# Patient Record
Sex: MALE | Race: BLACK OR AFRICAN AMERICAN | Employment: OTHER | URBAN - METROPOLITAN AREA
[De-identification: names, ages, dates, MRNs, and addresses within clinical notes are randomized per-mention and may not be internally consistent; named-entity substitution may affect disease eponyms.]

---

## 2017-02-22 ENCOUNTER — HOSPITAL ENCOUNTER (EMERGENCY)
Age: 72
Discharge: HOME | End: 2017-02-23
Attending: EMERGENCY MEDICINE | Admitting: EMERGENCY MEDICINE
Payer: MEDICARE

## 2017-02-22 VITALS
RESPIRATION RATE: 12 BRPM | TEMPERATURE: 99.5 F | WEIGHT: 132 LBS | HEART RATE: 82 BPM | BODY MASS INDEX: 19.49 KG/M2 | DIASTOLIC BLOOD PRESSURE: 79 MMHG | SYSTOLIC BLOOD PRESSURE: 109 MMHG | OXYGEN SATURATION: 99 %

## 2017-02-22 DIAGNOSIS — N39.0 URINARY TRACT INFECTION WITH HEMATURIA, SITE UNSPECIFIED: Primary | ICD-10-CM

## 2017-02-22 DIAGNOSIS — R31.9 HEMATURIA: ICD-10-CM

## 2017-02-22 DIAGNOSIS — R33.9 URINARY RETENTION: ICD-10-CM

## 2017-02-22 DIAGNOSIS — R31.9 URINARY TRACT INFECTION WITH HEMATURIA, SITE UNSPECIFIED: Primary | ICD-10-CM

## 2017-02-22 LAB
ABO + RH BLD: NORMAL
ALBUMIN SERPL BCP-MCNC: 3.4 G/DL (ref 3.4–5)
ALBUMIN/GLOB SERPL: 0.8 {RATIO} (ref 0.8–1.7)
ALP SERPL-CCNC: 69 U/L (ref 45–117)
ALT SERPL-CCNC: 28 U/L (ref 16–61)
ANION GAP BLD CALC-SCNC: 5 MMOL/L (ref 3–18)
APPEARANCE UR: ABNORMAL
APTT PPP: 38.8 SEC (ref 23–36.4)
AST SERPL W P-5'-P-CCNC: 13 U/L (ref 15–37)
BACTERIA URNS QL MICRO: ABNORMAL /HPF
BASOPHILS # BLD AUTO: 0 K/UL (ref 0–0.06)
BASOPHILS # BLD: 0 % (ref 0–2)
BILIRUB DIRECT SERPL-MCNC: <0.1 MG/DL (ref 0–0.2)
BILIRUB SERPL-MCNC: 0.3 MG/DL (ref 0.2–1)
BILIRUB UR QL: ABNORMAL
BLOOD GROUP ANTIBODIES SERPL: NORMAL
BUN SERPL-MCNC: 27 MG/DL (ref 7–18)
BUN/CREAT SERPL: 35 (ref 12–20)
CALCIUM SERPL-MCNC: 9.3 MG/DL (ref 8.5–10.1)
CHLORIDE SERPL-SCNC: 107 MMOL/L (ref 100–108)
CO2 SERPL-SCNC: 29 MMOL/L (ref 21–32)
COLOR UR: ABNORMAL
CREAT SERPL-MCNC: 0.78 MG/DL (ref 0.6–1.3)
DIFFERENTIAL METHOD BLD: ABNORMAL
EOSINOPHIL # BLD: 0.1 K/UL (ref 0–0.4)
EOSINOPHIL NFR BLD: 1 % (ref 0–5)
EPITH CASTS URNS QL MICRO: NEGATIVE /LPF (ref 0–5)
ERYTHROCYTE [DISTWIDTH] IN BLOOD BY AUTOMATED COUNT: 17.7 % (ref 11.6–14.5)
GLOBULIN SER CALC-MCNC: 4.1 G/DL (ref 2–4)
GLUCOSE SERPL-MCNC: 101 MG/DL (ref 74–99)
GLUCOSE UR STRIP.AUTO-MCNC: NEGATIVE MG/DL
HCT VFR BLD AUTO: 34.7 % (ref 36–48)
HGB BLD-MCNC: 11 G/DL (ref 13–16)
HGB UR QL STRIP: ABNORMAL
INR PPP: 1.1 (ref 0.8–1.2)
KETONES UR QL STRIP.AUTO: ABNORMAL MG/DL
LEUKOCYTE ESTERASE UR QL STRIP.AUTO: ABNORMAL
LIPASE SERPL-CCNC: 189 U/L (ref 73–393)
LYMPHOCYTES # BLD AUTO: 10 % (ref 21–52)
LYMPHOCYTES # BLD: 1.2 K/UL (ref 0.9–3.6)
MCH RBC QN AUTO: 25.6 PG (ref 24–34)
MCHC RBC AUTO-ENTMCNC: 31.7 G/DL (ref 31–37)
MCV RBC AUTO: 80.9 FL (ref 74–97)
MONOCYTES # BLD: 0.6 K/UL (ref 0.05–1.2)
MONOCYTES NFR BLD AUTO: 5 % (ref 3–10)
MUCOUS THREADS URNS QL MICRO: ABNORMAL /LPF
NEUTS SEG # BLD: 10.3 K/UL (ref 1.8–8)
NEUTS SEG NFR BLD AUTO: 84 % (ref 40–73)
NITRITE UR QL STRIP.AUTO: POSITIVE
PH UR STRIP: 7.5 [PH] (ref 5–8)
PLATELET # BLD AUTO: 356 K/UL (ref 135–420)
PMV BLD AUTO: 10.3 FL (ref 9.2–11.8)
POTASSIUM SERPL-SCNC: 4 MMOL/L (ref 3.5–5.5)
PROT SERPL-MCNC: 7.5 G/DL (ref 6.4–8.2)
PROT UR STRIP-MCNC: >300 MG/DL
PROTHROMBIN TIME: 13.6 SEC (ref 11.5–15.2)
RBC # BLD AUTO: 4.29 M/UL (ref 4.7–5.5)
RBC #/AREA URNS HPF: ABNORMAL /HPF (ref 0–5)
SODIUM SERPL-SCNC: 141 MMOL/L (ref 136–145)
SP GR UR REFRACTOMETRY: 1.02 (ref 1–1.03)
SPECIMEN EXP DATE BLD: NORMAL
UROBILINOGEN UR QL STRIP.AUTO: 1 EU/DL (ref 0.2–1)
WBC # BLD AUTO: 12.3 K/UL (ref 4.6–13.2)
WBC URNS QL MICRO: ABNORMAL /HPF (ref 0–4)

## 2017-02-22 PROCEDURE — 96365 THER/PROPH/DIAG IV INF INIT: CPT

## 2017-02-22 PROCEDURE — 85025 COMPLETE CBC W/AUTO DIFF WBC: CPT | Performed by: PHYSICIAN ASSISTANT

## 2017-02-22 PROCEDURE — 74011250636 HC RX REV CODE- 250/636: Performed by: PHYSICIAN ASSISTANT

## 2017-02-22 PROCEDURE — 86900 BLOOD TYPING SEROLOGIC ABO: CPT | Performed by: PHYSICIAN ASSISTANT

## 2017-02-22 PROCEDURE — 80076 HEPATIC FUNCTION PANEL: CPT | Performed by: PHYSICIAN ASSISTANT

## 2017-02-22 PROCEDURE — 85730 THROMBOPLASTIN TIME PARTIAL: CPT | Performed by: PHYSICIAN ASSISTANT

## 2017-02-22 PROCEDURE — 81001 URINALYSIS AUTO W/SCOPE: CPT | Performed by: PHYSICIAN ASSISTANT

## 2017-02-22 PROCEDURE — 51702 INSERT TEMP BLADDER CATH: CPT

## 2017-02-22 PROCEDURE — 80048 BASIC METABOLIC PNL TOTAL CA: CPT | Performed by: PHYSICIAN ASSISTANT

## 2017-02-22 PROCEDURE — 77030005530 HC CATH URETH FOL40 BARD -B

## 2017-02-22 PROCEDURE — 99285 EMERGENCY DEPT VISIT HI MDM: CPT

## 2017-02-22 PROCEDURE — 85610 PROTHROMBIN TIME: CPT | Performed by: PHYSICIAN ASSISTANT

## 2017-02-22 PROCEDURE — 83690 ASSAY OF LIPASE: CPT | Performed by: PHYSICIAN ASSISTANT

## 2017-02-22 RX ORDER — LEVOFLOXACIN 750 MG/1
750 TABLET ORAL DAILY
Qty: 5 TAB | Refills: 0 | Status: SHIPPED | OUTPATIENT
Start: 2017-02-22 | End: 2017-02-27

## 2017-02-22 RX ORDER — LEVOFLOXACIN 5 MG/ML
750 INJECTION, SOLUTION INTRAVENOUS ONCE
Status: COMPLETED | OUTPATIENT
Start: 2017-02-22 | End: 2017-02-22

## 2017-02-22 RX ADMIN — LEVOFLOXACIN 750 MG: 5 INJECTION, SOLUTION INTRAVENOUS at 09:09

## 2017-02-22 NOTE — ED NOTES
Bedside shift change report given to Solomon Hampton RN (oncoming nurse) by Zhen Rdz RN (offgoing nurse). Report included the following information SBAR.

## 2017-02-22 NOTE — ED PROVIDER NOTES
HPI Comments: Pt is a 71yo male with hx of HTN, dysphagia, brain aneurysm, CVA, stroke, bladder CA presenting to ED from assisted living with rectal bleeding per NH staff. Pt reportedly had clot noted near perineum. Pt has left sided paralysis from past stroke. LBM yesterday. Pt states he was told his \"blood was low\" a few weeks ago. Denies hx of transfusion. Pt denies abdominal pain, back pain, headache, dizziness, fever, chills, SOB, CP, urinary symptoms including hematuria, dysuria. Pt states he has a hx of hemorrhoids. States he had indwelling catheter placed which was removed 8 months ago. Pratik recent diarrhea, melena, bleeding from penis or in stool/rectum. Brianna Prince MD PCP    Patient is a 73 y.o. male presenting with anal bleeding. The history is provided by the patient. Rectal Bleeding   Pertinent negatives include no abdominal pain, no headaches and no shortness of breath.         Past Medical History:   Diagnosis Date    Basal ganglia disease     Bladder cancer ([facility] 3/13/14    Bladder tumor     Brain aneurysm     CVA (cerebral vascular accident) ([facility] [address] [facility] [address]    Dysphagia     Webb catheter in place     HTN (hypertension)     Hx MRSA infection     Neurogenic bladder     Retention, urine     Stroke [facility])        Past Surgical History:   Procedure Laterality Date    HX GI      PEG TUBE--REMOVED    HX HEENT      H/O TRACH    HX UROLOGICAL      super pubic tube    HX UROLOGICAL  3/13/14    TURBT, Dr. Nabila Luke, [place]    HX UROLOGICAL  10/8/15    Cysto, Dr. Cynthia Jo, [facility]         Family History:   Problem Relation Age of Onset    Hypertension Mother     Hypertension Father        Social History     Social History    Marital status: UNKNOWN     Spouse name: N/A    Number of children: 2    Years of education: 15     Occupational History    other Retired     Social History Main Topics    Smoking status: Former Smoker    Smokeless tobacco: Never Used    Alcohol use No    Drug use: No    Sexual activity: Not Currently     Partners: Female     Other Topics Concern     Service No    Blood Transfusions No    Caffeine Concern No    Occupational Exposure No    Hobby Hazards No    Sleep Concern No    Stress Concern No    Weight Concern No    Special Diet No    Back Care No    Exercise No    Bike Helmet No    Seat Belt Yes    Self-Exams No     Social History Narrative         ALLERGIES: Pcn [penicillins]    Review of Systems   Constitutional: Negative for chills and fever. HENT: Negative for congestion. Respiratory: Negative for cough, chest tightness, shortness of breath and wheezing. Gastrointestinal: Positive for anal bleeding. Negative for abdominal distention, abdominal pain, blood in stool, constipation, diarrhea, nausea, rectal pain and vomiting. Genitourinary: Positive for difficulty urinating, hematuria and penile pain. Negative for discharge, dysuria, penile swelling, scrotal swelling, testicular pain and urgency. Musculoskeletal: Positive for gait problem. Negative for back pain. Neurological: Negative for dizziness, weakness and headaches. Psychiatric/Behavioral: Negative for behavioral problems and confusion. Vitals:    02/22/17 0644 02/22/17 0645   BP: (!) 136/8 123/81   Pulse: 62    Resp: 14    Temp: 98.8 °F (37.1 °C)    SpO2: 100% 100%   Weight: 59.9 kg (132 lb)             Physical Exam   Constitutional: He is oriented to person, place, and time. He appears well-developed and well-nourished. HENT:   Head: Normocephalic and atraumatic. Eyes: Conjunctivae are normal. Pupils are equal, round, and reactive to light. Neck: Normal range of motion. Cardiovascular: Normal rate, regular rhythm and normal heart sounds. Pulmonary/Chest: Effort normal. No respiratory distress. He has no wheezes. He exhibits no tenderness. Abdominal: Soft. Bowel sounds are normal. He exhibits no distension and no mass.  There is no tenderness. There is no rebound and no guarding. Genitourinary: Prostate normal and penis normal. Rectal exam shows guaiac positive stool. No penile tenderness. Genitourinary Comments: No external hemorrhoids or internal hemorrhoids appreciated. No blood in rectum or anus. No fissures Penis is normal in appearance, no drainage or bleeding from meatus. No swelling, no TTP    Guaiac pos however blood noted to diaper and to surrounding skin   Musculoskeletal:   Contractures noted to upper and lower extremity, muscle atrophy noted to UE and LE bilaterally   Neurological: He is alert and oriented to person, place, and time. No cranial nerve deficit. Skin: Skin is warm. Nursing note and vitals reviewed. MDM  Number of Diagnoses or Management Options  Hematuria:   Urinary retention:   Urinary tract infection with hematuria, site unspecified:   Diagnosis management comments: 71yo male with blood clot noted by NH staff PTA- unsure of origin of blood. PE unremarkable. Vitals normal, pt is alert, non-toxic, well hydrated and appropriate. Will obtain Labs, ua and reassess patient. Labs Reviewed  CBC WITH AUTOMATED DIFF - Abnormal; Notable for the following:      RBC                           4.29 (*)               HGB                           11.0 (*)               HCT                           34.7 (*)               RDW                           17.7 (*)               NEUTROPHILS                   84 (*)                 LYMPHOCYTES                   10 (*)                 ABS.  NEUTROPHILS              10.3 (*)            All other components within normal limits  METABOLIC PANEL, BASIC - Abnormal; Notable for the following:      Glucose                       101 (*)                BUN                           27 (*)                 BUN/Creatinine ratio          35 (*)              All other components within normal limits  PTT - Abnormal; Notable for the following:      aPTT                          38.8 (*)            All other components within normal limits  HEPATIC FUNCTION PANEL - Abnormal; Notable for the following:      Globulin                      4.1 (*)                AST (SGOT)                    13 (*)              All other components within normal limits  URINALYSIS W/ RFLX MICROSCOPIC - Abnormal; Notable for the following:      Protein                       >300 (*)               Ketone                        TRACE (*)               Bilirubin                     MODERATE (*)               Blood                         LARGE (*)               Nitrites                      POSITIVE (*)               Leukocyte Esterase            TRACE (*)            All other components within normal limits  URINE MICROSCOPIC ONLY - Abnormal; Notable for the following:      Bacteria                      4+ (*)                 Mucus                         1+ (*)              All other components within normal limits  PROTHROMBIN TIME + INR  LIPASE  TYPE & SCREEN    Pt catheterized by ED nurse for urine specimen as pt has been unable to void. Discussed case with Dr. Katie Langley who agrees with ED management. Does not recommend further ED work up at this time and agrees that patient can be discharged back to [facility] with catheter and antibiotic, urology f/u in 48-72 hours. Recommended consulted NH PCP to discuss plan. 12:11 PM   PCP paged 3 times over 2 hour window with no call back. Discussed this with Dr. Katie Langley, he agrees to discharge patient without consultation. Rechecked the patient and updated him on all current results. No new complaints, abdomen remains soft, non-tender. No rectal bleeding, no melena or diarrhea. Impression: UTI, abdominal distension, hematuria. UTI as cause of urinary hematuria/retention. Plan: cipro Rx, perales catheter for retention and urology f/u in 2-3 days    Discussed proper way to take medications.  Discussed treatment plan, return precautions, symptomatic relief, and expected time to improvement. All questions answered. Patient is stable for discharge and outpatient management. NH staff will receive f/u info from ED nurse. Christina Whittington PA-C 12:14 PM       ED Course       Procedures    Diagnosis:   1. Urinary tract infection with hematuria, site unspecified    2. Urinary retention    3. Hematuria          Disposition: discharge home    Follow-up Information     Follow up With Details Comments [address], [place] In 2 days  [address] [address]      [facility] EMERGENCY DEPT  As needed, If symptoms worsen [address] [address]  [phone]    Urology Clinic Schedule an appointment as soon as possible for a visit in 2 days  [address] [place] [address] Rd  [phone]          Patient's Medications   Start Taking    LEVOFLOXACIN (LEVAQUIN) 750 MG TABLET    Take 1 Tab by mouth daily for 5 days. Continue Taking    CALCIUM CITRATE-VITAMIN D3 (CITRACAL+D) 315-200 MG-UNIT TAB    Take 1 Tab by mouth daily (with breakfast). CHOLECALCIFEROL, VITAMIN D3, (VITAMIN D3) 1,000 UNIT CAP    Take  by mouth. FERROUS SULFATE (IRON, FERROUS SULFATE,) 325 MG (65 MG IRON) TABLET    Take 1 Tab by mouth Daily (before breakfast). LISINOPRIL (PRINIVIL, ZESTRIL) 5 MG TABLET    Take 1 Tab by mouth daily. MULTIVIT, IRON, MIN NO. 8, FA (THERAGRAN-M PO)    Take  by mouth. OXYBUTYNIN CHLORIDE XL (DITROPAN XL) 10 MG CR TABLET    Take 1 Tab by mouth two (2) times a day. OXYCODONE IR (ROXICODONE) 10 MG TAB IMMEDIATE RELEASE TABLET    Take 1 Tab by mouth every six (6) hours as needed. Max Daily Amount: 40 mg. VITAMIN C-VITAMIN E (CRANBERRY CONCENTRATE) CAP    Take  by mouth.    These Medications have changed    No medications on file   Stop Taking    No medications on file

## 2017-02-24 ENCOUNTER — HOSPITAL ENCOUNTER (EMERGENCY)
Age: 72
Discharge: ANOTHER HOSPITAL | End: 2017-02-25
Attending: EMERGENCY MEDICINE | Admitting: EMERGENCY MEDICINE
Payer: MEDICARE

## 2017-02-24 DIAGNOSIS — R31.9 HEMATURIA: Primary | ICD-10-CM

## 2017-02-24 DIAGNOSIS — N39.0 URINARY TRACT INFECTION, SITE UNSPECIFIED: ICD-10-CM

## 2017-02-24 LAB
ANION GAP BLD CALC-SCNC: 6 MMOL/L (ref 3–18)
APPEARANCE UR: ABNORMAL
BACTERIA URNS QL MICRO: ABNORMAL /HPF
BASOPHILS # BLD AUTO: 0 K/UL (ref 0–0.1)
BASOPHILS # BLD: 0 % (ref 0–2)
BILIRUB UR QL: ABNORMAL
BUN SERPL-MCNC: 23 MG/DL (ref 7–18)
BUN/CREAT SERPL: 27 (ref 12–20)
CALCIUM SERPL-MCNC: 9 MG/DL (ref 8.5–10.1)
CHLORIDE SERPL-SCNC: 107 MMOL/L (ref 100–108)
CO2 SERPL-SCNC: 28 MMOL/L (ref 21–32)
COLOR UR: ABNORMAL
CREAT SERPL-MCNC: 0.85 MG/DL (ref 0.6–1.3)
DIFFERENTIAL METHOD BLD: ABNORMAL
EOSINOPHIL # BLD: 0.2 K/UL (ref 0–0.4)
EOSINOPHIL NFR BLD: 2 % (ref 0–5)
EPITH CASTS URNS QL MICRO: POSITIVE /LPF (ref 0–5)
ERYTHROCYTE [DISTWIDTH] IN BLOOD BY AUTOMATED COUNT: 17.9 % (ref 11.6–14.5)
GLUCOSE SERPL-MCNC: 112 MG/DL (ref 74–99)
GLUCOSE UR STRIP.AUTO-MCNC: NEGATIVE MG/DL
HCT VFR BLD AUTO: 33.2 % (ref 36–48)
HGB BLD-MCNC: 10.6 G/DL (ref 13–16)
HGB UR QL STRIP: ABNORMAL
KETONES UR QL STRIP.AUTO: NEGATIVE MG/DL
LEUKOCYTE ESTERASE UR QL STRIP.AUTO: ABNORMAL
LYMPHOCYTES # BLD AUTO: 10 % (ref 21–52)
LYMPHOCYTES # BLD: 1.2 K/UL (ref 0.9–3.6)
MCH RBC QN AUTO: 25.8 PG (ref 24–34)
MCHC RBC AUTO-ENTMCNC: 31.9 G/DL (ref 31–37)
MCV RBC AUTO: 80.8 FL (ref 74–97)
MONOCYTES # BLD: 0.8 K/UL (ref 0.05–1.2)
MONOCYTES NFR BLD AUTO: 7 % (ref 3–10)
NEUTS SEG # BLD: 9.9 K/UL (ref 1.8–8)
NEUTS SEG NFR BLD AUTO: 81 % (ref 40–73)
NITRITE UR QL STRIP.AUTO: POSITIVE
PH UR STRIP: 5 [PH] (ref 5–8)
PLATELET # BLD AUTO: 339 K/UL (ref 135–420)
PLATELET COMMENTS,PCOM: ABNORMAL
PMV BLD AUTO: 10.6 FL (ref 9.2–11.8)
POTASSIUM SERPL-SCNC: 3.9 MMOL/L (ref 3.5–5.5)
PROT UR STRIP-MCNC: 30 MG/DL
RBC # BLD AUTO: 4.11 M/UL (ref 4.7–5.5)
RBC #/AREA URNS HPF: ABNORMAL /HPF (ref 0–5)
RBC MORPH BLD: ABNORMAL
RBC MORPH BLD: ABNORMAL
SODIUM SERPL-SCNC: 141 MMOL/L (ref 136–145)
SP GR UR REFRACTOMETRY: 1.03 (ref 1–1.03)
UROBILINOGEN UR QL STRIP.AUTO: 1 EU/DL (ref 0.2–1)
WBC # BLD AUTO: 12.1 K/UL (ref 4.6–13.2)
WBC URNS QL MICRO: POSITIVE /HPF (ref 0–4)

## 2017-02-24 PROCEDURE — 85025 COMPLETE CBC W/AUTO DIFF WBC: CPT | Performed by: EMERGENCY MEDICINE

## 2017-02-24 PROCEDURE — 80048 BASIC METABOLIC PNL TOTAL CA: CPT | Performed by: EMERGENCY MEDICINE

## 2017-02-24 PROCEDURE — 99285 EMERGENCY DEPT VISIT HI MDM: CPT

## 2017-02-24 PROCEDURE — 81001 URINALYSIS AUTO W/SCOPE: CPT | Performed by: EMERGENCY MEDICINE

## 2017-02-25 VITALS
OXYGEN SATURATION: 99 % | TEMPERATURE: 98.9 F | RESPIRATION RATE: 16 BRPM | DIASTOLIC BLOOD PRESSURE: 79 MMHG | HEART RATE: 84 BPM | SYSTOLIC BLOOD PRESSURE: 107 MMHG

## 2017-02-25 NOTE — DISCHARGE INSTRUCTIONS

## 2017-02-25 NOTE — ED TRIAGE NOTES
Patient brought in by EMS from [address]. Patient reports blood in his urine since perales was placed 2 days ago. Patients H&H noted to be slightly low in daily labs today so the patient was sent here for evaluation.

## 2017-02-25 NOTE — ED PROVIDER NOTES
HPI Comments: 10:10 PM Mara Lagos is a 73 y.o. male with h/o HTN and bladder cancer who presents to ED complaining of hematuria. The patient was seen in [address] [place] [address] ED on February 17th, and at that time he had heme-positive stool and hematuria. He was discharged on antibiotics, and instructed to follow-up with urology. The patient is confused about what happened during his last visit, and he denies experiencing any urinary retention. States \"I didn't have any problems until they put the catheter in\". The patient is also complaining of hoarseness. No other concerns or symptoms at this time. PCP: Maude Campbell MD      The history is provided by the patient.         Past Medical History:   Diagnosis Date    Basal ganglia disease     Bladder cancer ([facility] 3/13/14    Bladder tumor     Brain aneurysm     CVA (cerebral vascular accident) ([facility] [address] [facility] [address] [facility] [address] 2012    Dysphagia     Webb catheter in place     HTN (hypertension)     Hx MRSA infection     Neurogenic bladder     Retention, urine     Stroke [facility])        Past Surgical History:   Procedure Laterality Date    HX GI      PEG TUBE--REMOVED    HX HEENT      H/O TRACH    HX UROLOGICAL      super pubic tube    HX UROLOGICAL  3/13/14    TURBT, Dr. Jeri Live    HX UROLOGICAL  10/8/15    Cysto, Dr. Mariana Snowden, [facility]         Family History:   Problem Relation Age of Onset    Hypertension Mother     Hypertension Father        Social History     Social History    Marital status:      Spouse name: N/A    Number of children: 2    Years of education: 15     Occupational History    other Retired     Social History Main Topics    Smoking status: Former Smoker    Smokeless tobacco: Never Used    Alcohol use No    Drug use: No    Sexual activity: Not Currently     Partners: Female     Other Topics Concern     Service No    Blood Transfusions No    Caffeine Concern No    Occupational Exposure No    Hobby Hazards No    Sleep Concern No    Stress Concern No    Weight Concern No    Special Diet No    Back Care No    Exercise No    Bike Helmet No    Seat Belt Yes    Self-Exams No     Social History Narrative         ALLERGIES: Pcn [penicillins]    Review of Systems   Constitutional: Negative for activity change, appetite change, diaphoresis and fever. HENT: Negative for congestion, dental problem, ear pain, hearing loss, nosebleeds, postnasal drip, sinus pressure, sneezing and tinnitus. Eyes: Negative for photophobia, discharge, redness and visual disturbance. Respiratory: Negative for cough, choking, shortness of breath, wheezing and stridor. Cardiovascular: Negative for chest pain, palpitations and leg swelling. Gastrointestinal: Negative for abdominal distention, abdominal pain, anal bleeding and blood in stool. Genitourinary: Positive for hematuria. Negative for decreased urine volume, difficulty urinating, discharge, dysuria, frequency, penile swelling, scrotal swelling, testicular pain and urgency. Musculoskeletal: Negative for arthralgias, back pain, gait problem, joint swelling, myalgias and neck pain. Skin: Negative for color change and pallor. Neurological: Negative for dizziness, tremors, seizures, syncope and headaches. Hematological: Negative for adenopathy. Does not bruise/bleed easily. Psychiatric/Behavioral: Negative for agitation, behavioral problems, confusion and hallucinations. The patient is not nervous/anxious. Vitals:    02/24/17 2230 02/24/17 2245 02/24/17 2300 02/24/17 2315   BP: 100/75 116/76 113/80 108/79   Pulse:       Resp:       Temp:       SpO2: 99% 99% 99% 98%            Physical Exam   Constitutional: He is oriented to person, place, and time. He appears well-developed and well-nourished. Speech is normal, but slightly hoarse. HENT:   Head: Normocephalic and atraumatic.    Right Ear: External ear normal.   Left Ear: External ear normal.   Nose: Nose normal. Mouth/Throat: Posterior oropharyngeal erythema present. Eyes: Conjunctivae and EOM are normal. Pupils are equal, round, and reactive to light. Right eye exhibits no discharge. No scleral icterus. Neck: Normal range of motion. Neck supple. No JVD present. No thyromegaly present. Cardiovascular: Normal rate, regular rhythm and intact distal pulses. Exam reveals no gallop and no friction rub. No murmur heard. Pulmonary/Chest: No respiratory distress. He has no wheezes. He has no rales. He exhibits no tenderness. Abdominal: He exhibits no distension and no mass. There is no tenderness. There is no rebound and no guarding. Genitourinary:   Genitourinary Comments: Webb catheter in place; bloody urine. Musculoskeletal: He exhibits no edema. Contractions in left arm and leg. Lymphadenopathy:     He has no cervical adenopathy. Neurological: He is alert and oriented to person, place, and time. No cranial nerve deficit. Coordination normal.   Skin: Skin is warm and dry. No rash noted. No erythema. Psychiatric: He has a normal mood and affect. His behavior is normal. Judgment normal.   Nursing note and vitals reviewed. MDM  Number of Diagnoses or Management Options  Diagnosis management comments: Return visit with hematuria. Prior ED visit reviewed  Will recheck the UA, CBC.        Amount and/or Complexity of Data Reviewed  Clinical lab tests: ordered  Tests in the radiology section of CPT®: ordered    Risk of Complications, Morbidity, and/or Mortality  Presenting problems: moderate      ED Course       Procedures    Vitals:  Patient Vitals for the past 12 hrs:   Temp Pulse Resp BP SpO2   02/24/17 2315 - - - 108/79 98 %   02/24/17 2300 - - - 113/80 99 %   02/24/17 2245 - - - 116/76 99 %   02/24/17 2230 - - - 100/75 99 %   02/24/17 2215 - - - 98/70 99 %   02/24/17 2200 - - - 110/79 99 %   02/24/17 2145 - - - 111/82 99 %   02/24/17 2130 - - - 116/86 99 %   02/24/17 2119 98.9 °F (37.2 °C) 84 16 119/78 98 %   SpO2 reviewed and within normal limits. Medications ordered:   Medications - No data to display      Lab findings:  Recent Results (from the past 12 hour(s))   CBC WITH AUTOMATED DIFF    Collection Time: 02/24/17 10:46 PM   Result Value Ref Range    WBC 12.1 4.6 - 13.2 K/uL    RBC 4.11 (L) 4.70 - 5.50 M/uL    HGB 10.6 (L) 13.0 - 16.0 g/dL    HCT 33.2 (L) 36.0 - 48.0 %    MCV 80.8 74.0 - 97.0 FL    MCH 25.8 24.0 - 34.0 PG    MCHC 31.9 31.0 - 37.0 g/dL    RDW 17.9 (H) 11.6 - 14.5 %    PLATELET 284 746 - 210 K/uL    MPV 10.6 9.2 - 11.8 FL    NEUTROPHILS 81 (H) 40 - 73 %    LYMPHOCYTES 10 (L) 21 - 52 %    MONOCYTES 7 3 - 10 %    EOSINOPHILS 2 0 - 5 %    BASOPHILS 0 0 - 2 %    ABS. NEUTROPHILS 9.9 (H) 1.8 - 8.0 K/UL    ABS. LYMPHOCYTES 1.2 0.9 - 3.6 K/UL    ABS. MONOCYTES 0.8 0.05 - 1.2 K/UL    ABS. EOSINOPHILS 0.2 0.0 - 0.4 K/UL    ABS.  BASOPHILS 0.0 0.0 - 0.1 K/UL    DF AUTOMATED      PLATELET COMMENTS ADEQUATE PLATELETS      RBC COMMENTS MICROCYTOSIS  1+        RBC COMMENTS ANISOCYTOSIS  1+       METABOLIC PANEL, BASIC    Collection Time: 02/24/17 10:46 PM   Result Value Ref Range    Sodium 141 136 - 145 mmol/L    Potassium 3.9 3.5 - 5.5 mmol/L    Chloride 107 100 - 108 mmol/L    CO2 28 21 - 32 mmol/L    Anion gap 6 3.0 - 18 mmol/L    Glucose 112 (H) 74 - 99 mg/dL    BUN 23 (H) 7.0 - 18 MG/DL    Creatinine 0.85 0.6 - 1.3 MG/DL    BUN/Creatinine ratio 27 (H) 12 - 20      GFR est AA >60 >60 ml/min/1.73m2    GFR est non-AA >60 >60 ml/min/1.73m2    Calcium 9.0 8.5 - 10.1 MG/DL   URINALYSIS W/ RFLX MICROSCOPIC    Collection Time: 02/24/17 10:46 PM   Result Value Ref Range    Color RED      Appearance TURBID      Specific gravity 1.030 1.005 - 1.030      pH (UA) 5.0 5.0 - 8.0      Protein 30 (A) NEG mg/dL    Glucose NEGATIVE  NEG mg/dL    Ketone NEGATIVE  NEG mg/dL    Bilirubin SMALL (A) NEG      Blood SMALL (A) NEG      Urobilinogen 1.0 0.2 - 1.0 EU/dL    Nitrites POSITIVE (A) NEG      Leukocyte Esterase MODERATE (A) NEG     URINE MICROSCOPIC ONLY    Collection Time: 02/24/17 10:46 PM   Result Value Ref Range    WBC POSITIVE 0 - 4 /hpf    RBC TOO NUMEROUS TO COUNT 0 - 5 /hpf    Epithelial cells POSITIVE 0 - 5 /lpf    Bacteria (A) NEG /hpf     UNABLE TO QUANTITATE MICROSCOPIC PARAMETERS DUE TO EXCESSIVE RBCS       EKG interpretation by ED Physician:    X-Ray, CT or other radiology findings or impressions:  No orders to display       Progress notes, Consult notes or additional Procedure notes:     Reevaluation of patient:   12:31 AM I have reassessed the patient and discussed their results and diagnosis. Pt will be discharged in stable condition. Patient is to return to emergency department if any new or worsening condition. Patient understands and verbalizes agreement with plan. Disposition:  Diagnosis:   1. Hematuria    2. Urinary tract infection, site unspecified        Disposition:    Follow-up Information     Follow up With Details Comments [address], [place] Call in 2 days  [address] [address] [address]      [facility] EMERGENCY DEPT Go to As needed, If symptoms worsen [facility] [address]    Nata Ag MD Call in 2 days For Urology Follow-Up [facility]  [phone]             Patient's Medications   Start Taking    No medications on file   Continue Taking    CALCIUM CITRATE-VITAMIN D3 (CITRACAL+D) 315-200 MG-UNIT TAB    Take 1 Tab by mouth daily (with breakfast). CHOLECALCIFEROL, VITAMIN D3, (VITAMIN D3) 1,000 UNIT CAP    Take  by mouth. FERROUS SULFATE (IRON, FERROUS SULFATE,) 325 MG (65 MG IRON) TABLET    Take 1 Tab by mouth Daily (before breakfast). LEVOFLOXACIN (LEVAQUIN) 750 MG TABLET    Take 1 Tab by mouth daily for 5 days. LISINOPRIL (PRINIVIL, ZESTRIL) 5 MG TABLET    Take 1 Tab by mouth daily.     MULTIVIT, IRON, MIN NO. 8, FA (THERAGRAN-M PO)    Take  by mouth.    OXYBUTYNIN CHLORIDE XL (DITROPAN XL) 10 MG CR TABLET    Take 1 Tab by mouth two (2) times a day. OXYCODONE IR (ROXICODONE) 10 MG TAB IMMEDIATE RELEASE TABLET    Take 1 Tab by mouth every six (6) hours as needed. Max Daily Amount: 40 mg. VITAMIN C-VITAMIN E (CRANBERRY CONCENTRATE) CAP    Take  by mouth. These Medications have changed    No medications on file   Stop Taking    No medications on file       SCRIBE ATTESTATION STATEMENT  Documented by: Deandre Kapoor scribing for, and in the presence of, Priyanka Langford MD 10:33 PM     Signed by: Thiago Jernigan, 2/25/2017, 10:33 PM    Scribe [facility] for and in the presence of Priyanka Langford MD (02/25/17)  Signed by: Thiago Keith, February 25, 2017 at 12:31 AM     Physician [address] [place] [address] Rd  I personally performed the services described in this documentation, reviewed and edited the documentation which was dictated to the scribe in my presence, and it accurately records my words and actions.     Priyanka Langford MD (02/25/17)

## 2017-03-22 ENCOUNTER — HOSPITAL ENCOUNTER (EMERGENCY)
Age: 72
Discharge: OTHER INSTITUTION | End: 2017-03-22
Attending: EMERGENCY MEDICINE | Admitting: EMERGENCY MEDICINE
Payer: MEDICARE

## 2017-03-22 VITALS
BODY MASS INDEX: 21.19 KG/M2 | DIASTOLIC BLOOD PRESSURE: 76 MMHG | OXYGEN SATURATION: 98 % | SYSTOLIC BLOOD PRESSURE: 108 MMHG | TEMPERATURE: 98.8 F | HEIGHT: 69 IN | WEIGHT: 143.06 LBS

## 2017-03-22 DIAGNOSIS — T83.9XXA FOLEY CATHETER PROBLEM, INITIAL ENCOUNTER (HCC): Primary | ICD-10-CM

## 2017-03-22 PROCEDURE — 99283 EMERGENCY DEPT VISIT LOW MDM: CPT

## 2017-03-22 NOTE — ED TRIAGE NOTES
Patient brought in by [facility] from [facility] for leaking around perales catheter. Patient denies any complaints at this time.

## 2017-03-23 NOTE — ED NOTES
Report called to [address], [facility] Given [facility] status and treatment. Awaiting transport back to facility.

## 2017-03-23 NOTE — ED PROVIDER NOTES
HPI Comments: Patient transported to ED from skilled nursing facility due to leakage of urine from around Webb over the course of the day. He states he has baseline penile pain without acute change. He denies fever, abdominal pain, hematuria, nausea, vomiting, or change in bowel movements. A review of his medical record reveals he had cystoscopy for dilation of urethral stricture and biopsy of an obstructing bladder mass. Patient is followed by Dr. Ines Potter of Urology. Patient is a 73 y.o. male presenting with urinary catheter problem. The history is provided by the patient.    Urinary Catheter Problem          Past Medical History:   Diagnosis Date    Basal ganglia disease     Benign prostatic hyperplasia with lower urinary tract symptoms     Bladder calculus     Bladder cancer ([facility] [address] 3/13/14    Bladder mass     Bladder tumor     Brain aneurysm     [facility] [address]    Dysphagia     Webb catheter in place     Gross hematuria     HTN (hypertension)     Hx MRSA infection     Neurogenic bladder     Retention, urine     Stroke [facility])        Past Surgical History:   Procedure Laterality Date    HX GI      PEG TUBE--REMOVED    HX HEENT      H/O TRACH    HX UROLOGICAL      super pubic tube    HX UROLOGICAL  3/13/14    TURBT, Dr. Nan Recinos, [facility]    HX UROLOGICAL  10/8/15    Cysto, Dr. Arslan Kim, [facility]         Family History:   Problem Relation Age of Onset    Hypertension Mother     Hypertension Father        Social History     Social History    Marital status:      Spouse name: N/A    Number of children: 2    Years of education: 15     Occupational History    other Retired     Social History Main Topics    Smoking status: Former Smoker    Smokeless tobacco: Never Used    Alcohol use No    Drug use: No    Sexual activity: Not Currently     Partners: Female     Other Topics Concern     Service No    Blood Transfusions No    Caffeine Concern No    Occupational Exposure No    Hobby Hazards No    Sleep Concern No    Stress Concern No    Weight Concern No    Special Diet No    Back Care No    Exercise No    Bike Helmet No    Seat Belt Yes    Self-Exams No     Social History Narrative         ALLERGIES: Pcn [penicillins]    Review of Systems   Constitutional: Negative. HENT: Negative. Eyes: Negative. Respiratory: Negative. Cardiovascular: Negative. Gastrointestinal: Negative. Endocrine: Negative. Genitourinary: Positive for difficulty urinating and penile pain. Negative for decreased urine volume, discharge, dysuria, enuresis, flank pain, frequency, genital sores, hematuria, penile swelling, scrotal swelling, testicular pain and urgency. Musculoskeletal: Negative. Skin: Negative. Allergic/Immunologic: Negative. Neurological: Negative. Hematological: Negative. Psychiatric/Behavioral: Negative. Vitals:    03/22/17 1930 03/22/17 1932 03/22/17 1958   BP: 108/76     Temp: 98.8 °F (37.1 °C)     SpO2:  98%    Weight:   64.9 kg (143 lb 1 oz)   Height:   5' 9\" (1.753 m)            Physical Exam   Constitutional: He appears well-developed and well-nourished. Alert and appropriate in no apparent marked discomfort or acute respiratory distress   HENT:   Head: Normocephalic and atraumatic. Right Ear: External ear normal.   Left Ear: External ear normal.   Mouth/Throat: Oropharynx is clear and moist. No oropharyngeal exudate. Eyes: Conjunctivae and EOM are normal. Pupils are equal, round, and reactive to light. Right eye exhibits no discharge. Left eye exhibits no discharge. No scleral icterus. Neck: Normal range of motion. Neck supple. No tracheal deviation present. No thyromegaly present. Cardiovascular: Normal rate, regular rhythm, normal heart sounds and intact distal pulses. No murmur heard. Pulmonary/Chest: Effort normal and breath sounds normal. No respiratory distress. He has no wheezes.  He has no rales. Abdominal: Soft. Bowel sounds are normal. He exhibits no distension. There is no tenderness. There is no rebound and no guarding. Genitourinary: Penis normal. No penile tenderness. Genitourinary Comments: Webb catheter in place with yellow urine in tube with moderate sedimentation without purulence or hematuria; no swelling or skin changes   Musculoskeletal: Normal range of motion. He exhibits no edema or tenderness. Lymphadenopathy:     He has no cervical adenopathy. Neurological: He is alert. No cranial nerve deficit. Coordination normal.   Skin: Skin is warm. No rash noted. No erythema. Psychiatric: He has a normal mood and affect. His behavior is normal. Judgment and thought content normal.   Nursing note and vitals reviewed. MDM  Number of Diagnoses or Management Options  Webb catheter problem, initial encounter [facility]   Diagnosis management comments: Patient with likely Webb obstruction associated with sedimentation but no clinical signs of UTI or sepsis. Will replace and irrigate Webb but hold on any laboratory testing at this time. Patient agrees with this plan. Risk of Complications, Morbidity, and/or Mortality  Presenting problems: moderate  Diagnostic procedures: low  Management options: moderate    Patient Progress  Patient progress: improved    ED Course       Procedures        Progress notes, Consult notes or additional Procedure notes: Webb changed out without complication and irrigated with clearing of sediment. Appeared to be functioning well at time of discharge. Discussed with Dr. Lety Roberson of Urology - agrees with outpatient follow-up. Reevaluation of patient:   I have reevaluated patient. Patient is feeling comfortable and back to baseline at time of discharge. Dispo:  Patient was discharged in stable condition. Patient is to return to emergency department with any new or worsening condition.

## 2017-03-23 NOTE — DISCHARGE INSTRUCTIONS
Return immediately for increasing pain, fever, inability to pass urine through catheter, or any other concerns

## 2017-03-28 ENCOUNTER — HOSPITAL ENCOUNTER (EMERGENCY)
Age: 72
Discharge: ANOTHER HOSPITAL | End: 2017-03-29
Attending: EMERGENCY MEDICINE | Admitting: EMERGENCY MEDICINE
Payer: MEDICARE

## 2017-03-28 DIAGNOSIS — T83.9XXA FOLEY CATHETER PROBLEM, INITIAL ENCOUNTER (HCC): Primary | ICD-10-CM

## 2017-03-28 LAB
APPEARANCE UR: ABNORMAL
BACTERIA URNS QL MICRO: ABNORMAL /HPF
BILIRUB UR QL: NEGATIVE
COLOR UR: YELLOW
EPITH CASTS URNS QL MICRO: NEGATIVE /LPF (ref 0–5)
GLUCOSE UR STRIP.AUTO-MCNC: NEGATIVE MG/DL
HGB UR QL STRIP: ABNORMAL
KETONES UR QL STRIP.AUTO: NEGATIVE MG/DL
LEUKOCYTE ESTERASE UR QL STRIP.AUTO: ABNORMAL
NITRITE UR QL STRIP.AUTO: NEGATIVE
PH UR STRIP: 6.5 [PH] (ref 5–8)
PROT UR STRIP-MCNC: 100 MG/DL
RBC #/AREA URNS HPF: ABNORMAL /HPF (ref 0–5)
SP GR UR REFRACTOMETRY: 1.02 (ref 1–1.03)
UROBILINOGEN UR QL STRIP.AUTO: 1 EU/DL (ref 0.2–1)
WBC URNS QL MICRO: ABNORMAL /HPF (ref 0–4)

## 2017-03-28 PROCEDURE — 81001 URINALYSIS AUTO W/SCOPE: CPT | Performed by: EMERGENCY MEDICINE

## 2017-03-28 PROCEDURE — 99285 EMERGENCY DEPT VISIT HI MDM: CPT

## 2017-03-28 PROCEDURE — 77030005530 HC CATH URETH FOL40 BARD -B

## 2017-03-28 PROCEDURE — 51703 INSERT BLADDER CATH COMPLEX: CPT

## 2017-03-29 VITALS
RESPIRATION RATE: 17 BRPM | HEART RATE: 64 BPM | OXYGEN SATURATION: 98 % | HEIGHT: 69 IN | BODY MASS INDEX: 21.18 KG/M2 | TEMPERATURE: 98.9 F | WEIGHT: 143 LBS | SYSTOLIC BLOOD PRESSURE: 108 MMHG | DIASTOLIC BLOOD PRESSURE: 73 MMHG

## 2017-03-29 NOTE — ED NOTES
Patient changed and repositioned in bed. I have reviewed discharge instructions with the patient. The patient verbalized understanding. Patient awaiting medical transport at this time.

## 2017-03-29 NOTE — DISCHARGE INSTRUCTIONS
SPECIFIC PATIENT INSTRUCTIONS FROM THE PHYSICIAN WHO TREATED YOU IN THE ER TODAY:  1. Return if any concerns or worsening of condition(s)  2. If you are being discharged with a Webb catheter still in place, then keep it in until seen by your urologist or primary care doctor. 3. If you are being discharged with your Webb catheter removed, then return to ER if inability to urinate, abdominal pain, fever, or any symptoms which worry you. 3. FOLLOW UP APPOINTMENT:  Your urologist and/or primary doctor in the 2-3 days. MyChart Activation    Thank you for requesting access to [employer]. Please follow the instructions below to securely access and download your online medical record. [employer] allows you to send messages to your doctor, view your test results, renew your prescriptions, schedule appointments, and more. How Do I Sign Up? 1. In your internet browser, go to [email or web address]. 2. Click on the First Time User? Click Here link in the Sign In box. You will see the New Member Sign Up page. 3. Enter your [employer] Access Code exactly as it appears below. You will not need to use this code after youve completed the sign-up process. If you do not sign up before the expiration date, you must request a new code. [employer] Access Code: Blank Ruiz  Expires: 2017  9:31 AM (This is the date your [employer] access code will )    4. Enter the last four digits of your Social Security Number (xxxx) and Date of Birth (mm/dd/yyyy) as indicated and click Submit. You will be taken to the next sign-up page. 5. Create a [employer] ID. This will be your [employer] login ID and cannot be changed, so think of one that is secure and easy to remember. 6. Create a [employer] password. You can change your password at any time. 7. Enter your Password Reset Question and Answer. This can be used at a later time if you forget your password. 8. Enter your e-mail address.  You will receive e-mail notification when new information is available in [address] 9. Click Sign Up. You can now view and download portions of your medical record. 10. Click the Download Summary menu link to download a portable copy of your medical information. Additional Information    If you have questions, please visit the Frequently Asked Questions section of the [email or web address] website at [email or web address] Remember, [email or web address] is NOT to be used for urgent needs. For medical emergencies, dial 911.

## 2017-03-29 NOTE — ED PROVIDER NOTES
Patricia Cheung  [address] [facility] EMERGENCY DEPT      8:45 PM. 73 y.o. male with noted past medical history who presents to the emergency department via EMS for urinary catheter complications. The patient states that his catheter is \"backed up,\" and that his nurses could not flush it out. He notes that he has residual left sided weakness from a previous stroke. He denies any abdominal pain. There are no other concerns at this time. No other complaints. No current facility-administered medications for this encounter. Current Outpatient Prescriptions   Medication Sig    Menthol-Zinc Oxide (CALMOSEPTINE) 0.44-20.6 % oint Apply  to affected area.  docusate sodium (COLACE) 100 mg capsule Take 100 mg by mouth two (2) times a day.  oxyCODONE-acetaminophen (PERCOCET) 5-325 mg per tablet Take 1 Tab by mouth every four (4) hours as needed for Pain.  food supplemt, lactose-reduced (ENSURE ACTIVE HIGH PROTEIN) liqd Take 237 mL by mouth four (4) times daily.  promethazine (PHENERGAN) 25 mg tablet Take 25 mg by mouth every six (6) hours as needed for Nausea.  citalopram (CELEXA) 10 mg tablet Take  by mouth daily.  lisinopril (PRINIVIL, ZESTRIL) 5 mg tablet Take 1 Tab by mouth daily.  ferrous sulfate (IRON, FERROUS SULFATE,) 325 mg (65 mg iron) tablet Take 1 Tab by mouth Daily (before breakfast).  oxyCODONE IR (ROXICODONE) 10 mg tab immediate release tablet Take 1 Tab by mouth every six (6) hours as needed. Max Daily Amount: 40 mg.    oxybutynin chloride XL (DITROPAN XL) 10 mg CR tablet Take 1 Tab by mouth two (2) times a day.  calcium citrate-vitamin d3 (CITRACAL+D) 315-200 mg-unit Tab Take 1 Tab by mouth daily (with breakfast).  vitamin c-vitamin e (CRANBERRY CONCENTRATE) cap Take  by mouth.  MULTIVIT, IRON, MIN NO. 8, FA (THERAGRAN-M PO) Take  by mouth.  Cholecalciferol, Vitamin D3, (VITAMIN D3) 1,000 unit cap Take  by mouth.        Past Medical History:   Diagnosis Date    Basal ganglia disease     Benign prostatic hyperplasia with lower urinary tract symptoms     Bladder calculus     Bladder cancer ([facility] 3/13/14    Bladder mass     Bladder tumor     Brain aneurysm     CVA (cerebral vascular accident) ([facility] [address]    Dysphagia     Webb catheter in place     Gross hematuria     HTN (hypertension)     Hx MRSA infection     Neurogenic bladder     Retention, urine     [facility])        Past Surgical History:   Procedure Laterality Date    HX GI      PEG TUBE--REMOVED    HX HEENT      H/O TRACH    HX UROLOGICAL      super pubic tube    HX UROLOGICAL  3/13/14    TURBT, Dr. Indu Henriquez, [place]    HX UROLOGICAL  10/8/15    [facility] Hannah [facility]       Family History   Problem Relation Age of Onset    Hypertension Mother     Hypertension Father        Social History     Social History    Marital status:      Spouse name: N/A    Number of children: 2    Years of education: 15     Occupational History    other Retired     Social History Main Topics    Smoking status: Former Smoker    Smokeless tobacco: Never Used    Alcohol use No    Drug use: No    Sexual activity: Not Currently     Partners: Female     Other Topics Concern     Service No    Blood Transfusions No    Caffeine Concern No    Occupational Exposure No    Hobby Hazards No    Sleep Concern No    Stress Concern No    Weight Concern No    Special Diet No    Back Care No    Exercise No    Bike Helmet No    Seat Belt Yes    Self-Exams No     Social History Narrative       Allergies   Allergen Reactions    Pcn [Penicillins] Other (comments) and Itching     intolerance       Patient's primary care provider (as noted in EPIC): Susy Prader, MD    REVIEW OF SYSTEMS:    Constitutional:  Negative for diaphoresis. HENT:  Negative for congestion. Respiratory:  Negative for cough and shortness of breath. Cardiovascular:  Negative for chest pain and palpitations.    Gastrointestinal: Negative for diarrhea. Genitourinary:  Negative for flank pain. Musculoskeletal:  Negative for back pain. Skin:  Negative for pallor. Neurological:  Negative for weakness. Visit Vitals    /72    Pulse 78    Temp 98.9 °F (37.2 °C)    Resp 22    Ht 5' 9\" (1.753 m)    Wt 64.9 kg (143 lb)    SpO2 100%    BMI 21.12 kg/m2       PHYSICAL EXAM:    CONSTITUTIONAL:  Alert, in no apparent distress;  well developed;  well nourished. HEAD:  Normocephalic, atraumatic. EYES:  EOMI. Non-icteric sclera. Normal conjunctiva. ENTM:  Nose:  no rhinorrhea. Throat:  no erythema or exudate, mucous membranes moist.  NECK:  No JVD. Supple  RESPIRATORY:  Chest clear, equal breath sounds, good air movement. CARDIOVASCULAR:  Regular rate and rhythm. No murmurs, rubs, or gallops. GI:  Normal bowel sounds, abdomen soft and non-tender. No rebound or guarding. No palpable masses.  exam:  No penile or scrotal rash, lesions, bruising. No urethral discharge. No penile, testicular or scrotal tenderness to palpation. Noted Webb catheter in place. BACK:  Non-tender. UPPER EXT:  Normal inspection. LOWER EXT:  No edema, no calf tenderness. Distal pulses intact. NEURO:  Moves all four extremities, and grossly normal motor exam.  SKIN:  No rashes;  Normal for age. PSYCH:  Alert and normal affect.     Abnormal lab results from this emergency department encounter:  Labs Reviewed   URINALYSIS W/ RFLX MICROSCOPIC - Abnormal; Notable for the following:        Result Value    Protein 100 (*)     Blood LARGE (*)     Leukocyte Esterase LARGE (*)     All other components within normal limits   URINE MICROSCOPIC ONLY       Lab values for this patient within approximately the last 12 hours:  Recent Results (from the past 12 hour(s))   URINALYSIS W/ RFLX MICROSCOPIC    Collection Time: 03/28/17  9:57 PM   Result Value Ref Range    Color YELLOW      Appearance TURBID      Specific gravity 1.022 1.005 - 1.030 pH (UA) 6.5 5.0 - 8.0      Protein 100 (A) NEG mg/dL    Glucose NEGATIVE  NEG mg/dL    Ketone NEGATIVE  NEG mg/dL    Bilirubin NEGATIVE  NEG      Blood LARGE (A) NEG      Urobilinogen 1.0 0.2 - 1.0 EU/dL    Nitrites NEGATIVE  NEG      Leukocyte Esterase LARGE (A) NEG         Radiologist and cardiologist interpretations if available at time of this note:  No orders to display       Medication(s) ordered for patient during this emergency visit encounter:  Medications - No data to display    ED COURSE:  Noted UA findings, but no bacterial and no WBC's. Further, given no UTI sxs, will not treat for UTI>     IMPRESSION AND MEDICAL DECISION MAKING:  Based upon the patients presentation with noted HPI and PE, along with the work up done in the emergency department, I believe that the patient is having noted Perales problem, specifically blocked Perales catheter. Catheter was changed out in ED. Follow-up Activity limitations:  None  Condition on Discharge:  Stable     DIAGNOSIS:  1. Clogged perales catheter. SPECIFIC PATIENT INSTRUCTIONS FROM THE PHYSICIAN WHO TREATED YOU IN THE ER TODAY:  1. Return if any concerns or worsening of condition(s)  2. If you are being discharged with a Perales catheter still in place, then keep it in until seen by your urologist or primary care doctor. 3. If you are being discharged with your Perales catheter removed, then return to ER if inability to urinate, abdominal pain, fever, or any symptoms which worry you. 3. FOLLOW UP APPOINTMENT:  Your urologist and/or primary doctor in the 2-3 days. Gli Greene M.D.     Scribe Attestation:   Sameera Deluca am scribing for and in the presence of Adis Carrera MD March 28, 2017 at 9:00 PM     Signed by: Marit Collet, Scribe, 03/28/17, 9:00 PM     Provider Attestation:  If a scribe was utilized in generation of this patient record, I personally performed the services described in the documentation, reviewed the documentation, as recorded by the scribe in my presence, and it accurately records the patient's history of presenting illness, review of systems, patient physical examination, and procedures performed by me as the attending physician. Alan Junior M.D.   [facility] Board Certified Emergency Physician  3/28/2017.  10:08 PM

## 2017-03-29 NOTE — ED NOTES
Received nursing report from [address] [place] [address]. Patient is A/Ox4, resting comfortably on the stretcher. Vital signs are stable, no signs of acute distress noted. Will continue to monitor.

## 2017-04-02 ENCOUNTER — HOSPITAL ENCOUNTER (EMERGENCY)
Age: 72
Discharge: OTHER INSTITUTION | End: 2017-04-02
Attending: EMERGENCY MEDICINE
Payer: MEDICARE

## 2017-04-02 VITALS
SYSTOLIC BLOOD PRESSURE: 102 MMHG | TEMPERATURE: 98.6 F | HEART RATE: 63 BPM | RESPIRATION RATE: 14 BRPM | OXYGEN SATURATION: 96 % | DIASTOLIC BLOOD PRESSURE: 73 MMHG

## 2017-04-02 DIAGNOSIS — Z97.8 FOLEY CATHETER IN PLACE: ICD-10-CM

## 2017-04-02 DIAGNOSIS — R31.9 HEMATURIA: Primary | ICD-10-CM

## 2017-04-02 PROCEDURE — 74011250637 HC RX REV CODE- 250/637: Performed by: EMERGENCY MEDICINE

## 2017-04-02 PROCEDURE — 99285 EMERGENCY DEPT VISIT HI MDM: CPT

## 2017-04-02 RX ORDER — PHENAZOPYRIDINE HYDROCHLORIDE 200 MG/1
200 TABLET, FILM COATED ORAL 3 TIMES DAILY
Qty: 6 TAB | Refills: 0 | Status: SHIPPED | OUTPATIENT
Start: 2017-04-02 | End: 2017-04-04

## 2017-04-02 RX ORDER — PHENAZOPYRIDINE HYDROCHLORIDE 200 MG/1
200 TABLET, FILM COATED ORAL
Status: COMPLETED | OUTPATIENT
Start: 2017-04-02 | End: 2017-04-02

## 2017-04-02 RX ADMIN — PHENAZOPYRIDINE 200 MG: 200 TABLET ORAL at 12:33

## 2017-04-02 NOTE — ED TRIAGE NOTES
Pt arrives from [facility] with c/o penile pain and bleeding s/p perales cathter replacement 2 days ago. Pt is A&OX4. Allergy to Penicillin.

## 2017-04-02 NOTE — ED PROVIDER NOTES
HPI Comments: 12:14 PM Tristen Serra is a 73 y.o. male with a hx of HTN, brain aneurysm, neurogenic bladder, CVA, and an in-dwelling catheter who presents to the ED c/o penile pain that started after getting his perales catheter changed two days ago. He has not had any trauma to the area since the perales was placed, but they have been flushing his perales which increases his pain. He has an appointment with his Urologist 4/13/17. He denies leaking from around the catheter, fever, vomiting, abdominal pain, and any further complaints. The history is provided by the patient.         Past Medical History:   Diagnosis Date    Basal ganglia disease     Benign prostatic hyperplasia with lower urinary tract symptoms     Bladder calculus     Bladder cancer ([facility] [address] 3/13/14    Bladder mass     Bladder tumor     Brain aneurysm     CVA (cerebral vascular accident) ([facility] [address] [facility] [place] [address]    Dysphagia     Perales catheter in place     Gross hematuria     HTN (hypertension)     Hx MRSA infection     Neurogenic bladder     Retention, urine     Stroke [facility])        Past Surgical History:   Procedure Laterality Date    HX GI      PEG TUBE--REMOVED    HX HEENT      H/O TRACH    HX UROLOGICAL      super pubic tube    HX UROLOGICAL  3/13/14    TURBT, Dr. Deepika Morales, [facility]    HX UROLOGICAL  10/8/15    Cysto, Dr. Duane Ross, [facility]         Family History:   Problem Relation Age of Onset    Hypertension Mother     Hypertension Father        Social History     Social History    Marital status:      Spouse name: N/A    Number of children: 2    Years of education: 15     Occupational History    other Retired     Social History Main Topics    Smoking status: Former Smoker    Smokeless tobacco: Never Used    Alcohol use No    Drug use: No    Sexual activity: Not Currently     Partners: Female     Other Topics Concern     Service No    Blood Transfusions No    Caffeine Concern No    Occupational Exposure No    Hobby Hazards No    Sleep Concern No    Stress Concern No    Weight Concern No    Special Diet No    Back Care No    Exercise No    Bike Helmet No    Seat Belt Yes    Self-Exams No     Social History Narrative         ALLERGIES: Pcn [penicillins]    Review of Systems   Constitutional: Negative for chills and fever. HENT: Negative. Negative for congestion and sore throat. Eyes: Negative. Respiratory: Negative. Negative for cough and shortness of breath. Cardiovascular: Negative. Negative for chest pain and leg swelling. Gastrointestinal: Negative. Negative for abdominal pain, nausea and vomiting. Genitourinary: Positive for discharge and penile pain. Negative for decreased urine volume, difficulty urinating, dysuria, enuresis, flank pain, frequency, genital sores, hematuria, penile swelling, scrotal swelling, testicular pain and urgency. Musculoskeletal: Negative. Negative for back pain. Skin: Negative for rash and wound. Neurological: Positive for weakness. Negative for syncope, light-headedness and headaches. Weakness associated with prior CVA- no new weakness   Psychiatric/Behavioral: Negative for behavioral problems. The patient is not nervous/anxious. Vitals:    04/02/17 1207   BP: 115/84   Pulse: 68   Resp: 16   Temp: 98.6 °F (37 °C)   SpO2: 100%            Physical Exam   Constitutional: He appears well-developed and well-nourished. Alert and appropriate in no apparent marked discomfort or acute respiratory distress   HENT:   Head: Normocephalic and atraumatic. Right Ear: External ear normal.   Left Ear: External ear normal.   Mouth/Throat: Oropharynx is clear and moist. No oropharyngeal exudate. Eyes: Conjunctivae and EOM are normal. Pupils are equal, round, and reactive to light. Right eye exhibits no discharge. Left eye exhibits no discharge. No scleral icterus. Neck: Normal range of motion. Neck supple. No tracheal deviation present.  No thyromegaly present. Cardiovascular: Normal rate, regular rhythm, normal heart sounds and intact distal pulses. Exam reveals no gallop and no friction rub. No murmur heard. Pulmonary/Chest: Effort normal and breath sounds normal. No respiratory distress. He has no wheezes. He has no rales. Abdominal: Soft. Bowel sounds are normal. He exhibits no distension and no mass. There is no tenderness. There is no rebound and no guarding. Genitourinary:   Genitourinary Comments: Perales catheter in place with clear yellow urine in tubing and bag; small amount of blood around urethral meatus without signs of active hemorrhage or ulceration   Musculoskeletal: Normal range of motion. He exhibits no edema or tenderness. Lymphadenopathy:     He has no cervical adenopathy. Neurological: He is alert. He exhibits abnormal muscle tone. Coordination abnormal.   Left sided weakness c/w prior CVA   Skin: Skin is warm. No rash noted. No erythema. Psychiatric: He has a normal mood and affect. His behavior is normal. Judgment and thought content normal.   Nursing note and vitals reviewed. MDM  Number of Diagnoses or Management Options  Diagnosis management comments: Patient with indwelling perales following endoscopic evaluation for bladder mass and urethral stricture. No signs of acute infection but patient still having small amount of blood around catheter, which is functioning well. Patient states discomfort is mainly with irrigation and has been there since initial placement. No indication that Perales needs to be replaced at this time. Will try adding prn pyridium and have follow-up with Urology this week. May need suprapubic catheter due to recurrent ED visits and poor tolerance of Perales catheter.      Risk of Complications, Morbidity, and/or Mortality  Presenting problems: moderate  Diagnostic procedures: low  Management options: moderate    Patient Progress  Patient progress: improved    ED Course Procedures    Vitals:  Patient Vitals for the past 12 hrs:   Temp Pulse Resp BP SpO2   04/02/17 1207 98.6 °F (37 °C) 68 16 115/84 100 %   Pulse ox reviewed and WNL    Medications ordered:   Medications   phenazopyridine (PYRIDIUM) tablet 200 mg (200 mg Oral Given 4/2/17 1233)         Progress notes, Consult notes or additional Procedure notes:   Patient had symptomatic relief with pyridium without signs of fever or Webb obstruction. No signs of marked hemorrhage in ED. Precautions given. Reevaluation of patient:   Patient is resting comfortably at time of discharge. Disposition:  Diagnosis:   1. Hematuria    2. Webb catheter in place        Disposition: Discharge    Follow-up Information     None           Patient's Medications   Start Taking    No medications on file   Continue Taking    CALCIUM CITRATE-VITAMIN D3 (CITRACAL+D) 315-200 MG-UNIT TAB    Take 1 Tab by mouth daily (with breakfast). CHOLECALCIFEROL, VITAMIN D3, (VITAMIN D3) 1,000 UNIT CAP    Take  by mouth. CITALOPRAM (CELEXA) 10 MG TABLET    Take  by mouth daily. DOCUSATE SODIUM (COLACE) 100 MG CAPSULE    Take 100 mg by mouth two (2) times a day. FERROUS SULFATE (IRON, FERROUS SULFATE,) 325 MG (65 MG IRON) TABLET    Take 1 Tab by mouth Daily (before breakfast). FOOD SUPPLEMT, LACTOSE-REDUCED (ENSURE ACTIVE HIGH PROTEIN) LIQD    Take 237 mL by mouth four (4) times daily. LISINOPRIL (PRINIVIL, ZESTRIL) 5 MG TABLET    Take 1 Tab by mouth daily. MENTHOL-ZINC OXIDE (CALMOSEPTINE) 0.44-20.6 % OINT    Apply  to affected area. MULTIVIT, IRON, MIN NO. 8, FA (THERAGRAN-M PO)    Take  by mouth. OXYBUTYNIN CHLORIDE XL (DITROPAN XL) 10 MG CR TABLET    Take 1 Tab by mouth two (2) times a day. OXYCODONE IR (ROXICODONE) 10 MG TAB IMMEDIATE RELEASE TABLET    Take 1 Tab by mouth every six (6) hours as needed. Max Daily Amount: 40 mg.     OXYCODONE-ACETAMINOPHEN (PERCOCET) 5-325 MG PER TABLET    Take 1 Tab by mouth every four (4) hours as needed for Pain. PROMETHAZINE (PHENERGAN) 25 MG TABLET    Take 25 mg by mouth every six (6) hours as needed for Nausea. VITAMIN C-VITAMIN E (CRANBERRY CONCENTRATE) CAP    Take  by mouth. These Medications have changed    No medications on file   Stop Taking    No medications on file       SCRIBE ATTESTATION STATEMENT  Documented by: Mary Jane Casiano for, and in the presence of, Efren Domínguez MD 12:29 PM     Signed by: Thiago Gordon, 04/02/17 12:29 PM    PROVIDER ATTESTATION STATEMENT  I personally performed the services described in the documentation, reviewed the documentation, as recorded by the scribe in my presence, and it accurately and completely records my words and actions.   Efren Domínguez MD

## 2017-04-02 NOTE — DISCHARGE INSTRUCTIONS
Patient's urine may turn orange with medication for discomfort- only give if having pain. Return immediately for increasing pain, heavy bleeding, obstructed catheter, or any other concerns.

## 2017-04-27 ENCOUNTER — HOSPITAL ENCOUNTER (EMERGENCY)
Age: 72
Discharge: HOME | End: 2017-04-28
Attending: EMERGENCY MEDICINE | Admitting: EMERGENCY MEDICINE
Payer: MEDICARE

## 2017-04-27 DIAGNOSIS — T83.9XXA FOLEY CATHETER PROBLEM, INITIAL ENCOUNTER (HCC): Primary | ICD-10-CM

## 2017-04-27 PROCEDURE — 99283 EMERGENCY DEPT VISIT LOW MDM: CPT

## 2017-04-27 PROCEDURE — 77030005514 HC CATH URETH FOL14 BARD -A

## 2017-04-28 VITALS
HEART RATE: 75 BPM | RESPIRATION RATE: 18 BRPM | DIASTOLIC BLOOD PRESSURE: 80 MMHG | HEIGHT: 69 IN | SYSTOLIC BLOOD PRESSURE: 111 MMHG | OXYGEN SATURATION: 99 % | WEIGHT: 135 LBS | TEMPERATURE: 98.3 F | BODY MASS INDEX: 19.99 KG/M2

## 2017-04-28 PROCEDURE — 87070 CULTURE OTHR SPECIMN AEROBIC: CPT | Performed by: NURSE PRACTITIONER

## 2017-04-28 PROCEDURE — 74011000250 HC RX REV CODE- 250: Performed by: EMERGENCY MEDICINE

## 2017-04-28 RX ORDER — LIDOCAINE HYDROCHLORIDE 20 MG/ML
JELLY TOPICAL
Status: DISCONTINUED
Start: 2017-04-28 | End: 2017-04-28 | Stop reason: HOSPADM

## 2017-04-28 RX ORDER — LIDOCAINE HYDROCHLORIDE 20 MG/ML
JELLY TOPICAL ONCE
Status: COMPLETED | OUTPATIENT
Start: 2017-04-28 | End: 2017-04-28

## 2017-04-28 RX ORDER — LIDOCAINE HCL/PF 100 MG/5ML
SYRINGE (ML) INTRAVENOUS
Status: DISCONTINUED
Start: 2017-04-28 | End: 2017-04-28 | Stop reason: HOSPADM

## 2017-04-28 RX ADMIN — LIDOCAINE HYDROCHLORIDE: 20 JELLY TOPICAL at 01:00

## 2017-04-28 NOTE — ED PROVIDER NOTES
HPI Comments: Pt with hx of neurogenic bladder and bladder cancer is presented from nursing facility with CC of perales catheter not draining. No reports of fever, nausea or vomiting. Pt denies abdominal pain any pain or other symptoms.         Past Medical History:   Diagnosis Date    Abnormal computed tomography of bladder     Basal ganglia disease     Benign prostatic hyperplasia with lower urinary tract symptoms     Bladder calculus     Bladder cancer ([facility] [address] 3/13/14    Bladder mass     Bladder tumor     Brain aneurysm     CVA (cerebral vascular accident) ([facility] [address]    Dysphagia     Erythematous bladder mucosa     Perales catheter in place     Gross hematuria     HTN (hypertension)     Hx MRSA infection     Neurogenic bladder     Retention, urine     Stroke ([facility] [address]     Urethral stricture        Past Surgical History:   Procedure Laterality Date    HX GI      PEG TUBE--REMOVED    HX HEENT      H/O TRACH    HX UROLOGICAL      super pubic tube    HX UROLOGICAL  3/13/14    [facility]    HX UROLOGICAL  10/8/15    [facility]yst[facility], Dr. Jef [facility], [facility]         Family History:   Problem Relation Age of Onset    Hypertension Mother     Hypertension Father        Social History     Social History    Marital status:      Spouse name: N/A    Number of children: 2    Years of education: 15     Occupational History    other Retired     Social History Main Topics    Smoking status: Former Smoker    Smokeless tobacco: Never Used    Alcohol use No    Drug use: No    Sexual activity: Not Currently     Partners: Female     Other Topics Concern     Service No    Blood Transfusions No    Caffeine Concern No    Occupational Exposure No    Hobby Hazards No    Sleep Concern No    Stress Concern No    Weight Concern No    Special Diet No    Back Care No    Exercise No    Bike Helmet No    Seat Belt Yes    Self-Exams No     Social History Narrative         ALLERGIES: Pcn [penicillins]    Review of Systems   Constitutional: Negative for chills and fever. Respiratory: Negative for cough and shortness of breath. Cardiovascular: Negative for chest pain. Gastrointestinal: Negative for abdominal pain, nausea and vomiting. Genitourinary: Positive for difficulty urinating and hematuria. Negative for penile pain, scrotal swelling and testicular pain. Pt with neurogenic bladder and bladder cancer with chronic indwelling catheter   Skin: Negative for color change and pallor. Neurological: Negative for weakness and headaches. Hematological: Does not bruise/bleed easily. All other systems reviewed and are negative. Vitals:    04/27/17 2144   BP: 102/69   Pulse: 81   Resp: 20   Temp: 99.4 °F (37.4 °C)   SpO2: 99%   Weight: 61.2 kg (135 lb)   Height: 5' 9\" (1.753 m)            Physical Exam   Constitutional: He appears well-developed and well-nourished. No distress. HENT:   Head: Normocephalic and atraumatic. Eyes: Conjunctivae and EOM are normal. Pupils are equal, round, and reactive to light. Neck: Normal range of motion. Cardiovascular: Normal rate, regular rhythm and normal heart sounds. Pulmonary/Chest: Effort normal and breath sounds normal.   Abdominal: Soft. Bowel sounds are normal. He exhibits no distension. There is no tenderness. There is no rebound and no guarding. Genitourinary:   Genitourinary Comments: Crusty dried drainage at catheter insertion. Tip of catheter sent for culture   Neurological: He is alert. He exhibits normal muscle tone. Coordination normal.   Skin: He is not diaphoretic. Psychiatric: He has a normal mood and affect. His behavior is normal.   Nursing note and vitals reviewed. MDM  Number of Diagnoses or Management Options  Diagnosis management comments: Pt's indwelling perales changed by staff and draining well. Catheter tip sent for culture due to build up on it.      Risk of Complications, Morbidity, and/or Mortality  Presenting problems: moderate  Diagnostic procedures: moderate  Management options: moderate    Patient Progress  Patient progress: improved    ED Course       Procedures                       Diagnosis:   1. Webb catheter problem, initial encounter [facility])          Disposition: home      Follow-up Information     Follow up With Details Comments [address], [place] Call Monday for catheter tip culture results [address] [facility] [address]  [phone]      [facility] EMERGENCY DEPT  If symptoms worsen [address] [address]  [phone]          Patient's Medications   Start Taking    No medications on file   Continue Taking    CALCIUM CITRATE-VITAMIN D3 (CITRACAL+D) 315-200 MG-UNIT TAB    Take 1 Tab by mouth daily (with breakfast). CHOLECALCIFEROL, VITAMIN D3, (VITAMIN D3) 1,000 UNIT CAP    Take  by mouth. CITALOPRAM (CELEXA) 10 MG TABLET    Take  by mouth daily. DOCUSATE SODIUM (COLACE) 100 MG CAPSULE    Take 100 mg by mouth two (2) times a day. FERROUS SULFATE (IRON, FERROUS SULFATE,) 325 MG (65 MG IRON) TABLET    Take 1 Tab by mouth Daily (before breakfast). FOOD SUPPLEMT, LACTOSE-REDUCED (ENSURE ACTIVE HIGH PROTEIN) LIQD    Take 237 mL by mouth four (4) times daily. LISINOPRIL (PRINIVIL, ZESTRIL) 5 MG TABLET    Take 1 Tab by mouth daily. MENTHOL-ZINC OXIDE (CALMOSEPTINE) 0.44-20.6 % OINT    Apply  to affected area. MULTIVIT, IRON, MIN NO. 8, FA (THERAGRAN-M PO)    Take  by mouth. OXYBUTYNIN CHLORIDE XL (DITROPAN XL) 10 MG CR TABLET    Take 1 Tab by mouth two (2) times a day. OXYCODONE IR (ROXICODONE) 10 MG TAB IMMEDIATE RELEASE TABLET    Take 1 Tab by mouth every six (6) hours as needed. Max Daily Amount: 40 mg. OXYCODONE-ACETAMINOPHEN (PERCOCET) 5-325 MG PER TABLET    Take 1 Tab by mouth every four (4) hours as needed for Pain.     PROMETHAZINE (PHENERGAN) 25 MG TABLET    Take 25 mg by mouth every six (6) hours as needed for Nausea. VITAMIN C-VITAMIN E (CRANBERRY CONCENTRATE) CAP    Take  by mouth.    These Medications have changed    No medications on file   Stop Taking    No medications on file

## 2017-04-28 NOTE — ED NOTES
I have reviewed discharge instructions with the patient. The patient verbalized understanding. Vital signs stable at this time. Left ED in stable condition with life care transport team back to [email or web address]

## 2017-04-28 NOTE — DISCHARGE INSTRUCTIONS
Learning About Urinary Catheter Care to Prevent Infection  What is a urinary catheter? A urinary catheter is a flexible plastic tube used to drain urine from your bladder when you can't urinate on your own. The catheter allows urine to drain from the bladder into a bag. Two types of drainage bags may be used with a urinary catheter. · A bedside bag is a large bag that you can hang on the side of your bed or on a chair. You can use it overnight or anytime you will be sitting or lying down for a long time. · A leg bag is a small bag that you can use during the day. It is usually attached to your thigh or calf and hidden under your clothes. Having a urinary catheter increases your risk of getting a urinary tract infection. Germs may get on the catheter and cause an infection in your bladder or kidneys. The longer you have a catheter, the more likely it is that you will get an infection. You can help prevent this problem with good hygiene and careful handling of your catheter and drainage bags. How can you help prevent infection? Take care to be clean  · Always wash your hands well before and after you handle your catheter. · Clean the skin around the catheter twice a day using soap and water. Dry with a clean towel afterward. You can shower with your catheter and drainage bag in place unless your doctor told you not to. · When you clean around the catheter, check the surrounding skin for signs of infection. Look for things like pus or irritated, swollen, red, or tender skin around the catheter. Be careful with your drainage bag  · Always keep the drainage bag below the level of your bladder. This will help keep urine from flowing back into your bladder. · Check often to see that urine is flowing through the catheter into the drainage bag. · Empty the drainage bag when it is half full. This will keep it from overflowing or backing up.   · When you empty the drainage bag, do not let the tubing or drain spout touch anything. Be careful with your catheter  · Do not unhook the catheter from the drain tube. That could let germs get into the tube. · Make sure that the catheter tubing does not get twisted or kinked. · Do not tug or pull on the catheter. And make sure that the drainage bag does not drag or pull on the catheter. · Do not put powder or lotion on the skin around the catheter. · Do not have sexual intercourse while wearing a catheter. How do you empty a urine drainage bag? .  If your doctor has asked you to keep a record, write down the amount of urine in the bag before you empty it. Wash your hands before and after you touch the bag. 1. Remove the drain spout from its sleeve at the bottom of the drainage bag.  2. Open the valve on the drain spout. Let the urine flow out into the toilet or a container. Be careful not to let the tubing or drain spout touch anything. 3. After you empty the bag, wipe off any liquid on the end of the drain spout. Close the valve. Then put the drain spout back into its sleeve at the bottom of the collection bag. How do you change from a bedside bag to a leg bag? Wash your hands before and after you handle the bags. 1. Empty the bag attached to the catheter. 2. Put a clean towel under the catheter where it connects to the bag.  3. Fold and pinch the catheter closed to keep urine from leaking out. Many catheters have a clip you can use to pinch the tube closed. 4. Remove the used bag from the catheter. 5. Use an alcohol wipe to clean the tip of the leg bag. Then connect the leg bag to the catheter. 6. Strap the leg bag to your thigh or calf. Be sure the straps are not too tight. How can you clean a drainage bag? Clean your bags every day. Many people clean their bedside bag in the morning when they switch to a leg bag. At night, they attach the bedside bag and clean the leg bag. To clean a drainage ba. Remove the bag from the catheter.   2. Fill the bag with 2 parts vinegar and 3 parts water. Let it stand for 20 minutes. 3. Empty the bag, and let it air dry. When should you call for help? Call your doctor now or seek immediate medical care if:  · You have symptoms of a urinary infection. These may include:  ¨ Pain or burning when you urinate. ¨ A frequent need to urinate without being able to pass much urine. ¨ Pain in the flank, which is just below the rib cage and above the waist on either side of the back. ¨ Blood in your urine. ¨ A fever. · Your urine smells bad. · You see large blood clots in your urine. · No urine or very little urine is flowing into the bag for 4 or more hours. Watch closely for changes in your health, and be sure to contact your doctor if:  · The area around the catheter becomes irritated, swollen, red, or tender, or there is pus draining from it. · Urine is leaking from the place where the catheter enters your body. Follow-up care is a key part of your treatment and safety. Be sure to make and go to all appointments, and call your doctor if you are having problems. It's also a good idea to know your test results and keep a list of the medicines you take. Where can you learn more? Go to http://lorna-kristina.info/. Enter C910 in the search box to learn more about \"Learning About Urinary Catheter Care to Prevent Infection. \"  Current as of: August 12, 2016  Content Version: 11.2  © 7433-3557 [employer]. Care instructions adapted under license by [employer] (which disclaims liability or warranty for this information). If you have questions about a medical condition or this instruction, always ask your healthcare professional. [address] any warranty or liability for your use of this information. [employer] Activation    Thank you for requesting access to [employer]. Please follow the instructions below to securely access and download your online medical record.  [employer] allows you to send messages to your doctor, view your test results, renew your prescriptions, schedule appointments, and more. How Do I Sign Up? 1. In your internet browser, go to [email or web address]  2. Click on the First Time User? Click Here link in the Sign In box. You will be redirect to the New Member Sign Up page. 3. Enter your Hundo Access Code exactly as it appears below. You will not need to use this code after youve completed the sign-up process. If you do not sign up before the expiration date, you must request a new code. Hundo Access Code: Carolynn Galvez  Expires: 2017  9:31 AM (This is the date your Hundo access code will )    4. Enter the last four digits of your Social Security Number (xxxx) and Date of Birth (mm/dd/yyyy) as indicated and click Submit. You will be taken to the next sign-up page. 5. Create a Hundo ID. This will be your Hundo login ID and cannot be changed, so think of one that is secure and easy to remember. 6. Create a Hundo password. You can change your password at any time. 7. Enter your Password Reset Question and Answer. This can be used at a later time if you forget your password. 8. Enter your e-mail address. You will receive e-mail notification when new information is available in [address] 9. Click Sign Up. You can now view and download portions of your medical record. 10. Click the Download Summary menu link to download a portable copy of your medical information. Additional Information    If you have questions, please visit the Frequently Asked Questions section of the Hundo website at [email or web address] Remember, Hundo is NOT to be used for urgent needs. For medical emergencies, dial 911.

## 2017-04-28 NOTE — ED NOTES
Present perales has yellow and green debride inside of the external catheter, it was very difficult to remove the perales from the urethra, upon finally removing the catheter the perales was very dry and has some crystallization lodged into the rubber. Provider notified, new catheter will be placed.

## 2017-05-09 ENCOUNTER — HOSPITAL ENCOUNTER (EMERGENCY)
Age: 72
Discharge: HOME | End: 2017-05-09
Attending: EMERGENCY MEDICINE | Admitting: EMERGENCY MEDICINE
Payer: MEDICARE

## 2017-05-09 VITALS
BODY MASS INDEX: 20.38 KG/M2 | RESPIRATION RATE: 18 BRPM | OXYGEN SATURATION: 99 % | TEMPERATURE: 98.8 F | DIASTOLIC BLOOD PRESSURE: 72 MMHG | WEIGHT: 138 LBS | HEART RATE: 83 BPM | SYSTOLIC BLOOD PRESSURE: 109 MMHG

## 2017-05-09 DIAGNOSIS — T83.011A MALFUNCTION OF FOLEY CATHETER, INITIAL ENCOUNTER (HCC): Primary | ICD-10-CM

## 2017-05-09 DIAGNOSIS — N30.00 ACUTE CYSTITIS WITHOUT HEMATURIA: ICD-10-CM

## 2017-05-09 LAB
APPEARANCE UR: ABNORMAL
BACTERIA URNS QL MICRO: ABNORMAL /HPF
BILIRUB UR QL: NEGATIVE
COLOR UR: ABNORMAL
EPITH CASTS URNS QL MICRO: ABNORMAL /LPF (ref 0–5)
GLUCOSE UR STRIP.AUTO-MCNC: NEGATIVE MG/DL
HGB UR QL STRIP: ABNORMAL
KETONES UR QL STRIP.AUTO: NEGATIVE MG/DL
LEUKOCYTE ESTERASE UR QL STRIP.AUTO: ABNORMAL
NITRITE UR QL STRIP.AUTO: NEGATIVE
PH UR STRIP: 7 [PH] (ref 5–8)
PROT UR STRIP-MCNC: 300 MG/DL
RBC #/AREA URNS HPF: ABNORMAL /HPF (ref 0–5)
SP GR UR REFRACTOMETRY: 1.03 (ref 1–1.03)
UROBILINOGEN UR QL STRIP.AUTO: 1 EU/DL (ref 0.2–1)
WBC URNS QL MICRO: ABNORMAL /HPF (ref 0–4)

## 2017-05-09 PROCEDURE — 74011250636 HC RX REV CODE- 250/636: Performed by: EMERGENCY MEDICINE

## 2017-05-09 PROCEDURE — 81001 URINALYSIS AUTO W/SCOPE: CPT | Performed by: EMERGENCY MEDICINE

## 2017-05-09 PROCEDURE — 96365 THER/PROPH/DIAG IV INF INIT: CPT

## 2017-05-09 PROCEDURE — 99283 EMERGENCY DEPT VISIT LOW MDM: CPT

## 2017-05-09 RX ORDER — LEVOFLOXACIN 500 MG/1
500 TABLET, FILM COATED ORAL DAILY
Qty: 10 TAB | Refills: 0 | Status: SHIPPED | OUTPATIENT
Start: 2017-05-09 | End: 2017-05-16

## 2017-05-09 RX ORDER — LEVOFLOXACIN 5 MG/ML
500 INJECTION, SOLUTION INTRAVENOUS
Status: COMPLETED | OUTPATIENT
Start: 2017-05-09 | End: 2017-05-09

## 2017-05-09 RX ADMIN — LEVOFLOXACIN 500 MG: 5 INJECTION, SOLUTION INTRAVENOUS at 19:53

## 2017-05-09 NOTE — ED PROVIDER NOTES
[facility] EMERGENCY DEPT      73 y.o. male with noted past medical history who presents to the emergency department c/o perales blockage for 2 days. Pt has reported to [facility] ED for same issues previously. Pt states \"it get stopped up every month\". Pt has no abd pain or any other acute sxs. Pt has a TIA in 2012 and has had the perales ever since. No other acute concerns. No other complaints. No current facility-administered medications for this encounter. Current Outpatient Prescriptions   Medication Sig    Menthol-Zinc Oxide (CALMOSEPTINE) 0.44-20.6 % oint Apply  to affected area.  docusate sodium (COLACE) 100 mg capsule Take 100 mg by mouth two (2) times a day.  oxyCODONE-acetaminophen (PERCOCET) 5-325 mg per tablet Take 1 Tab by mouth every four (4) hours as needed for Pain.  food supplemt, lactose-reduced (ENSURE ACTIVE HIGH PROTEIN) liqd Take 237 mL by mouth four (4) times daily.  promethazine (PHENERGAN) 25 mg tablet Take 25 mg by mouth every six (6) hours as needed for Nausea.  citalopram (CELEXA) 10 mg tablet Take  by mouth daily.  lisinopril (PRINIVIL, ZESTRIL) 5 mg tablet Take 1 Tab by mouth daily.  ferrous sulfate (IRON, FERROUS SULFATE,) 325 mg (65 mg iron) tablet Take 1 Tab by mouth Daily (before breakfast).  oxyCODONE IR (ROXICODONE) 10 mg tab immediate release tablet Take 1 Tab by mouth every six (6) hours as needed. Max Daily Amount: 40 mg.    oxybutynin chloride XL (DITROPAN XL) 10 mg CR tablet Take 1 Tab by mouth two (2) times a day.  calcium citrate-vitamin d3 (CITRACAL+D) 315-200 mg-unit Tab Take 1 Tab by mouth daily (with breakfast).  vitamin c-vitamin e (CRANBERRY CONCENTRATE) cap Take  by mouth.  MULTIVIT, IRON, MIN NO. 8, FA (THERAGRAN-M PO) Take  by mouth.  Cholecalciferol, Vitamin D3, (VITAMIN D3) 1,000 unit cap Take  by mouth.        Past Medical History:   Diagnosis Date    Abnormal computed tomography of bladder     Basal ganglia disease     Benign prostatic hyperplasia with lower urinary tract symptoms     Bladder calculus     Bladder cancer ([facility] [address] 3/13/14    Bladder mass     Bladder tumor     Brain aneurysm     CVA (cerebral vascular accident) ([facility] [address]    Dysphagia     Erythematous bladder mucosa     Webb catheter in place     Gross hematuria     HTN (hypertension)     Hx MRSA infection     Neurogenic bladder     Retention, urine     Stroke ([facility] [address]     Urethral stricture        Past Surgical History:   Procedure Laterality Date    HX GI      PEG TUBE--REMOVED    HX HEENT      H/O TRACH    HX UROLOGICAL      super pubic tube    HX UROLOGICAL  3/13/14    TURBT, Dr. Julia Mane, [place]    HX UROLOGICAL  10/8/15    Cysto, Dr. Kasey Grimm, [facility]       Family History   Problem Relation Age of Onset    Hypertension Mother     Hypertension Father        Social History     Social History    Marital status:      Spouse name: N/A    Number of children: 2    Years of education: 15     Occupational History    other Retired     Social History Main Topics    Smoking status: Former Smoker    Smokeless tobacco: Never Used    Alcohol use No    Drug use: No    Sexual activity: Not Currently     Partners: Female     Other Topics Concern     Service No    Blood Transfusions No    Caffeine Concern No    Occupational Exposure No    Hobby Hazards No    Sleep Concern No    Stress Concern No    Weight Concern No    Special Diet No    Back Care No    Exercise No    Bike Helmet No    Seat Belt Yes    Self-Exams No     Social History Narrative       Allergies   Allergen Reactions    Pcn [Penicillins] Other (comments) and Itching     intolerance       Patient's primary care provider (as noted in EPIC): Socorro Esquivel MD    REVIEW OF SYSTEMS:    Constitutional:  Negative for diaphoresis. HENT:  Negative for congestion. Respiratory:  Negative for cough and shortness of breath.     Cardiovascular: Negative for chest pain and palpitations. Gastrointestinal:  Negative for diarrhea. Genitourinary:  Negative for flank pain. Musculoskeletal:  Negative for back pain. Skin:  Negative for pallor. Neurological:  Negative for weakness. Visit Vitals    /76 (BP 1 Location: Left arm, BP Patient Position: At rest)    Pulse 83    Temp 98.8 °F (37.1 °C)    Resp 16    Wt 62.6 kg (138 lb)    SpO2 99%    BMI 20.38 kg/m2       PHYSICAL EXAM:    CONSTITUTIONAL:  Alert, in no apparent distress;  well developed;  well nourished. HEAD:  Normocephalic, atraumatic. EYES:  EOMI. Non-icteric sclera. Normal conjunctiva. ENTM:  Nose:  no rhinorrhea. Throat:  no erythema or exudate, mucous membranes moist.  NECK:  No JVD. Supple  RESPIRATORY:  Chest clear, equal breath sounds, good air movement. CARDIOVASCULAR:  Regular rate and rhythm. No murmurs, rubs, or gallops. GI:  Normal bowel sounds, abdomen soft and non-tender. No rebound or guarding. No palpable masses.  exam:  No penile or scrotal rash, lesions, bruising. No urethral discharge. No penile, testicular or scrotal tenderness to palpation. Noted Webb catheter in place. BACK:  Non-tender. UPPER EXT:  Normal inspection. LOWER EXT:  No edema, no calf tenderness. Distal pulses intact. NEURO:  Moves all four extremities, and grossly normal motor exam.  SKIN:  No rashes;  Normal for age. PSYCH:  Alert and normal affect.     Abnormal lab results from this emergency department encounter:  Labs Reviewed   URINALYSIS W/ RFLX MICROSCOPIC - Abnormal; Notable for the following:        Result Value    Protein 300 (*)     Blood LARGE (*)     Leukocyte Esterase LARGE (*)     All other components within normal limits   URINE MICROSCOPIC ONLY - Abnormal; Notable for the following:     Bacteria 2+ (*)     All other components within normal limits       Lab values for this patient within approximately the last 12 hours:  Recent Results (from the past 12 hour(s))   URINALYSIS W/ RFLX MICROSCOPIC    Collection Time: 05/09/17  6:25 PM   Result Value Ref Range    Color DARK YELLOW      Appearance TURBID      Specific gravity 1.026 1.005 - 1.030      pH (UA) 7.0 5.0 - 8.0      Protein 300 (A) NEG mg/dL    Glucose NEGATIVE  NEG mg/dL    Ketone NEGATIVE  NEG mg/dL    Bilirubin NEGATIVE  NEG      Blood LARGE (A) NEG      Urobilinogen 1.0 0.2 - 1.0 EU/dL    Nitrites NEGATIVE  NEG      Leukocyte Esterase LARGE (A) NEG     URINE MICROSCOPIC ONLY    Collection Time: 05/09/17  6:25 PM   Result Value Ref Range    WBC TOO NUMEROUS TO COUNT 0 - 4 /hpf    RBC 20 to 25 0 - 5 /hpf    Epithelial cells FEW 0 - 5 /lpf    Bacteria 2+ (A) NEG /hpf       Radiologist and cardiologist interpretations if available at time of this note:  No results found. Medication(s) ordered for patient during this emergency visit encounter:  Medications - No data to display    [address]:  Based upon the patients presentation with noted HPI and PE, along with the work up done in the emergency department, I believe that the patient is having noted Perales problem, specifically clogged Perales catheter. Perales catheter was replaced with a new one in the ED. Follow-up Activity limitations:  None  Condition on Discharge:  Stable     DIAGNOSIS:  1. Clogged perales catheter. 2. UTI. SPECIFIC PATIENT INSTRUCTIONS FROM THE PHYSICIAN WHO TREATED YOU IN THE ER TODAY:  1. Return if any concerns or worsening of condition(s)  2. If you are being discharged with a Perales catheter still in place, then keep it in until seen by your urologist or primary care doctor. 3. If you are being discharged with your Perales catheter removed, then return to ER if inability to urinate, abdominal pain, fever, or any symptoms which worry you. 3. FOLLOW UP APPOINTMENT:  Your urologist and/or primary doctor in the 2-3 days. 4. Levaquin as prescribed until finished. Ese Robison, M.D.    Provider Attestation:  If a scribe was utilized in generation of this patient record, I personally performed the services described in the documentation, reviewed the documentation, as recorded by the scribe in my presence, and it accurately records the patient's history of presenting illness, review of systems, patient physical examination, and procedures performed by me as the attending physician. Mer Narvaez M.D. [facility] Board Certified Emergency Physician  5/9/2017.  5:49 PM      Scribe Attestation:     Renée Robles, scribing for and in the presence of Ruthy Sarah MD May 09, 2017     Signed by: Thiago Harper, May 09, 2017 at 6:50 PM     Physician Attestation:   I personally performed the services described in this documentation, reviewed and edited the documentation which was dictated to the scribe in my presence, and it accurately records my words and actions.  Ruthy Sarah MD  May 09, 2017

## 2017-05-09 NOTE — DISCHARGE INSTRUCTIONS
SPECIFIC PATIENT INSTRUCTIONS FROM THE PHYSICIAN WHO TREATED YOU IN THE ER TODAY:  1. Return if any concerns or worsening of condition(s)  2. If you are being discharged with a Webb catheter still in place, then keep it in until seen by your urologist or primary care doctor. 3. If you are being discharged with your Webb catheter removed, then return to ER if inability to urinate, abdominal pain, fever, or any symptoms which worry you. 3. FOLLOW UP APPOINTMENT:  Your urologist and/or primary doctor in the 2-3 days. 4. Levaquin as prescribed until finished. Urinary Tract Infections in Men: Care Instructions  Your Care Instructions    A urinary tract infection, or UTI, is a general term for an infection anywhere between the kidneys and the tip of the penis. UTIs can also be a result of a prostate problem. Most cause pain or burning when you urinate. Most UTIs are caused by bacteria and can be cured with antibiotics. It is important to complete your treatment so that the infection does not get worse. Follow-up care is a key part of your treatment and safety. Be sure to make and go to all appointments, and call your doctor if you are having problems. It's also a good idea to know your test results and keep a list of the medicines you take. How can you care for yourself at home? · Take your antibiotics as prescribed. Do not stop taking them just because you feel better. You need to take the full course of antibiotics. · Take your medicines exactly as prescribed. Your doctor may have prescribed a medicine, such as phenazopyridine (Pyridium), to help relieve pain when you urinate. This turns your urine orange. You may stop taking it when your symptoms get better. But be sure to take all of your antibiotics, which treat the infection. · Drink extra water for the next day or two. This will help make the urine less concentrated and help wash out the bacteria causing the infection.  (If you have kidney, heart, or liver disease and have to limit your fluids, talk with your doctor before you increase your fluid intake.)  · Avoid drinks that are carbonated or have caffeine. They can irritate the bladder. · Urinate often. Try to empty your bladder each time. · To relieve pain, take a hot bath or lay a heating pad (set on low) over your lower belly or genital area. Never go to sleep with a heating pad in place. To help prevent UTIs  · Drink plenty of fluids, enough so that your urine is light yellow or clear like water. If you have kidney, heart, or liver disease and have to limit fluids, talk with your doctor before you increase the amount of fluids you drink. · Urinate when you have the urge. Do not hold your urine for a long time. Urinate before you go to sleep. · Keep your penis clean. Catheter care  If you have a drainage tube (catheter) in place, the following steps will help you care for it. · Always wash your hands before and after touching your catheter. · Check the area around the urethra for inflammation or signs of infection. Signs of infection include irritated, swollen, red, or tender skin, or pus around the catheter. · Clean the area around the catheter with soap and water two times a day. Dry with a clean towel afterward. · Do not apply powder or lotion to the skin around the catheter. To empty the urine collection bag  · Wash your hands with soap and water. · Without touching the drain spout, remove the spout from its sleeve at the bottom of the collection bag. Open the valve on the spout. · Let the urine flow out of the bag and into the toilet or a container. Do not let the tubing or drain spout touch anything. · After you empty the bag, clean the end of the drain spout with tissue and water. Close the valve and put the drain spout back into its sleeve at the bottom of the collection bag. · Wash your hands with soap and water. When should you call for help?   Call your doctor now or seek immediate medical care if:  · Symptoms such as a fever, chills, nausea, or vomiting get worse or happen for the first time. · You have new pain in your back just below your rib cage. This is called flank pain. · There is new blood or pus in your urine. · You are not able to take or keep down your antibiotics. Watch closely for changes in your health, and be sure to contact your doctor if:  · You are not getting better after taking an antibiotic for 2 days. · Your symptoms go away but then come back. Where can you learn more? Go to http://lorna-kristina.info/. Enter R763 in the search box to learn more about \"Urinary Tract Infections in Men: Care Instructions. \"  Current as of: November 28, 2016  Content Version: 11.2  © 5461-5403 [employer]. Care instructions adapted under license by [employer] (which disclaims liability or warranty for this information). If you have questions about a medical condition or this instruction, always ask your healthcare professional. [place] [address] any warranty or liability for your use of this information. RRsat Activation    Thank you for requesting access to RRsat. Please follow the instructions below to securely access and download your online medical record. RRsat allows you to send messages to your doctor, view your test results, renew your prescriptions, schedule appointments, and more. How Do I Sign Up? 1. In your internet browser, go to [email or web address]. 2. Click on the First Time User? Click Here link in the Sign In box. You will see the New Member Sign Up page. 3. Enter your RRsat Access Code exactly as it appears below. You will not need to use this code after youve completed the sign-up process. If you do not sign up before the expiration date, you must request a new code.     RRsat Access Code: Frankie Chris  Expires: 5/23/2017  9:31 AM (This is the date your [employer] access code will )    4. Enter the last four digits of your Social Security Number (xxxx) and Date of Birth (mm/dd/yyyy) as indicated and click Submit. You will be taken to the next sign-up page. 5. Create a Raffstart ID. This will be your [employer] login ID and cannot be changed, so think of one that is secure and easy to remember. 6. Create a [employer] password. You can change your password at any time. 7. Enter your Password Reset Question and Answer. This can be used at a later time if you forget your password. 8. Enter your e-mail address. You will receive e-mail notification when new information is available in [address] 9. Click Sign Up. You can now view and download portions of your medical record. 10. Click the Download Summary menu link to download a portable copy of your medical information. Additional Information    If you have questions, please visit the Frequently Asked Questions section of the [employer] website at [email or web address] Remember, [employer] is NOT to be used for urgent needs. For medical emergencies, dial 911.

## 2017-05-10 NOTE — ED NOTES
Two attempts have been made to get intouch with the receiving nurse to give report no answer was received.

## 2017-05-10 NOTE — ED NOTES
Patient provided a warm blanket and repositioned in bed. Call bell within reach. No additional wants or needs noted. Webb currently draining.

## 2017-05-10 NOTE — ED NOTES
Medical transport is present at the beside ready for pick-up. Patient verbalizes understanding of his discharge papers. His papers are placed in the folder to give to the nurses along with his RX. Medial transport also notified. Patient is stable and cleaned. Going  Back via stretcher he has signed the hard copy of his discharge papers,.

## 2017-05-24 ENCOUNTER — HOSPITAL ENCOUNTER (EMERGENCY)
Age: 72
Discharge: HOME | End: 2017-05-24
Attending: EMERGENCY MEDICINE
Payer: MEDICARE

## 2017-05-24 VITALS
BODY MASS INDEX: 19.99 KG/M2 | DIASTOLIC BLOOD PRESSURE: 66 MMHG | SYSTOLIC BLOOD PRESSURE: 105 MMHG | WEIGHT: 135 LBS | HEART RATE: 69 BPM | OXYGEN SATURATION: 96 % | TEMPERATURE: 97.7 F | RESPIRATION RATE: 14 BRPM | HEIGHT: 69 IN

## 2017-05-24 DIAGNOSIS — Z46.6 ENCOUNTER FOR FOLEY CATHETER REPLACEMENT: Primary | ICD-10-CM

## 2017-05-24 PROCEDURE — 51702 INSERT TEMP BLADDER CATH: CPT

## 2017-05-24 PROCEDURE — 99283 EMERGENCY DEPT VISIT LOW MDM: CPT

## 2017-05-24 PROCEDURE — 77030005514 HC CATH URETH FOL14 BARD -A

## 2017-05-24 NOTE — ED TRIAGE NOTES
Per nursing home: \"Patients perales catheter is clog\" Upon arrival to the ER patient was noted to be incontinent of urine.

## 2017-05-24 NOTE — DISCHARGE INSTRUCTIONS
Continue routine Webb catheter care  Return immediately for increasing pain, fever, obstruction, or any other concerns

## 2017-06-06 ENCOUNTER — HOSPITAL ENCOUNTER (EMERGENCY)
Age: 72
Discharge: NURSING, REHAB OR LONG-TERM CARE | End: 2017-06-06
Attending: EMERGENCY MEDICINE | Admitting: EMERGENCY MEDICINE
Payer: MEDICARE

## 2017-06-06 VITALS
OXYGEN SATURATION: 100 % | DIASTOLIC BLOOD PRESSURE: 74 MMHG | SYSTOLIC BLOOD PRESSURE: 111 MMHG | BODY MASS INDEX: 19.99 KG/M2 | RESPIRATION RATE: 13 BRPM | HEIGHT: 69 IN | HEART RATE: 73 BPM | TEMPERATURE: 99 F | WEIGHT: 135 LBS

## 2017-06-06 DIAGNOSIS — T83.9XXA FOLEY CATHETER PROBLEM, INITIAL ENCOUNTER (HCC): Primary | ICD-10-CM

## 2017-06-06 PROCEDURE — 87086 URINE CULTURE/COLONY COUNT: CPT | Performed by: EMERGENCY MEDICINE

## 2017-06-06 PROCEDURE — 51702 INSERT TEMP BLADDER CATH: CPT

## 2017-06-06 PROCEDURE — 87186 SC STD MICRODIL/AGAR DIL: CPT | Performed by: EMERGENCY MEDICINE

## 2017-06-06 PROCEDURE — 99284 EMERGENCY DEPT VISIT MOD MDM: CPT

## 2017-06-06 PROCEDURE — 87077 CULTURE AEROBIC IDENTIFY: CPT | Performed by: EMERGENCY MEDICINE

## 2017-06-06 PROCEDURE — 77030005514 HC CATH URETH FOL14 BARD -A

## 2017-06-06 NOTE — ED NOTES
Report givento Winsome Hendricks LPN . No questions or concerns after report.  Yeny VELEZ actual discharged pt

## 2017-06-06 NOTE — ED TRIAGE NOTES
EMS states pt is a resident of [facility] on [address]: Webb catheter clogged since 0600 am per staff; pt reports unable to void x 2-3 days.

## 2017-06-06 NOTE — ED PROVIDER NOTES
HPI Comments: 12:51 PM Wendi López is a 73 y.o. male with a history of HTN, Brain Aneurysm, CVA and Bladder Cancer who presents to the emergency department from [facility] for evaluation of catheter problem. Per nursing staff the patient catheter is not working properly and needs to be changed. He reports feeling \"full\" secondary to retention. He denies any appetite changes, fever or any other symptoms at this time. No other acute complaints or concerns were noted at this time. PCP: Nicol Rodriguez MD      The history is provided by the EMS personnel.         Past Medical History:   Diagnosis Date    Abnormal computed tomography of bladder     Basal ganglia disease     Benign prostatic hyperplasia with lower urinary tract symptoms     Bladder calculus     Bladder cancer ([facility] [address] 3/13/14    Bladder mass     Bladder tumor     Brain aneurysm     CVA (cerebral vascular accident) ([facility] [address] [place] [facility]    Dysphagia     Erythematous bladder mucosa     Webb catheter in place     Gross hematuria     HTN (hypertension)     Hx MRSA infection     Neurogenic bladder     Retention, urine     Stroke ([facility] [address]     Urethral stricture        Past Surgical History:   Procedure Laterality Date    HX GI      PEG TUBE--REMOVED    HX HEENT      H/O TRACH    HX UROLOGICAL      super pubic tube    HX UROLOGICAL  3/13/14    TURBT, Dr. Yoko Rubi, [place]    HX UROLOGICAL  10/8/15    Cysto, Dr. Tavares Carrera, [facility]         Family History:   Problem Relation Age of Onset    Hypertension Mother     Hypertension Father        Social History     Social History    Marital status:      Spouse name: N/A    Number of children: 2    Years of education: 15     Occupational History    other Retired     Social History Main Topics    Smoking status: Former Smoker    Smokeless tobacco: Never Used    Alcohol use No    Drug use: No    Sexual activity: Not Currently     Partners: Female     Other Topics Concern     Service No    Blood Transfusions No    Caffeine Concern No    Occupational Exposure No    Hobby Hazards No    Sleep Concern No    Stress Concern No    Weight Concern No    Special Diet No    Back Care No    Exercise No    Bike Helmet No    Seat Belt Yes    Self-Exams No     Social History Narrative         ALLERGIES: Pcn [penicillins]    Review of Systems   Constitutional: Negative for activity change, fatigue and fever. HENT: Negative for congestion and rhinorrhea. Eyes: Negative for visual disturbance. Respiratory: Negative for shortness of breath. Cardiovascular: Negative for chest pain and palpitations. Gastrointestinal: Negative for abdominal pain, diarrhea, nausea and vomiting. Genitourinary: Negative for dysuria and hematuria.        + catheter problem     Musculoskeletal: Negative for back pain. Skin: Negative for rash. Allergic/Immunologic: Negative for immunocompromised state. Neurological: Negative for dizziness, weakness and light-headedness. All other systems reviewed and are negative. Vitals:    06/06/17 1400 06/06/17 1415 06/06/17 1430 06/06/17 1445   BP: 109/72 107/73 111/74    Pulse: 67 64 64 73   Resp: 14 14 15 13   Temp:       SpO2: 100% 100% 100% 100%   Weight:       Height:                Physical Exam   Constitutional: He is oriented to person, place, and time. He appears well-developed and well-nourished. No distress. Contracted, no distress    HENT:   Head: Normocephalic and atraumatic. Right Ear: External ear normal.   Left Ear: External ear normal.   Nose: Nose normal.   Mouth/Throat: Oropharynx is clear and moist.   Eyes: Conjunctivae and EOM are normal. Pupils are equal, round, and reactive to light. No scleral icterus. Neck: Normal range of motion. Neck supple. No JVD present. No tracheal deviation present. No thyromegaly present.    Previous tracheostomy site noted    Cardiovascular: Normal rate, regular rhythm, normal heart sounds and intact distal pulses. Exam reveals no gallop and no friction rub. No murmur heard. Pulmonary/Chest: Effort normal and breath sounds normal. He exhibits no tenderness. Abdominal: Soft. Bowel sounds are normal. He exhibits no distension. There is no tenderness. There is no rebound and no guarding. Genitourinary:   Genitourinary Comments: Perales in place, urine leaking around    Musculoskeletal: Normal range of motion. He exhibits no edema or tenderness. Contracted LE   Lymphadenopathy:     He has no cervical adenopathy. Neurological: He is alert and oriented to person, place, and time. No cranial nerve deficit. Coordination normal.   Contracted   Skin: Skin is warm and dry. Psychiatric: He has a normal mood and affect. His behavior is normal. Judgment and thought content normal.   Nursing note and vitals reviewed. MDM  Number of Diagnoses or Management Options  Diagnosis management comments: Pt is a 71yo male with a hx of urinary retention now with perales presents with suspected clogged catheter. Will replace and reevaluate. No evidence to suspect infection. Will reevaluate after perales is replaced. Jefferson Arriola DO 1:00 PM      ED Course       Procedures    X-Ray, CT or other radiology findings or impressions:  No orders to display           Disposition:  Diagnosis:   1. Perales catheter problem, initial encounter [facility])        Disposition: KENDRA      Scribgil Attestation:     I, [address] [place] [address] for and in the presence of Nickie Poole MD June 11, 2017 at 6:46 PM     Physician Attestation:   I personally performed the services described in this documentation, reviewed and edited the documentation which was dictated to the scribe in my presence, and it accurately records my words and actions.  Ncikie Poole MD  June 11, 2017 at 6:46 PM    Signed by: Thiago Maldonado June 11, 2017, 6:46 PM

## 2017-06-06 NOTE — DISCHARGE INSTRUCTIONS
Learning About Urinary Catheter Care to Prevent Infection  What is a urinary catheter? A urinary catheter is a flexible plastic tube used to drain urine from your bladder when you can't urinate on your own. The catheter allows urine to drain from the bladder into a bag. Two types of drainage bags may be used with a urinary catheter. · A bedside bag is a large bag that you can hang on the side of your bed or on a chair. You can use it overnight or anytime you will be sitting or lying down for a long time. · A leg bag is a small bag that you can use during the day. It is usually attached to your thigh or calf and hidden under your clothes. Having a urinary catheter increases your risk of getting a urinary tract infection. Germs may get on the catheter and cause an infection in your bladder or kidneys. The longer you have a catheter, the more likely it is that you will get an infection. You can help prevent this problem with good hygiene and careful handling of your catheter and drainage bags. How can you help prevent infection? Take care to be clean  · Always wash your hands well before and after you handle your catheter. · Clean the skin around the catheter twice a day using soap and water. Dry with a clean towel afterward. You can shower with your catheter and drainage bag in place unless your doctor told you not to. · When you clean around the catheter, check the surrounding skin for signs of infection. Look for things like pus or irritated, swollen, red, or tender skin around the catheter. Be careful with your drainage bag  · Always keep the drainage bag below the level of your bladder. This will help keep urine from flowing back into your bladder. · Check often to see that urine is flowing through the catheter into the drainage bag. · Empty the drainage bag when it is half full. This will keep it from overflowing or backing up.   · When you empty the drainage bag, do not let the tubing or drain spout touch anything. Be careful with your catheter  · Do not unhook the catheter from the drain tube. That could let germs get into the tube. · Make sure that the catheter tubing does not get twisted or kinked. · Do not tug or pull on the catheter. And make sure that the drainage bag does not drag or pull on the catheter. · Do not put powder or lotion on the skin around the catheter. · Do not have sexual intercourse while wearing a catheter. How do you empty a urine drainage bag? .  If your doctor has asked you to keep a record, write down the amount of urine in the bag before you empty it. Wash your hands before and after you touch the bag. 1. Remove the drain spout from its sleeve at the bottom of the drainage bag.  2. Open the valve on the drain spout. Let the urine flow out into the toilet or a container. Be careful not to let the tubing or drain spout touch anything. 3. After you empty the bag, wipe off any liquid on the end of the drain spout. Close the valve. Then put the drain spout back into its sleeve at the bottom of the collection bag. How do you change from a bedside bag to a leg bag? Wash your hands before and after you handle the bags. 1. Empty the bag attached to the catheter. 2. Put a clean towel under the catheter where it connects to the bag.  3. Fold and pinch the catheter closed to keep urine from leaking out. Many catheters have a clip you can use to pinch the tube closed. 4. Remove the used bag from the catheter. 5. Use an alcohol wipe to clean the tip of the leg bag. Then connect the leg bag to the catheter. 6. Strap the leg bag to your thigh or calf. Be sure the straps are not too tight. How can you clean a drainage bag? Clean your bags every day. Many people clean their bedside bag in the morning when they switch to a leg bag. At night, they attach the bedside bag and clean the leg bag. To clean a drainage ba. Remove the bag from the catheter.   2. Fill the bag with 2 parts vinegar and 3 parts water. Let it stand for 20 minutes. 3. Empty the bag, and let it air dry. When should you call for help? Call your doctor now or seek immediate medical care if:  · You have symptoms of a urinary infection. These may include:  ¨ Pain or burning when you urinate. ¨ A frequent need to urinate without being able to pass much urine. ¨ Pain in the flank, which is just below the rib cage and above the waist on either side of the back. ¨ Blood in your urine. ¨ A fever. · Your urine smells bad. · You see large blood clots in your urine. · No urine or very little urine is flowing into the bag for 4 or more hours. Watch closely for changes in your health, and be sure to contact your doctor if:  · The area around the catheter becomes irritated, swollen, red, or tender, or there is pus draining from it. · Urine is leaking from the place where the catheter enters your body. Follow-up care is a key part of your treatment and safety. Be sure to make and go to all appointments, and call your doctor if you are having problems. It's also a good idea to know your test results and keep a list of the medicines you take. Where can you learn more? Go to http://lorna-kristina.info/. Enter C910 in the search box to learn more about \"Learning About Urinary Catheter Care to Prevent Infection. \"  Current as of: August 12, 2016  Content Version: 11.2  © 9374-9003 [employer]. Care instructions adapted under license by [employer] (which disclaims liability or warranty for this information). If you have questions about a medical condition or this instruction, always ask your healthcare professional. [place] [address] any warranty or liability for your use of this information.

## 2017-06-09 LAB
BACTERIA SPEC CULT: ABNORMAL
BACTERIA SPEC CULT: ABNORMAL
SERVICE CMNT-IMP: ABNORMAL

## 2017-06-16 NOTE — ED PROVIDER NOTES
HPI Comments: 74 yo male with indwelling Webb catheter which he feels is not working properly. No fever or chills or hematuria. Urine is leaking around the catheter. No vomiting or trauma to the area. Patient is a 73 y.o. male presenting with urinary catheter problem. The history is provided by the patient. Urinary Catheter Problem   Pertinent negatives include no chest pain, no abdominal pain, no headaches and no shortness of breath.         Past Medical History:   Diagnosis Date    Abnormal computed tomography of bladder     Basal ganglia disease     Benign prostatic hyperplasia with lower urinary tract symptoms     Bladder calculus     Bladder cancer ([address] 3/13/14    Bladder mass     Bladder tumor     Brain aneurysm     CVA (cerebral vascular accident) ([facility] [address] [facility] [address]    Dysphagia     Erythematous bladder mucosa     Webb catheter in place     Gross hematuria     HTN (hypertension)     Hx MRSA infection     Neurogenic bladder     Retention, urine     Stroke ([facility] [address]     Urethral stricture        Past Surgical History:   Procedure Laterality Date    HX GI      PEG TUBE--REMOVED    HX HEENT      H/O TRACH    HX UROLOGICAL      super pubic tube    HX UROLOGICAL  3/13/14    TURBT, Dr. Kellee Crenshaw, [facility] [place] [facility]    HX UROLOGICAL  10/8/15    Cysto, Dr. Vickie Leary, [facility]         Family History:   Problem Relation Age of Onset    Hypertension Mother     Hypertension Father        Social History     Social History    Marital status:      Spouse name: N/A    Number of children: 2    Years of education: 15     Occupational History    other Retired     Social History Main Topics    Smoking status: Former Smoker    Smokeless tobacco: Never Used    Alcohol use No    Drug use: No    Sexual activity: Not Currently     Partners: Female     Other Topics Concern     Service No    Blood Transfusions No    Caffeine Concern No    Occupational Exposure No    Hobby Hazards No    Sleep Concern No    Stress Concern No    Weight Concern No    Special Diet No    Back Care No    Exercise No    Bike Helmet No    Seat Belt Yes    Self-Exams No     Social History Narrative         ALLERGIES: Pcn [penicillins]    Review of Systems   Constitutional: Negative for activity change, appetite change, chills, diaphoresis, fatigue, fever and unexpected weight change. HENT: Negative for congestion, dental problem, drooling, ear discharge, ear pain, facial swelling, hearing loss, mouth sores, nosebleeds, postnasal drip, rhinorrhea, sinus pressure, sneezing, sore throat, tinnitus and trouble swallowing. Eyes: Negative for photophobia, pain, discharge, redness, itching and visual disturbance. Respiratory: Negative for apnea, cough, choking, chest tightness, shortness of breath, wheezing and stridor. Cardiovascular: Negative for chest pain, palpitations and leg swelling. Gastrointestinal: Negative for abdominal distention, abdominal pain, anal bleeding, blood in stool, constipation, diarrhea, nausea, rectal pain and vomiting. Endocrine: Negative for cold intolerance, heat intolerance, polydipsia, polyphagia and polyuria. Genitourinary: Positive for difficulty urinating and penile pain. Negative for decreased urine volume, dysuria, enuresis, flank pain, frequency, genital sores, hematuria and urgency. Musculoskeletal: Negative for arthralgias, back pain, gait problem, joint swelling, myalgias, neck pain and neck stiffness. Skin: Negative for color change, pallor, rash and wound. Allergic/Immunologic: Negative for environmental allergies, food allergies and immunocompromised state. Neurological: Negative for dizziness, tremors, seizures, syncope, facial asymmetry, speech difficulty, weakness, light-headedness, numbness and headaches. Hematological: Negative for adenopathy. Does not bruise/bleed easily.    Psychiatric/Behavioral: Negative for agitation, behavioral problems, confusion, decreased concentration, dysphoric mood, hallucinations, self-injury, sleep disturbance and suicidal ideas. The patient is not nervous/anxious and is not hyperactive. Vitals:    05/24/17 1738   BP: 105/66   Pulse: 69   Resp: 14   Temp: 97.7 °F (36.5 °C)   SpO2: 96%   Weight: 61.2 kg (135 lb)   Height: 5' 9\" (1.753 m)            Physical Exam   Constitutional: He is oriented to person, place, and time. He appears well-developed and well-nourished. HENT:   Head: Normocephalic and atraumatic. Right Ear: External ear normal.   Left Ear: External ear normal.   Mouth/Throat: Oropharynx is clear and moist. No oropharyngeal exudate. Eyes: Conjunctivae and EOM are normal. Pupils are equal, round, and reactive to light. Right eye exhibits no discharge. Left eye exhibits no discharge. No scleral icterus. Neck: Normal range of motion. Neck supple. No tracheal deviation present. No thyromegaly present. Cardiovascular: Normal rate, regular rhythm, normal heart sounds and intact distal pulses. No murmur heard. Pulmonary/Chest: Effort normal and breath sounds normal. No respiratory distress. He has no wheezes. He has no rales. Abdominal: Soft. Bowel sounds are normal. He exhibits no distension. There is no tenderness. There is no rebound and no guarding. Genitourinary: Penis normal.   Genitourinary Comments: Webb catheter in place without skin lesion or hemorrhage   Musculoskeletal: Normal range of motion. He exhibits no edema or tenderness. Lymphadenopathy:     He has no cervical adenopathy. Neurological: He is alert and oriented to person, place, and time. No cranial nerve deficit. Coordination normal.   Skin: Skin is warm. No rash noted. No erythema. Nursing note and vitals reviewed.        MDM  Number of Diagnoses or Management Options  Encounter for Webb catheter replacement:   Diagnosis management comments: Patient with obstructed catheter - will consider further work-up if replacement does not relieve symptoms. No signs of acute infection or trauma. Amount and/or Complexity of Data Reviewed  Review and summarize past medical records: yes    Risk of Complications, Morbidity, and/or Mortality  Presenting problems: moderate  Diagnostic procedures: low  Management options: moderate    Patient Progress  Patient progress: improved    ED Course       Procedures      Progress notes, Consult notes or additional Procedure notes:   Patient with good relief of symptoms and free flow of urine with replacement of catheter. Agrees with follow-up with Urology. Precautions given. Reevaluation of patient:   I have reevaluated patient. Patient is feeling improved    Dispo:  Patient was discharged in stable condition. Patient is to return to emergency department with any new or worsening condition.

## 2017-08-03 PROBLEM — Z85.51 HX OF BLADDER CANCER: Status: ACTIVE | Noted: 2017-08-03

## 2017-08-15 ENCOUNTER — HOSPITAL ENCOUNTER (EMERGENCY)
Age: 72
Discharge: OTHER INSTITUTION | End: 2017-08-15
Attending: EMERGENCY MEDICINE | Admitting: EMERGENCY MEDICINE
Payer: MEDICARE

## 2017-08-15 VITALS
HEART RATE: 80 BPM | OXYGEN SATURATION: 100 % | SYSTOLIC BLOOD PRESSURE: 110 MMHG | DIASTOLIC BLOOD PRESSURE: 80 MMHG | RESPIRATION RATE: 14 BRPM | TEMPERATURE: 99.1 F

## 2017-08-15 DIAGNOSIS — D64.9 ANEMIA, UNSPECIFIED TYPE: Primary | ICD-10-CM

## 2017-08-15 LAB
ABO + RH BLD: NORMAL
ANION GAP BLD CALC-SCNC: 7 MMOL/L (ref 3–18)
BASOPHILS # BLD: 0 K/UL (ref 0–0.1)
BASOPHILS NFR BLD: 0 % (ref 0–2)
BLOOD GROUP ANTIBODIES SERPL: NORMAL
BUN SERPL-MCNC: 19 MG/DL (ref 7–18)
BUN/CREAT SERPL: 24 (ref 12–20)
CALCIUM SERPL-MCNC: 9 MG/DL (ref 8.5–10.1)
CHLORIDE SERPL-SCNC: 108 MMOL/L (ref 100–108)
CO2 SERPL-SCNC: 26 MMOL/L (ref 21–32)
CREAT SERPL-MCNC: 0.79 MG/DL (ref 0.6–1.3)
DIFFERENTIAL METHOD BLD: ABNORMAL
EOSINOPHIL # BLD: 0.1 K/UL (ref 0–0.4)
EOSINOPHIL NFR BLD: 1 % (ref 0–5)
ERYTHROCYTE [DISTWIDTH] IN BLOOD BY AUTOMATED COUNT: 21.3 % (ref 11.6–14.5)
GLUCOSE SERPL-MCNC: 110 MG/DL (ref 74–99)
HCT VFR BLD AUTO: 26 % (ref 36–48)
HGB BLD-MCNC: 7.6 G/DL (ref 13–16)
LYMPHOCYTES # BLD: 1.5 K/UL (ref 0.9–3.6)
LYMPHOCYTES NFR BLD: 11 % (ref 21–52)
MCH RBC QN AUTO: 19.6 PG (ref 24–34)
MCHC RBC AUTO-ENTMCNC: 29.2 G/DL (ref 31–37)
MCV RBC AUTO: 67.2 FL (ref 74–97)
MONOCYTES # BLD: 0.7 K/UL (ref 0.05–1.2)
MONOCYTES NFR BLD: 5 % (ref 3–10)
NEUTS SEG # BLD: 10.9 K/UL (ref 1.8–8)
NEUTS SEG NFR BLD: 83 % (ref 40–73)
PLATELET # BLD AUTO: 392 K/UL (ref 135–420)
PLATELET COMMENTS,PCOM: ABNORMAL
PMV BLD AUTO: 9.4 FL (ref 9.2–11.8)
POTASSIUM SERPL-SCNC: 4 MMOL/L (ref 3.5–5.5)
RBC # BLD AUTO: 3.87 M/UL (ref 4.7–5.5)
RBC MORPH BLD: ABNORMAL
SODIUM SERPL-SCNC: 141 MMOL/L (ref 136–145)
SPECIMEN EXP DATE BLD: NORMAL
WBC # BLD AUTO: 13.2 K/UL (ref 4.6–13.2)

## 2017-08-15 PROCEDURE — 99284 EMERGENCY DEPT VISIT MOD MDM: CPT

## 2017-08-15 PROCEDURE — 86900 BLOOD TYPING SEROLOGIC ABO: CPT | Performed by: EMERGENCY MEDICINE

## 2017-08-15 PROCEDURE — 85025 COMPLETE CBC W/AUTO DIFF WBC: CPT | Performed by: EMERGENCY MEDICINE

## 2017-08-15 PROCEDURE — 80048 BASIC METABOLIC PNL TOTAL CA: CPT | Performed by: EMERGENCY MEDICINE

## 2017-08-15 NOTE — ED TRIAGE NOTES
Pt was brought in by EMS for abnormal lab results from [address] home, h/h low, pt asymptomatic, denies CP respirations even and unlabored no meds given in route

## 2017-08-16 NOTE — ED PROVIDER NOTES
HPI Comments: Pt with history of HTN, basal ganglia disease, aneurysm and CVA, presents to ED from NH without complaint. Pt states he feels \"fine\". He denies any pain, no dyspnea, no lightheadedness, no nausea or vomiting. He states that \"they keep drawing blood but they don't tell me why\". He was reportedly sent to ED due to concerns for anemia. Pt denies any bleeding, no melena, no gingival bleeding, no epistaxis. No reports of bleeding received from NH. Pt states he has been told in the past that he is anemic, he has never received a transfusion and \"I don't really want to have one\". No other acute symptoms or complaints were noted.        Past Medical History:   Diagnosis Date    Abnormal computed tomography of bladder     Basal ganglia disease     Benign prostatic hyperplasia with lower urinary tract symptoms     Bladder calculus     Bladder cancer ([facility] [address] 3/13/14    Bladder mass     Bladder tumor     Brain aneurysm     CVA (cerebral vascular accident) ([facility] [address]    Dysphagia     Erythematous bladder mucosa     Webb catheter in place     Gross hematuria     HTN (hypertension)     Hx MRSA infection     Neurogenic bladder     Retention, urine     Stroke ([facility] [address]     Urethral stricture        Past Surgical History:   Procedure Laterality Date    HX GI      PEG TUBE--REMOVED    HX HEENT      H/O TRACH    HX UROLOGICAL      super pubic tube    HX UROLOGICAL  3/13/14    TURBT, Dr. Jody Melendrez, [facility] [place] [facility]    HX UROLOGICAL  10/8/15    Cysto, Dr. Sofi Carcamo, [facility]         Family History:   Problem Relation Age of Onset    Hypertension Mother     Hypertension Father        Social History     Social History    Marital status:      Spouse name: N/A    Number of children: 2    Years of education: 15     Occupational History    other Retired     Social History Main Topics    Smoking status: Former Smoker    Smokeless tobacco: Never Used    Alcohol use No    Drug use: No    Sexual activity: Not Currently     Partners: Female     Other Topics Concern     Service No    Blood Transfusions No    Caffeine Concern No    Occupational Exposure No    Hobby Hazards No    Sleep Concern No    Stress Concern No    Weight Concern No    Special Diet No    Back Care No    Exercise No    Bike Helmet No    Seat Belt Yes    Self-Exams No     Social History Narrative         ALLERGIES: Pcn [penicillins]    Review of Systems   Constitutional: Negative for chills and fever. HENT: Negative. Eyes: Negative for visual disturbance. Respiratory: Negative for chest tightness and shortness of breath. Cardiovascular: Negative for chest pain and palpitations. Gastrointestinal: Negative for abdominal pain, diarrhea, nausea and vomiting. Endocrine: Negative. Genitourinary: Negative for dysuria and hematuria. Musculoskeletal: Negative. Skin: Negative for pallor. Allergic/Immunologic: Negative. Neurological: Negative for dizziness, light-headedness and headaches. Hematological: Does not bruise/bleed easily. Vitals:    08/15/17 1940 08/15/17 1941 08/15/17 1945 08/15/17 2000   BP:   124/77 110/80   Pulse: 80 82 80 80   Resp: 20 15 18 16   Temp:       SpO2: 98% 100% 99% 100%            Physical Exam   Constitutional: He is oriented to person, place, and time. He appears well-developed and well-nourished. No distress. Resting comfortably on stretcher. Chronically ill appearing, contracted. HENT:   Head: Normocephalic and atraumatic. MM moist   Eyes: Conjunctivae and EOM are normal.   Sclera clear bilaterally   Neck: Neck supple. No JVD present. Non-tender to palpation   Cardiovascular: Normal rate, regular rhythm and normal heart sounds. Exam reveals no gallop and no friction rub. No murmur heard. Pulmonary/Chest: Effort normal and breath sounds normal. No respiratory distress. He has no wheezes. He has no rales. He exhibits no tenderness.    No crepitance with palpation   Abdominal: Soft. Bowel sounds are normal. He exhibits no distension. There is no tenderness. Genitourinary:   Genitourinary Comments: No CVA tenderness   Musculoskeletal: He exhibits no edema or tenderness. Normal inspection of upper extremities. No edema noted to bilateral lower extremities   Lymphadenopathy:     He has no cervical adenopathy. Neurological: He is alert and oriented to person, place, and time. No cranial nerve deficit. Skin: Skin is warm and dry. He is not diaphoretic. Psychiatric:   Normal mood and affect. Vitals reviewed. MDM  Number of Diagnoses or Management Options  Anemia, unspecified type:   Diagnosis management comments: Pt without acute complaint. Hb over 7, pt is asymptomatic without history of ESRD or CAD. Reviewed results with pt, he doesn't want a transfusion and denies any other acute symptoms. Will d/c to NH with heme referral as needed.        Amount and/or Complexity of Data Reviewed  Clinical lab tests: ordered and reviewed  Decide to obtain previous medical records or to obtain history from someone other than the patient: yes  Obtain history from someone other than the patient: yes  Independent visualization of images, tracings, or specimens: yes    Risk of Complications, Morbidity, and/or Mortality  Presenting problems: moderate  Management options: moderate    Patient Progress  Patient progress: stable    ED Course       Procedures

## 2017-08-16 NOTE — DISCHARGE INSTRUCTIONS
Anemia: Care Instructions  Your Care Instructions    Anemia is a low level of red blood cells, which carry oxygen throughout your body. Many things can cause anemia. Lack of iron is one of the most common causes. Your body needs iron to make hemoglobin, a substance in red blood cells that carries oxygen from the lungs to your body's cells. Without enough iron, the body produces fewer and smaller red blood cells. As a result, your body's cells do not get enough oxygen, and you feel tired and weak. And you may have trouble concentrating. Bleeding is the most common cause of a lack of iron. You may have heavy menstrual bleeding or bleeding caused by conditions such as ulcers, hemorrhoids, or cancer. Regular use of aspirin or other anti-inflammatory medicines (such as ibuprofen) also can cause bleeding in some people. A lack of iron in your diet also can cause anemia, especially at times when the body needs more iron, such as during pregnancy, infancy, and the teen years. Your doctor may have prescribed iron pills. It may take several months of treatment for your iron levels to return to normal. Your doctor also may suggest that you eat foods that are rich in iron, such as meat and beans. There are many other causes of anemia. It is not always due to a lack of iron. Finding the specific cause of your anemia will help your doctor find the right treatment for you. Follow-up care is a key part of your treatment and safety. Be sure to make and go to all appointments, and call your doctor if you are having problems. It's also a good idea to know your test results and keep a list of the medicines you take. How can you care for yourself at home? · Take your medicines exactly as prescribed. Call your doctor if you think you are having a problem with your medicine. · If your doctor recommends iron pills, take them as directed:  ¨ Try to take the pills on an empty stomach about 1 hour before or 2 hours after meals. But you may need to take iron with food to avoid an upset stomach. ¨ Do not take antacids or drink milk or caffeine drinks (such as coffee, tea, or cola) at the same time or within 2 hours of the time that you take your iron. They can make it hard for your body to absorb the iron. ¨ Vitamin C (from food or supplements) helps your body absorb iron. Try taking iron pills with a glass of orange juice or some other food that is high in vitamin C, such as citrus fruits. ¨ Iron pills may cause stomach problems, such as heartburn, nausea, diarrhea, constipation, and cramps. Be sure to drink plenty of fluids, and include fruits, vegetables, and fiber in your diet each day. Iron pills often make your bowel movements dark or green. ¨ If you forget to take an iron pill, do not take a double dose of iron the next time you take a pill. ¨ Keep iron pills out of the reach of small children. An overdose of iron can be very dangerous. · Follow your doctor's advice about eating iron-rich foods. These include red meat, shellfish, poultry, eggs, beans, raisins, whole-grain bread, and leafy green vegetables. · Steam vegetables to help them keep their iron content. When should you call for help? Call 911 anytime you think you may need emergency care. For example, call if:  · You have symptoms of a heart attack. These may include:  ¨ Chest pain or pressure, or a strange feeling in the chest.  ¨ Sweating. ¨ Shortness of breath. ¨ Nausea or vomiting. ¨ Pain, pressure, or a strange feeling in the back, neck, jaw, or upper belly or in one or both shoulders or arms. ¨ Lightheadedness or sudden weakness. ¨ A fast or irregular heartbeat. After you call 911, the  may tell you to chew 1 adult-strength or 2 to 4 low-dose aspirin. Wait for an ambulance. Do not try to drive yourself. · You passed out (lost consciousness).   Call your doctor now or seek immediate medical care if:  · You have new or increased shortness of breath. · You are dizzy or lightheaded, or you feel like you may faint. · Your fatigue and weakness continue or get worse. · You have any abnormal bleeding, such as:  ¨ Nosebleeds. ¨ Vaginal bleeding that is different (heavier, more frequent, at a different time of the month) than what you are used to. ¨ Bloody or black stools, or rectal bleeding. ¨ Bloody or pink urine. Watch closely for changes in your health, and be sure to contact your doctor if:  · You do not get better as expected. Where can you learn more? Go to http://lorna-kristina.info/. Enter R301 in the search box to learn more about \"Anemia: Care Instructions. \"  Current as of: October 13, 2016  Content Version: 11.3  © 4840-4957 [employer] Care instructions adapted under license by [employer] (which disclaims liability or warranty for this information). If you have questions about a medical condition or this instruction, always ask your healthcare professional. [place] [address] any warranty or liability for your use of this information.

## 2017-08-16 NOTE — ED NOTES
TRANSFER - OUT REPORT:    Verbal report given to Belén Jones on Micaela Null  being transferred to [facility] for DC. Report consisted of patients Situation, Background, Assessment and   Recommendations(SBAR). Information from the following report(s) SBAR, ED Summary and Recent Results was reviewed with the receiving nurse. Opportunity for questions and clarification was provided.       Patient transported with medical transport

## 2017-09-05 ENCOUNTER — HOSPITAL ENCOUNTER (EMERGENCY)
Age: 72
Discharge: HOME | End: 2017-09-05
Attending: EMERGENCY MEDICINE
Payer: MEDICARE

## 2017-09-05 VITALS
RESPIRATION RATE: 19 BRPM | SYSTOLIC BLOOD PRESSURE: 121 MMHG | HEART RATE: 87 BPM | TEMPERATURE: 97.8 F | OXYGEN SATURATION: 98 % | DIASTOLIC BLOOD PRESSURE: 84 MMHG

## 2017-09-05 DIAGNOSIS — Z46.6 CATHETER (URINE) CHANGE REQUIRED: Primary | ICD-10-CM

## 2017-09-05 PROCEDURE — 87077 CULTURE AEROBIC IDENTIFY: CPT | Performed by: PHYSICIAN ASSISTANT

## 2017-09-05 PROCEDURE — 77030005514 HC CATH URETH FOL14 BARD -A

## 2017-09-05 PROCEDURE — 51702 INSERT TEMP BLADDER CATH: CPT

## 2017-09-05 PROCEDURE — 87086 URINE CULTURE/COLONY COUNT: CPT | Performed by: PHYSICIAN ASSISTANT

## 2017-09-05 PROCEDURE — 87186 SC STD MICRODIL/AGAR DIL: CPT | Performed by: PHYSICIAN ASSISTANT

## 2017-09-05 PROCEDURE — 99284 EMERGENCY DEPT VISIT MOD MDM: CPT

## 2017-09-05 NOTE — ED NOTES
I have reviewed discharge instructions with the patient. The patient verbalized understanding. Patient armband removed and shredded.  Patient d/c via stretcher with medical transport

## 2017-09-05 NOTE — ED PROVIDER NOTES
HPI Comments: 82 yo M here from [facility] for catheter change. EMS reports staff at facility feel they cannot change the catheter because he has BPH. Pt without complaints at this time. Denies pain. Catheter draining clear urine. No other complaints. Patient is a 81 y.o. male presenting with urinary catheter problem.    Urinary Catheter Problem          Past Medical History:   Diagnosis Date    Abnormal computed tomography of bladder     Basal ganglia disease     Benign prostatic hyperplasia with lower urinary tract symptoms     Bladder calculus     Bladder cancer ([address] 3/13/14    Bladder mass     Bladder tumor     Brain aneurysm     CVA (cerebral vascular accident) ([address] [facility] [address]    Dysphagia     Erythematous bladder mucosa     Webb catheter in place     Gross hematuria     HTN (hypertension)     Hx MRSA infection     Neurogenic bladder     Retention, urine     Stroke ([facility] [facility]     Urethral stricture        Past Surgical History:   Procedure Laterality Date    HX GI      PEG TUBE--REMOVED    HX HEENT      H/O TRACH    HX UROLOGICAL      super pubic tube    HX UROLOGICAL  3/13/14    TURBT, Dr. Steve Olsen [facility] UROLOGICAL  10/8/15    Cysto, Dr. Johnie Prader, [facility]         Family History:   Problem Relation Age of Onset    Hypertension Mother     Hypertension Father        Social History     Social History    Marital status:      Spouse name: N/A    Number of children: 2    Years of education: 15     Occupational History    other Retired     Social History Main Topics    Smoking status: Former Smoker    Smokeless tobacco: Never Used    Alcohol use No    Drug use: No    Sexual activity: Not Currently     Partners: Female     Other Topics Concern     Service No    Blood Transfusions No    Caffeine Concern No    Occupational Exposure No    Hobby Hazards No    Sleep Concern No    Stress Concern No    Weight Concern No    Special Diet No    Back Care No    Exercise No    Bike Helmet No    Seat Belt Yes    Self-Exams No     Social History Narrative         ALLERGIES: Pcn [penicillins]    Review of Systems   All other systems reviewed and are negative. Vitals:    09/05/17 1516   BP: 121/84   Pulse: 87   Resp: 19   Temp: 97.8 °F (36.6 °C)   SpO2: 98%            Physical Exam   Constitutional: He appears well-developed and well-nourished. No distress. HENT:   Head: Normocephalic and atraumatic. Eyes: Conjunctivae are normal.   Neck: Normal range of motion. Neck supple. Cardiovascular: Normal rate, regular rhythm and normal heart sounds. Pulmonary/Chest: Effort normal and breath sounds normal. No respiratory distress. He has no wheezes. He has no rales. Abdominal: Soft. He exhibits no distension. There is no tenderness. There is no rebound and no guarding. Musculoskeletal: Normal range of motion. Neurological: He is alert. No cranial nerve deficit. He exhibits abnormal muscle tone. Lower limbs and left arm contracted. Skin: Skin is warm and dry. Psychiatric: He has a normal mood and affect. His behavior is normal. Judgment and thought content normal.   Nursing note and vitals reviewed. MDM  Number of Diagnoses or Management Options  Catheter (urine) change required:     ED Course       Procedures      -------------------------------------------------------------------------------------------------------------------     EKG INTERPRETATIONS:      RADIOLOGY RESULTS:   No orders to display       LABORATORY RESULTS:  No results found for this or any previous visit (from the past 12 hour(s)). CONSULTATIONS:        PROGRESS NOTES:    3:33 PM Pt well appearing and in NAD. Here for routine catheter change. Sent urine culture. F/u with urology as scheduled. Lengthy D/W pt regarding possible worsening of pt's condition, need for follow up and strict ED return instructions for any worsening symptoms.      DISPOSITION:  ED DIAGNOSIS & DISPOSITION INFORMATION  Diagnosis:   1. Catheter (urine) change required          Disposition: home    Follow-up Information     Follow up With Details Comments Contact Info    Alice Irby MD  as scheduled [address]       [facility] EMERGENCY DEPT  Immediately if symptoms worsen [address] [facility] [address]  [phone]          Patient's Medications   Start Taking    No medications on file   Continue Taking    CALCIUM CITRATE-VITAMIN D3 (CITRACAL+D) 315-200 MG-UNIT TAB    Take 1 Tab by mouth daily (with breakfast). CHOLECALCIFEROL, VITAMIN D3, (VITAMIN D3) 1,000 UNIT CAP    Take  by mouth. CITALOPRAM (CELEXA) 10 MG TABLET    Take  by mouth daily. DOCUSATE SODIUM (COLACE) 100 MG CAPSULE    Take 100 mg by mouth two (2) times a day. FERROUS SULFATE (IRON, FERROUS SULFATE,) 325 MG (65 MG IRON) TABLET    Take 1 Tab by mouth Daily (before breakfast). FOOD SUPPLEMT, LACTOSE-REDUCED (ENSURE ACTIVE HIGH PROTEIN) LIQD    Take 237 mL by mouth four (4) times daily. LISINOPRIL (PRINIVIL, ZESTRIL) 5 MG TABLET    Take 1 Tab by mouth daily. MENTHOL-ZINC OXIDE (CALMOSEPTINE) 0.44-20.6 % OINT    Apply  to affected area. MULTIVIT, IRON, MIN NO. 8, FA (THERAGRAN-M PO)    Take  by mouth. OXYBUTYNIN CHLORIDE XL (DITROPAN XL) 10 MG CR TABLET    Take 1 Tab by mouth two (2) times a day. OXYCODONE IR (ROXICODONE) 10 MG TAB IMMEDIATE RELEASE TABLET    Take 1 Tab by mouth every six (6) hours as needed. Max Daily Amount: 40 mg. OXYCODONE-ACETAMINOPHEN (PERCOCET) 5-325 MG PER TABLET    Take 1 Tab by mouth every four (4) hours as needed for Pain. PROMETHAZINE (PHENERGAN) 25 MG TABLET    Take 25 mg by mouth every six (6) hours as needed for Nausea. VITAMIN C-VITAMIN E (CRANBERRY CONCENTRATE) CAP    Take  by mouth.    These Medications have changed    No medications on file   Stop Taking    No medications on file

## 2017-09-05 NOTE — ED TRIAGE NOTES
Pt. Reports via [employer] for [employer]. Pt. Was seen 8/3 at urologist and an 12 coude was placed. Pt. Is A/Ox2 and placed onto stretcher. Contracted in lower limbs but denies pain and perales is patent draining yellow urine.

## 2017-09-08 NOTE — PROGRESS NOTES
Dr Crocker Sites,   I saw this gentleman in the ED 3 days ago for a routine catheter change. Nurses at the SNF where he resides were uncomfortable changing his catheter due to his BPH and sent him to the ED. Pt was without fever, signs of systemic infection or pain, and stated he felt well. Will defer treatment for this urine culture to you as he is under care at your office.    Thank you,   Earl Hernández PA-C

## 2017-09-09 LAB
BACTERIA SPEC CULT: ABNORMAL
BACTERIA SPEC CULT: ABNORMAL
SERVICE CMNT-IMP: ABNORMAL

## 2017-11-15 ENCOUNTER — ANESTHESIA EVENT (OUTPATIENT)
Dept: ENDOSCOPY | Age: 72
End: 2017-11-15

## 2017-11-16 ENCOUNTER — ANESTHESIA (OUTPATIENT)
Dept: ENDOSCOPY | Age: 72
End: 2017-11-16

## 2017-11-16 ENCOUNTER — HOSPITAL ENCOUNTER (OUTPATIENT)
Age: 72
Setting detail: OUTPATIENT SURGERY
Discharge: NURSING, REHAB OR LONG-TERM CARE | End: 2017-11-16
Attending: INTERNAL MEDICINE | Admitting: INTERNAL MEDICINE

## 2017-11-16 VITALS
TEMPERATURE: 98.5 F | DIASTOLIC BLOOD PRESSURE: 70 MMHG | HEIGHT: 69 IN | RESPIRATION RATE: 14 BRPM | SYSTOLIC BLOOD PRESSURE: 118 MMHG | BODY MASS INDEX: 19.99 KG/M2 | OXYGEN SATURATION: 99 % | WEIGHT: 135 LBS | HEART RATE: 66 BPM

## 2017-11-16 RX ORDER — SODIUM CHLORIDE 0.9 % (FLUSH) 0.9 %
5-10 SYRINGE (ML) INJECTION EVERY 8 HOURS
Status: DISCONTINUED | OUTPATIENT
Start: 2017-11-16 | End: 2017-11-16 | Stop reason: HOSPADM

## 2017-11-16 RX ORDER — SODIUM CHLORIDE 0.9 % (FLUSH) 0.9 %
5-10 SYRINGE (ML) INJECTION AS NEEDED
Status: DISCONTINUED | OUTPATIENT
Start: 2017-11-16 | End: 2017-11-16 | Stop reason: HOSPADM

## 2017-11-16 RX ORDER — SODIUM CHLORIDE, SODIUM LACTATE, POTASSIUM CHLORIDE, CALCIUM CHLORIDE 600; 310; 30; 20 MG/100ML; MG/100ML; MG/100ML; MG/100ML
75 INJECTION, SOLUTION INTRAVENOUS CONTINUOUS
Status: DISCONTINUED | OUTPATIENT
Start: 2017-11-16 | End: 2017-11-16 | Stop reason: HOSPADM

## 2017-11-16 NOTE — IP AVS SNAPSHOT
[address] Patient: Fili Aj MRN: [id number]:[id number] My Medications ASK your physician about these medications Instructions Each Dose to Equal  
 Morning Noon Evening Bedtime  
 calcium citrate-vitamin d3 315-200 mg-unit Tab Commonly known as:  CITRACAL+D Your last dose was: Your next dose is: Take 1 Tab by mouth daily (with breakfast). 1 Tab  
    
   
   
   
  
 citalopram 10 mg tablet Commonly known as:  Arvel Saltness Your last dose was: Your next dose is: Take  by mouth daily. COLACE 100 mg capsule Generic drug:  docusate sodium Your last dose was: Your next dose is: Take 100 mg by mouth two (2) times a day. 100 mg CRANBERRY CONCENTRATE Cap Generic drug:  vitamin c-vitamin e Your last dose was: Your next dose is: Take  by mouth. ENSURE ACTIVE HIGH PROTEIN Liqd Generic drug:  food supplemt, lactose-reduced Your last dose was: Your next dose is: Take 237 mL by mouth four (4) times daily. 237 mL  
    
   
   
   
  
 ferrous sulfate 325 mg (65 mg iron) tablet Commonly known as:  Iron (Ferrous Sulfate) Your last dose was: Your next dose is: Take 1 Tab by mouth Daily (before breakfast). 325 mg  
    
   
   
   
  
 lisinopril 5 mg tablet Commonly known as:  Thersia Kucornells Your last dose was: Your next dose is: Take 1 Tab by mouth daily. 5 mg Menthol-Zinc Oxide 0.44-20.6 % Oint Commonly known as:  Calmoseptine Your last dose was: Your next dose is:    
   
   
 Apply  to affected area. oxybutynin chloride XL 10 mg CR tablet Commonly known as:  DITROPAN XL Your last dose was: Your next dose is: Take 1 Tab by mouth two (2) times a day. 10 mg  
    
   
   
   
  
 oxyCODONE IR 10 mg Tab immediate release tablet Commonly known as:  Radha Cevallos Your last dose was: Your next dose is: Take 1 Tab by mouth every six (6) hours as needed. Max Daily Amount: 40 mg.  
 10 mg  
    
   
   
   
  
 oxyCODONE-acetaminophen 5-325 mg per tablet Commonly known as:  PERCOCET Your last dose was: Your next dose is: Take 1 Tab by mouth every four (4) hours as needed for Pain. 1 Tab  
    
   
   
   
  
 promethazine 25 mg tablet Commonly known as:  PHENERGAN Your last dose was: Your next dose is: Take 25 mg by mouth every six (6) hours as needed for Nausea. 25 mg  
    
   
   
   
  
 THERAGRAN-M PO Your last dose was: Your next dose is: Take  by mouth. VITAMIN D3 1,000 unit Cap Generic drug:  cholecalciferol Your last dose was: Your next dose is: Take  by mouth.

## 2017-11-16 NOTE — H&P
[facility]   [email or web address]      Impression:   1. WILLIAM      Plan:     1. Elma egd mac all risks discussed       Chief Complaint: william    HPI:  Ladsuly Bellamy is a 81 y.o. male who is being seen on consult for william.     PMH:   Past Medical History:   Diagnosis Date    Abnormal computed tomography of bladder     Basal ganglia disease     Benign prostatic hyperplasia with lower urinary tract symptoms     Bladder calculus     Bladder cancer ([facility] 3/13/14    Bladder mass     Bladder tumor     Brain aneurysm     CVA (cerebral vascular accident) ([facility] [address]    Dysphagia     Erythematous bladder mucosa     Webb catheter in place     Gross hematuria     HTN (hypertension)     Hx MRSA infection     Neurogenic bladder     Retention, urine     Stroke (HCC)     Urethral stricture        PSH:   Past Surgical History:   Procedure Laterality Date    HX GI      PEG TUBE--REMOVED    HX HEENT      H/O TRACH    HX UROLOGICAL      super pubic tube    HX UROLOGICAL  3/13/14    TURBT, Dr. Nelly Langley, [place] [address]    HX UROLOGICAL  10/8/15    Cysto, Dr. Dewey Live, [facility]       Social HX:   Social History     Social History    Marital status:      Spouse name: N/A    Number of children: 2    Years of education: 15     Occupational History    other Retired     Social History Main Topics    Smoking status: Former Smoker    Smokeless tobacco: Never Used    Alcohol use No    Drug use: No    Sexual activity: Not Currently     Partners: Female     Other Topics Concern     Service No    Blood Transfusions No    Caffeine Concern No    Occupational Exposure No    Hobby Hazards No    Sleep Concern No    Stress Concern No    Weight Concern No    Special Diet No    Back Care No    Exercise No    Bike Helmet No    Seat Belt Yes    Self-Exams No     Social History Narrative       FHX:   Family History   Problem Relation Age of Onset    Hypertension Mother     Hypertension Father        Allergy:   Allergies   Allergen Reactions    Pcn [Penicillins] Other (comments) and Itching     intolerance       Home Medications:     No prescriptions prior to admission. Review of Systems:     Constitutional: No fevers, chills, weight loss, fatigue. Skin: No rashes, pruritis, jaundice, ulcerations, erythema. HENT: No headaches, nosebleeds, sinus pressure, rhinorrhea, sore throat. Eyes: No visual changes, blurred vision, eye pain, photophobia, jaundice. Cardiovascular: No chest pain, heart palpitations. Respiratory: No cough, SOB, wheezing, chest discomfort, orthopnea. Gastrointestinal: Neg    Genitourinary: No dysuria, bleeding, discharge, pyuria. Musculoskeletal: No weakness, arthralgias, wasting. Endo: No sweats. Heme: No bruising, easy bleeding. Allergies: As noted. Neurological: Cranial nerves intact. Alert and oriented. Gait not assessed. Psychiatric:  No anxiety, depression, hallucinations. Visit Vitals    Ht 5' 9\" (1.753 m)    Wt 61.2 kg (135 lb)    BMI 19.94 kg/m2       Physical Assessment:     constitutional: appearance: well developed, well nourished, normal habitus, no deformities, in no acute distress. skin: inspection: no rashes, ulcers, icterus or other lesions; no clubbing or telangiectasias. palpation: no induration or subcutaneos nodules. eyes: inspection: normal conjunctivae and lids; no jaundice pupils: symmetrical, normoreactive to light, normal accommodation and size. ENMT: mouth: normal oral mucosa,lips and gums; good dentition. oropharynx: normal tongue, hard and soft palate; posterior pharynx without erythema, exudate or lesions. neck: no masses organomegaly or tenderness. respiratory: effort: normal chest excursion; no intercostal retraction or accessory muscle use. cardiovascular: abdominal aorta: normal size and position; no bruits.  palpation: PMI of normal size and position; normal rhythm; no thrill or murmurs. abdominal: abdomen: normal consistency; no tenderness or masses. hernias: no hernias appreciated. liver: normal size and consistency. spleen: not palpable. rectal: hemoccult/guaiac: not performed. musculoskeletal: no deformities or muscle wasting   lymphatic: axilae: not palpable. groin: not palpable. neck: within normal limits. other: not palpable. neurologic: cranial nerves: II-XII normal.   psychiatric: judgement/insight: within normal limits. memory: within normal limits for recent and remote events. mood and affect: no evidence of depression, anxiety or agitation. orientation: oriented to time, space and person. Basic Metabolic Profile   No results for input(s): NA, K, CL, CO2, BUN, GLU, CA, MG, PHOS in the last 72 hours. No lab exists for component: CREAT      CBC w/Diff    No results for input(s): WBC, RBC, HGB, HCT, MCV, MCH, MCHC, RDW, PLT, HGBEXT, HCTEXT, PLTEXT in the last 72 hours. No lab exists for component: MPV No results for input(s): GRANS, LYMPH, EOS, PRO, MYELO, METAS, BLAST in the last 72 hours. No lab exists for component: MONO, BASO     Hepatic Function   No results for input(s): ALB, TP, TBILI, GPT, SGOT, AP, AML, LPSE in the last 72 hours. No lab exists for component: Glenwood Sacks, MD, M.D. [facility], [address] [place] [address]  [email or web address]

## 2017-11-16 NOTE — PERIOP NOTES
Spoke  With [facility] [address]. Reported pt case cancelled r/t no bowel prep and no NPO status, pt.  Had Lunch No PIV inserted

## 2017-11-16 NOTE — IP AVS SNAPSHOT
Oneida Jj 
 
 
 [address] Patient: Angela Fernández MRN: [id number] UHR:[id number] About your hospitalization You were admitted on:  November 16, 2017 You last received care in the:  [facility] ENDOSCOPY You were discharged on:  November 16, 2017 Why you were hospitalized Your primary diagnosis was:  Not on File Discharge Orders None A check víctor indicates which time of day the medication should be taken. My Medications ASK your physician about these medications Instructions Each Dose to Equal  
 Morning Noon Evening Bedtime  
 calcium citrate-vitamin d3 315-200 mg-unit Tab Commonly known as:  CITRACAL+D Your last dose was: Your next dose is: Take 1 Tab by mouth daily (with breakfast). 1 Tab  
    
   
   
   
  
 citalopram 10 mg tablet Commonly known as:  Katie Mcclain Your last dose was: Your next dose is: Take  by mouth daily. COLACE 100 mg capsule Generic drug:  docusate sodium Your last dose was: Your next dose is: Take 100 mg by mouth two (2) times a day. 100 mg CRANBERRY CONCENTRATE Cap Generic drug:  vitamin c-vitamin e Your last dose was: Your next dose is: Take  by mouth. ENSURE ACTIVE HIGH PROTEIN Liqd Generic drug:  food supplemt, lactose-reduced Your last dose was: Your next dose is: Take 237 mL by mouth four (4) times daily. 237 mL  
    
   
   
   
  
 ferrous sulfate 325 mg (65 mg iron) tablet Commonly known as:  Iron (Ferrous Sulfate) Your last dose was: Your next dose is: Take 1 Tab by mouth Daily (before breakfast). 325 mg  
    
   
   
   
  
 lisinopril 5 mg tablet Commonly known as:  Nickie Snow  
   
 Your last dose was: Your next dose is: Take 1 Tab by mouth daily. 5 mg Menthol-Zinc Oxide 0.44-20.6 % Oint Commonly known as:  Calmoseptine Your last dose was: Your next dose is:    
   
   
 Apply  to affected area. oxybutynin chloride XL 10 mg CR tablet Commonly known as:  DITROPAN XL Your last dose was: Your next dose is: Take 1 Tab by mouth two (2) times a day. 10 mg  
    
   
   
   
  
 oxyCODONE IR 10 mg Tab immediate release tablet Commonly known as:  [place] Gun Your last dose was: Your next dose is: Take 1 Tab by mouth every six (6) hours as needed. Max Daily Amount: 40 mg.  
 10 mg  
    
   
   
   
  
 oxyCODONE-acetaminophen 5-325 mg per tablet Commonly known as:  PERCOCET Your last dose was: Your next dose is: Take 1 Tab by mouth every four (4) hours as needed for Pain. 1 Tab  
    
   
   
   
  
 promethazine 25 mg tablet Commonly known as:  PHENERGAN Your last dose was: Your next dose is: Take 25 mg by mouth every six (6) hours as needed for Nausea. 25 mg  
    
   
   
   
  
 THERAGRAN-M PO Your last dose was: Your next dose is: Take  by mouth. VITAMIN D3 1,000 unit Cap Generic drug:  cholecalciferol Your last dose was: Your next dose is: Take  by mouth. Discharge Instructions DISCHARGE SUMMARY from Nurse The following personal items are in your possession at time of discharge: 
 
 
 
PATIENT INSTRUCTIONS: 
 
 
Follow up with provider regarding reschedule of case These are general instructions for a healthy lifestyle: No smoking/ No tobacco products/ Avoid exposure to second hand smoke Surgeon General's Warning:  Quitting smoking now greatly reduces serious risk to your health. Obesity, smoking, and sedentary lifestyle greatly increases your risk for illness A healthy diet, regular physical exercise & weight monitoring are important for maintaining a healthy lifestyle You may be retaining fluid if you have a history of heart failure or if you experience any of the following symptoms:  Weight gain of 3 pounds or more overnight or 5 pounds in a week, increased swelling in our hands or feet or shortness of breath while lying flat in bed. Please call your doctor as soon as you notice any of these symptoms; do not wait until your next office visit. Recognize signs and symptoms of STROKE: 
 
F-face looks uneven A-arms unable to move or move unevenly S-speech slurred or non-existent T-time-call 911 as soon as signs and symptoms begin-DO NOT go Back to bed or wait to see if you get better-TIME IS BRAIN. Warning Signs of HEART ATTACK Call 911 if you have these symptoms: 
? Chest discomfort. Most heart attacks involve discomfort in the center of the chest that lasts more than a few minutes, or that goes away and comes back. It can feel like uncomfortable pressure, squeezing, fullness, or pain. ? Discomfort in other areas of the upper body. Symptoms can include pain or discomfort in one or both arms, the back, neck, jaw, or stomach. ? Shortness of breath with or without chest discomfort. ? Other signs may include breaking out in a cold sweat, nausea, or lightheadedness. Don't wait more than five minutes to call [address]! Fast action can save your life. Calling 911 is almost always the fastest way to get lifesaving treatment. Emergency Medical Services staff can begin treatment when they arrive  up to an hour sooner than if someone gets to the hospital by car. The discharge information has been reviewed with the patient.   The patient verbalized understanding. Patient armband removed and shredded Introducing \[facility]\"" & HEALTH SERVICES! Bart Cornelius introduces [employer] patient portal. Now you can access parts of your medical record, email your doctor's office, and request medication refills online. 1. In your internet browser, go to [email or web address] 2. Click on the First Time User? Click Here link in the Sign In box. You will see the New Member Sign Up page. 3. Enter your [employer] Access Code exactly as it appears below. You will not need to use this code after youve completed the sign-up process. If you do not sign up before the expiration date, you must request a new code. · [employer] Access Code: [email or web address] Expires: 12/4/2017  2:30 PM 
 
4. Enter the last four digits of your Social Security Number (xxxx) and Date of Birth (mm/dd/yyyy) as indicated and click Submit. You will be taken to the next sign-up page. 5. Create a [employer] ID. This will be your [employer] login ID and cannot be changed, so think of one that is secure and easy to remember. 6. Create a [employer] password. You can change your password at any time. 7. Enter your Password Reset Question and Answer. This can be used at a later time if you forget your password. 8. Enter your e-mail address. You will receive e-mail notification when new information is available in [address] 9. Click Sign Up. You can now view and download portions of your medical record. 10. Click the Download Summary menu link to download a portable copy of your medical information. If you have questions, please visit the Frequently Asked Questions section of the [employer] website. Remember, [employer] is NOT to be used for urgent needs. For medical emergencies, dial 911. Now available from your iPhone and Android! Providers Seen During Your Hospitalization Provider Specialty Primary office phone Rashida Lombardo MD Gastroenterology [phone] Your Primary Care Physician (PCP) Primary Care Physician Office Phone Office Fax [address], [address] [phone] You are allergic to the following Allergen Reactions Pcn (Penicillins) Other (comments) Itching  
 intolerance Recent Documentation Height Weight BMI Smoking Status 1.753 m 61.2 kg 19.94 kg/m2 Former Smoker Emergency Contacts Name Discharge Info Relation Home Work Mobile Shiloh Diallo DISCHARGE CAREGIVER [3] Spouse [3] [phone] [address] [place] [address] CAREGIVER [3] Child [2] [phone] DialloMarcel(Son)  Child [2] [phone] Mary Ellen Book  Child [2] [phone] Rosio Hoof  Child [2] [phone] Patient Belongings The following personal items are in your possession at time of discharge: 
  Dental Appliances: None Please provide this summary of care documentation to your next provider. Signatures-by signing, you are acknowledging that this After Visit Summary has been reviewed with you and you have received a copy. Patient Signature:  ____________________________________________________________ Date:  ____________________________________________________________  
  
Chapo Kim Provider Signature:  ____________________________________________________________ Date:  ____________________________________________________________

## 2017-11-16 NOTE — DISCHARGE INSTRUCTIONS
DISCHARGE SUMMARY from Nurse    The following personal items are in your possession at time of discharge:        PATIENT INSTRUCTIONS:      Follow up with provider regarding reschedule of case          These are general instructions for a healthy lifestyle:    No smoking/ No tobacco products/ Avoid exposure to second hand smoke    Surgeon General's Warning:  Quitting smoking now greatly reduces serious risk to your health. Obesity, smoking, and sedentary lifestyle greatly increases your risk for illness    A healthy diet, regular physical exercise & weight monitoring are important for maintaining a healthy lifestyle    You may be retaining fluid if you have a history of heart failure or if you experience any of the following symptoms:  Weight gain of 3 pounds or more overnight or 5 pounds in a week, increased swelling in our hands or feet or shortness of breath while lying flat in bed. Please call your doctor as soon as you notice any of these symptoms; do not wait until your next office visit. Recognize signs and symptoms of STROKE:    F-face looks uneven    A-arms unable to move or move unevenly    S-speech slurred or non-existent    T-time-call 911 as soon as signs and symptoms begin-DO NOT go       Back to bed or wait to see if you get better-TIME IS BRAIN. Warning Signs of HEART ATTACK     Call 911 if you have these symptoms:   Chest discomfort. Most heart attacks involve discomfort in the center of the chest that lasts more than a few minutes, or that goes away and comes back. It can feel like uncomfortable pressure, squeezing, fullness, or pain.  Discomfort in other areas of the upper body. Symptoms can include pain or discomfort in one or both arms, the back, neck, jaw, or stomach.  Shortness of breath with or without chest discomfort.  Other signs may include breaking out in a cold sweat, nausea, or lightheadedness. Don't wait more than five minutes to call 911 - MINUTES MATTER!  Fast action can save your life. Calling 911 is almost always the fastest way to get lifesaving treatment. Emergency Medical Services staff can begin treatment when they arrive -- up to an hour sooner than if someone gets to the hospital by car. The discharge information has been reviewed with the patient. The patient verbalized understanding.     Patient armband removed and shredded

## 2017-12-11 ENCOUNTER — HOSPITAL ENCOUNTER (EMERGENCY)
Age: 72
Discharge: NURSING, REHAB OR LONG-TERM CARE | End: 2017-12-11
Attending: EMERGENCY MEDICINE
Payer: MEDICARE

## 2017-12-11 VITALS
HEART RATE: 75 BPM | TEMPERATURE: 98.2 F | DIASTOLIC BLOOD PRESSURE: 84 MMHG | OXYGEN SATURATION: 98 % | RESPIRATION RATE: 16 BRPM | SYSTOLIC BLOOD PRESSURE: 124 MMHG

## 2017-12-11 DIAGNOSIS — K62.5 RECTAL BLEEDING: Primary | ICD-10-CM

## 2017-12-11 LAB
ALBUMIN SERPL-MCNC: 3.2 G/DL (ref 3.4–5)
ALBUMIN/GLOB SERPL: 0.7 {RATIO} (ref 0.8–1.7)
ALP SERPL-CCNC: 79 U/L (ref 45–117)
ALT SERPL-CCNC: 19 U/L (ref 16–61)
ANION GAP SERPL CALC-SCNC: 6 MMOL/L (ref 3–18)
AST SERPL-CCNC: 13 U/L (ref 15–37)
BASOPHILS # BLD: 0.1 K/UL (ref 0–0.06)
BASOPHILS NFR BLD: 1 % (ref 0–2)
BILIRUB SERPL-MCNC: 0.2 MG/DL (ref 0.2–1)
BUN SERPL-MCNC: 28 MG/DL (ref 7–18)
BUN/CREAT SERPL: 30 (ref 12–20)
CALCIUM SERPL-MCNC: 8.9 MG/DL (ref 8.5–10.1)
CHLORIDE SERPL-SCNC: 110 MMOL/L (ref 100–108)
CO2 SERPL-SCNC: 26 MMOL/L (ref 21–32)
CREAT SERPL-MCNC: 0.94 MG/DL (ref 0.6–1.3)
DIFFERENTIAL METHOD BLD: ABNORMAL
EOSINOPHIL # BLD: 0.2 K/UL (ref 0–0.4)
EOSINOPHIL NFR BLD: 3 % (ref 0–5)
ERYTHROCYTE [DISTWIDTH] IN BLOOD BY AUTOMATED COUNT: 17.2 % (ref 11.6–14.5)
GLOBULIN SER CALC-MCNC: 4.7 G/DL (ref 2–4)
GLUCOSE SERPL-MCNC: 117 MG/DL (ref 74–99)
HCT VFR BLD AUTO: 32.8 % (ref 36–48)
HGB BLD-MCNC: 10 G/DL (ref 13–16)
INR PPP: 1.1 (ref 0.8–1.2)
LACTATE BLD-SCNC: 1.5 MMOL/L (ref 0.4–2)
LYMPHOCYTES # BLD: 1.3 K/UL (ref 0.9–3.6)
LYMPHOCYTES NFR BLD: 15 % (ref 21–52)
MCH RBC QN AUTO: 23.3 PG (ref 24–34)
MCHC RBC AUTO-ENTMCNC: 30.5 G/DL (ref 31–37)
MCV RBC AUTO: 76.3 FL (ref 74–97)
MONOCYTES # BLD: 0.7 K/UL (ref 0.05–1.2)
MONOCYTES NFR BLD: 8 % (ref 3–10)
NEUTS SEG # BLD: 6.7 K/UL (ref 1.8–8)
NEUTS SEG NFR BLD: 73 % (ref 40–73)
PLATELET # BLD AUTO: 312 K/UL (ref 135–420)
PMV BLD AUTO: 9.7 FL (ref 9.2–11.8)
POTASSIUM SERPL-SCNC: 4 MMOL/L (ref 3.5–5.5)
PROT SERPL-MCNC: 7.9 G/DL (ref 6.4–8.2)
PROTHROMBIN TIME: 14 SEC (ref 11.5–15.2)
RBC # BLD AUTO: 4.3 M/UL (ref 4.7–5.5)
SODIUM SERPL-SCNC: 142 MMOL/L (ref 136–145)
WBC # BLD AUTO: 9 K/UL (ref 4.6–13.2)

## 2017-12-11 PROCEDURE — 85025 COMPLETE CBC W/AUTO DIFF WBC: CPT

## 2017-12-11 PROCEDURE — 99284 EMERGENCY DEPT VISIT MOD MDM: CPT

## 2017-12-11 PROCEDURE — 83605 ASSAY OF LACTIC ACID: CPT

## 2017-12-11 PROCEDURE — 85610 PROTHROMBIN TIME: CPT

## 2017-12-11 PROCEDURE — 80053 COMPREHEN METABOLIC PANEL: CPT

## 2017-12-11 NOTE — ED PROVIDER NOTES
EMERGENCY DEPARTMENT HISTORY AND PHYSICAL EXAM    3:39 PM      Date: 12/11/2017  Patient Name: Ava Stewart    History of Presenting Illness     Chief Complaint   Patient presents with    Rectal Bleeding         History Provided By: Patient and EMS    Chief Complaint: Rectal bleeding  Duration:  20 mins PTA  Timing:  Acute  Location: Rectum  Quality: N/A  Severity: 0 out of 10  Modifying Factors: none  Associated Symptoms: denies any other associated signs or symptoms      Additional History (Context): Ava Stewart is a 81 y.o. male with hypertension and stroke who presents to the ED with c/o rectal bleeding 20 mins PTA. Per medics, patient lives in a nursing home who reported \"copious amounts of blood in stool. \" Patient denies similar sx in past, denies abdominal pain. Admits he has a colonoscopy scheduled in January 2018. Denies weakness or dizziness. Reports hx of CVA in 2012 with L sided deficits for which he is bed ridden. Admits he ambulates with wheelxhair ocassionally. Notes he had a small BM yesterday. Denies weakness or dizziness. Denies use of blood thinners. No other symptoms or concerns were expressed. PCP: Kaveh Hernández MD    Current Outpatient Prescriptions   Medication Sig Dispense Refill    Menthol-Zinc Oxide (CALMOSEPTINE) 0.44-20.6 % oint Apply  to affected area.  docusate sodium (COLACE) 100 mg capsule Take 100 mg by mouth two (2) times a day.  oxyCODONE-acetaminophen (PERCOCET) 5-325 mg per tablet Take 1 Tab by mouth every four (4) hours as needed for Pain.  food supplemt, lactose-reduced (ENSURE ACTIVE HIGH PROTEIN) liqd Take 237 mL by mouth four (4) times daily.  promethazine (PHENERGAN) 25 mg tablet Take 25 mg by mouth every six (6) hours as needed for Nausea.  citalopram (CELEXA) 10 mg tablet Take  by mouth daily.  lisinopril (PRINIVIL, ZESTRIL) 5 mg tablet Take 1 Tab by mouth daily.  90 Tab 3    ferrous sulfate (IRON, FERROUS SULFATE,) 325 mg (65 mg iron) tablet Take 1 Tab by mouth Daily (before breakfast). 90 Tab 3    oxyCODONE IR (ROXICODONE) 10 mg tab immediate release tablet Take 1 Tab by mouth every six (6) hours as needed. Max Daily Amount: 40 mg. 30 Tab 0    oxybutynin chloride XL (DITROPAN XL) 10 mg CR tablet Take 1 Tab by mouth two (2) times a day. 60 Tab 5    calcium citrate-vitamin d3 (CITRACAL+D) 315-200 mg-unit Tab Take 1 Tab by mouth daily (with breakfast).  vitamin c-vitamin e (CRANBERRY CONCENTRATE) cap Take  by mouth.  MULTIVIT, IRON, MIN NO. 8, FA (THERAGRAN-M PO) Take  by mouth.  Cholecalciferol, Vitamin D3, (VITAMIN D3) 1,000 unit cap Take  by mouth. Past History     Past Medical History:  Past Medical History:   Diagnosis Date    Abnormal computed tomography of bladder     Basal ganglia disease     Benign prostatic hyperplasia with lower urinary tract symptoms     Bladder calculus     Bladder cancer ([facility] 3/13/14    Bladder mass     Bladder tumor     Brain aneurysm     CVA (cerebral vascular accident) ([facility] [address] [address]    Dysphagia     Erythematous bladder mucosa     Webb catheter in place     Gross hematuria     HTN (hypertension)     Hx MRSA infection     Neurogenic bladder     Retention, urine     Stroke ([facility] [address] [facility]     Urethral stricture        Past Surgical History:  Past Surgical History:   Procedure Laterality Date    HX GI      PEG TUBE--REMOVED    HX HEENT      H/O TRACH    HX UROLOGICAL      super pubic tube    HX UROLOGICAL  3/13/14    TURBT, Jeanette Kimbrough    HX UROLOGICAL  10/8/15    Cysto, Dr. Azeem Henderson       Family History:  Family History   Problem Relation Age of Onset    Hypertension Mother     Hypertension Father        Social History:  Social History   Substance Use Topics    Smoking status: Former Smoker    Smokeless tobacco: Never Used    Alcohol use No       Allergies:   Allergies   Allergen Reactions    Pcn [Penicillins] Other (comments) and Itching intolerance         Review of Systems       Review of Systems   Constitutional: Negative for activity change, chills and fever. HENT: Negative for congestion, ear pain, sore throat and trouble swallowing. Eyes: Negative for visual disturbance. Respiratory: Negative for cough, shortness of breath and wheezing. Cardiovascular: Negative for chest pain, palpitations and leg swelling. Gastrointestinal: Positive for blood in stool. Negative for abdominal pain, diarrhea, nausea and vomiting. Genitourinary: Negative for decreased urine volume, dysuria, frequency and urgency. Musculoskeletal: Negative for arthralgias and joint swelling. Skin: Negative for rash. Neurological: Negative for weakness, numbness and headaches. Psychiatric/Behavioral: Negative for agitation and confusion. All other systems reviewed and are negative. Physical Exam     Visit Vitals    /84 (BP 1 Location: Right arm, BP Patient Position: At rest;Supine)    Pulse 75    Temp 98.2 °F (36.8 °C)    Resp 16    SpO2 98%       Physical Exam   Constitutional: He is oriented to person, place, and time. He appears well-developed and well-nourished. He is cooperative. No distress. HENT:   Head: Normocephalic and atraumatic. Mouth/Throat: Oropharynx is clear and moist. No oropharyngeal exudate. Eyes: Conjunctivae and EOM are normal. Right eye exhibits no discharge. Left eye exhibits no discharge. No scleral icterus. Neck: Normal range of motion and phonation normal. Neck supple. No JVD present. No thyromegaly present. Cardiovascular: Normal rate, regular rhythm, S1 normal, S2 normal, normal heart sounds and intact distal pulses. Exam reveals no gallop and no friction rub. No murmur heard. Pulmonary/Chest: Effort normal and breath sounds normal. No accessory muscle usage. No respiratory distress. He has no wheezes. He has no rhonchi. He has no rales. Abdominal: Soft.  Normal appearance and bowel sounds are normal. He exhibits no distension and no pulsatile midline mass. There is no tenderness. There is no rebound and no guarding. Genitourinary: Rectal exam shows guaiac positive stool. Genitourinary Comments: Rectal exam: normal tone, no obvious hemorrhage, small amount of red blood, no pain, hemoccult positive. Musculoskeletal: Normal range of motion. He exhibits no edema or deformity. Contractures and muscle wasting LUE, LLE; chronic since CVA. Lymphadenopathy:        Head (right side): No submandibular adenopathy present. He has no cervical adenopathy. Neurological: He is alert and oriented to person, place, and time. Pleasant and cooperative   Skin: Skin is warm and dry. No rash noted. No skin breakdown on sacrum, heels or hips   Psychiatric: He has a normal mood and affect. His speech is normal and behavior is normal.   Nursing note and vitals reviewed. Diagnostic Study Results     Labs -  Recent Results (from the past 12 hour(s))   CBC WITH AUTOMATED DIFF    Collection Time: 12/11/17  3:45 PM   Result Value Ref Range    WBC 9.0 4.6 - 13.2 K/uL    RBC 4.30 (L) 4.70 - 5.50 M/uL    HGB 10.0 (L) 13.0 - 16.0 g/dL    HCT 32.8 (L) 36.0 - 48.0 %    MCV 76.3 74.0 - 97.0 FL    MCH 23.3 (L) 24.0 - 34.0 PG    MCHC 30.5 (L) 31.0 - 37.0 g/dL    RDW 17.2 (H) 11.6 - 14.5 %    PLATELET 631 486 - 313 K/uL    MPV 9.7 9.2 - 11.8 FL    NEUTROPHILS 73 40 - 73 %    LYMPHOCYTES 15 (L) 21 - 52 %    MONOCYTES 8 3 - 10 %    EOSINOPHILS 3 0 - 5 %    BASOPHILS 1 0 - 2 %    ABS. NEUTROPHILS 6.7 1.8 - 8.0 K/UL    ABS. LYMPHOCYTES 1.3 0.9 - 3.6 K/UL    ABS. MONOCYTES 0.7 0.05 - 1.2 K/UL    ABS. EOSINOPHILS 0.2 0.0 - 0.4 K/UL    ABS.  BASOPHILS 0.1 (H) 0.0 - 0.06 K/UL    DF AUTOMATED     PROTHROMBIN TIME + INR    Collection Time: 12/11/17  3:45 PM   Result Value Ref Range    Prothrombin time 14.0 11.5 - 15.2 sec    INR 1.1 0.8 - 1.2     METABOLIC PANEL, COMPREHENSIVE    Collection Time: 12/11/17  3:45 PM   Result Value Ref Range    Sodium 142 136 - 145 mmol/L    Potassium 4.0 3.5 - 5.5 mmol/L    Chloride 110 (H) 100 - 108 mmol/L    CO2 26 21 - 32 mmol/L    Anion gap 6 3.0 - 18 mmol/L    Glucose 117 (H) 74 - 99 mg/dL    BUN 28 (H) 7.0 - 18 MG/DL    Creatinine 0.94 0.6 - 1.3 MG/DL    BUN/Creatinine ratio 30 (H) 12 - 20      GFR est AA >60 >60 ml/min/1.73m2    GFR est non-AA >60 >60 ml/min/1.73m2    Calcium 8.9 8.5 - 10.1 MG/DL    Bilirubin, total 0.2 0.2 - 1.0 MG/DL    ALT (SGPT) 19 16 - 61 U/L    AST (SGOT) 13 (L) 15 - 37 U/L    Alk. phosphatase 79 45 - 117 U/L    Protein, total 7.9 6.4 - 8.2 g/dL    Albumin 3.2 (L) 3.4 - 5.0 g/dL    Globulin 4.7 (H) 2.0 - 4.0 g/dL    A-G Ratio 0.7 (L) 0.8 - 1.7     POC LACTIC ACID    Collection Time: 12/11/17  4:14 PM   Result Value Ref Range    Lactic Acid (POC) 1.5 0.4 - 2.0 mmol/L       Medical Decision Making   I am the first provider for this patient. I reviewed the vital signs, available nursing notes, past medical history, past surgical history, family history and social history. Vital Signs-Reviewed the patient's vital signs. Pulse Oximetry Analysis -  98% on room air, normal.    Records Reviewed: Nursing Notes, Old Medical Records, Previous Radiology Studies and Previous Laboratory Studies (Time of Review: 3:39 PM)    ED Course: Progress Notes, Reevaluation, and Consults:  6:29 PM: Reevaluated patient. Review labs. Lactate nml, CBC with hemoglobin 10.0, previous documented hemoglocin 7.6 in August 2017, rest of labs benign. No more bloody stools in ED. Vital signs stable. Discharge patient back to nursing home. Patient can continue scheduled outpatient colonoscopy in 1/2018. Provider Notes (Medical Decision Making):    ASSESSMENT / PLAN:     Mr Rosa Rodriguez is a pleasant 77y/o AAM, nursing home resident, PMhx significant for CVA (l.hemiparisis, bed bound), Chronic Webb who presents from nursing home via EMS for single episode of reported blood in stool.  Pt feels at baseline, had normal BM yesterday, doesn't take blood thinners, NAD, no weakness, dizziness, no abd pain, no trauma. Nursing home reported 1 large bm mixed w/red blood. EMS found pt in diaper (cleaned up) w/small amount of brown stool. Never had this before. No blood thinners, no previous C-scope but looks to be scheduled to have one in Jan 2018 by chart review. On exam, normal viatals, thin AAM, pleasant, A&O, NAD, contracted L.arm/leg, fol;ey in place, abd benign. Normal appearing rectum, no hemorrhoids or fizzures, small amount of redblood mixed w/stool on CYNDI, hemmocult positive, nonpainful. Unsure of cause, sounds likely to be internal hemorroid bleed w/painless single bleed. No stigmata of acute blood loss anemia, or hemodynamic instability. Could be divertiula but no fever or pain on history or exam. Malignancy possible too. -CBC, CMP  -Observe  -If Hgb stable/baseline, and no more bleeds here, will likely dc back to nursing home to continue outpt GI c-scope as scheduled. Jewels Briscoe MD  EM-IM Physician      Diagnosis     Clinical Impression:   1. Rectal bleeding      Disposition: Discharge. Follow-up Information     Follow up With Details Comments [address], [place] In 2 days  [address] [facility] [address]      [facility] EMERGENCY DEPT  As needed, If symptoms worsen [address] [facility] [address] [place] [address]    Nalini Llanos MD  As needed, If symptoms worsen [address]  [phone]             Patient's Medications   Start Taking    No medications on file   Continue Taking    CALCIUM CITRATE-VITAMIN D3 (CITRACAL+D) 315-200 MG-UNIT TAB    Take 1 Tab by mouth daily (with breakfast). CHOLECALCIFEROL, VITAMIN D3, (VITAMIN D3) 1,000 UNIT CAP    Take  by mouth. CITALOPRAM (CELEXA) 10 MG TABLET    Take  by mouth daily.     DOCUSATE SODIUM (COLACE) 100 MG CAPSULE    Take 100 mg by mouth two (2) times a day. FERROUS SULFATE (IRON, FERROUS SULFATE,) 325 MG (65 MG IRON) TABLET    Take 1 Tab by mouth Daily (before breakfast). FOOD SUPPLEMT, LACTOSE-REDUCED (ENSURE ACTIVE HIGH PROTEIN) LIQD    Take 237 mL by mouth four (4) times daily. LISINOPRIL (PRINIVIL, ZESTRIL) 5 MG TABLET    Take 1 Tab by mouth daily. MENTHOL-ZINC OXIDE (CALMOSEPTINE) 0.44-20.6 % OINT    Apply  to affected area. MULTIVIT, IRON, MIN NO. 8, FA (THERAGRAN-M PO)    Take  by mouth. OXYBUTYNIN CHLORIDE XL (DITROPAN XL) 10 MG CR TABLET    Take 1 Tab by mouth two (2) times a day. OXYCODONE IR (ROXICODONE) 10 MG TAB IMMEDIATE RELEASE TABLET    Take 1 Tab by mouth every six (6) hours as needed. Max Daily Amount: 40 mg. OXYCODONE-ACETAMINOPHEN (PERCOCET) 5-325 MG PER TABLET    Take 1 Tab by mouth every four (4) hours as needed for Pain. PROMETHAZINE (PHENERGAN) 25 MG TABLET    Take 25 mg by mouth every six (6) hours as needed for Nausea. VITAMIN C-VITAMIN E (CRANBERRY CONCENTRATE) CAP    Take  by mouth. These Medications have changed    No medications on file   Stop Taking    No medications on file     _______________________________    Attestations:  [address] [facility] acting as a scribe for and in the presence of Edgar Laurent MD      December 11, 2017 at 3:39 PM       Provider Attestation:      I personally performed the services described in the documentation, reviewed the documentation, as recorded by the scribe in my presence, and it accurately and completely records my words and actions.  December 11, 2017 at 3:39 PM - Edgar Laurent MD    _______________________________

## 2017-12-11 NOTE — DISCHARGE INSTRUCTIONS
1) Your blood levels were normal  2) No changes in your meds  3) Go to your Colonoscopy as scheduled in [facility]  4) Feel free to come back for any more bleeding or any concerns. Lower Gastrointestinal Bleeding: Care Instructions  Your Care Instructions    The digestive or gastrointestinal tract goes from the mouth to the anus. It is often called the GI tract. Bleeding in the lower GI tract can happen anywhere in your small or large intestine. It can also happen in your rectum or anus. In some cases, it is caused by an infection, cancer, or inflammatory bowel disease. Or it may be caused by hemorrhoids, diverticulitis, or clotting problems. Light bleeding may not cause any symptoms at first. But if you continue to bleed for a while, you may feel very weak or tired. Sudden, heavy bleeding means you need to see a doctor right away. This kind of bleeding can be very dangerous. But it can usually be cured or controlled. The doctor may do some tests to find the cause of your bleeding. Follow-up care is a key part of your treatment and safety. Be sure to make and go to all appointments, and call your doctor if you are having problems. It's also a good idea to know your test results and keep a list of the medicines you take. How can you care for yourself at home? · Be safe with medicines. Take your medicines exactly as prescribed. Call your doctor if you think you are having a problem with your medicine. You will get more details on the specific medicines your doctor prescribes. · Do not take aspirin or other anti-inflammatory medicines, such as naproxen (Aleve) or ibuprofen (Advil, Motrin), without talking to your doctor first. Ask your doctor if it is okay to use acetaminophen (Tylenol). · Do not drink alcohol. · The bleeding may make you lose iron. So it's important to eat foods that have a lot of iron. These include red meat, shellfish, poultry, and eggs.  They also include beans, raisins, whole-grain breads, and leafy green vegetables. If you want help planning meals, you can meet with a dietitian. When should you call for help? Call 911 anytime you think you may need emergency care. For example, call if:  ? · You have sudden, severe belly pain. ? · You vomit blood or what looks like coffee grounds. ? · You passed out (lost consciousness). ? · Your stools are maroon or very bloody. ?Call your doctor now or seek immediate medical care if:  ? · You are dizzy or lightheaded, or you feel like you may faint. ? · Your stools are black and look like tar, or they have streaks of blood. ? · You have belly pain. ? · You vomit or have nausea. ? Watch closely for changes in your health, and be sure to contact your doctor if you do not get better as expected. Where can you learn more? Go to http://lorna-kristina.info/. Enter Y248 in the search box to learn more about \"Lower Gastrointestinal Bleeding: Care Instructions. \"  Current as of: March 20, 2017  Content Version: 11.4  © 6839-8004 "[employer]". Care instructions adapted under license by [employer] (which disclaims liability or warranty for this information). If you have questions about a medical condition or this instruction, always ask your healthcare professional. [place] [address] any warranty or liability for your use of this information.   Patient armband removed and shredded

## 2018-01-12 ENCOUNTER — ANESTHESIA EVENT (OUTPATIENT)
Dept: ENDOSCOPY | Age: 73
End: 2018-01-12
Payer: MEDICARE

## 2018-01-15 ENCOUNTER — HOSPITAL ENCOUNTER (OUTPATIENT)
Age: 73
Setting detail: OUTPATIENT SURGERY
Discharge: HOME | End: 2018-01-15
Attending: INTERNAL MEDICINE | Admitting: INTERNAL MEDICINE
Payer: MEDICARE

## 2018-01-15 ENCOUNTER — ANESTHESIA (OUTPATIENT)
Dept: ENDOSCOPY | Age: 73
End: 2018-01-15
Payer: MEDICARE

## 2018-01-15 VITALS
OXYGEN SATURATION: 99 % | HEART RATE: 59 BPM | DIASTOLIC BLOOD PRESSURE: 67 MMHG | TEMPERATURE: 97.9 F | SYSTOLIC BLOOD PRESSURE: 110 MMHG | RESPIRATION RATE: 14 BRPM

## 2018-01-15 LAB
ATRIAL RATE: 65 BPM
CALCULATED P AXIS, ECG09: 38 DEGREES
CALCULATED R AXIS, ECG10: -2 DEGREES
CALCULATED T AXIS, ECG11: 18 DEGREES
DIAGNOSIS, 93000: NORMAL
P-R INTERVAL, ECG05: 182 MS
Q-T INTERVAL, ECG07: 402 MS
QRS DURATION, ECG06: 82 MS
QTC CALCULATION (BEZET), ECG08: 418 MS
VENTRICULAR RATE, ECG03: 65 BPM

## 2018-01-15 PROCEDURE — 74011250636 HC RX REV CODE- 250/636

## 2018-01-15 PROCEDURE — 77030020018 HC MRKR ENDOSC SPOT 5ML SYR GISP -B: Performed by: INTERNAL MEDICINE

## 2018-01-15 PROCEDURE — 74011250636 HC RX REV CODE- 250/636: Performed by: NURSE ANESTHETIST, CERTIFIED REGISTERED

## 2018-01-15 PROCEDURE — 74011250637 HC RX REV CODE- 250/637: Performed by: INTERNAL MEDICINE

## 2018-01-15 PROCEDURE — 74011000250 HC RX REV CODE- 250: Performed by: NURSE ANESTHETIST, CERTIFIED REGISTERED

## 2018-01-15 PROCEDURE — 74011000250 HC RX REV CODE- 250

## 2018-01-15 PROCEDURE — 76040000007: Performed by: INTERNAL MEDICINE

## 2018-01-15 PROCEDURE — 77030013992 HC SNR POLYP ENDOSC BSC -B: Performed by: INTERNAL MEDICINE

## 2018-01-15 PROCEDURE — 77030003657 HC NDL SCLER BSC -B: Performed by: INTERNAL MEDICINE

## 2018-01-15 PROCEDURE — 93005 ELECTROCARDIOGRAM TRACING: CPT

## 2018-01-15 PROCEDURE — 77030018846 HC SOL IRR STRL H20 ICUM -A: Performed by: INTERNAL MEDICINE

## 2018-01-15 PROCEDURE — 77030009426 HC FCPS BIOP ENDOSC BSC -B: Performed by: INTERNAL MEDICINE

## 2018-01-15 PROCEDURE — 88305 TISSUE EXAM BY PATHOLOGIST: CPT | Performed by: INTERNAL MEDICINE

## 2018-01-15 PROCEDURE — 77030008565 HC TBNG SUC IRR ERBE -B: Performed by: INTERNAL MEDICINE

## 2018-01-15 PROCEDURE — 76060000032 HC ANESTHESIA 0.5 TO 1 HR: Performed by: INTERNAL MEDICINE

## 2018-01-15 PROCEDURE — 77030019988 HC FCPS ENDOSC DISP BSC -B: Performed by: INTERNAL MEDICINE

## 2018-01-15 RX ORDER — HYDROMORPHONE HYDROCHLORIDE 2 MG/ML
0.5 INJECTION, SOLUTION INTRAMUSCULAR; INTRAVENOUS; SUBCUTANEOUS AS NEEDED
Status: CANCELLED | OUTPATIENT
Start: 2018-01-15

## 2018-01-15 RX ORDER — LIDOCAINE HYDROCHLORIDE 20 MG/ML
INJECTION, SOLUTION EPIDURAL; INFILTRATION; INTRACAUDAL; PERINEURAL AS NEEDED
Status: DISCONTINUED | OUTPATIENT
Start: 2018-01-15 | End: 2018-01-15 | Stop reason: HOSPADM

## 2018-01-15 RX ORDER — SODIUM CHLORIDE, SODIUM LACTATE, POTASSIUM CHLORIDE, CALCIUM CHLORIDE 600; 310; 30; 20 MG/100ML; MG/100ML; MG/100ML; MG/100ML
75 INJECTION, SOLUTION INTRAVENOUS CONTINUOUS
Status: CANCELLED | OUTPATIENT
Start: 2018-01-15 | End: 2018-01-15

## 2018-01-15 RX ORDER — NALOXONE HYDROCHLORIDE 0.4 MG/ML
0.1 INJECTION, SOLUTION INTRAMUSCULAR; INTRAVENOUS; SUBCUTANEOUS ONCE
Status: CANCELLED | OUTPATIENT
Start: 2018-01-15 | End: 2018-01-16

## 2018-01-15 RX ORDER — DEXTROMETHORPHAN/PSEUDOEPHED 2.5-7.5/.8
DROPS ORAL AS NEEDED
Status: DISCONTINUED | OUTPATIENT
Start: 2018-01-15 | End: 2018-01-15 | Stop reason: HOSPADM

## 2018-01-15 RX ORDER — PROPOFOL 10 MG/ML
INJECTION, EMULSION INTRAVENOUS AS NEEDED
Status: DISCONTINUED | OUTPATIENT
Start: 2018-01-15 | End: 2018-01-15 | Stop reason: HOSPADM

## 2018-01-15 RX ORDER — SODIUM CHLORIDE, SODIUM LACTATE, POTASSIUM CHLORIDE, CALCIUM CHLORIDE 600; 310; 30; 20 MG/100ML; MG/100ML; MG/100ML; MG/100ML
75 INJECTION, SOLUTION INTRAVENOUS CONTINUOUS
Status: DISCONTINUED | OUTPATIENT
Start: 2018-01-16 | End: 2018-01-15 | Stop reason: HOSPADM

## 2018-01-15 RX ADMIN — PROPOFOL 50 MG: 10 INJECTION, EMULSION INTRAVENOUS at 15:57

## 2018-01-15 RX ADMIN — FAMOTIDINE 20 MG: 10 INJECTION INTRAVENOUS at 10:28

## 2018-01-15 RX ADMIN — PROPOFOL 50 MG: 10 INJECTION, EMULSION INTRAVENOUS at 16:15

## 2018-01-15 RX ADMIN — PROPOFOL 50 MG: 10 INJECTION, EMULSION INTRAVENOUS at 16:05

## 2018-01-15 RX ADMIN — PROPOFOL 50 MG: 10 INJECTION, EMULSION INTRAVENOUS at 16:09

## 2018-01-15 RX ADMIN — PROPOFOL 50 MG: 10 INJECTION, EMULSION INTRAVENOUS at 16:02

## 2018-01-15 RX ADMIN — PROPOFOL 50 MG: 10 INJECTION, EMULSION INTRAVENOUS at 15:59

## 2018-01-15 RX ADMIN — SODIUM CHLORIDE, SODIUM LACTATE, POTASSIUM CHLORIDE, AND CALCIUM CHLORIDE 75 ML/HR: 600; 310; 30; 20 INJECTION, SOLUTION INTRAVENOUS at 10:18

## 2018-01-15 RX ADMIN — LIDOCAINE HYDROCHLORIDE 40 MG: 20 INJECTION, SOLUTION EPIDURAL; INFILTRATION; INTRACAUDAL; PERINEURAL at 15:57

## 2018-01-15 NOTE — IP AVS SNAPSHOT
[address] Patient: Rainer López MRN: [id number] FIB:[birth year]90 About your hospitalization You were admitted on:  January 15, 2018 You last received care in the:  [facility] PHASE 2 RECOVERY You were discharged on:  January 15, 2018 Why you were hospitalized Your primary diagnosis was:  Not on File Follow-up Information Follow up With Details Comments Contact Info Eduard Lucio MD   [address] [place] [id number] 
[phone] Your Scheduled Appointments Friday February 09, 2018 10:45 AM EST Any with Fanny Johnson MD  
[facility] ([address]) [address], [facility] [address] [place] [address]  
[phone] Discharge Orders None A check víctor indicates which time of day the medication should be taken. My Medications CONTINUE taking these medications Instructions Each Dose to Equal  
 Morning Noon Evening Bedtime  
 calcium citrate-vitamin d3 315-200 mg-unit Tab Commonly known as:  CITRACAL+D Your last dose was: Your next dose is: Take 1 Tab by mouth daily (with breakfast). 1 Tab  
    
   
   
   
  
 citalopram 10 mg tablet Commonly known as:  Jeneal Bergamo Your last dose was: Your next dose is: Take  by mouth daily. COLACE 100 mg capsule Generic drug:  docusate sodium Your last dose was: Your next dose is: Take 100 mg by mouth two (2) times a day. 100 mg CRANBERRY CONCENTRATE Cap Generic drug:  vitamin c-vitamin e Your last dose was: Your next dose is: Take  by mouth. ENSURE ACTIVE HIGH PROTEIN Liqd Generic drug:  food supplemt, lactose-reduced Your last dose was: Your next dose is: Take 237 mL by mouth four (4) times daily. 237 mL  
    
   
   
   
  
 ferrous sulfate 325 mg (65 mg iron) tablet Commonly known as:  Iron (Ferrous Sulfate) Your last dose was: Your next dose is: Take 1 Tab by mouth Daily (before breakfast). 325 mg  
    
   
   
   
  
 lisinopril 5 mg tablet Commonly known as:  Thelma Best Your last dose was: Your next dose is: Take 1 Tab by mouth daily. 5 mg Menthol-Zinc Oxide 0.44-20.6 % Oint Commonly known as:  Calmoseptine Your last dose was: Your next dose is:    
   
   
 Apply  to affected area. oxybutynin chloride XL 10 mg CR tablet Commonly known as:  DITROPAN XL Your last dose was: Your next dose is: Take 1 Tab by mouth two (2) times a day. 10 mg  
    
   
   
   
  
 oxyCODONE IR 10 mg Tab immediate release tablet Commonly known as:  Laureano Miners Your last dose was: Your next dose is: Take 1 Tab by mouth every six (6) hours as needed. Max Daily Amount: 40 mg.  
 10 mg  
    
   
   
   
  
 oxyCODONE-acetaminophen 5-325 mg per tablet Commonly known as:  PERCOCET Your last dose was: Your next dose is: Take 1 Tab by mouth every four (4) hours as needed for Pain. 1 Tab  
    
   
   
   
  
 promethazine 25 mg tablet Commonly known as:  PHENERGAN Your last dose was: Your next dose is: Take 25 mg by mouth every six (6) hours as needed for Nausea. 25 mg  
    
   
   
   
  
 THERAGRAN-M PO Your last dose was: Your next dose is: Take  by mouth. VITAMIN D3 1,000 unit Cap Generic drug:  cholecalciferol Your last dose was: Your next dose is: Take  by mouth. Discharge Instructions DISCHARGE SUMMARY from Nurse PATIENT INSTRUCTIONS: 
 
 
F-face looks uneven A-arms unable to move or move unevenly S-speech slurred or non-existent T-time-call 911 as soon as signs and symptoms begin-DO NOT go Back to bed or wait to see if you get better-TIME IS BRAIN. Warning Signs of HEART ATTACK Call 911 if you have these symptoms: 
? Chest discomfort. Most heart attacks involve discomfort in the center of the chest that lasts more than a few minutes, or that goes away and comes back. It can feel like uncomfortable pressure, squeezing, fullness, or pain. ? Discomfort in other areas of the upper body. Symptoms can include pain or discomfort in one or both arms, the back, neck, jaw, or stomach. ? Shortness of breath with or without chest discomfort. ? Other signs may include breaking out in a cold sweat, nausea, or lightheadedness. Don't wait more than five minutes to call [address]! Fast action can save your life. Calling 911 is almost always the fastest way to get lifesaving treatment. Emergency Medical Services staff can begin treatment when they arrive  up to an hour sooner than if someone gets to the hospital by car. Colonoscopy: What to Expect at [facility] Your Recovery After you have a colonoscopy, you will stay at the clinic for 1 to 2 hours until the medicines wear off. Then you can go home. But you will need to arrange for a ride. Your doctor will tell you when you can eat and do your other usual activities. Your doctor will talk to you about when you will need your next colonoscopy. Your doctor can help you decide how often you need to be checked. This will depend on the results of your test and your risk for colorectal cancer. After the test, you may be bloated or have gas pains.  You may need to pass gas. If a biopsy was done or a polyp was removed, you may have streaks of blood in your stool (feces) for a few days. This care sheet gives you a general idea about how long it will take for you to recover. But each person recovers at a different pace. Follow the steps below to get better as quickly as possible. How can you care for yourself at home? Activity ? · Rest when you feel tired. ? · You can do your normal activities when it feels okay to do so. Diet ? · Follow your doctor's directions for eating. ? · Unless your doctor has told you not to, drink plenty of fluids. This helps to replace the fluids that were lost during the colon prep. ? · Do not drink alcohol. Medicines ? · Your doctor will tell you if and when you can restart your medicines. He or she will also give you instructions about taking any new medicines. ? · If you take blood thinners, such as warfarin (Coumadin), clopidogrel (Plavix), or aspirin, be sure to talk to your doctor. He or she will tell you if and when to start taking those medicines again. Make sure that you understand exactly what your doctor wants you to do. ? · If polyps were removed or a biopsy was done during the test, your doctor may tell you not to take aspirin or other anti-inflammatory medicines for a few days. These include ibuprofen (Advil, Motrin) and naproxen (Aleve). Other instructions ? · For your safety, do not drive or operate machinery until the medicine wears off and you can think clearly. Your doctor may tell you not to drive or operate machinery until the day after your test.  
? · Do not sign legal documents or make major decisions until the medicine wears off and you can think clearly. The anesthesia can make it hard for you to fully understand what you are agreeing to. Follow-up care is a key part of your treatment and safety.  Be sure to make and go to all appointments, and call your doctor if you are having problems. It's also a good idea to know your test results and keep a list of the medicines you take. When should you call for help? Call 911 anytime you think you may need emergency care. For example, call if: 
? · You passed out (lost consciousness). ? · You pass maroon or bloody stools. ? · You have trouble breathing. ?Call your doctor now or seek immediate medical care if: 
? · You have pain that does not get better after you take pain medicine. ? · You are sick to your stomach or cannot drink fluids. ? · You have new or worse belly pain. ? · You have blood in your stools. ? · You have a fever. ? · You cannot pass stools or gas. ? Watch closely for changes in your health, and be sure to contact your doctor if you have any problems. Where can you learn more? Go to http://lorna-kristina.info/. Enter E264 in the search box to learn more about \"Colonoscopy: What to Expect at Home. \" Current as of: May 12, 2017 Content Version: 11.4 © 0015-7023 [employer]. Care instructions adapted under license by [employer] (which disclaims liability or warranty for this information). If you have questions about a medical condition or this instruction, always ask your healthcare professional. [address] any warranty or liability for your use of this information. Upper GI Endoscopy: What to Expect at [facility] Your Recovery After you have an endoscopy, you will stay at the hospital or clinic for 1 to 2 hours. This will allow the medicine to wear off. You will be able to go home after your doctor or nurse checks to make sure you are not having any problems. You may have to stay overnight if you had treatment during the test. You may have a sore throat for a day or two after the test. 
This care sheet gives you a general idea about what to expect after the test. 
How can you care for yourself at home? Activity · Rest as much as you need to after you go home. · You should be able to go back to your usual activities the day after the test. 
Diet · Follow your doctor's directions for eating after the test. 
· Drink plenty of fluids (unless your doctor has told you not to). Medications · If you have a sore throat the day after the test, use an over-the-counter spray to numb your throat. Follow-up care is a key part of your treatment and safety. Be sure to make and go to all appointments, and call your doctor if you are having problems. It's also a good idea to know your test results and keep a list of the medicines you take. When should you call for help? Call 911 anytime you think you may need emergency care. For example, call if: 
? · You passed out (loses consciousness). ? · You have trouble breathing. ? · You pass maroon or bloody stools. ?Call your doctor now or seek immediate medical care if: 
? · You have pain that does not get better after your take pain medicine. ? · You have new or worse belly pain. ? · You have blood in your stools. ? · You are sick to your stomach and cannot keep fluids down. ? · You have a fever. ? · You cannot pass stools or gas. ? Watch closely for changes in your health, and be sure to contact your doctor if: 
? · Your throat still hurts after a day or two. ? · You do not get better as expected. Where can you learn more? Go to http://lorna-kristina.info/. Enter [phone] in the search box to learn more about \"Upper GI Endoscopy: What to Expect at Home. \" Current as of: May 12, 2017 Content Version: 11.4 © 2048-9233 [employer]. Care instructions adapted under license by [employer] (which disclaims liability or warranty for this information).  If you have questions about a medical condition or this instruction, always ask your healthcare professional. [employer] disclaims any warranty or liability for your use of this information. The discharge information has been reviewed with the patient and caregiver. The patient and caregiver verbalized understanding. Discharge medications reviewed with the patient and caregiver and appropriate educational materials and side effects teaching were provided. ___________________________________________________________________________________________________________________________________ Introducing [facility]! [facility] introduces Meshfire patient portal. Now you can access parts of your medical record, email your doctor's office, and request medication refills online. 1. In your internet browser, go to [email or web address] and [email or web address] 2. Click on the First Time User? Click Here link in the Sign In box. You will see the New Member Sign Up page. 3. Enter your Meshfire Access Code exactly as it appears below. You will not need to use this code after youve completed the sign-up process. If you do not sign up before the expiration date, you must request a new code. · Meshfire Access Code: [email or web address] Expires: 3/11/2018  6:30 PM 
 
4. Enter the last four digits of your Social Security Number (xxxx) and Date of Birth (mm/dd/yyyy) as indicated and click Submit. You will be taken to the next sign-up page. 5. Create a MoboFreet ID. This will be your Meshfire login ID and cannot be changed, so think of one that is secure and easy to remember. 6. Create a MoboFreet password. You can change your password at any time. 7. Enter your Password Reset Question and Answer. This can be used at a later time if you forget your password. 8. Enter your e-mail address. You will receive e-mail notification when new information is available in [address] 9. Click Sign Up. You can now view and download portions of your medical record.  
10. Click the Download Summary menu link to download a portable copy of your medical information. If you have questions, please visit the Frequently Asked Questions section of the MyChart website. Remember, [employer] is NOT to be used for urgent needs. For medical emergencies, dial 911. Now available from your iPhone and Android! Providers Seen During Your Hospitalization Provider Specialty Primary office phone Ping Nice MD Gastroenterology [phone] Your Primary Care Physician (PCP) Primary Care Physician Office Phone Office Fax [address] [phone] You are allergic to the following Allergen Reactions Pcn (Penicillins) Other (comments) Itching  
 intolerance Recent Documentation Smoking Status Former Smoker Emergency Contacts Name Discharge Info Relation Home Work Mobile Shiloh Diallo DISCHARGE CAREGIVER [3] Spouse [3] [phone] [address] [place] [address] CAREGIVER [3] Child [2] [phone] YoelMarcel DECLINED CAREGIVER [4] Son [22] [phone] Patient Belongings The following personal items are in your possession at time of discharge: 
  Dental Appliances: None  Visual Aid: None Please provide this summary of care documentation to your next provider. Signatures-by signing, you are acknowledging that this After Visit Summary has been reviewed with you and you have received a copy. Patient Signature:  ____________________________________________________________ Date:  ____________________________________________________________  
  
Rodrigo Dorsey Provider Signature:  ____________________________________________________________ Date:  ____________________________________________________________

## 2018-01-15 NOTE — PROGRESS NOTES
[email or web address]. [address]  [address], [address]      Brief Procedure Note    Vicki Cavanaugh  1945  [id number]    Date of Procedure: 1/15/2018    Preoperative diagnosis: Iron deficiency anemia, unspecified iron deficiency anemia type [D50.9]    Postoperative diagnosis: EGD: small hiatal hernia 37cm-35cm, antrum polyp  Roxana: hepatic flexure polyp, ascending polyp, ascending mass, descending polypm, diverticulosis      Type of Anesthesia: MAC (Monitored anesthesia care)    Description of findings: same as post op dx    Procedure: Procedure(s):  ENDOSCOPY w/ biopsies  COLONOSCOPY w/ biopsies w/ polypectomy w/ ink injection    :  Dr. Venancio Su MD    Assistant(s): Endoscopy Technician-1: Froilan Gonzalez  Endoscopy RN-1: Judge Vivek RN    EBL:None    Specimens:   ID Type Source Tests Collected by Time Destination   1 : antrum polyp bx Preservative Stomach, Antrum  Venancio Su MD 1/15/2018 1603 Pathology   2 : hepatic flexure polyp bx Preservative Hepatic Flexure  Venancio Su MD 1/15/2018 1615 Pathology   3 : ascending polyp Preservative Colon, Ascending  Venancio Su MD 1/15/2018 1626 Pathology   4 : ascending mass bxs Preservative Colon, Ascending  Venancio Su MD 1/15/2018 1628 Pathology   5 : descending polyp Preservative Colon, Descending  Venancio Su MD 1/15/2018 1636 Pathology       Findings: See printed and scanned procedure note    Complications: None    Dr. Venancio Su MD  1/15/2018  4:48 PM

## 2018-01-15 NOTE — H&P
[facility]  [phone]    GASTROENTEROLOGY Pre-Procedure H and P      Impression/Plan:   1.  This patient is consented for an EGD and colonoscopy for anemia      Chief Complaint: anemia    HPI:  Melody Lynn is a 81 y.o. male who is being is having an EGD and colonoscopy anemia  PMH:   Past Medical History:   Diagnosis Date    Abnormal computed tomography of bladder     Basal ganglia disease     Benign prostatic hyperplasia with lower urinary tract symptoms     Bladder calculus     Bladder cancer ([address] 3/13/14    Bladder mass     Bladder tumor     Brain aneurysm     CVA (cerebral vascular accident) ([address] [facility] [address]    Dysphagia     Erythematous bladder mucosa     Webb catheter in place     Gross hematuria     HTN (hypertension)     Hx MRSA infection     Neurogenic bladder     Retention, urine     Stroke [address] [address]     Urethral stricture        PSH:   Past Surgical History:   Procedure Laterality Date    HX GI      PEG TUBE--REMOVED    HX HEENT      H/O TRACH    HX UROLOGICAL      super pubic tube    HX UROLOGICAL  3/13/14    TURBT, Dr. Philip Ward [facility] UROLOGICAL  10/8/15    Cysto, Dr. Tiffany Fischer, [facility]       Social HX:   Social History     Social History    Marital status:      Spouse name: N/A    Number of children: 2    Years of education: 15     Occupational History    other Retired     Social History Main Topics    Smoking status: Former Smoker    Smokeless tobacco: Never Used    Alcohol use No    Drug use: No    Sexual activity: Not Currently     Partners: Female     Other Topics Concern     Service No    Blood Transfusions No    Caffeine Concern No    Occupational Exposure No    Hobby Hazards No    Sleep Concern No    Stress Concern No    Weight Concern No    Special Diet No    Back Care No    Exercise No    Bike Helmet No    Seat Belt Yes    Self-Exams No     Social History Narrative       FHX:   Family History   Problem Relation Age of Onset    Hypertension Mother     Hypertension Father        Allergy:   Allergies   Allergen Reactions    Pcn [Penicillins] Other (comments) and Itching     intolerance       Home Medications:     No prescriptions prior to admission. Review of Systems:     Constitutional: No fevers, chills, weight loss, fatigue. Skin: No rashes, pruritis, jaundice, ulcerations, erythema. HENT: No headaches, nosebleeds, sinus pressure, rhinorrhea, sore throat. Eyes: No visual changes, blurred vision, eye pain, photophobia, jaundice. Cardiovascular: No chest pain, heart palpitations. Respiratory: No cough, SOB, wheezing, chest discomfort, orthopnea. Gastrointestinal:    Genitourinary: No dysuria, bleeding, discharge, pyuria. Musculoskeletal: No weakness, arthralgias, wasting. Endo: No sweats. Heme: No bruising, easy bleeding. Allergies: As noted. Neurological: Cranial nerves intact. Alert and oriented. Gait not assessed. Psychiatric:  No anxiety, depression, hallucinations. There were no vitals taken for this visit. Physical Assessment:     constitutional: appearance: well developed, well nourished, normal habitus, no deformities, in no acute distress. skin: inspection: no rashes, ulcers, icterus or other lesions; no clubbing or telangiectasias. palpation: no induration or subcutaneos nodules. eyes: inspection: normal conjunctivae and lids; no jaundice pupils: normal  ENMT: mouth: normal oral mucosa,lips and gums; good dentition. oropharynx: normal tongue, hard and soft palate; posterior pharynx without erithema, exudate or lesions. neck: thyroid: normal size, consistency and position; no masses or tenderness. respiratory: effort: normal chest excursion; no intercostal retraction or accessory muscle use. cardiovascular: abdominal aorta: normal size and position; no bruits. palpation: PMI of normal size and position; normal rhythm; no thrill or murmurs.    abdominal: abdomen: normal consistency; no tenderness or masses. hernias: no hernias appreciated. liver: normal size and consistency. spleen: not palpable. rectal: hemoccult/guaiac: not performed. musculoskeletal: digits and nails: no clubbing, cyanosis, petechiae or other inflammatory conditions. gait: normal gait and station head and neck: normal range of motion; no pain, crepitation or contracture. spine/ribs/pelvis: normal range of motion; no pain, deformity or contracture. neurologic: cranial nerves: II-XII normal.   psychiatric: judgement/insight: within normal limits. memory: within normal limits for recent and remote events. mood and affect: no evidence of depression, anxiety or agitation. orientation: oriented to time, space and person. Basic Metabolic Profile   No results for input(s): NA, K, CL, CO2, BUN, GLU, CA, MG, PHOS in the last 72 hours. No lab exists for component: CREAT      CBC w/Diff    No results for input(s): WBC, RBC, HGB, HCT, MCV, MCH, MCHC, RDW, PLT, HGBEXT, HCTEXT, PLTEXT in the last 72 hours. No lab exists for component: MPV No results for input(s): GRANS, LYMPH, EOS, PRO, MYELO, METAS, BLAST in the last 72 hours. No lab exists for component: MONO, BASO     Hepatic Function   No results for input(s): ALB, TP, TBILI, GPT, SGOT, AP, AML, LPSE in the last 72 hours. No lab exists for component: DBILI     Coags   No results for input(s): PTP, INR, APTT in the last 72 hours. No lab exists for component: Vibha Narvaez MD  [facility] [address]  Cell: [phone]  Direct pager: [phone]  [email or web address]  [email or web address]. [employer]

## 2018-01-15 NOTE — DISCHARGE INSTRUCTIONS
DISCHARGE SUMMARY from Nurse    PATIENT INSTRUCTIONS:    After general anesthesia or intravenous sedation, for 24 hours or while taking prescription Narcotics:  · Limit your activities  · Do not drive and operate hazardous machinery  · Do not make important personal or business decisions  · Do  not drink alcoholic beverages  · If you have not urinated within 8 hours after discharge, please contact your surgeon on call. Report the following to your surgeon:  · Excessive pain, swelling, redness or odor of or around the surgical area  · Temperature over 100.5  · Nausea and vomiting lasting longer than 4 hours or if unable to take medications  · Any signs of decreased circulation or nerve impairment to extremity: change in color, persistent  numbness, tingling, coldness or increase pain  · Any questions    What to do at Home:  Recommended activity: Activity as tolerated and no driving for today. *  Please give a list of your current medications to your Primary Care Provider. *  Please update this list whenever your medications are discontinued, doses are      changed, or new medications (including over-the-counter products) are added. *  Please carry medication information at all times in case of emergency situations. These are general instructions for a healthy lifestyle:    No smoking/ No tobacco products/ Avoid exposure to second hand smoke  Surgeon General's Warning:  Quitting smoking now greatly reduces serious risk to your health. Obesity, smoking, and sedentary lifestyle greatly increases your risk for illness    A healthy diet, regular physical exercise & weight monitoring are important for maintaining a healthy lifestyle    You may be retaining fluid if you have a history of heart failure or if you experience any of the following symptoms:  Weight gain of 3 pounds or more overnight or 5 pounds in a week, increased swelling in our hands or feet or shortness of breath while lying flat in bed. Please call your doctor as soon as you notice any of these symptoms; do not wait until your next office visit. Recognize signs and symptoms of STROKE:    F-face looks uneven    A-arms unable to move or move unevenly    S-speech slurred or non-existent    T-time-call 911 as soon as signs and symptoms begin-DO NOT go       Back to bed or wait to see if you get better-TIME IS BRAIN. Warning Signs of HEART ATTACK     Call 911 if you have these symptoms:   Chest discomfort. Most heart attacks involve discomfort in the center of the chest that lasts more than a few minutes, or that goes away and comes back. It can feel like uncomfortable pressure, squeezing, fullness, or pain.  Discomfort in other areas of the upper body. Symptoms can include pain or discomfort in one or both arms, the back, neck, jaw, or stomach.  Shortness of breath with or without chest discomfort.  Other signs may include breaking out in a cold sweat, nausea, or lightheadedness. Don't wait more than five minutes to call 911 - MINUTES MATTER! Fast action can save your life. Calling 911 is almost always the fastest way to get lifesaving treatment. Emergency Medical Services staff can begin treatment when they arrive -- up to an hour sooner than if someone gets to the hospital by car. Colonoscopy: What to Expect at [address] [place] [address]  After you have a colonoscopy, you will stay at the clinic for 1 to 2 hours until the medicines wear off. Then you can go home. But you will need to arrange for a ride. Your doctor will tell you when you can eat and do your other usual activities. Your doctor will talk to you about when you will need your next colonoscopy. Your doctor can help you decide how often you need to be checked. This will depend on the results of your test and your risk for colorectal cancer. After the test, you may be bloated or have gas pains. You may need to pass gas.  If a biopsy was done or a polyp was removed, you may have streaks of blood in your stool (feces) for a few days. This care sheet gives you a general idea about how long it will take for you to recover. But each person recovers at a different pace. Follow the steps below to get better as quickly as possible. How can you care for yourself at home? Activity  ? · Rest when you feel tired. ? · You can do your normal activities when it feels okay to do so. Diet  ? · Follow your doctor's directions for eating. ? · Unless your doctor has told you not to, drink plenty of fluids. This helps to replace the fluids that were lost during the colon prep. ? · Do not drink alcohol. Medicines  ? · Your doctor will tell you if and when you can restart your medicines. He or she will also give you instructions about taking any new medicines. ? · If you take blood thinners, such as warfarin (Coumadin), clopidogrel (Plavix), or aspirin, be sure to talk to your doctor. He or she will tell you if and when to start taking those medicines again. Make sure that you understand exactly what your doctor wants you to do. ? · If polyps were removed or a biopsy was done during the test, your doctor may tell you not to take aspirin or other anti-inflammatory medicines for a few days. These include ibuprofen (Advil, Motrin) and naproxen (Aleve). Other instructions  ? · For your safety, do not drive or operate machinery until the medicine wears off and you can think clearly. Your doctor may tell you not to drive or operate machinery until the day after your test.   ? · Do not sign legal documents or make major decisions until the medicine wears off and you can think clearly. The anesthesia can make it hard for you to fully understand what you are agreeing to. Follow-up care is a key part of your treatment and safety. Be sure to make and go to all appointments, and call your doctor if you are having problems.  It's also a good idea to know your test results and keep a list of the medicines you take. When should you call for help? Call 911 anytime you think you may need emergency care. For example, call if:  ? · You passed out (lost consciousness). ? · You pass maroon or bloody stools. ? · You have trouble breathing. ?Call your doctor now or seek immediate medical care if:  ? · You have pain that does not get better after you take pain medicine. ? · You are sick to your stomach or cannot drink fluids. ? · You have new or worse belly pain. ? · You have blood in your stools. ? · You have a fever. ? · You cannot pass stools or gas. ? Watch closely for changes in your health, and be sure to contact your doctor if you have any problems. Where can you learn more? Go to http://lorna-kristina.info/. Enter E264 in the search box to learn more about \"Colonoscopy: What to Expect at Home. \"  Current as of: May 12, 2017  Content Version: 11.4  © 6522-4374 [employer]. Care instructions adapted under license by [employer] (which disclaims liability or warranty for this information). If you have questions about a medical condition or this instruction, always ask your healthcare professional. [place] [address] any warranty or liability for your use of this information. Upper GI Endoscopy: What to Expect at [address] [place] [address]  After you have an endoscopy, you will stay at the hospital or clinic for 1 to 2 hours. This will allow the medicine to wear off. You will be able to go home after your doctor or nurse checks to make sure you are not having any problems. You may have to stay overnight if you had treatment during the test. You may have a sore throat for a day or two after the test.  This care sheet gives you a general idea about what to expect after the test.  How can you care for yourself at home? Activity  · Rest as much as you need to after you go home.   · You should be able to go back to your usual activities the day after the test.  Diet  · Follow your doctor's directions for eating after the test.  · Drink plenty of fluids (unless your doctor has told you not to). Medications  · If you have a sore throat the day after the test, use an over-the-counter spray to numb your throat. Follow-up care is a key part of your treatment and safety. Be sure to make and go to all appointments, and call your doctor if you are having problems. It's also a good idea to know your test results and keep a list of the medicines you take. When should you call for help? Call 911 anytime you think you may need emergency care. For example, call if:  ? · You passed out (loses consciousness). ? · You have trouble breathing. ? · You pass maroon or bloody stools. ?Call your doctor now or seek immediate medical care if:  ? · You have pain that does not get better after your take pain medicine. ? · You have new or worse belly pain. ? · You have blood in your stools. ? · You are sick to your stomach and cannot keep fluids down. ? · You have a fever. ? · You cannot pass stools or gas. ? Watch closely for changes in your health, and be sure to contact your doctor if:  ? · Your throat still hurts after a day or two. ? · You do not get better as expected. Where can you learn more? Go to http://lorna-kristina.info/. Enter ([phone] in the search box to learn more about \"Upper GI Endoscopy: What to Expect at Home. \"  Current as of: May 12, 2017  Content Version: 11.4  © 5896-8854 [employer]. Care instructions adapted under license by [employer] (which disclaims liability or warranty for this information). If you have questions about a medical condition or this instruction, always ask your healthcare professional. [address] any warranty or liability for your use of this information. The discharge information has been reviewed with the patient and caregiver.   The patient and caregiver verbalized understanding. Discharge medications reviewed with the patient and caregiver and appropriate educational materials and side effects teaching were provided.   ___________________________________________________________________________________________________________________________________

## 2018-01-15 NOTE — ANESTHESIA PREPROCEDURE EVALUATION
Anesthetic History   No history of anesthetic complications            Review of Systems / Medical History  Patient summary reviewed, nursing notes reviewed and pertinent labs reviewed    Pulmonary  Within defined limits                 Neuro/Psych       CVA  TIA    Comments: Left side weakness Cardiovascular    Hypertension: well controlled                   GI/Hepatic/Renal                Endo/Other        Anemia     Other Findings   Comments:   Risk Factors for Postoperative nausea/vomiting:       History of postoperative nausea/vomiting? NO       Female? NO       Motion sickness? NO       Intended opioid administration for postoperative analgesia? YES      Smoking Abstinence  Current Smoker? NO  Elective Surgery? NO  Seen preoperatively by anesthesiologist or proxy prior to day of surgery? YES  Pt abstained from smoking 24 hours prior to anesthesia?  N/A         Physical Exam    Airway  Mallampati: II  TM Distance: 4 - 6 cm  Neck ROM: normal range of motion        Cardiovascular  Regular rate and rhythm,  S1 and S2 normal,  no murmur, click, rub, or gallop  Rhythm: regular  Rate: normal         Dental  No notable dental hx       Pulmonary  Breath sounds clear to auscultation               Abdominal  GI exam deferred       Other Findings            Anesthetic Plan    ASA: 3  Anesthesia type: MAC          Induction: Intravenous  Anesthetic plan and risks discussed with: Patient

## 2018-01-15 NOTE — ANESTHESIA POSTPROCEDURE EVALUATION
Post-Anesthesia Evaluation and Assessment    Patient: Kali Menard MRN: [id number]  SSN: [id number]    YOB: 1945  Age: 81 y.o. Sex: male       Cardiovascular Function/Vital Signs  Visit Vitals    /85    Pulse 72    Temp 36.6 °C (97.8 °F)    Resp 22    SpO2 98%       Patient is status post MAC anesthesia for Procedure(s):  ENDOSCOPY w/ biopsies  COLONOSCOPY w/ biopsies w/ polypectomy w/ ink injection. Nausea/Vomiting: None    Postoperative hydration reviewed and adequate. Pain:  Pain Scale 1: Numeric (0 - 10) (01/15/18 1649)  Pain Intensity 1: 0 (01/15/18 1649)   Managed    Neurological Status:   Neuro (WDL): Exceptions to WDL (01/15/18 1649)  Neuro  LUE Motor Response: Other(comment) (contracted) (01/15/18 1649)  LLE Motor Response: Weak (01/15/18 1649)  RUE Motor Response: Purposeful (01/15/18 1649)  RLE Motor Response: Purposeful (01/15/18 1649)   At baseline    Mental Status and Level of Consciousness: Arousable    Pulmonary Status:   O2 Device: Nasal cannula (01/15/18 1652)   Adequate oxygenation and airway patent    Complications related to anesthesia: None    Post-anesthesia assessment completed.  No concerns    Signed By: Madi Biggs MD     January 15, 2018

## 2018-01-26 ENCOUNTER — HOSPITAL ENCOUNTER (OUTPATIENT)
Dept: CT IMAGING | Age: 73
Discharge: HOME | End: 2018-01-26
Attending: INTERNAL MEDICINE
Payer: MEDICARE

## 2018-01-26 ENCOUNTER — HOSPITAL ENCOUNTER (OUTPATIENT)
Dept: LAB | Age: 73
Discharge: HOME | End: 2018-01-26
Attending: INTERNAL MEDICINE
Payer: MEDICARE

## 2018-01-26 DIAGNOSIS — K31.7 GASTRIC POLYP: ICD-10-CM

## 2018-01-26 DIAGNOSIS — R19.00 INTRA-ABDOMINAL AND PELVIC SWELLING, MASS AND LUMP, UNSPECIFIED SITE: ICD-10-CM

## 2018-01-26 DIAGNOSIS — K63.5 POLYP OF DESCENDING COLON, UNSPECIFIED TYPE: ICD-10-CM

## 2018-01-26 DIAGNOSIS — D12.3 BENIGN NEOPLASM OF TRANSVERSE COLON: ICD-10-CM

## 2018-01-26 DIAGNOSIS — D50.9 IRON (FE) DEFICIENCY ANEMIA: ICD-10-CM

## 2018-01-26 DIAGNOSIS — D12.2 BENIGN NEOPLASM OF ASCENDING COLON: ICD-10-CM

## 2018-01-26 LAB — CREAT UR-MCNC: 0.8 MG/DL (ref 0.6–1.3)

## 2018-01-26 PROCEDURE — 82565 ASSAY OF CREATININE: CPT

## 2018-01-26 PROCEDURE — 74177 CT ABD & PELVIS W/CONTRAST: CPT

## 2018-01-26 PROCEDURE — 74011636320 HC RX REV CODE- 636/320: Performed by: INTERNAL MEDICINE

## 2018-01-26 RX ADMIN — IOPAMIDOL 100 ML: 612 INJECTION, SOLUTION INTRAVENOUS at 11:33

## 2018-01-29 ENCOUNTER — TELEPHONE (OUTPATIENT)
Dept: SURGERY | Age: 73
End: 2018-01-29

## 2018-01-29 ENCOUNTER — OFFICE VISIT (OUTPATIENT)
Dept: SURGERY | Age: 73
End: 2018-01-29

## 2018-01-29 VITALS
SYSTOLIC BLOOD PRESSURE: 118 MMHG | RESPIRATION RATE: 20 BRPM | HEART RATE: 70 BPM | TEMPERATURE: 98.6 F | DIASTOLIC BLOOD PRESSURE: 72 MMHG

## 2018-01-29 DIAGNOSIS — K63.89 COLONIC MASS: Primary | ICD-10-CM

## 2018-01-29 RX ORDER — DICLOFENAC SODIUM 10 MG/G
GEL TOPICAL 4 TIMES DAILY
COMMUNITY

## 2018-01-29 RX ORDER — POLYETHYLENE GLYCOL 3350 17 G/17G
17 POWDER, FOR SOLUTION ORAL DAILY
COMMUNITY
End: 2018-03-23

## 2018-01-29 RX ORDER — METRONIDAZOLE 500 MG/1
500 TABLET ORAL 3 TIMES DAILY
Qty: 3 TAB | Refills: 0 | Status: SHIPPED | OUTPATIENT
Start: 2018-01-29 | End: 2018-01-29 | Stop reason: SDUPTHER

## 2018-01-29 RX ORDER — MIRTAZAPINE 15 MG/1
30 TABLET, FILM COATED ORAL
COMMUNITY
End: 2020-11-06

## 2018-01-29 RX ORDER — NEOMYCIN SULFATE 500 MG/1
1000 TABLET ORAL 3 TIMES DAILY
Qty: 6 TAB | Refills: 0 | Status: SHIPPED | OUTPATIENT
Start: 2018-01-29 | End: 2018-01-30

## 2018-01-29 RX ORDER — NEOMYCIN SULFATE 500 MG/1
1000 TABLET ORAL 3 TIMES DAILY
Qty: 6 TAB | Refills: 0 | Status: SHIPPED | OUTPATIENT
Start: 2018-01-29 | End: 2018-01-29 | Stop reason: SDUPTHER

## 2018-01-29 RX ORDER — ASCORBIC ACID 500 MG
TABLET ORAL
COMMUNITY

## 2018-01-29 RX ORDER — EPINEPHRINE 1 MG/ML
INJECTION, SOLUTION, CONCENTRATE INTRAVENOUS ONCE
COMMUNITY

## 2018-01-29 RX ORDER — METRONIDAZOLE 500 MG/1
500 TABLET ORAL 3 TIMES DAILY
Qty: 3 TAB | Refills: 0 | Status: SHIPPED | OUTPATIENT
Start: 2018-01-29 | End: 2018-01-30

## 2018-01-29 NOTE — MR AVS SNAPSHOT
[facility] [address] [facility] 
[phone] Patient: Love Pagan MRN: [id number] Visit Information Date & Time Provider Department Dept. Phone Encounter #  
 1/29/2018 11:15 AM Lauren Varghese  [address] [id number]  
  
 2/9/2018 10:45 AM  
Any with Toño Cespedes MD  
[facility] ([address]) Appt Note: 6 mon f/u /cath change [address] [place] [address]  
[address] [place] [address] Upcoming Health Maintenance Date Due Hepatitis C Screening 1945 DTaP/Tdap/Td series (1 - Tdap) 8/13/1968 MEDICARE YEARLY EXAM 2/11/2017 FOBT Q 1 YEAR AGE 50-75 2/11/2017 GLAUCOMA SCREENING Q2Y 3/3/2017 Influenza Age 5 to Adult 8/1/2017 Allergies as of 1/29/2018  Review Complete On: 1/29/2018 By: Lauren Varghese MD  
  
 Severity Noted Reaction Type Reactions Pcn [Penicillins]  04/04/2013    Other (comments), Itching  
 intolerance Current Immunizations  Never Reviewed Name Date Pneumococcal Conjugate (PCV-13) 2/11/2016  4:00 PM  
 Pneumococcal Vaccine (Unspecified Type) 10/9/2012 Not reviewed this visit You Were Diagnosed With   
  
 Codes Comments Colonic mass    -  Primary ICD-10-CM: K63.9 ICD-9-CM: 569.89 Vitals BP Pulse Temp Resp Smoking Status 118/72 70 98.6 °F (37 °C) 20 Former Smoker Vitals History Preferred Pharmacy Pharmacy Name Phone [address] [facility],[address] Ashkan Bibles [phone] Your Updated Medication List  
  
   
This list is accurate as of: 1/29/18  1:34 PM.  Always use your most recent med list.  
  
  
  
  
 BENADRYL ALLERGY PO Take  by mouth.  
  
 calcium citrate-vitamin d3 315-200 mg-unit Tab Commonly known as:  CITRACAL+D Take 1 Tab by mouth daily (with breakfast). citalopram 10 mg tablet Commonly known as:  Debi Huston Take  by mouth daily. COLACE 100 mg capsule Generic drug:  docusate sodium Take 100 mg by mouth two (2) times a day. CRANBERRY CONCENTRATE Cap Generic drug:  vitamin c-vitamin e Take  by mouth. diclofenac 1 % Gel Commonly known as:  VOLTAREN Apply  to affected area four (4) times daily. ENSURE ACTIVE HIGH PROTEIN Liqd Generic drug:  food supplemt, lactose-reduced Take 237 mL by mouth four (4) times daily. EPINEPHrine HCl (PF) 1 mg/mL (1 mL) injection Commonly known as:  ADRENALIN  
once. ferrous sulfate 325 mg (65 mg iron) tablet Commonly known as:  Iron (Ferrous Sulfate) Take 1 Tab by mouth Daily (before breakfast). lisinopril 5 mg tablet Commonly known as:  Nkechi Likens Take 1 Tab by mouth daily. Menthol-Zinc Oxide 0.44-20.6 % Oint Commonly known as:  Calmoseptine Apply  to affected area. MIRALAX 17 gram packet Generic drug:  polyethylene glycol Take 17 g by mouth daily. mirtazapine 15 mg tablet Commonly known as:  Aleene Short Take  by mouth nightly. oxybutynin chloride XL 10 mg CR tablet Commonly known as:  DITROPAN XL Take 1 Tab by mouth two (2) times a day. oxyCODONE IR 10 mg Tab immediate release tablet Commonly known as:  Ludmila Krystina Take 1 Tab by mouth every six (6) hours as needed. Max Daily Amount: 40 mg.  
  
 oxyCODONE-acetaminophen 5-325 mg per tablet Commonly known as:  PERCOCET Take 1 Tab by mouth every four (4) hours as needed for Pain. promethazine 25 mg tablet Commonly known as:  PHENERGAN Take 25 mg by mouth every six (6) hours as needed for Nausea. THERAGRAN-M PO Take  by mouth. VITAMIN C 500 mg tablet Generic drug:  ascorbic acid (vitamin C) Take  by mouth. VITAMIN D3 1,000 unit Cap Generic drug:  cholecalciferol Take  by mouth. Introducing [facility]! [facility] introduces [employer] patient portal. Now you can access parts of your medical record, email your doctor's office, and request medication refills online. 1. In your internet browser, go to [email or web address] 2. Click on the First Time User? Click Here link in the Sign In box. You will see the New Member Sign Up page. 3. Enter your [employer] Access Code exactly as it appears below. You will not need to use this code after youve completed the sign-up process. If you do not sign up before the expiration date, you must request a new code. · [employer] Access Code: [email or web address] Expires: 3/11/2018  6:30 PM 
 
4. Enter the last four digits of your Social Security Number (xxxx) and Date of Birth (mm/dd/yyyy) as indicated and click Submit. You will be taken to the next sign-up page. 5. Create a [employer] ID. This will be your [employer] login ID and cannot be changed, so think of one that is secure and easy to remember. 6. Create a [employer] password. You can change your password at any time. 7. Enter your Password Reset Question and Answer. This can be used at a later time if you forget your password. 8. Enter your e-mail address. You will receive e-mail notification when new information is available in [address] 9. Click Sign Up. You can now view and download portions of your medical record. 10. Click the Download Summary menu link to download a portable copy of your medical information. If you have questions, please visit the Frequently Asked Questions section of the [employer] website. Remember, [employer] is NOT to be used for urgent needs. For medical emergencies, dial 911. Now available from your iPhone and Android! Please provide this summary of care documentation to your next provider. Your primary care clinician is listed as Harleton Bigger.  If you have any questions after today's visit, please call [phone].

## 2018-01-29 NOTE — PROGRESS NOTES
HPI: Herve Grandchild is a 81 y.o. male presenting with chief complain of ascending colon mass. Patient had an episode of bright red blood per rectum which prompted a colonoscopy. This was done by William Bryant and showed polyps of the ascending, descending colon and hepatic flexure. It also showed a nonbleeding 6 cm mass in the mid ascending colon with a broad base. It was 2 or 3 folds away from the cecum. Multiple biopsies were taken and ankle was injected distal to the lesion. The patient has issues of urinary and fecal incontinence. He has hemiplegia due to a stroke in 2012. He had had percutaneous gastrostomy tube placed at that time. He denies abdominal symptoms or recent weight loss. He has irregular bowel habits. He takes MiraLAX and Colace for help with his bowel function. He denies rectal pain.      Past Medical History:   Diagnosis Date    Abnormal computed tomography of bladder     Anemia     Arthritis     Basal ganglia disease     Benign prostatic hyperplasia with lower urinary tract symptoms     Bladder calculus     Bladder cancer ([address] 3/13/14    Bladder mass     Bladder tumor     Brain aneurysm     CVA (cerebral vascular accident) ([address] DEC 2012    hemiplegia    Dysphagia     Erythematous bladder mucosa     Webb catheter in place     Gross hematuria     HTN (hypertension)     Hx MRSA infection     Neurogenic bladder     Retention, urine     Stroke ([address]     Urethral stricture        Past Surgical History:   Procedure Laterality Date    COLONOSCOPY N/A 1/15/2018    COLONOSCOPY w/ biopsies w/ polypectomy w/ ink injection performed by Sylvester Rodriguez MD at [address] HX GI      PEG TUBE--REMOVED    HX HEENT      H/O TRACH    HX UROLOGICAL      super pubic tube    [facility]13[facility]    HX UROLOGICAL  10/8/15    Cysto, Dr. Gabbi Do, [facility]       Family History   Problem Relation Age of Onset    Hypertension Mother     Hypertension Father        Social History     Social History    Marital status:      Spouse name: N/A    Number of children: 2    Years of education: 15     Occupational History    other Retired     Social History Main Topics    Smoking status: Former Smoker     Quit date: 1/29/2010    Smokeless tobacco: Never Used    Alcohol use No    Drug use: No    Sexual activity: Not Currently     Partners: Female     Other Topics Concern     Service No    Blood Transfusions No    Caffeine Concern No    Occupational Exposure No    Hobby Hazards No    Sleep Concern No    Stress Concern No    Weight Concern No    Special Diet No    Back Care No    Exercise No    Bike Helmet No    Seat Belt Yes    Self-Exams No     Social History Narrative       Review of Systems - Review of Systems   Constitutional: Negative. HENT: Negative. Eyes: Negative. Respiratory: Negative. Cardiovascular: Negative. Gastrointestinal: Negative. Genitourinary: Negative. Musculoskeletal: Negative. Skin: Negative. Neurological: Negative. Endo/Heme/Allergies: Negative. Psychiatric/Behavioral: Negative. has perales catheter and arrived by stretcher and has paralysis left side    Outpatient Prescriptions Marked as Taking for the 1/29/18 encounter (Office Visit) with Gunner Harrington MD   Medication Sig Dispense Refill    polyethylene glycol (MIRALAX) 17 gram packet Take 17 g by mouth daily.  mirtazapine (REMERON) 15 mg tablet Take  by mouth nightly.  DIPHENHYDRAMINE HCL (BENADRYL ALLERGY PO) Take  by mouth.  EPINEPHrine HCl, PF, (ADRENALIN) 1 mg/mL (1 mL) injection once.  ascorbic acid, vitamin C, (VITAMIN C) 500 mg tablet Take  by mouth.  diclofenac (VOLTAREN) 1 % gel Apply  to affected area four (4) times daily.  Menthol-Zinc Oxide (CALMOSEPTINE) 0.44-20.6 % oint Apply  to affected area.       docusate sodium (COLACE) 100 mg capsule Take 100 mg by mouth two (2) times a day.      oxyCODONE-acetaminophen (PERCOCET) 5-325 mg per tablet Take 1 Tab by mouth every four (4) hours as needed for Pain.  food supplemt, lactose-reduced (ENSURE ACTIVE HIGH PROTEIN) liqd Take 237 mL by mouth four (4) times daily.  promethazine (PHENERGAN) 25 mg tablet Take 25 mg by mouth every six (6) hours as needed for Nausea.  citalopram (CELEXA) 10 mg tablet Take  by mouth daily.  calcium citrate-vitamin d3 (CITRACAL+D) 315-200 mg-unit Tab Take 1 Tab by mouth daily (with breakfast). Allergies   Allergen Reactions    Pcn [Penicillins] Other (comments) and Itching     intolerance       Vitals:    01/29/18 1254   BP: 118/72   Pulse: 70   Resp: 20   Temp: 98.6 °F (37 °C)   PainSc:   0 - No pain       Physical Exam   Constitutional: He appears well-developed and well-nourished. HENT:   Head: Normocephalic and atraumatic. Eyes: Conjunctivae and EOM are normal.   Abdominal: Soft. He exhibits no distension and no mass. There is no tenderness. Musculoskeletal: Normal range of motion. Lymphadenopathy:     He has no cervical adenopathy. Right: No inguinal adenopathy present. Left: No inguinal adenopathy present. Neurological: He exhibits normal muscle tone. Skin: Skin is warm and dry. Psychiatric: He has a normal mood and affect. His speech is normal.     Colonoscopy reported as above, with 6 cm ascending colon mass, pathologies which showed dysplasia but no overt cancer      Assessment / Plan    Ascending colon mass with dysplasia, 6 cm in size  Proceed with laparoscopic right colectomy  Given his lack of mobility I would like to have lower extremities ultrasound performed to look for subclinical DVT  Given his risk of DVT from surgery and potential cancer we may wish to have IVC filter placed prior to surgery  We should also consider DVT prophylaxis immediately prior to surgery    The diagnoses and plan were discussed with the patient. All questions answered. Plan of care agreed to by all concerned.     2/19/18 Addendum: small hypodense lesion in liver on re-read of ct abd/pelvis  Will need MRI and possibly CT chest if tumor comes back as invasive cancer

## 2018-01-29 NOTE — TELEPHONE ENCOUNTER
I called [facility] on [address] and spoke to Gladysshane Linda at 2:32PM to inform her that this patient is scheduled for Inpatient Surgery on 2/16/2018 with an 8:30AM arrival at [facility]. I also discussed with [employer] that the patient is scheduled for an Ultrasound on 2/2/2018 at 3:30PM at [facility]. We need to have patient arrival about an hour earlier that day for his Labs to be drawn. [employer] stated that she would call today to set up transportation for the 2/2/2018 appointment.

## 2018-01-29 NOTE — LETTER
1/29/2018 1:39 PM 
 
Patient:  Gabriel Castro YOB: 1945 Date of Visit: 1/29/2018 Kimberly Montez MD 
[address] [place] [address] [address] [place] [address] VIA Facsimile: [phone] Heidi Siemens, MD 
[address] [facility] [address] VIA Facsimile: [phone] Dear Akila Whittington, 
 
I saw Shala Pedro in the office today for the ascending colon mass you identified on recent colonoscopy. He had had an episode of bleeding prior to this which prompted the exam.  Pathology from your biopsies showed dysplasia but no overt cancer. CT of the abdomen and pelvis did not show any evidence of metastatic disease. He has a history of CVA in 2012 and is bedbound. We will prepare him for a laparoscopic right colectomy. I am particularly interested to see if he has subclinical DVT given his lack of mobility. We will order ultrasounds of both lower extremities to rule this out. If he does indeed have DVT we might consider referral to vascular surgery for IVC filter placement prior to surgery. I will also give subcutaneous heparin immediately prior to his surgery. Thank you very much for your referral of Mr. Gabriel Castro. If you have questions, please do not hesitate to call me. I look forward to following Mr. Lizeth Childress along with you and will keep you updated as to his progress. Sincerely, Johnie Qureshi MD

## 2018-01-30 ENCOUNTER — TELEPHONE (OUTPATIENT)
Dept: SURGERY | Age: 73
End: 2018-01-30

## 2018-01-30 NOTE — TELEPHONE ENCOUNTER
1/30/2018 spoke to Nurse Xiomy Oneal at [facility] to inform her that we are moving Mr. Heber Curling surgery to 2/13/2018 at [facility] with an arrival time of 5:30AM. She stated she would also pass this information onto the Unit secretary Solange Machado. I asked them to call me with any questions.

## 2018-02-02 ENCOUNTER — HOSPITAL ENCOUNTER (OUTPATIENT)
Dept: VASCULAR SURGERY | Age: 73
Discharge: HOME | End: 2018-02-02
Attending: COLON & RECTAL SURGERY
Payer: MEDICARE

## 2018-02-02 ENCOUNTER — HOSPITAL ENCOUNTER (OUTPATIENT)
Dept: PREADMISSION TESTING | Age: 73
Discharge: HOME | End: 2018-02-02
Attending: COLON & RECTAL SURGERY
Payer: MEDICARE

## 2018-02-02 DIAGNOSIS — K63.89 COLONIC MASS: ICD-10-CM

## 2018-02-02 LAB
ALBUMIN SERPL-MCNC: 3.4 G/DL (ref 3.4–5)
ANION GAP SERPL CALC-SCNC: 7 MMOL/L (ref 3–18)
APTT PPP: 39.8 SEC (ref 23–36.4)
BASOPHILS # BLD: 0.1 K/UL (ref 0–0.06)
BASOPHILS NFR BLD: 1 % (ref 0–2)
BUN SERPL-MCNC: 17 MG/DL (ref 7–18)
BUN/CREAT SERPL: 21 (ref 12–20)
CALCIUM SERPL-MCNC: 8.5 MG/DL (ref 8.5–10.1)
CHLORIDE SERPL-SCNC: 109 MMOL/L (ref 100–108)
CO2 SERPL-SCNC: 26 MMOL/L (ref 21–32)
CREAT SERPL-MCNC: 0.81 MG/DL (ref 0.6–1.3)
DIFFERENTIAL METHOD BLD: ABNORMAL
EOSINOPHIL # BLD: 0.4 K/UL (ref 0–0.4)
EOSINOPHIL NFR BLD: 4 % (ref 0–5)
ERYTHROCYTE [DISTWIDTH] IN BLOOD BY AUTOMATED COUNT: 20.3 % (ref 11.6–14.5)
GLUCOSE SERPL-MCNC: 130 MG/DL (ref 74–99)
HCT VFR BLD AUTO: 26.5 % (ref 36–48)
HGB BLD-MCNC: 7.8 G/DL (ref 13–16)
INR PPP: 1 (ref 0.8–1.2)
LYMPHOCYTES # BLD: 1.4 K/UL (ref 0.9–3.6)
LYMPHOCYTES NFR BLD: 13 % (ref 21–52)
MCH RBC QN AUTO: 19.8 PG (ref 24–34)
MCHC RBC AUTO-ENTMCNC: 29.4 G/DL (ref 31–37)
MCV RBC AUTO: 67.4 FL (ref 74–97)
MONOCYTES # BLD: 0.6 K/UL (ref 0.05–1.2)
MONOCYTES NFR BLD: 6 % (ref 3–10)
NEUTS SEG # BLD: 8.2 K/UL (ref 1.8–8)
NEUTS SEG NFR BLD: 76 % (ref 40–73)
PLATELET # BLD AUTO: 371 K/UL (ref 135–420)
PLATELET COMMENTS,PCOM: ABNORMAL
PMV BLD AUTO: 10.8 FL (ref 9.2–11.8)
POTASSIUM SERPL-SCNC: 4 MMOL/L (ref 3.5–5.5)
PROTHROMBIN TIME: 13 SEC (ref 11.5–15.2)
RBC # BLD AUTO: 3.93 M/UL (ref 4.7–5.5)
RBC MORPH BLD: ABNORMAL
RBC MORPH BLD: ABNORMAL
SODIUM SERPL-SCNC: 142 MMOL/L (ref 136–145)
WBC # BLD AUTO: 10.7 K/UL (ref 4.6–13.2)

## 2018-02-02 PROCEDURE — 85025 COMPLETE CBC W/AUTO DIFF WBC: CPT | Performed by: COLON & RECTAL SURGERY

## 2018-02-02 PROCEDURE — 82040 ASSAY OF SERUM ALBUMIN: CPT | Performed by: COLON & RECTAL SURGERY

## 2018-02-02 PROCEDURE — 80048 BASIC METABOLIC PNL TOTAL CA: CPT | Performed by: COLON & RECTAL SURGERY

## 2018-02-02 PROCEDURE — 93970 EXTREMITY STUDY: CPT

## 2018-02-02 PROCEDURE — 86900 BLOOD TYPING SEROLOGIC ABO: CPT | Performed by: COLON & RECTAL SURGERY

## 2018-02-02 PROCEDURE — 85730 THROMBOPLASTIN TIME PARTIAL: CPT | Performed by: COLON & RECTAL SURGERY

## 2018-02-02 PROCEDURE — 85610 PROTHROMBIN TIME: CPT | Performed by: COLON & RECTAL SURGERY

## 2018-02-02 PROCEDURE — 36415 COLL VENOUS BLD VENIPUNCTURE: CPT | Performed by: COLON & RECTAL SURGERY

## 2018-02-02 NOTE — PROCEDURES
[facility]  *** FINAL REPORT ***    Name: Alina Child  MRN: [id number]    Outpatient  : 13 Aug 1945  HIS Order #: [id number]  [id number] [facility] Visit #: [id number]  Date: 2018    TYPE OF TEST: Peripheral Venous Testing    REASON FOR TEST  Limb swelling    Right Leg:-  Deep venous thrombosis:           No  Superficial venous thrombosis:    No  Deep venous insufficiency:        Not examined  Superficial venous insufficiency: Not examined    Left Leg:-  Deep venous thrombosis:           No  Superficial venous thrombosis:    No  Deep venous insufficiency:        Not examined  Superficial venous insufficiency: Not examined      INTERPRETATION/FINDINGS  Duplex images were obtained using 2-D gray scale, color flow, and  spectral Doppler analysis. Right leg :  1. Deep veins visualized include the common femoral, femoral,  popliteal, posterior tibial and peroneal veins. 2. No evidence of deep venous thrombosis detected in the veins  visualized. 3. Superficial vein(s visualized include the great saphenous vein. 4. No evidence of superficial thrombosis detected. 5. Normal multiphasic flow in the posterior tibial artery. Left leg :  1. Deep veins visualized include the common femoral, femoral,  popliteal, posterior tibial and peroneal veins. 2. No evidence of deep venous thrombosis detected in the veins  visualized. 3. Superficial vein(s visualized include the great saphenous vein. 4. No evidence of superficial thrombosis detected. 5. Normal multiphasic flow in the posterior tibial artery. ADDITIONAL COMMENTS    I have personally reviewed the data relevant to the interpretation of  this  study.     TECHNOLOGIST: Milla English, [address] [place] [address]  Signed: 2018 04:45 PM    PHYSICIAN: Milka Leblanc MD  Signed: 2018 06:31 PM

## 2018-02-03 LAB
ABO + RH BLD: NORMAL
BLOOD GROUP ANTIBODIES SERPL: NORMAL
SPECIMEN EXP DATE BLD: NORMAL

## 2018-02-19 ENCOUNTER — DOCUMENTATION ONLY (OUTPATIENT)
Dept: SURGERY | Age: 73
End: 2018-02-19

## 2018-02-19 NOTE — PROGRESS NOTES
Pt had no evidence of invasive cancer on path from colo  If cancer, will need CT chest as well as MRI liver given small lesion seen on CT abd

## 2018-03-01 ENCOUNTER — HOSPITAL ENCOUNTER (EMERGENCY)
Age: 73
Discharge: HOSPICE | End: 2018-03-01
Attending: EMERGENCY MEDICINE
Payer: MEDICARE

## 2018-03-01 VITALS
HEIGHT: 68 IN | OXYGEN SATURATION: 98 % | WEIGHT: 148 LBS | DIASTOLIC BLOOD PRESSURE: 91 MMHG | BODY MASS INDEX: 22.43 KG/M2 | HEART RATE: 79 BPM | RESPIRATION RATE: 16 BRPM | TEMPERATURE: 98.2 F | SYSTOLIC BLOOD PRESSURE: 126 MMHG

## 2018-03-01 DIAGNOSIS — T83.9XXA FOLEY CATHETER PROBLEM, INITIAL ENCOUNTER (HCC): Primary | ICD-10-CM

## 2018-03-01 DIAGNOSIS — N39.0 URINARY TRACT INFECTION ASSOCIATED WITH CATHETERIZATION OF URINARY TRACT, UNSPECIFIED INDWELLING URINARY CATHETER TYPE, INITIAL ENCOUNTER (HCC): ICD-10-CM

## 2018-03-01 DIAGNOSIS — T83.511A URINARY TRACT INFECTION ASSOCIATED WITH CATHETERIZATION OF URINARY TRACT, UNSPECIFIED INDWELLING URINARY CATHETER TYPE, INITIAL ENCOUNTER (HCC): ICD-10-CM

## 2018-03-01 LAB
APPEARANCE UR: ABNORMAL
BACTERIA URNS QL MICRO: ABNORMAL /HPF
BILIRUB UR QL: NEGATIVE
COLOR UR: ABNORMAL
EPITH CASTS URNS QL MICRO: ABNORMAL /LPF (ref 0–5)
GLUCOSE UR STRIP.AUTO-MCNC: NEGATIVE MG/DL
HGB UR QL STRIP: ABNORMAL
KETONES UR QL STRIP.AUTO: NEGATIVE MG/DL
LEUKOCYTE ESTERASE UR QL STRIP.AUTO: ABNORMAL
NITRITE UR QL STRIP.AUTO: POSITIVE
PH UR STRIP: >8.5 [PH] (ref 5–8)
PROT UR STRIP-MCNC: 100 MG/DL
RBC #/AREA URNS HPF: ABNORMAL /HPF (ref 0–5)
SP GR UR REFRACTOMETRY: 1.02 (ref 1–1.03)
TRI-PHOS CRY URNS QL MICRO: ABNORMAL
UROBILINOGEN UR QL STRIP.AUTO: 0.2 EU/DL (ref 0.2–1)
WBC URNS QL MICRO: ABNORMAL /HPF (ref 0–4)

## 2018-03-01 PROCEDURE — 51702 INSERT TEMP BLADDER CATH: CPT

## 2018-03-01 PROCEDURE — 77030005514 HC CATH URETH FOL14 BARD -A

## 2018-03-01 PROCEDURE — 87077 CULTURE AEROBIC IDENTIFY: CPT | Performed by: PHYSICIAN ASSISTANT

## 2018-03-01 PROCEDURE — 87186 SC STD MICRODIL/AGAR DIL: CPT | Performed by: PHYSICIAN ASSISTANT

## 2018-03-01 PROCEDURE — 99283 EMERGENCY DEPT VISIT LOW MDM: CPT

## 2018-03-01 PROCEDURE — 87086 URINE CULTURE/COLONY COUNT: CPT | Performed by: PHYSICIAN ASSISTANT

## 2018-03-01 PROCEDURE — 81001 URINALYSIS AUTO W/SCOPE: CPT | Performed by: PHYSICIAN ASSISTANT

## 2018-03-01 RX ORDER — NITROFURANTOIN 25; 75 MG/1; MG/1
100 CAPSULE ORAL 2 TIMES DAILY
Qty: 14 CAP | Refills: 0 | Status: SHIPPED | OUTPATIENT
Start: 2018-03-01 | End: 2018-03-08

## 2018-03-01 NOTE — ED TRIAGE NOTES
\"I need to have my catheter changed. It's clogged up. \"  The patient presents from [facility] for Webb catheter change.

## 2018-03-01 NOTE — DISCHARGE INSTRUCTIONS

## 2018-03-01 NOTE — LETTER
3/4/2018 Juju Sarmiento [id number] [facility] [address] Dear Khadar Lizeth Childress, You were recently seen in the Emergency Department of [address] [place] [address], and had lab and/or radiology tests performed. We would like to discuss these results with you. Please call the Emergency Department at your earliest convenience at  [phone], to speak with one of our providers. Sincerely, Zina Horton PA-C 
 
 
[address] [place] [address] [facility] EMERGENCY DEPT 
[address] [facility] [id number] [phone]

## 2018-03-01 NOTE — ED PROVIDER NOTES
EMERGENCY DEPARTMENT HISTORY AND PHYSICAL EXAM    Date: 3/1/2018  Patient Name: Concepcion Closs    History of Presenting Illness     Chief Complaint   Patient presents with    Urinary Catheter Problem         History Provided By: Patient    Chief Complaint: catheter won't drain  Duration: 1 Days  Timing:  Acute  Location: urethral  Modifying Factors: had a new catheter placed three days ago, was draining, attempted irrigation at Nursing home today  Associated Symptoms: denies any other associated signs or symptoms      Additional History (Context): Concepcion Closs is a 81 y.o. male with stroke, neurogenic bladder, HTN, Bladder cancer, basal ganglia disease who presents with C/O perales catheter not draining. States he had this new Perales placed three days ago. States it was draining until today. His nursing home, [facility], tried to irrigate it without success. Denies any abdominal pain, fevers, chills, chest pain, SOB, penile pain. PCP: Chrissy Medeiros MD    Current Outpatient Prescriptions   Medication Sig Dispense Refill    atorvastatin (LIPITOR) 10 mg tablet Take 10 mg by mouth daily.  calcium-vitamin D (OYSTER SHELL CALCIUM-VIT D3) 250-125 mg-unit tablet Take 1 Tab by mouth daily.  polyethylene glycol (MIRALAX) 17 gram packet Take 17 g by mouth daily.  mirtazapine (REMERON) 15 mg tablet Take  by mouth nightly.  DIPHENHYDRAMINE HCL (BENADRYL ALLERGY PO) Take  by mouth.  EPINEPHrine HCl, PF, (ADRENALIN) 1 mg/mL (1 mL) injection once.  ascorbic acid, vitamin C, (VITAMIN C) 500 mg tablet Take  by mouth.  diclofenac (VOLTAREN) 1 % gel Apply  to affected area four (4) times daily.  Menthol-Zinc Oxide (CALMOSEPTINE) 0.44-20.6 % oint Apply  to affected area.  docusate sodium (COLACE) 100 mg capsule Take 100 mg by mouth two (2) times a day.       oxyCODONE-acetaminophen (PERCOCET) 5-325 mg per tablet Take 1 Tab by mouth every four (4) hours as needed for Pain.  food supplemt, lactose-reduced (ENSURE ACTIVE HIGH PROTEIN) liqd Take 237 mL by mouth four (4) times daily.  promethazine (PHENERGAN) 25 mg tablet Take 25 mg by mouth every six (6) hours as needed for Nausea.  citalopram (CELEXA) 10 mg tablet Take  by mouth daily.  lisinopril (PRINIVIL, ZESTRIL) 5 mg tablet Take 1 Tab by mouth daily. 90 Tab 3    ferrous sulfate (IRON, FERROUS SULFATE,) 325 mg (65 mg iron) tablet Take 1 Tab by mouth Daily (before breakfast). 90 Tab 3    oxyCODONE IR (ROXICODONE) 10 mg tab immediate release tablet Take 1 Tab by mouth every six (6) hours as needed. Max Daily Amount: 40 mg. 30 Tab 0    oxybutynin chloride XL (DITROPAN XL) 10 mg CR tablet Take 1 Tab by mouth two (2) times a day. 60 Tab 5    calcium citrate-vitamin d3 (CITRACAL+D) 315-200 mg-unit Tab Take 1 Tab by mouth daily (with breakfast).  vitamin c-vitamin e (CRANBERRY CONCENTRATE) cap Take  by mouth.  MULTIVIT, IRON, MIN NO. 8, FA (THERAGRAN-M PO) Take  by mouth.  Cholecalciferol, Vitamin D3, (VITAMIN D3) 1,000 unit cap Take  by mouth.          Past History     Past Medical History:  Past Medical History:   Diagnosis Date    Abnormal computed tomography of bladder     Anemia     Arthritis     Basal ganglia disease     Benign prostatic hyperplasia with lower urinary tract symptoms     Bladder calculus     Bladder cancer ([facility] 3/13/14    Bladder mass     Bladder tumor     Brain aneurysm     CVA (cerebral vascular accident) ([facility] DEC 2012    hemiplegia    Dysphagia     Erythematous bladder mucosa     Webb catheter in place     Gross hematuria     HTN (hypertension)     Hx MRSA infection 09/2017    urine    Neurogenic bladder     Psychiatric disorder     Retention, urine     Stroke [facility]     Urethral stricture        Past Surgical History:  Past Surgical History:   Procedure Laterality Date    COLONOSCOPY N/A 1/15/2018    COLONOSCOPY w/ biopsies w/ polypectomy w/ ink injection performed by Laney Melgar MD at [address] HX GI      PEG TUBE--REMOVED    [facility]X [facility]      [facility]O [facility]    HX UROLOGICAL      super pubic tube    HX UROLOGICAL  3/13/14    TURBT, Dr. Emerald Barclay, [facility]    HX UROLOGICAL  10/8/15    Cysto, Dr[facility] Rosanna Boo, [facility]       Family History:  Family History   Problem Relation Age of Onset    Hypertension Mother     Hypertension Father        Social History:  Social History   Substance Use Topics    Smoking status: Former Smoker     Quit date: 1/29/2010    Smokeless tobacco: Never Used    Alcohol use No       Allergies: Allergies   Allergen Reactions    Pcn [Penicillins] Other (comments) and Itching     intolerance         Review of Systems   Review of Systems   Constitutional: Negative for chills and fever. HENT: Negative. Respiratory: Negative. Cardiovascular: Negative. Genitourinary:        Catheter problem   All other systems reviewed and are negative. All Other Systems Negative  Physical Exam     Vitals:    03/01/18 1314   BP: (!) 126/91   Pulse: 79   Resp: 16   Temp: 98.2 °F (36.8 °C)   SpO2: 98%   Weight: 67.1 kg (148 lb)   Height: 5' 8\" (1.727 m)     Physical Exam   Constitutional: He is oriented to person, place, and time. He appears well-developed and well-nourished. No distress. HENT:   Head: Normocephalic and atraumatic. Eyes: EOM are normal. Pupils are equal, round, and reactive to light. No scleral icterus. Neck: Neck supple. No tracheal deviation present. Cardiovascular: Normal rate, regular rhythm and normal heart sounds. Exam reveals no gallop and no friction rub. No murmur heard. Pulmonary/Chest: Effort normal and breath sounds normal. No respiratory distress. He has no wheezes. He has no rales. Abdominal: Soft. Bowel sounds are normal. He exhibits no distension and no mass. There is no tenderness. There is no rebound and no guarding. Webb catheter in place. Bag with no urine in it.      Lymphadenopathy: He has no cervical adenopathy. Neurological: He is alert and oriented to person, place, and time. Patient with contracture to the legs and to the left arm and hand. This is his baseline from his stroke. CN 2-12 intact. No dysarthria, alert and oriented times 4. Skin: Skin is warm and dry. No rash noted. He is not diaphoretic. No erythema. No pallor. Psychiatric: He has a normal mood and affect. His behavior is normal.   Nursing note and vitals reviewed. Diagnostic Study Results     Labs -     Recent Results (from the past 12 hour(s))   URINALYSIS W/ RFLX MICROSCOPIC    Collection Time: 03/01/18  2:58 PM   Result Value Ref Range    Color GUMARO      Appearance TURBID      Specific gravity 1.024 1.005 - 1.030      pH (UA) >8.5 (H) 5.0 - 8.0    Protein 100 (A) NEG mg/dL    Glucose NEGATIVE  NEG mg/dL    Ketone NEGATIVE  NEG mg/dL    Bilirubin NEGATIVE  NEG      Blood LARGE (A) NEG      Urobilinogen 0.2 0.2 - 1.0 EU/dL    Nitrites POSITIVE (A) NEG      Leukocyte Esterase LARGE (A) NEG     URINE MICROSCOPIC ONLY    Collection Time: 03/01/18  2:58 PM   Result Value Ref Range    WBC 10 to 15 0 - 4 /hpf    RBC TOO NUMEROUS TO COUNT 0 - 5 /hpf    Epithelial cells 1+ 0 - 5 /lpf    Bacteria 4+ (A) NEG /hpf    Triple Phosphate crystals 1+ (A) NEG       Radiologic Studies -   No orders to display     CT Results  (Last 48 hours)    None        CXR Results  (Last 48 hours)    None            Medical Decision Making   I am the first provider for this patient. I reviewed the vital signs, available nursing notes, past medical history, past surgical history, family history and social history. Vital Signs-Reviewed the patient's vital signs. Records Reviewed: Nursing Notes    Procedures:  Procedures    Provider Notes (Medical Decision Making):   Pt to the ED with perales cath that is not draining. Attempted irrigation which did not work. Cath was changed and patient not has output.    Will plan for discharge home after UA resulted. MARIS Armstrong      Noted UA which always looks bad due to the perales state of the patient. TOday he has nitrates though, which have not been present on prior Urines in the past year. Reviewed old culture, will start on macrobid. MARIS Armstrong 3:58 PM        MED RECONCILIATION:  No current facility-administered medications for this encounter. Current Outpatient Prescriptions   Medication Sig    atorvastatin (LIPITOR) 10 mg tablet Take 10 mg by mouth daily.  calcium-vitamin D (OYSTER SHELL CALCIUM-VIT D3) 250-125 mg-unit tablet Take 1 Tab by mouth daily.  polyethylene glycol (MIRALAX) 17 gram packet Take 17 g by mouth daily.  mirtazapine (REMERON) 15 mg tablet Take  by mouth nightly.  DIPHENHYDRAMINE HCL (BENADRYL ALLERGY PO) Take  by mouth.  EPINEPHrine HCl, PF, (ADRENALIN) 1 mg/mL (1 mL) injection once.  ascorbic acid, vitamin C, (VITAMIN C) 500 mg tablet Take  by mouth.  diclofenac (VOLTAREN) 1 % gel Apply  to affected area four (4) times daily.  Menthol-Zinc Oxide (CALMOSEPTINE) 0.44-20.6 % oint Apply  to affected area.  docusate sodium (COLACE) 100 mg capsule Take 100 mg by mouth two (2) times a day.  oxyCODONE-acetaminophen (PERCOCET) 5-325 mg per tablet Take 1 Tab by mouth every four (4) hours as needed for Pain.  food supplemt, lactose-reduced (ENSURE ACTIVE HIGH PROTEIN) liqd Take 237 mL by mouth four (4) times daily.  promethazine (PHENERGAN) 25 mg tablet Take 25 mg by mouth every six (6) hours as needed for Nausea.  citalopram (CELEXA) 10 mg tablet Take  by mouth daily.  lisinopril (PRINIVIL, ZESTRIL) 5 mg tablet Take 1 Tab by mouth daily.  ferrous sulfate (IRON, FERROUS SULFATE,) 325 mg (65 mg iron) tablet Take 1 Tab by mouth Daily (before breakfast).  oxyCODONE IR (ROXICODONE) 10 mg tab immediate release tablet Take 1 Tab by mouth every six (6) hours as needed.  Max Daily Amount: 40 mg.   [facility] oxybutynin chloride XL (DITROPAN XL) 10 mg CR tablet Take 1 Tab by mouth two (2) times a day.  calcium citrate-vitamin d3 (CITRACAL+D) 315-200 mg-unit Tab Take 1 Tab by mouth daily (with breakfast).  vitamin c-vitamin e (CRANBERRY CONCENTRATE) cap Take  by mouth.  MULTIVIT, IRON, MIN NO. 8, FA (THERAGRAN-M PO) Take  by mouth.  Cholecalciferol, Vitamin D3, (VITAMIN D3) 1,000 unit cap Take  by mouth. Disposition:  dishcarged    DISCHARGE NOTE:     Pt has been reexamined. Patient has no new complaints, changes, or physical findings. Care plan outlined and precautions discussed. Results of UA were reviewed with the patient. All medications were reviewed with the patient; will d/c home with macrobid. All of pt's questions and concerns were addressed. Patient was instructed and agrees to follow up with PCP and urologist as well as to return to the ED upon further deterioration. Patient is ready to go home. Follow-up Information     Follow up With Details Comments Contact Info    [facility] EMERGENCY DEPT  If symptoms worsen [address], [place] In 3 days  [address] [place] [address]  [phone]            Current Discharge Medication List      START taking these medications    Details   nitrofurantoin, macrocrystal-monohydrate, (MACROBID) 100 mg capsule Take 1 Cap by mouth two (2) times a day for 7 days. Qty: 14 Cap, Refills: 0               Diagnosis     Clinical Impression:   1. Webb catheter problem, initial encounter ([facility]    2.  Urinary tract infection associated with catheterization of urinary tract, unspecified indwelling urinary catheter type, initial encounter ([facility]

## 2018-03-02 ENCOUNTER — TELEPHONE (OUTPATIENT)
Dept: SURGERY | Age: 73
End: 2018-03-02

## 2018-03-02 NOTE — TELEPHONE ENCOUNTER
Eliel Ghotra from [facility] called to stated that Mr. Franco Joy went to ER at [facility] on 3/1/2018, and after that he decided he did not want to have surgery at [facility]. He wanted to go to [facility].  Ms. Ebony Seo transferred me to  Gudelia Silvia [facility] and I spoke to him about what his care will be with Dr. Fernando Cook and the staff in surgery recovery and on the floor he will be on the 5th floor. That he will receive excellent care. He thanked me and at this time we will keep him on our schedule.

## 2018-03-04 LAB
BACTERIA SPEC CULT: ABNORMAL
SERVICE CMNT-IMP: ABNORMAL

## 2018-03-04 NOTE — PROGRESS NOTES
Called and LM on pt's VM. On macrobid, needs to be changed to a different abx like Keflex. Sent letter to pt's house.

## 2018-03-14 ENCOUNTER — ANESTHESIA EVENT (OUTPATIENT)
Dept: SURGERY | Age: 73
DRG: 331 | End: 2018-03-14
Payer: MEDICARE

## 2018-03-14 ENCOUNTER — HOSPITAL ENCOUNTER (INPATIENT)
Age: 73
LOS: 9 days | Discharge: NURSING, REHAB OR LONG-TERM CARE | DRG: 331 | End: 2018-03-23
Attending: COLON & RECTAL SURGERY | Admitting: COLON & RECTAL SURGERY
Payer: MEDICARE

## 2018-03-14 DIAGNOSIS — C18.9 MALIGNANT NEOPLASM OF COLON, UNSPECIFIED PART OF COLON (HCC): Primary | ICD-10-CM

## 2018-03-14 PROBLEM — D64.9 ANEMIA: Status: ACTIVE | Noted: 2018-03-14

## 2018-03-14 LAB
AMORPH CRY URNS QL MICRO: ABNORMAL
APPEARANCE UR: ABNORMAL
BACTERIA URNS QL MICRO: ABNORMAL /HPF
BILIRUB UR QL: ABNORMAL
COLOR UR: ABNORMAL
EPITH CASTS URNS QL MICRO: NEGATIVE /LPF (ref 0–5)
GLUCOSE UR STRIP.AUTO-MCNC: NEGATIVE MG/DL
HCT VFR BLD AUTO: 24.8 % (ref 36–48)
HGB BLD-MCNC: 7.1 G/DL (ref 13–16)
HGB UR QL STRIP: NEGATIVE
KETONES UR QL STRIP.AUTO: ABNORMAL MG/DL
LEUKOCYTE ESTERASE UR QL STRIP.AUTO: ABNORMAL
NITRITE UR QL STRIP.AUTO: NEGATIVE
PH UR STRIP: >8.5 [PH] (ref 5–8)
PROT UR STRIP-MCNC: 30 MG/DL
RBC #/AREA URNS HPF: NEGATIVE /HPF (ref 0–5)
SP GR UR REFRACTOMETRY: >1.03 (ref 1–1.03)
TRI-PHOS CRY URNS QL MICRO: ABNORMAL
UROBILINOGEN UR QL STRIP.AUTO: 1 EU/DL (ref 0.2–1)
WBC URNS QL MICRO: ABNORMAL /HPF (ref 0–4)

## 2018-03-14 PROCEDURE — 87086 URINE CULTURE/COLONY COUNT: CPT | Performed by: COLON & RECTAL SURGERY

## 2018-03-14 PROCEDURE — 74011250637 HC RX REV CODE- 250/637: Performed by: COLON & RECTAL SURGERY

## 2018-03-14 PROCEDURE — 36430 TRANSFUSION BLD/BLD COMPNT: CPT

## 2018-03-14 PROCEDURE — 81001 URINALYSIS AUTO W/SCOPE: CPT | Performed by: COLON & RECTAL SURGERY

## 2018-03-14 PROCEDURE — P9016 RBC LEUKOCYTES REDUCED: HCPCS | Performed by: COLON & RECTAL SURGERY

## 2018-03-14 PROCEDURE — 77030010545

## 2018-03-14 PROCEDURE — 74011250636 HC RX REV CODE- 250/636: Performed by: COLON & RECTAL SURGERY

## 2018-03-14 PROCEDURE — 85018 HEMOGLOBIN: CPT | Performed by: COLON & RECTAL SURGERY

## 2018-03-14 PROCEDURE — 86920 COMPATIBILITY TEST SPIN: CPT | Performed by: COLON & RECTAL SURGERY

## 2018-03-14 PROCEDURE — 87077 CULTURE AEROBIC IDENTIFY: CPT | Performed by: COLON & RECTAL SURGERY

## 2018-03-14 PROCEDURE — 87186 SC STD MICRODIL/AGAR DIL: CPT | Performed by: COLON & RECTAL SURGERY

## 2018-03-14 PROCEDURE — 30233N1 TRANSFUSION OF NONAUTOLOGOUS RED BLOOD CELLS INTO PERIPHERAL VEIN, PERCUTANEOUS APPROACH: ICD-10-PCS | Performed by: COLON & RECTAL SURGERY

## 2018-03-14 PROCEDURE — 86900 BLOOD TYPING SEROLOGIC ABO: CPT | Performed by: COLON & RECTAL SURGERY

## 2018-03-14 PROCEDURE — 74011000250 HC RX REV CODE- 250: Performed by: COLON & RECTAL SURGERY

## 2018-03-14 PROCEDURE — 65270000029 HC RM PRIVATE

## 2018-03-14 PROCEDURE — 36415 COLL VENOUS BLD VENIPUNCTURE: CPT | Performed by: COLON & RECTAL SURGERY

## 2018-03-14 RX ORDER — CEFAZOLIN SODIUM 2 G/50ML
2 SOLUTION INTRAVENOUS
Status: DISCONTINUED | OUTPATIENT
Start: 2018-03-14 | End: 2018-03-14

## 2018-03-14 RX ORDER — CITALOPRAM 10 MG/1
10 TABLET ORAL DAILY
Status: DISCONTINUED | OUTPATIENT
Start: 2018-03-14 | End: 2018-03-23 | Stop reason: HOSPADM

## 2018-03-14 RX ORDER — METRONIDAZOLE 500 MG/100ML
500 INJECTION, SOLUTION INTRAVENOUS 3 TIMES DAILY
Status: COMPLETED | OUTPATIENT
Start: 2018-03-14 | End: 2018-03-15

## 2018-03-14 RX ORDER — DIPHENHYDRAMINE HYDROCHLORIDE 50 MG/ML
25 INJECTION, SOLUTION INTRAMUSCULAR; INTRAVENOUS
Status: DISCONTINUED | OUTPATIENT
Start: 2018-03-14 | End: 2018-03-23 | Stop reason: HOSPADM

## 2018-03-14 RX ORDER — ACETAMINOPHEN 325 MG/1
650 TABLET ORAL
Status: DISCONTINUED | OUTPATIENT
Start: 2018-03-14 | End: 2018-03-23 | Stop reason: HOSPADM

## 2018-03-14 RX ORDER — SODIUM CHLORIDE 9 MG/ML
250 INJECTION, SOLUTION INTRAVENOUS AS NEEDED
Status: DISCONTINUED | OUTPATIENT
Start: 2018-03-14 | End: 2018-03-15

## 2018-03-14 RX ORDER — NEOMYCIN SULFATE 500 MG/1
1000 TABLET ORAL 3 TIMES DAILY
Status: DISPENSED | OUTPATIENT
Start: 2018-03-14 | End: 2018-03-15

## 2018-03-14 RX ORDER — SODIUM CHLORIDE, SODIUM LACTATE, POTASSIUM CHLORIDE, CALCIUM CHLORIDE 600; 310; 30; 20 MG/100ML; MG/100ML; MG/100ML; MG/100ML
50 INJECTION, SOLUTION INTRAVENOUS CONTINUOUS
Status: DISCONTINUED | OUTPATIENT
Start: 2018-03-14 | End: 2018-03-15

## 2018-03-14 RX ORDER — MIRTAZAPINE 15 MG/1
15 TABLET, FILM COATED ORAL
Status: DISCONTINUED | OUTPATIENT
Start: 2018-03-14 | End: 2018-03-23 | Stop reason: HOSPADM

## 2018-03-14 RX ORDER — METRONIDAZOLE 500 MG/100ML
500 INJECTION, SOLUTION INTRAVENOUS
Status: DISCONTINUED | OUTPATIENT
Start: 2018-03-14 | End: 2018-03-14

## 2018-03-14 RX ORDER — ONDANSETRON 2 MG/ML
4 INJECTION INTRAMUSCULAR; INTRAVENOUS
Status: DISCONTINUED | OUTPATIENT
Start: 2018-03-14 | End: 2018-03-23 | Stop reason: HOSPADM

## 2018-03-14 RX ORDER — HEPARIN SODIUM 5000 [USP'U]/ML
5000 INJECTION, SOLUTION INTRAVENOUS; SUBCUTANEOUS EVERY 8 HOURS
Status: DISCONTINUED | OUTPATIENT
Start: 2018-03-14 | End: 2018-03-23 | Stop reason: HOSPADM

## 2018-03-14 RX ORDER — OXYBUTYNIN CHLORIDE 5 MG/1
10 TABLET, EXTENDED RELEASE ORAL 2 TIMES DAILY
Status: DISCONTINUED | OUTPATIENT
Start: 2018-03-14 | End: 2018-03-23 | Stop reason: HOSPADM

## 2018-03-14 RX ADMIN — SODIUM CHLORIDE, SODIUM LACTATE, POTASSIUM CHLORIDE, AND CALCIUM CHLORIDE 50 ML/HR: 600; 310; 30; 20 INJECTION, SOLUTION INTRAVENOUS at 12:00

## 2018-03-14 RX ADMIN — NEOMYCIN SULFATE 1000 MG: 500 TABLET ORAL at 17:29

## 2018-03-14 RX ADMIN — OXYBUTYNIN CHLORIDE 10 MG: 5 TABLET, EXTENDED RELEASE ORAL at 17:29

## 2018-03-14 RX ADMIN — MIRTAZAPINE 15 MG: 15 TABLET, FILM COATED ORAL at 22:56

## 2018-03-14 RX ADMIN — NEOMYCIN SULFATE 1000 MG: 500 TABLET ORAL at 22:56

## 2018-03-14 RX ADMIN — CITALOPRAM HYDROBROMIDE 10 MG: 10 TABLET ORAL at 17:29

## 2018-03-14 RX ADMIN — METRONIDAZOLE 500 MG: 500 INJECTION, SOLUTION INTRAVENOUS at 16:00

## 2018-03-14 RX ADMIN — HEPARIN SODIUM 5000 UNITS: 5000 INJECTION, SOLUTION INTRAVENOUS; SUBCUTANEOUS at 17:28

## 2018-03-14 RX ADMIN — POLYETHYLENE GLYCOL 3350, SODIUM SULFATE ANHYDROUS, SODIUM BICARBONATE, SODIUM CHLORIDE, POTASSIUM CHLORIDE 4000 ML: 236; 22.74; 6.74; 5.86; 2.97 POWDER, FOR SOLUTION ORAL at 13:00

## 2018-03-14 RX ADMIN — METRONIDAZOLE 500 MG: 500 INJECTION, SOLUTION INTRAVENOUS at 22:57

## 2018-03-14 NOTE — H&P
HPI: Gigi Gonzalez is a 81 y.o. male presenting with chief complain of ascending colon mass. Pt found to be anemic prior to surgery and admitted for blood transfusion prior to surgery. Currently asymptomatic.      Past Medical History:   Diagnosis Date    Abnormal computed tomography of bladder     Anemia     Arthritis     Basal ganglia disease     Benign prostatic hyperplasia with lower urinary tract symptoms     Bladder calculus     Bladder cancer ([facility] 3/13/14    Bladder mass     Bladder tumor     Brain aneurysm     CVA (cerebral vascular accident) ([facility] [address]    hemiplegia    Dysphagia     Erythematous bladder mucosa     Webb catheter in place     Gross hematuria     HTN (hypertension)     Hx MRSA infection 09/2017    urine    Neurogenic bladder     Psychiatric disorder     Retention, urine     Stroke ([facility]     Urethral stricture        Past Surgical History:   Procedure Laterality Date    COLONOSCOPY N/A 1/15/2018    COLONOSCOPY w/ biopsies w/ polypectomy w/ ink injection performed by Meryl Truong MD at [address] HX GI      PEG TUBE--REMOVED    HX HEENT      H/O TRACH    HX UROLOGICAL      super pubic tube    HX UROLOGICAL  3/13/14    TURBT, Dr. William Krueger, [facility]    HX UROLOGICAL  10/8/15    Cysto, Dr. Valerio Garcia, [facility]       Family History   Problem Relation Age of Onset    Hypertension Mother     Hypertension Father        Social History     Social History    Marital status:      Spouse name: N/A    Number of children: 2    Years of education: 15     Occupational History    other Retired     Social History Main Topics    Smoking status: Former Smoker     Quit date: 1/29/2010    Smokeless tobacco: Never Used    Alcohol use No    Drug use: No    Sexual activity: Not Currently     Partners: Female     Other Topics Concern     Service No    Blood Transfusions No    Caffeine Concern No    Occupational Exposure No    Hobby Hazards No    Sleep Concern No    Stress Concern No    Weight Concern No    Special Diet No    Back Care No    Exercise No    Bike Helmet No    Seat Belt Yes    Self-Exams No     Social History Narrative       Review of Systems - negative        Allergies   Allergen Reactions    Pcn [Penicillins] Other (comments) and Itching     intolerance       Vitals:    03/14/18 0933 03/14/18 1106   BP: 111/70 110/72   Pulse: 68 70   Resp: 16 18   Temp: 98.1 °F (36.7 °C) 97.9 °F (36.6 °C)   SpO2: 98% 100%       Physical Exam    Assessment / Plan    Admit  2 units prbc for hgb of 7.1  Bowel prep  Surgery tomorrow    The diagnoses and plan were discussed with the patient. All questions answered. Plan of care agreed to by all concerned.

## 2018-03-14 NOTE — PROGRESS NOTES
Probable microbial contamination w/ yellow & brown creamy sediment present in the urinary bag and tube. Urinary bag and tubing removed and changed to obtain clean urinary sample for UA. Pt provided inc care for loose brown stool. Skin intact on posterior. Inserted 20 G in upper R arm and started LR 50mL/hr. Waiting on blood typing and matching.

## 2018-03-15 ENCOUNTER — ANESTHESIA (OUTPATIENT)
Dept: SURGERY | Age: 73
DRG: 331 | End: 2018-03-15
Payer: MEDICARE

## 2018-03-15 LAB
ANION GAP SERPL CALC-SCNC: 7 MMOL/L (ref 3–18)
BUN SERPL-MCNC: 16 MG/DL (ref 7–18)
BUN/CREAT SERPL: 18 (ref 12–20)
CALCIUM SERPL-MCNC: 8.8 MG/DL (ref 8.5–10.1)
CHLORIDE SERPL-SCNC: 107 MMOL/L (ref 100–108)
CO2 SERPL-SCNC: 25 MMOL/L (ref 21–32)
CREAT SERPL-MCNC: 0.88 MG/DL (ref 0.6–1.3)
ERYTHROCYTE [DISTWIDTH] IN BLOOD BY AUTOMATED COUNT: 23.7 % (ref 11.6–14.5)
GLUCOSE SERPL-MCNC: 86 MG/DL (ref 74–99)
HCT VFR BLD AUTO: 31.2 % (ref 36–48)
HGB BLD-MCNC: 9.6 G/DL (ref 13–16)
MAGNESIUM SERPL-MCNC: 2.1 MG/DL (ref 1.6–2.6)
MCH RBC QN AUTO: 20.7 PG (ref 24–34)
MCHC RBC AUTO-ENTMCNC: 30.8 G/DL (ref 31–37)
MCV RBC AUTO: 67.4 FL (ref 74–97)
PHOSPHATE SERPL-MCNC: 2.4 MG/DL (ref 2.5–4.9)
PLATELET # BLD AUTO: 332 K/UL (ref 135–420)
PMV BLD AUTO: 10.1 FL (ref 9.2–11.8)
POTASSIUM SERPL-SCNC: 3.6 MMOL/L (ref 3.5–5.5)
RBC # BLD AUTO: 4.63 M/UL (ref 4.7–5.5)
SODIUM SERPL-SCNC: 139 MMOL/L (ref 136–145)
WBC # BLD AUTO: 10.3 K/UL (ref 4.6–13.2)

## 2018-03-15 PROCEDURE — 77030010030: Performed by: COLON & RECTAL SURGERY

## 2018-03-15 PROCEDURE — 76210000016 HC OR PH I REC 1 TO 1.5 HR: Performed by: COLON & RECTAL SURGERY

## 2018-03-15 PROCEDURE — 36430 TRANSFUSION BLD/BLD COMPNT: CPT

## 2018-03-15 PROCEDURE — 74011250636 HC RX REV CODE- 250/636

## 2018-03-15 PROCEDURE — 88342 IMHCHEM/IMCYTCHM 1ST ANTB: CPT | Performed by: COLON & RECTAL SURGERY

## 2018-03-15 PROCEDURE — 0DBE4ZZ EXCISION OF LARGE INTESTINE, PERCUTANEOUS ENDOSCOPIC APPROACH: ICD-10-PCS | Performed by: COLON & RECTAL SURGERY

## 2018-03-15 PROCEDURE — 74011000272 HC RX REV CODE- 272: Performed by: COLON & RECTAL SURGERY

## 2018-03-15 PROCEDURE — 74011250636 HC RX REV CODE- 250/636: Performed by: NURSE ANESTHETIST, CERTIFIED REGISTERED

## 2018-03-15 PROCEDURE — 77030011640 HC PAD GRND REM COVD -A: Performed by: COLON & RECTAL SURGERY

## 2018-03-15 PROCEDURE — 77030018846 HC SOL IRR STRL H20 ICUM -A: Performed by: COLON & RECTAL SURGERY

## 2018-03-15 PROCEDURE — 77030002933 HC SUT MCRYL J&J -A: Performed by: COLON & RECTAL SURGERY

## 2018-03-15 PROCEDURE — 83735 ASSAY OF MAGNESIUM: CPT | Performed by: COLON & RECTAL SURGERY

## 2018-03-15 PROCEDURE — 77030028754 HC RCTRCTR LAPSCP ALX DSP AMR -B: Performed by: COLON & RECTAL SURGERY

## 2018-03-15 PROCEDURE — 74011000250 HC RX REV CODE- 250: Performed by: COLON & RECTAL SURGERY

## 2018-03-15 PROCEDURE — 77030018521 HC STPLR ENDOSCOPIC J&J -C: Performed by: COLON & RECTAL SURGERY

## 2018-03-15 PROCEDURE — 77030035045 HC TRCR ENDOSC VRSPRT BLDLSS COVD -B: Performed by: COLON & RECTAL SURGERY

## 2018-03-15 PROCEDURE — 77030018836 HC SOL IRR NACL ICUM -A: Performed by: COLON & RECTAL SURGERY

## 2018-03-15 PROCEDURE — 76010000133 HC OR TIME 3 TO 3.5 HR: Performed by: COLON & RECTAL SURGERY

## 2018-03-15 PROCEDURE — 76060000037 HC ANESTHESIA 3 TO 3.5 HR: Performed by: COLON & RECTAL SURGERY

## 2018-03-15 PROCEDURE — 77030008683 HC TU ET CUF COVD -A: Performed by: ANESTHESIOLOGY

## 2018-03-15 PROCEDURE — 77030002966 HC SUT PDS J&J -A: Performed by: COLON & RECTAL SURGERY

## 2018-03-15 PROCEDURE — 77030009978 HC RELD STPLR TCR J&J -B: Performed by: COLON & RECTAL SURGERY

## 2018-03-15 PROCEDURE — 88341 IMHCHEM/IMCYTCHM EA ADD ANTB: CPT | Performed by: COLON & RECTAL SURGERY

## 2018-03-15 PROCEDURE — 74011250636 HC RX REV CODE- 250/636: Performed by: COLON & RECTAL SURGERY

## 2018-03-15 PROCEDURE — 77030013449 HC CLP LIG TELE -A: Performed by: COLON & RECTAL SURGERY

## 2018-03-15 PROCEDURE — 77030008544 HC TBNG MON PRSS MRTM -B: Performed by: ANESTHESIOLOGY

## 2018-03-15 PROCEDURE — 65270000029 HC RM PRIVATE

## 2018-03-15 PROCEDURE — 80048 BASIC METABOLIC PNL TOTAL CA: CPT | Performed by: COLON & RECTAL SURGERY

## 2018-03-15 PROCEDURE — 88307 TISSUE EXAM BY PATHOLOGIST: CPT | Performed by: COLON & RECTAL SURGERY

## 2018-03-15 PROCEDURE — 36415 COLL VENOUS BLD VENIPUNCTURE: CPT | Performed by: COLON & RECTAL SURGERY

## 2018-03-15 PROCEDURE — 77030034628 HC LIGASURE LAP SEAL DIV MD COVD -F: Performed by: COLON & RECTAL SURGERY

## 2018-03-15 PROCEDURE — 77030035048 HC TRCR ENDOSC OPTCL COVD -B: Performed by: COLON & RECTAL SURGERY

## 2018-03-15 PROCEDURE — P9016 RBC LEUKOCYTES REDUCED: HCPCS | Performed by: COLON & RECTAL SURGERY

## 2018-03-15 PROCEDURE — 74011250637 HC RX REV CODE- 250/637: Performed by: COLON & RECTAL SURGERY

## 2018-03-15 PROCEDURE — 84100 ASSAY OF PHOSPHORUS: CPT | Performed by: COLON & RECTAL SURGERY

## 2018-03-15 PROCEDURE — 77030010293 HC STPLR INT TI J&J -B: Performed by: COLON & RECTAL SURGERY

## 2018-03-15 PROCEDURE — 77030020782 HC GWN BAIR PAWS FLX 3M -B: Performed by: COLON & RECTAL SURGERY

## 2018-03-15 PROCEDURE — 77030027138 HC INCENT SPIROMETER -A

## 2018-03-15 PROCEDURE — 77030002986 HC SUT PROL J&J -A: Performed by: COLON & RECTAL SURGERY

## 2018-03-15 PROCEDURE — 85027 COMPLETE CBC AUTOMATED: CPT | Performed by: COLON & RECTAL SURGERY

## 2018-03-15 PROCEDURE — 77030018706 HC CORD MPLR COVD -A: Performed by: COLON & RECTAL SURGERY

## 2018-03-15 PROCEDURE — 74011000250 HC RX REV CODE- 250

## 2018-03-15 PROCEDURE — 77030020268 HC MISC GENERAL SUPPLY: Performed by: COLON & RECTAL SURGERY

## 2018-03-15 PROCEDURE — 77030013079 HC BLNKT BAIR HGGR 3M -A: Performed by: ANESTHESIOLOGY

## 2018-03-15 PROCEDURE — 77030031139 HC SUT VCRL2 J&J -A: Performed by: COLON & RECTAL SURGERY

## 2018-03-15 PROCEDURE — 77030003028 HC SUT VCRL J&J -A: Performed by: COLON & RECTAL SURGERY

## 2018-03-15 PROCEDURE — 77030026438 HC STYL ET INTUB CARD -A: Performed by: ANESTHESIOLOGY

## 2018-03-15 PROCEDURE — 74011250637 HC RX REV CODE- 250/637: Performed by: NURSE ANESTHETIST, CERTIFIED REGISTERED

## 2018-03-15 PROCEDURE — 77030035029 HC NDL INSUF VERES DISP COVD -B: Performed by: COLON & RECTAL SURGERY

## 2018-03-15 PROCEDURE — 0DTF4ZZ RESECTION OF RIGHT LARGE INTESTINE, PERCUTANEOUS ENDOSCOPIC APPROACH: ICD-10-PCS | Performed by: COLON & RECTAL SURGERY

## 2018-03-15 PROCEDURE — 88309 TISSUE EXAM BY PATHOLOGIST: CPT | Performed by: COLON & RECTAL SURGERY

## 2018-03-15 PROCEDURE — 77030016151 HC PROTCTR LNS DFOG COVD -B: Performed by: COLON & RECTAL SURGERY

## 2018-03-15 RX ORDER — SODIUM CHLORIDE, SODIUM LACTATE, POTASSIUM CHLORIDE, CALCIUM CHLORIDE 600; 310; 30; 20 MG/100ML; MG/100ML; MG/100ML; MG/100ML
100 INJECTION, SOLUTION INTRAVENOUS CONTINUOUS
Status: DISCONTINUED | OUTPATIENT
Start: 2018-03-15 | End: 2018-03-19

## 2018-03-15 RX ORDER — OXYCODONE HCL 20 MG/1
20 TABLET, FILM COATED, EXTENDED RELEASE ORAL ONCE
Status: DISCONTINUED | OUTPATIENT
Start: 2018-03-15 | End: 2018-03-15

## 2018-03-15 RX ORDER — GLYCOPYRROLATE 0.2 MG/ML
INJECTION INTRAMUSCULAR; INTRAVENOUS AS NEEDED
Status: DISCONTINUED | OUTPATIENT
Start: 2018-03-15 | End: 2018-03-15 | Stop reason: HOSPADM

## 2018-03-15 RX ORDER — ONDANSETRON 2 MG/ML
INJECTION INTRAMUSCULAR; INTRAVENOUS AS NEEDED
Status: DISCONTINUED | OUTPATIENT
Start: 2018-03-15 | End: 2018-03-15 | Stop reason: HOSPADM

## 2018-03-15 RX ORDER — METRONIDAZOLE 500 MG/100ML
500 INJECTION, SOLUTION INTRAVENOUS EVERY 8 HOURS
Status: COMPLETED | OUTPATIENT
Start: 2018-03-15 | End: 2018-03-16

## 2018-03-15 RX ORDER — ROCURONIUM BROMIDE 10 MG/ML
INJECTION, SOLUTION INTRAVENOUS AS NEEDED
Status: DISCONTINUED | OUTPATIENT
Start: 2018-03-15 | End: 2018-03-15 | Stop reason: HOSPADM

## 2018-03-15 RX ORDER — SODIUM CHLORIDE, SODIUM LACTATE, POTASSIUM CHLORIDE, CALCIUM CHLORIDE 600; 310; 30; 20 MG/100ML; MG/100ML; MG/100ML; MG/100ML
75 INJECTION, SOLUTION INTRAVENOUS CONTINUOUS
Status: DISCONTINUED | OUTPATIENT
Start: 2018-03-15 | End: 2018-03-15 | Stop reason: HOSPADM

## 2018-03-15 RX ORDER — MORPHINE SULFATE 2 MG/ML
2-4 INJECTION, SOLUTION INTRAMUSCULAR; INTRAVENOUS
Status: DISCONTINUED | OUTPATIENT
Start: 2018-03-15 | End: 2018-03-23 | Stop reason: HOSPADM

## 2018-03-15 RX ORDER — FAMOTIDINE 20 MG/1
20 TABLET, FILM COATED ORAL ONCE
Status: COMPLETED | OUTPATIENT
Start: 2018-03-15 | End: 2018-03-15

## 2018-03-15 RX ORDER — SUCCINYLCHOLINE CHLORIDE 20 MG/ML
INJECTION INTRAMUSCULAR; INTRAVENOUS AS NEEDED
Status: DISCONTINUED | OUTPATIENT
Start: 2018-03-15 | End: 2018-03-15 | Stop reason: HOSPADM

## 2018-03-15 RX ORDER — SODIUM CHLORIDE 0.9 % (FLUSH) 0.9 %
5-10 SYRINGE (ML) INJECTION AS NEEDED
Status: DISCONTINUED | OUTPATIENT
Start: 2018-03-15 | End: 2018-03-15 | Stop reason: HOSPADM

## 2018-03-15 RX ORDER — SODIUM CHLORIDE 0.9 % (FLUSH) 0.9 %
5-10 SYRINGE (ML) INJECTION EVERY 8 HOURS
Status: DISCONTINUED | OUTPATIENT
Start: 2018-03-15 | End: 2018-03-15 | Stop reason: HOSPADM

## 2018-03-15 RX ORDER — CEFAZOLIN SODIUM 2 G/50ML
2 SOLUTION INTRAVENOUS EVERY 8 HOURS
Status: COMPLETED | OUTPATIENT
Start: 2018-03-15 | End: 2018-03-16

## 2018-03-15 RX ORDER — PROPOFOL 10 MG/ML
INJECTION, EMULSION INTRAVENOUS AS NEEDED
Status: DISCONTINUED | OUTPATIENT
Start: 2018-03-15 | End: 2018-03-15 | Stop reason: HOSPADM

## 2018-03-15 RX ORDER — NALOXONE HYDROCHLORIDE 0.4 MG/ML
0.1 INJECTION, SOLUTION INTRAMUSCULAR; INTRAVENOUS; SUBCUTANEOUS ONCE
Status: DISCONTINUED | OUTPATIENT
Start: 2018-03-15 | End: 2018-03-15 | Stop reason: HOSPADM

## 2018-03-15 RX ORDER — FENTANYL CITRATE 50 UG/ML
INJECTION, SOLUTION INTRAMUSCULAR; INTRAVENOUS AS NEEDED
Status: DISCONTINUED | OUTPATIENT
Start: 2018-03-15 | End: 2018-03-15 | Stop reason: HOSPADM

## 2018-03-15 RX ORDER — CEFAZOLIN SODIUM 2 G/50ML
2 SOLUTION INTRAVENOUS
Status: COMPLETED | OUTPATIENT
Start: 2018-03-15 | End: 2018-03-15

## 2018-03-15 RX ORDER — DEXAMETHASONE SODIUM PHOSPHATE 4 MG/ML
INJECTION, SOLUTION INTRA-ARTICULAR; INTRALESIONAL; INTRAMUSCULAR; INTRAVENOUS; SOFT TISSUE AS NEEDED
Status: DISCONTINUED | OUTPATIENT
Start: 2018-03-15 | End: 2018-03-15 | Stop reason: HOSPADM

## 2018-03-15 RX ORDER — NEOSTIGMINE METHYLSULFATE 5 MG/5 ML
SYRINGE (ML) INTRAVENOUS AS NEEDED
Status: DISCONTINUED | OUTPATIENT
Start: 2018-03-15 | End: 2018-03-15 | Stop reason: HOSPADM

## 2018-03-15 RX ORDER — METRONIDAZOLE 500 MG/100ML
500 INJECTION, SOLUTION INTRAVENOUS EVERY 8 HOURS
Status: DISCONTINUED | OUTPATIENT
Start: 2018-03-15 | End: 2018-03-15

## 2018-03-15 RX ORDER — METRONIDAZOLE 500 MG/100ML
500 INJECTION, SOLUTION INTRAVENOUS
Status: DISCONTINUED | OUTPATIENT
Start: 2018-03-15 | End: 2018-03-15

## 2018-03-15 RX ORDER — MORPHINE SULFATE 2 MG/ML
2 INJECTION, SOLUTION INTRAMUSCULAR; INTRAVENOUS AS NEEDED
Status: DISCONTINUED | OUTPATIENT
Start: 2018-03-15 | End: 2018-03-15 | Stop reason: HOSPADM

## 2018-03-15 RX ORDER — MIDAZOLAM HYDROCHLORIDE 1 MG/ML
INJECTION, SOLUTION INTRAMUSCULAR; INTRAVENOUS AS NEEDED
Status: DISCONTINUED | OUTPATIENT
Start: 2018-03-15 | End: 2018-03-15 | Stop reason: HOSPADM

## 2018-03-15 RX ORDER — LIDOCAINE HYDROCHLORIDE 20 MG/ML
INJECTION, SOLUTION EPIDURAL; INFILTRATION; INTRACAUDAL; PERINEURAL AS NEEDED
Status: DISCONTINUED | OUTPATIENT
Start: 2018-03-15 | End: 2018-03-15 | Stop reason: HOSPADM

## 2018-03-15 RX ORDER — SODIUM CHLORIDE, SODIUM LACTATE, POTASSIUM CHLORIDE, CALCIUM CHLORIDE 600; 310; 30; 20 MG/100ML; MG/100ML; MG/100ML; MG/100ML
75 INJECTION, SOLUTION INTRAVENOUS CONTINUOUS
Status: DISCONTINUED | OUTPATIENT
Start: 2018-03-15 | End: 2018-03-15

## 2018-03-15 RX ADMIN — FENTANYL CITRATE 50 MCG: 50 INJECTION, SOLUTION INTRAMUSCULAR; INTRAVENOUS at 14:02

## 2018-03-15 RX ADMIN — FENTANYL CITRATE 50 MCG: 50 INJECTION, SOLUTION INTRAMUSCULAR; INTRAVENOUS at 15:11

## 2018-03-15 RX ADMIN — SUCCINYLCHOLINE CHLORIDE 120 MG: 20 INJECTION INTRAMUSCULAR; INTRAVENOUS at 14:06

## 2018-03-15 RX ADMIN — SODIUM CHLORIDE, SODIUM LACTATE, POTASSIUM CHLORIDE, AND CALCIUM CHLORIDE: 600; 310; 30; 20 INJECTION, SOLUTION INTRAVENOUS at 15:15

## 2018-03-15 RX ADMIN — FAMOTIDINE 20 MG: 20 TABLET, FILM COATED ORAL at 13:31

## 2018-03-15 RX ADMIN — GLYCOPYRROLATE 0.8 MG: 0.2 INJECTION INTRAMUSCULAR; INTRAVENOUS at 16:43

## 2018-03-15 RX ADMIN — METRONIDAZOLE 500 MG: 500 INJECTION, SOLUTION INTRAVENOUS at 14:17

## 2018-03-15 RX ADMIN — MIDAZOLAM HYDROCHLORIDE 1 MG: 1 INJECTION, SOLUTION INTRAMUSCULAR; INTRAVENOUS at 13:54

## 2018-03-15 RX ADMIN — Medication 5 MG: at 16:43

## 2018-03-15 RX ADMIN — CEFAZOLIN SODIUM 2 G: 2 SOLUTION INTRAVENOUS at 21:54

## 2018-03-15 RX ADMIN — FENTANYL CITRATE 50 MCG: 50 INJECTION, SOLUTION INTRAMUSCULAR; INTRAVENOUS at 14:06

## 2018-03-15 RX ADMIN — DEXAMETHASONE SODIUM PHOSPHATE 4 MG: 4 INJECTION, SOLUTION INTRA-ARTICULAR; INTRALESIONAL; INTRAMUSCULAR; INTRAVENOUS; SOFT TISSUE at 14:15

## 2018-03-15 RX ADMIN — MIRTAZAPINE 15 MG: 15 TABLET, FILM COATED ORAL at 21:54

## 2018-03-15 RX ADMIN — ROCURONIUM BROMIDE 5 MG: 10 INJECTION, SOLUTION INTRAVENOUS at 14:06

## 2018-03-15 RX ADMIN — METRONIDAZOLE 500 MG: 500 INJECTION, SOLUTION INTRAVENOUS at 21:54

## 2018-03-15 RX ADMIN — MIDAZOLAM HYDROCHLORIDE 1 MG: 1 INJECTION, SOLUTION INTRAMUSCULAR; INTRAVENOUS at 13:59

## 2018-03-15 RX ADMIN — Medication 10 ML: at 13:20

## 2018-03-15 RX ADMIN — LIDOCAINE HYDROCHLORIDE 10 MG: 20 INJECTION, SOLUTION EPIDURAL; INFILTRATION; INTRACAUDAL; PERINEURAL at 15:56

## 2018-03-15 RX ADMIN — PROPOFOL 120 MG: 10 INJECTION, EMULSION INTRAVENOUS at 14:06

## 2018-03-15 RX ADMIN — CEFAZOLIN SODIUM 2 G: 2 SOLUTION INTRAVENOUS at 14:04

## 2018-03-15 RX ADMIN — SODIUM CHLORIDE, SODIUM LACTATE, POTASSIUM CHLORIDE, AND CALCIUM CHLORIDE 125 ML/HR: 600; 310; 30; 20 INJECTION, SOLUTION INTRAVENOUS at 18:55

## 2018-03-15 RX ADMIN — LIDOCAINE HYDROCHLORIDE 40 MG: 20 INJECTION, SOLUTION EPIDURAL; INFILTRATION; INTRACAUDAL; PERINEURAL at 14:06

## 2018-03-15 RX ADMIN — FAMOTIDINE 20 MG: 10 INJECTION INTRAVENOUS at 21:48

## 2018-03-15 RX ADMIN — ONDANSETRON 4 MG: 2 INJECTION INTRAMUSCULAR; INTRAVENOUS at 16:30

## 2018-03-15 RX ADMIN — SODIUM CHLORIDE, SODIUM LACTATE, POTASSIUM CHLORIDE, AND CALCIUM CHLORIDE 75 ML/HR: 600; 310; 30; 20 INJECTION, SOLUTION INTRAVENOUS at 13:31

## 2018-03-15 NOTE — BRIEF OP NOTE
BRIEF OPERATIVE NOTE    Date of Procedure: 3/15/2018   Preoperative Diagnosis: Colonic mass [K63.9]  Postoperative Diagnosis: Ascending Colonic mass [K63.9]    Procedure(s):  LAPAROSCOPIC RIGHT COLECTOMY      Surgeon(s) and Role:     * Tamela Jurado MD - Primary         Assistant Staff: None      Surgical Staff:  Circ-1: Caryn Hancock RN  Scrub Tech-1: Stormy Nicholson  Scrub Tech-Relief: Blanca Bond  Surg Asst-1: Edine Eloina  Surg Asst-Relief: Leif Flores  Event Time In   Incision Start 1426   Incision Close      Anesthesia: General   Estimated Blood Loss: 250 ml  Specimens:   ID Type Source Tests Collected by Time Destination   1 : ileum and cecum Preservative Ileum  Tamela Jurado MD 3/15/2018 1540 Pathology   2 : additional ileum Preservative Ileum  Tamela Jurado MD 3/15/2018 1552 Pathology      Findings: large colon polyp   Complications: none  Implants: * No implants in log *    [id number]

## 2018-03-15 NOTE — ROUTINE PROCESS
TRANSFER - OUT REPORT:    Verbal report given to Yoko Colon on Alen Blanchard  being transferred to room 515 for routine progression of care       Report consisted of patients Situation, Background, Assessment and   Recommendations(SBAR). Information from the following report(s) SBAR, OR Summary, Intake/Output and MAR was reviewed with the receiving nurse. Opportunity for questions and clarification was provided.       Patient transported with:   O2 @ 3 liters  Registered Nurse  [facility]

## 2018-03-15 NOTE — ANESTHESIA POSTPROCEDURE EVALUATION
Post-Anesthesia Evaluation and Assessment    Patient: Umang Hernandez MRN: [id number]  SSN: [id number]    YOB: 1945  Age: 81 y.o. Sex: male      Data from PACU flowsheet    Cardiovascular Function/Vital Signs  Visit Vitals    /67    Pulse 74    Temp 37 °C (98.6 °F)    Resp 14    Ht 5' 8\" (1.727 m)    Wt 68 kg (150 lb)    SpO2 100%    BMI 22.81 kg/m2       Patient is status post general anesthesia for Procedure(s):  LAPAROSCOPIC RIGHT COLECTOMY    . Nausea/Vomiting: controlled    Postoperative hydration reviewed and adequate. Pain:  Pain Scale 1: Visual (03/15/18 1720)  Pain Intensity 1: 0 (03/15/18 1720)   Managed      Mental Status and Level of Consciousness: Alert and oriented     Pulmonary Status:   O2 Device: Oxygen mask (03/15/18 1722)   Adequate oxygenation and airway patent    Complications related to anesthesia: None    Post-anesthesia assessment completed.  No concerns    Signed By: Graciela Prince MD     March 15, 2018

## 2018-03-15 NOTE — ROUTINE PROCESS
1900 Took over care of patient helped get a perales in him he has a lot of sediment in his urine. He has over half of his prep for his surgery left. Stool is getting watery but a light brown in color. Will continue to give patient prep until midnight. Started his first unit of blood he is tolerating it well. Patient is alert and oriented times three with no signs or symptoms fo distress at this time  0000 Patient is alert and oriented times three with no signs or symptoms of distress. Tolerated first unit of blood well and started the second unit. He has had several bowel movements through the last couple of hours. They are liquid and getting lighter in color. Patient is made NPO for surgery in the morning. 0400 Patient is alert and oriented times three with nos gins or symptoms of distress. 0700   Bedside and Verbal shift change report given to Leona Stevens (oncoming nurse) by Claude Mathieu, RN (offgoing nurse). Report included the following information SBAR, Kardex, MAR and Recent Results.     SITUATION:    Code Status: No Order   Reason for Admission: colonic mass   Anemia    [facility] day: 1   Problem List:       Hospital Problems  Date Reviewed: 3/12/2018          Codes Class Noted POA    Anemia ICD-10-CM: D64.9  ICD-9-CM: 285.9  3/14/2018 Unknown              BACKGROUND:    Past Medical History:   Past Medical History:   Diagnosis Date    Abnormal computed tomography of bladder     Anemia     Arthritis     Basal ganglia disease     Benign prostatic hyperplasia with lower urinary tract symptoms     Bladder calculus     Bladder cancer ([address] 3/13/14    Bladder mass     Bladder tumor     Brain aneurysm     CVA (cerebral vascular accident) ([facility] DEC 2012    hemiplegia    Dysphagia     Erythematous bladder mucosa     Perales catheter in place     Gross hematuria     HTN (hypertension)     Hx MRSA infection 09/2017    urine    Neurogenic bladder     Psychiatric disorder     Retention, urine     Stroke [facility])     Urethral stricture          Patient taking anticoagulants yes     ASSESSMENT:    Changes in Assessment Throughout Shift: NPO for surgery in the am     Patient has Central Line: no Reasons if yes: none   Patient has Webb Cath: no Reasons if yes: none      Last Vitals:     Vitals:    03/14/18 2010 03/14/18 2115 03/15/18 0026 03/15/18 0351   BP: 123/76 117/69 121/78 121/85   Pulse: 77 77 78 61   Resp: 16 15 16 16   Temp: 98.9 °F (37.2 °C) 98.8 °F (37.1 °C) 98.9 °F (37.2 °C) 97.6 °F (36.4 °C)   SpO2: 100% 100% 96% 97%        IV and DRAINS (will only show if present)         WOUND (if present)   Wound Type:  none, intact   Dressing present Dressing Present : No   Wound Concerns/Notes:  none     PAIN    Pain Assessment    Pain Intensity 1: 0 (03/15/18 0026)              Patient Stated Pain Goal: 0  o Interventions for Pain:  none, medication  o Intervention effective: yes  o Time of last intervention: 0400   o Reassessment Completed: yes      Last 3 Weights: There were no vitals filed for this visit. Weight change:      INTAKE/OUPUT    Current Shift: 03/14 1901 - 03/15 0700  In: 3074.8 [P.O.:1426; I.V.:888.3]  Out: 200 [Urine:200]    Last three shifts: 03/13 0701 - 03/14 1900  In: 1393 [P.O.:1343]  Out: 150 [Urine:150]     LAB RESULTS     Recent Labs      03/14/18   1126   HGB  7.1*   HCT  24.8*      No results for input(s): NA, K, GLU, BUN, CREA, CA, MG, INR in the last 72 hours. No lab exists for component: PT, PTT, INREXT    RECOMMENDATIONS AND DISCHARGE PLANNING     1. Pending tests/procedures/ Plan of Care or Other Needs: surgery      2. Discharge plan for patient and Needs/Barriers: none    3. Estimated Discharge Date: 3/15/18 Posted on Whiteboard in [facility] Room: yes      4. The patient's care plan was reviewed with the oncoming nurse.        \"HEALS\" SAFETY CHECK      Fall Risk    Total Score: 2    Safety Measures: Safety Measures: Bed/Chair-Wheels locked, Bed in low position, Call light within reach    A safety check occurred in the patient's room between off going nurse and oncoming nurse listed above. The safety check included the below items  Area Items   H  High Alert Medications - Verify all high alert medication drips (heparin, PCA, etc.)   E  Equipment - Suction is set up for ALL patients (with denzel)  - Red plugs utilized for all equipment (IV pumps, etc.)  - WOWs wiped down at end of shift.  - Room stocked with oxygen, suction, and other unit-specific supplies   A  Alarms - Bed alarm is set for fall risk patients  - Ensure chair alarm is in place and activated if patient is up in a chair   L  Lines - Check IV for any infiltration  - Webb bag is empty if patient has a Webb   - Tubing and IV bags are labeled   S  Safety   - Room is clean, patient is clean, and equipment is clean. - Hallways are clear from equipment besides carts. - Fall bracelet on for fall risk patients  - Ensure room is clear and free of clutter  - Suction is set up for ALL patients (with denzel)  - Hallways are clear from equipment besides carts.    - Isolation precautions followed, supplies available outside room, sign posted     Yoselyn Florian RN

## 2018-03-15 NOTE — CDMP QUERY
Please clarify if there is any clinical significance   with the following UA  results.       color-   dark yellow    appearance-  turbid    sp gravity-  > 1.030    nitrites- neg    leukocytes- large    bacteria-  1+     Thank you ,   Kisha Rodriguez RN   CCDS   x [phone]

## 2018-03-15 NOTE — OP NOTES
[address] [place] [address]   OPERATIVE REPORT    Russell Osgood  MR#: [id number]  : 1945  ACCOUNT #: [de-identified]   DATE OF SERVICE: 03/15/2018    PREOPERATIVE DIAGNOSIS: Dysplastic ascending colon mass. POSTOPERATIVE DIAGNOSIS:  Dysplastic ascending colon polyp. PROCEDURE PERFORMED:  Laparoscopic right colectomy. SURGEON:  Pedro Baez MD    ANESTHESIA:  General.    ESTIMATED BLOOD LOSS:  250 mL. SPECIMENS REMOVED: Colon and small intestine to pathology. COMPLICATIONS:  None. INDICATIONS:  The patient is a 79-year-old male who was found to have a very large ascending colon mass. Biopsies showed dysplasia. He was brought to the operating room for laparoscopic right colectomy. I explained the risks, including bleeding, infection, injury to surrounding structures, the need for a temporary or permanent stoma and death. He understood and wished to proceed. PROCEDURE:  The patient was properly identified in the holding area and brought to the operating room. He was placed supine on the operating room table, with some limitation due to his spasticity from his stroke. General anesthesia was administered. The chest was strapped to the bed. The abdomen was prepped and draped in the usual sterile fashion. We made a 10 mm incision above the umbilicus, inserted a Veress needle and insufflated the abdomen to 15 mmHg and placed a 10 mm port there and two 5 mm ports in the lower midline and left lower quadrant. There was no significant adhesive disease. We performed medial to lateral dissection of the ileum, cecum, ascending colon and a portion of transverse colon. The ileocolic pedicle was circumferentially dissected and ligated with Hem-o-josh clips and transected with a LigaSure device. Hemostasis was excellent.   We took our lateral attachments of the ileum, cecum and ascending colon and took down the hepatocolic ligament and identified and spared the duodenum, which was not injured, thus completing the dissection. We extended the upper midline incision laterally with a 15 blade and carried this down through subcutaneous tissues and incised the fascia and entered the abdominal cavity. We placed a wound protector and extracorporealized the specimen. We transected the ileum and transverse colon with 75 mm blue load JASSI staplers and took all remaining mesentery with a LigaSure device. We passed the specimen off the field and opened it. This was an extremely large polyp of the ascending colon on a stalk. There was no ulceration. Subsequent to this, we prepared to perform our anastomosis. The mesentery appeared a little thin on one portion of distal small intestine. We therefore took an additional 5-6 cm of ileum so we had good healthy mesentery beneath the bowel. This was passed off the field as well. We created a side-to-side functional end-to-end anastomosis between the ileum and transverse colon in the standard fashion. The resulting defect was closed with a TA-60 linear stapler. This TA staple line was oversewn with 3-0 Vicryl suture. Two crotch stitches were placed as well. The anastomosis was placed back into the abdominal cavity. The abdomen was reinsufflated to 15 mmHg. Hemostasis was assured. Blood and fluid were suctioned from the abdominal cavity. The ileocolic pedicle was reidentified and was hemostatic. The anastomosis was evaluated and was without tension or twisting of the mesentery. All ports were removed under direct visualization. The extraction site was closed in the posterior sheath with 0 Vicryl suture and the anterior sheath with #1 PDS suture, both in a running fashion. The subcutaneous tissues were irrigated. The skin incisions were closed with 4-0 Monocryl subcuticular suture. Steri-Strips were applied. The patient tolerated the procedure well. All instrument, sponge and needle counts were correct at the end of the case x2. The patient awoke from anesthesia and was extubated and transported to the PACU in stable condition. MD Vanesa Yi / Mechelle.Speak  D: 03/15/2018 16:57     T: 03/15/2018 17:16  JOB #: [id number]

## 2018-03-15 NOTE — PROGRESS NOTES
New patient directly admitted by Dr. Jose Juan Zuñiga from [facility]. No report from EMT or SNF received. Pt is AOx4 and easily responses to questions. Vitals w/in normal limits. Hemiplegia on L side w/ contracture of the LUE and LLE. Neuromotor function intact intact on RUE/RLE. Pt report sensation in the affected L side. Speech clear. Slight droop of L side of face. Lung sounds clear, but very diminished w/ bilaterally equal chest expansion. Bowel sounds present w/ no tenderness or pain w/ palpitating. Peripheral pulses present w/ cap refill of < 3 sec, warm to touch, sensation present. Pt has a perales catheter w/ probable microbial contamination w/ yellow & brown creamy sediment present in the urinary bag and tube. Pt has a hx of urinary retention BPH. Pt provided education call light and bed lowest position.

## 2018-03-15 NOTE — ROUTINE PROCESS
Bedside and Verbal shift change report given to RN Rosa Walker (oncoming nurse) by Latoya Díaz RN (offgoing nurse). Report included the following information SBAR, Kardex, MAR and Recent Results.     Latoya Díaz, RN

## 2018-03-15 NOTE — PROGRESS NOTES
07:50 - 08:00 - Despite ingest of 1/2 the bowel prep before midnight NPO, pt's stool has not run clear. Contacted Dr. Michael Issa regarding situation. Dr rodarte pt ingest \"as much of the remaining bowel prep as possible by 8:00am\". Pt was able to ingest a cup of bowel prep before 08:00 am.  Pt notified family of hospitalization. 11:41 - pt has failed to pass any additional stool since the 1 cup of bowel pre this am. Dr. Michael Issa notified. Pt has had CHG bath w/ gown and linen change.

## 2018-03-15 NOTE — PROGRESS NOTES
1600 - Obtained ~ 50 mL of cloudy urine for clean perales catheter tubing and sent to lab of UA w/ culture. Perales from [facility] clogged with sediment, irrigation unsuccessful. Pt reports this is the 3rd time his perales catheter has clogged and needed to be reinserted this week. Contacted Dr. Adelaida Macias received orders for removal and replacement of perales catheter. 36- [facility] perales removed w/o issue. Attempted to insert 16 fr perales catheter, but met resistance during insertion. Insertion of perales catheter stopped to prevent injury. Will attempt to reinsertion w/ coude catheter. 20:00 - 16 Fr coude catheter inserted w/ sterile technique by AGUSTIN Liang w/ assistance by AGUSTIN Bosch.

## 2018-03-15 NOTE — ANESTHESIA PREPROCEDURE EVALUATION
Anesthetic History   No history of anesthetic complications            Review of Systems / Medical History  Patient summary reviewed and pertinent labs reviewed    Pulmonary  Within defined limits                 Neuro/Psych       CVA  TIA    Comments: Left side weakness Cardiovascular    Hypertension: well controlled                   GI/Hepatic/Renal                Endo/Other        Arthritis and anemia     Other Findings   Comments:   Risk Factors for Postoperative nausea/vomiting:       History of postoperative nausea/vomiting? NO       Female? NO       Motion sickness? NO       Intended opioid administration for postoperative analgesia? YES      Smoking Abstinence  Current Smoker? NO  Elective Surgery? NO  Seen preoperatively by anesthesiologist or proxy prior to day of surgery? YES  Pt abstained from smoking 24 hours prior to anesthesia?  N/A           Physical Exam    Airway  Mallampati: II  TM Distance: 4 - 6 cm  Neck ROM: normal range of motion        Cardiovascular  Regular rate and rhythm,  S1 and S2 normal,  no murmur, click, rub, or gallop  Rhythm: regular  Rate: normal         Dental  No notable dental hx       Pulmonary  Breath sounds clear to auscultation               Abdominal  GI exam deferred       Other Findings            Anesthetic Plan    ASA: 3  Anesthesia type: general          Induction: Intravenous  Anesthetic plan and risks discussed with: Patient

## 2018-03-16 LAB
ABO + RH BLD: NORMAL
ANION GAP SERPL CALC-SCNC: 12 MMOL/L (ref 3–18)
BLD PROD TYP BPU: NORMAL
BLD PROD TYP BPU: NORMAL
BLOOD GROUP ANTIBODIES SERPL: NORMAL
BPU ID: NORMAL
BPU ID: NORMAL
BUN SERPL-MCNC: 8 MG/DL (ref 7–18)
BUN/CREAT SERPL: 9 (ref 12–20)
CALCIUM SERPL-MCNC: 8.7 MG/DL (ref 8.5–10.1)
CALLED TO:,BCALL1: NORMAL
CHLORIDE SERPL-SCNC: 106 MMOL/L (ref 100–108)
CO2 SERPL-SCNC: 18 MMOL/L (ref 21–32)
CREAT SERPL-MCNC: 0.87 MG/DL (ref 0.6–1.3)
CROSSMATCH RESULT,%XM: NORMAL
CROSSMATCH RESULT,%XM: NORMAL
ERYTHROCYTE [DISTWIDTH] IN BLOOD BY AUTOMATED COUNT: 23.8 % (ref 11.6–14.5)
GLUCOSE SERPL-MCNC: 172 MG/DL (ref 74–99)
HCT VFR BLD AUTO: 29.9 % (ref 36–48)
HGB BLD-MCNC: 9.3 G/DL (ref 13–16)
MAGNESIUM SERPL-MCNC: 1.9 MG/DL (ref 1.6–2.6)
MCH RBC QN AUTO: 21.5 PG (ref 24–34)
MCHC RBC AUTO-ENTMCNC: 31.1 G/DL (ref 31–37)
MCV RBC AUTO: 69.1 FL (ref 74–97)
PHOSPHATE SERPL-MCNC: 2.1 MG/DL (ref 2.5–4.9)
PLATELET # BLD AUTO: 350 K/UL (ref 135–420)
PMV BLD AUTO: 10 FL (ref 9.2–11.8)
POTASSIUM SERPL-SCNC: 4.3 MMOL/L (ref 3.5–5.5)
RBC # BLD AUTO: 4.33 M/UL (ref 4.7–5.5)
SODIUM SERPL-SCNC: 136 MMOL/L (ref 136–145)
SPECIMEN EXP DATE BLD: NORMAL
STATUS OF UNIT,%ST: NORMAL
STATUS OF UNIT,%ST: NORMAL
UNIT DIVISION, %UDIV: 0
UNIT DIVISION, %UDIV: 0
WBC # BLD AUTO: 17.6 K/UL (ref 4.6–13.2)

## 2018-03-16 PROCEDURE — 85027 COMPLETE CBC AUTOMATED: CPT | Performed by: COLON & RECTAL SURGERY

## 2018-03-16 PROCEDURE — 84100 ASSAY OF PHOSPHORUS: CPT | Performed by: COLON & RECTAL SURGERY

## 2018-03-16 PROCEDURE — 74011250636 HC RX REV CODE- 250/636: Performed by: COLON & RECTAL SURGERY

## 2018-03-16 PROCEDURE — 83735 ASSAY OF MAGNESIUM: CPT | Performed by: COLON & RECTAL SURGERY

## 2018-03-16 PROCEDURE — 74011250637 HC RX REV CODE- 250/637: Performed by: COLON & RECTAL SURGERY

## 2018-03-16 PROCEDURE — 36415 COLL VENOUS BLD VENIPUNCTURE: CPT | Performed by: COLON & RECTAL SURGERY

## 2018-03-16 PROCEDURE — 65270000029 HC RM PRIVATE

## 2018-03-16 PROCEDURE — 80048 BASIC METABOLIC PNL TOTAL CA: CPT | Performed by: COLON & RECTAL SURGERY

## 2018-03-16 PROCEDURE — 74011000250 HC RX REV CODE- 250: Performed by: COLON & RECTAL SURGERY

## 2018-03-16 RX ADMIN — CEFAZOLIN SODIUM 2 G: 2 SOLUTION INTRAVENOUS at 13:41

## 2018-03-16 RX ADMIN — MIRTAZAPINE 15 MG: 15 TABLET, FILM COATED ORAL at 21:42

## 2018-03-16 RX ADMIN — OXYBUTYNIN CHLORIDE 10 MG: 5 TABLET, EXTENDED RELEASE ORAL at 08:39

## 2018-03-16 RX ADMIN — FAMOTIDINE 20 MG: 10 INJECTION INTRAVENOUS at 21:42

## 2018-03-16 RX ADMIN — CEFAZOLIN SODIUM 2 G: 2 SOLUTION INTRAVENOUS at 05:26

## 2018-03-16 RX ADMIN — METRONIDAZOLE 500 MG: 500 INJECTION, SOLUTION INTRAVENOUS at 05:07

## 2018-03-16 RX ADMIN — METRONIDAZOLE 500 MG: 500 INJECTION, SOLUTION INTRAVENOUS at 13:44

## 2018-03-16 RX ADMIN — CITALOPRAM HYDROBROMIDE 10 MG: 10 TABLET ORAL at 08:39

## 2018-03-16 RX ADMIN — OXYBUTYNIN CHLORIDE 10 MG: 5 TABLET, EXTENDED RELEASE ORAL at 17:45

## 2018-03-16 RX ADMIN — FAMOTIDINE 20 MG: 10 INJECTION INTRAVENOUS at 08:38

## 2018-03-16 RX ADMIN — ACETAMINOPHEN 650 MG: 325 TABLET ORAL at 21:42

## 2018-03-16 RX ADMIN — SODIUM CHLORIDE, SODIUM LACTATE, POTASSIUM CHLORIDE, AND CALCIUM CHLORIDE 100 ML/HR: 600; 310; 30; 20 INJECTION, SOLUTION INTRAVENOUS at 10:47

## 2018-03-16 NOTE — ROUTINE PROCESS
Bedside and Verbal shift change report given to RN Raj Orozco (oncoming nurse) by Harpreet Patel RN (offgoing nurse). Report included the following information SBAR, Kardex, MAR and Recent Results.       Harpreet Patel RN

## 2018-03-16 NOTE — PROGRESS NOTES
[facility]  [address]  [facility] [address] [phone]  Colon and Rectal Surgery Progress Note      Patient: Phillip Butler MRN: [id number]  SSN: [id number]    YOB: 1945  Age: 81 y.o. Sex: male      Admit Date: 3/14/2018    LOS: 2 days     Subjective:     No overnight events. Mild pain. Objective:     Vitals:    03/15/18 1820 03/15/18 2000 03/16/18 0400 03/16/18 0730   BP: 129/71 127/75 125/73 120/69   Pulse: 77 78 (!) 59 63   Resp: 19 19 20 20   Temp:  98.6 °F (37 °C) 98.5 °F (36.9 °C) 99.2 °F (37.3 °C)   SpO2: 100% 98% 97% 98%   Weight:       Height:            Intake and Output:  Current Shift: 03/16 0701 - 03/16 1900  In: -   Out: 1000 [Urine:1000]  Last three shifts: 03/14 1901 - 03/16 0700  In: 6418.2 [P.O.:1426; I.V.:4231.7]  Out: 2850 [Urine:2650]    Physical Exam:     abd soft miguelito tender, ND    Lab/Data Review:    BMP:   Lab Results   Component Value Date/Time     03/16/2018 05:10 AM    K 4.3 03/16/2018 05:10 AM     03/16/2018 05:10 AM    CO2 18 (L) 03/16/2018 05:10 AM    AGAP 12 03/16/2018 05:10 AM     (H) 03/16/2018 05:10 AM    BUN 8 03/16/2018 05:10 AM    CREA 0.87 03/16/2018 05:10 AM    GFRAA >60 03/16/2018 05:10 AM    GFRNA >60 03/16/2018 05:10 AM     CBC:   Lab Results   Component Value Date/Time    WBC 17.6 (H) 03/16/2018 05:10 AM    HGB 9.3 (L) 03/16/2018 05:10 AM    HCT 29.9 (L) 03/16/2018 05:10 AM     03/16/2018 05:10 AM        Assessment:     POD 1 s/p lap R colectomy for ascending colon polyp    Plan:     Continue NPO for now  Start clears tomorrow if no bloating, nausea  U/A borderline but UCx shows mixed growth.  Will hold off on abx management as this may be biofilm from long term indwelling perales  If pt has fevers, persistent wbc may reconsider    Signed By: Cori Purvis MD        March 16, 2018

## 2018-03-16 NOTE — ROUTINE PROCESS
Verbal and bedside Shift changed report given to Pippa Johnson RN (oncoming RN) on Pt. Condition. Report consisted of patients Situation, History, Activities, intake/output,  Background, Assessment and Recommendations(SBAR). Information from the following report(s) Kardex, order Summary, Lab results and MAR was reviewed with the receiving nurse. Opportunity for questions and clarification was provided.

## 2018-03-16 NOTE — ROUTINE PROCESS
2120 Bedside and Verbal shift change  Received from Madyson Chávez RN (outgoing nurse), to KEIRY Lloyd (oncoming)  Pt. Is AOX 4. IV patent and infusing well, Pt. denies  pain at this time. Report included the following information SBAR, Kardex, Procedure Summary, Intake/Output, MAR, Recent Lab Results. Will resume care and monitor Pt. Condition. Pt. Educated on call bell when in need of help and assistance. Pt. verbalized understanding. Bed alarm on.     Pt. Head to toe Assessment Done and documented. 2250  Pt. Resting comfortably in bed, no sign of discomfort. 0000  Pt. Made no complaints. 0200  Pt. Resting comfortably in bed, no sign of distress. 0400 Pt. Made no complaints. 0600  Pt. Resting comfortably in bed no sign of discomfort or distress.

## 2018-03-17 ENCOUNTER — APPOINTMENT (OUTPATIENT)
Dept: CT IMAGING | Age: 73
DRG: 331 | End: 2018-03-17
Payer: MEDICARE

## 2018-03-17 LAB
ANION GAP SERPL CALC-SCNC: 10 MMOL/L (ref 3–18)
BUN SERPL-MCNC: 6 MG/DL (ref 7–18)
BUN/CREAT SERPL: 8 (ref 12–20)
CALCIUM SERPL-MCNC: 8.8 MG/DL (ref 8.5–10.1)
CHLORIDE SERPL-SCNC: 109 MMOL/L (ref 100–108)
CO2 SERPL-SCNC: 23 MMOL/L (ref 21–32)
CREAT SERPL-MCNC: 0.73 MG/DL (ref 0.6–1.3)
ERYTHROCYTE [DISTWIDTH] IN BLOOD BY AUTOMATED COUNT: 24.7 % (ref 11.6–14.5)
GLUCOSE BLD STRIP.AUTO-MCNC: 107 MG/DL (ref 70–110)
GLUCOSE SERPL-MCNC: 98 MG/DL (ref 74–99)
HCT VFR BLD AUTO: 27.6 % (ref 36–48)
HGB BLD-MCNC: 8.2 G/DL (ref 13–16)
MAGNESIUM SERPL-MCNC: 2 MG/DL (ref 1.6–2.6)
MCH RBC QN AUTO: 20.6 PG (ref 24–34)
MCHC RBC AUTO-ENTMCNC: 29.7 G/DL (ref 31–37)
MCV RBC AUTO: 69.3 FL (ref 74–97)
PHOSPHATE SERPL-MCNC: 1.5 MG/DL (ref 2.5–4.9)
PLATELET # BLD AUTO: 327 K/UL (ref 135–420)
PMV BLD AUTO: 10.2 FL (ref 9.2–11.8)
POTASSIUM SERPL-SCNC: 3.6 MMOL/L (ref 3.5–5.5)
RBC # BLD AUTO: 3.98 M/UL (ref 4.7–5.5)
SODIUM SERPL-SCNC: 142 MMOL/L (ref 136–145)
WBC # BLD AUTO: 13.8 K/UL (ref 4.6–13.2)

## 2018-03-17 PROCEDURE — 71275 CT ANGIOGRAPHY CHEST: CPT

## 2018-03-17 PROCEDURE — 65270000029 HC RM PRIVATE

## 2018-03-17 PROCEDURE — 85027 COMPLETE CBC AUTOMATED: CPT | Performed by: COLON & RECTAL SURGERY

## 2018-03-17 PROCEDURE — 74011636320 HC RX REV CODE- 636/320: Performed by: COLON & RECTAL SURGERY

## 2018-03-17 PROCEDURE — 74011250636 HC RX REV CODE- 250/636

## 2018-03-17 PROCEDURE — 83735 ASSAY OF MAGNESIUM: CPT | Performed by: COLON & RECTAL SURGERY

## 2018-03-17 PROCEDURE — 80048 BASIC METABOLIC PNL TOTAL CA: CPT | Performed by: COLON & RECTAL SURGERY

## 2018-03-17 PROCEDURE — 74011250636 HC RX REV CODE- 250/636: Performed by: SURGERY

## 2018-03-17 PROCEDURE — 82962 GLUCOSE BLOOD TEST: CPT

## 2018-03-17 PROCEDURE — 84100 ASSAY OF PHOSPHORUS: CPT | Performed by: COLON & RECTAL SURGERY

## 2018-03-17 PROCEDURE — 74011250637 HC RX REV CODE- 250/637: Performed by: COLON & RECTAL SURGERY

## 2018-03-17 PROCEDURE — 36415 COLL VENOUS BLD VENIPUNCTURE: CPT | Performed by: COLON & RECTAL SURGERY

## 2018-03-17 PROCEDURE — 74011000250 HC RX REV CODE- 250: Performed by: COLON & RECTAL SURGERY

## 2018-03-17 PROCEDURE — 74011000250 HC RX REV CODE- 250: Performed by: SURGERY

## 2018-03-17 PROCEDURE — 74011250636 HC RX REV CODE- 250/636: Performed by: COLON & RECTAL SURGERY

## 2018-03-17 RX ORDER — SODIUM CHLORIDE 9 MG/ML
500 INJECTION, SOLUTION INTRAVENOUS CONTINUOUS
Status: DISCONTINUED | OUTPATIENT
Start: 2018-03-17 | End: 2018-03-19

## 2018-03-17 RX ADMIN — CITALOPRAM HYDROBROMIDE 10 MG: 10 TABLET ORAL at 10:31

## 2018-03-17 RX ADMIN — HEPARIN SODIUM 5000 UNITS: 5000 INJECTION, SOLUTION INTRAVENOUS; SUBCUTANEOUS at 18:34

## 2018-03-17 RX ADMIN — OXYBUTYNIN CHLORIDE 10 MG: 5 TABLET, EXTENDED RELEASE ORAL at 18:32

## 2018-03-17 RX ADMIN — Medication 2 MG: at 15:38

## 2018-03-17 RX ADMIN — HEPARIN SODIUM 5000 UNITS: 5000 INJECTION, SOLUTION INTRAVENOUS; SUBCUTANEOUS at 10:32

## 2018-03-17 RX ADMIN — SODIUM CHLORIDE 500 ML: 900 INJECTION, SOLUTION INTRAVENOUS at 20:35

## 2018-03-17 RX ADMIN — FAMOTIDINE 20 MG: 10 INJECTION INTRAVENOUS at 21:55

## 2018-03-17 RX ADMIN — OXYBUTYNIN CHLORIDE 10 MG: 5 TABLET, EXTENDED RELEASE ORAL at 10:31

## 2018-03-17 RX ADMIN — IOPAMIDOL 50 ML: 755 INJECTION, SOLUTION INTRAVENOUS at 23:00

## 2018-03-17 RX ADMIN — POTASSIUM PHOSPHATE, MONOBASIC AND POTASSIUM PHOSPHATE, DIBASIC: 224; 236 INJECTION, SOLUTION, CONCENTRATE INTRAVENOUS at 15:46

## 2018-03-17 RX ADMIN — MIRTAZAPINE 15 MG: 15 TABLET, FILM COATED ORAL at 21:55

## 2018-03-17 RX ADMIN — FAMOTIDINE 20 MG: 10 INJECTION INTRAVENOUS at 10:32

## 2018-03-17 RX ADMIN — SODIUM CHLORIDE, SODIUM LACTATE, POTASSIUM CHLORIDE, AND CALCIUM CHLORIDE 100 ML/HR: 600; 310; 30; 20 INJECTION, SOLUTION INTRAVENOUS at 01:02

## 2018-03-17 RX ADMIN — HEPARIN SODIUM 5000 UNITS: 5000 INJECTION, SOLUTION INTRAVENOUS; SUBCUTANEOUS at 03:23

## 2018-03-17 NOTE — PROGRESS NOTES
Progress Note    Patient: Acacia Gay MRN: [id number]  SSN: [id number]    YOB: 1945  Age: 81 y.o. Sex: male      Admit Date: 3/14/2018    2 Days Post-Op    Procedure:  Procedure(s):  LAPAROSCOPIC RIGHT COLECTOMY        Subjective:     Patient has no new complaints. No n/v, no burping. No flatus yet. Objective:     Visit Vitals    /90 (BP 1 Location: Left arm, BP Patient Position: At rest)    Pulse (!) 107    Temp 97.1 °F (36.2 °C)    Resp 18    Ht 5' 8\" (1.727 m)    Wt 68 kg (150 lb)    SpO2 97%    BMI 22.81 kg/m2       Temp (24hrs), Av.6 °F (37 °C), Min:97.1 °F (36.2 °C), Max:99.1 °F (37.3 °C)      Physical Exam:    General:  Alert, cooperative, no distress. Lungs:   Clear to auscultation bilaterally. Heart:  Regular rate and rhythm. Abdomen:   Softly distended, appropriate incisional tenderness only. Bowel sounds hypoactive. Incisions c/d/i with steris. No masses,  No organomegaly. Extremities: Extremities normal, atraumatic, no cyanosis or edema. No calf tenderness. Data Review: images and reports reviewed    Lab Review: All lab results for the last 24 hours reviewed. H/H essen stable. WBC improving    Assessment:     Hospital Problems  Date Reviewed: 3/15/2018          Codes Class Noted POA    Anemia ICD-10-CM: D64.9  ICD-9-CM: 285.9  3/14/2018 Unknown              Plan/Recommendations/Medical Decision Making:     Continue present treatment. NPO x ice chips until +flatus. OOB w assistance. Follow exam and labs.     Signed By: Mary Somers MD     2018

## 2018-03-18 LAB
ANION GAP SERPL CALC-SCNC: 10 MMOL/L (ref 3–18)
BACTERIA SPEC CULT: ABNORMAL
BACTERIA SPEC CULT: ABNORMAL
BUN SERPL-MCNC: 10 MG/DL (ref 7–18)
BUN/CREAT SERPL: 13 (ref 12–20)
CALCIUM SERPL-MCNC: 8.7 MG/DL (ref 8.5–10.1)
CHLORIDE SERPL-SCNC: 112 MMOL/L (ref 100–108)
CO2 SERPL-SCNC: 21 MMOL/L (ref 21–32)
CREAT SERPL-MCNC: 0.78 MG/DL (ref 0.6–1.3)
ERYTHROCYTE [DISTWIDTH] IN BLOOD BY AUTOMATED COUNT: 25.4 % (ref 11.6–14.5)
GLUCOSE SERPL-MCNC: 110 MG/DL (ref 74–99)
HCT VFR BLD AUTO: 29.8 % (ref 36–48)
HGB BLD-MCNC: 8.8 G/DL (ref 13–16)
MAGNESIUM SERPL-MCNC: 1.9 MG/DL (ref 1.6–2.6)
MCH RBC QN AUTO: 20.6 PG (ref 24–34)
MCHC RBC AUTO-ENTMCNC: 29.5 G/DL (ref 31–37)
MCV RBC AUTO: 69.8 FL (ref 74–97)
PHOSPHATE SERPL-MCNC: 1.8 MG/DL (ref 2.5–4.9)
PLATELET # BLD AUTO: 329 K/UL (ref 135–420)
PMV BLD AUTO: 10 FL (ref 9.2–11.8)
POTASSIUM SERPL-SCNC: 3.4 MMOL/L (ref 3.5–5.5)
RBC # BLD AUTO: 4.27 M/UL (ref 4.7–5.5)
SERVICE CMNT-IMP: ABNORMAL
SODIUM SERPL-SCNC: 143 MMOL/L (ref 136–145)
WBC # BLD AUTO: 16.5 K/UL (ref 4.6–13.2)

## 2018-03-18 PROCEDURE — 85027 COMPLETE CBC AUTOMATED: CPT | Performed by: COLON & RECTAL SURGERY

## 2018-03-18 PROCEDURE — 80048 BASIC METABOLIC PNL TOTAL CA: CPT | Performed by: COLON & RECTAL SURGERY

## 2018-03-18 PROCEDURE — 74011250636 HC RX REV CODE- 250/636: Performed by: COLON & RECTAL SURGERY

## 2018-03-18 PROCEDURE — 74011250637 HC RX REV CODE- 250/637: Performed by: COLON & RECTAL SURGERY

## 2018-03-18 PROCEDURE — 84100 ASSAY OF PHOSPHORUS: CPT | Performed by: COLON & RECTAL SURGERY

## 2018-03-18 PROCEDURE — 74011000250 HC RX REV CODE- 250: Performed by: COLON & RECTAL SURGERY

## 2018-03-18 PROCEDURE — 36415 COLL VENOUS BLD VENIPUNCTURE: CPT | Performed by: COLON & RECTAL SURGERY

## 2018-03-18 PROCEDURE — 83735 ASSAY OF MAGNESIUM: CPT | Performed by: COLON & RECTAL SURGERY

## 2018-03-18 PROCEDURE — 65270000029 HC RM PRIVATE

## 2018-03-18 RX ADMIN — SODIUM CHLORIDE, SODIUM LACTATE, POTASSIUM CHLORIDE, AND CALCIUM CHLORIDE 100 ML/HR: 600; 310; 30; 20 INJECTION, SOLUTION INTRAVENOUS at 19:22

## 2018-03-18 RX ADMIN — HEPARIN SODIUM 5000 UNITS: 5000 INJECTION, SOLUTION INTRAVENOUS; SUBCUTANEOUS at 01:58

## 2018-03-18 RX ADMIN — FAMOTIDINE 20 MG: 10 INJECTION INTRAVENOUS at 21:00

## 2018-03-18 RX ADMIN — HEPARIN SODIUM 5000 UNITS: 5000 INJECTION, SOLUTION INTRAVENOUS; SUBCUTANEOUS at 09:20

## 2018-03-18 RX ADMIN — OXYBUTYNIN CHLORIDE 10 MG: 5 TABLET, EXTENDED RELEASE ORAL at 09:19

## 2018-03-18 RX ADMIN — HEPARIN SODIUM 5000 UNITS: 5000 INJECTION, SOLUTION INTRAVENOUS; SUBCUTANEOUS at 17:28

## 2018-03-18 RX ADMIN — SODIUM CHLORIDE, SODIUM LACTATE, POTASSIUM CHLORIDE, AND CALCIUM CHLORIDE 100 ML/HR: 600; 310; 30; 20 INJECTION, SOLUTION INTRAVENOUS at 09:18

## 2018-03-18 RX ADMIN — MIRTAZAPINE 15 MG: 15 TABLET, FILM COATED ORAL at 22:00

## 2018-03-18 RX ADMIN — FAMOTIDINE 20 MG: 10 INJECTION INTRAVENOUS at 09:20

## 2018-03-18 RX ADMIN — CITALOPRAM HYDROBROMIDE 10 MG: 10 TABLET ORAL at 09:20

## 2018-03-18 RX ADMIN — OXYBUTYNIN CHLORIDE 10 MG: 5 TABLET, EXTENDED RELEASE ORAL at 17:29

## 2018-03-18 NOTE — PROGRESS NOTES
Surgery  Tachardia over night, with some LLE edema. Urine Output low so bolused with NS. Because of edema he got a CTA of the chest to r/o PE. This was negative however the scan did show what is thought to be an Ascending aortic intimal flap, and a left subclavian semilunar clot. He is completely asymptomatic from both and has bounding pulses in his upper extremities. I have viewed the images and see the issue. D/w Vascular.    Other wise the pt is normal and doing well  He is AF with stable vital signs  Wounds ok labs reviewed  Continue current support

## 2018-03-18 NOTE — PROGRESS NOTES
Bedside and Verbal shift change report given to [employer] RN (oncoming nurse) by Kurt Lenz RN (offgoing nurse). Report included the following information SBAR, Kardex, Intake/Output and MAR.

## 2018-03-18 NOTE — PROGRESS NOTES
Patient is lying down alert and oriented CMS+. Area to abdomen dry and intact with steri strips in placed. No s/s of distress or sob.

## 2018-03-18 NOTE — ROUTINE PROCESS
Bedside and Verbal shift change report given to Angela RN (oncoming nurse) by Adolfo Rivas RN (offgoing nurse). Report included the following information SBAR, Kardex, MAR and Recent Results.     SITUATION:    Code Status: No Order   Reason for Admission: Colonic mass [K63.9]    [facility] day: 4   Problem List:       Hospital Problems  Date Reviewed: 3/15/2018          Codes Class Noted POA    Anemia ICD-10-CM: D64.9  ICD-9-CM: 285.9  3/14/2018 Unknown              BACKGROUND:    Past Medical History:   Past Medical History:   Diagnosis Date    Abnormal computed tomography of bladder     Anemia     Arthritis     Basal ganglia disease     Benign prostatic hyperplasia with lower urinary tract symptoms     Bladder calculus     Bladder cancer ([facility] [address] 3/13/14    Bladder mass     Bladder tumor     Brain aneurysm     CVA (cerebral vascular accident) ([facility] [address] [facility] [place] [address] 2012    hemiplegia    Dysphagia     Erythematous bladder mucosa     Webb catheter in place     Gross hematuria     HTN (hypertension)     Hx MRSA infection 09/2017    urine    Neurogenic bladder     Psychiatric disorder     Retention, urine     Stroke ([facility] [address]     Urethral stricture          Patient taking anticoagulants no     ASSESSMENT:    Changes in Assessment Throughout Shift: No     Patient has Central Line: no Reasons if yes: No   Patient has Webb Cath: no Reasons if yes: No      Last Vitals:     Vitals:    03/17/18 1955 03/17/18 2202 03/18/18 0003 03/18/18 0424   BP: 104/75 113/74 114/80 126/83   Pulse: (!) 127 (!) 103 93 88   Resp: 18 18 16 20   Temp: 97.9 °F (36.6 °C) 98.4 °F (36.9 °C) 98.9 °F (37.2 °C) 99.4 °F (37.4 °C)   SpO2: 97% 97% 96% 96%   Weight:       Height:            IV and DRAINS (will only show if present)   [REMOVED] Peripheral IV Right Arm-Site Assessment: Clean, dry, & intact  Peripheral IV Right Antecubital-Site Assessment: Clean, dry, & intact  Peripheral IV 03/17/18 Right Forearm-Site Assessment: Clean, dry, & intact     WOUND (if present)   Wound Type:  none   Dressing present Dressing Present : Yes   Wound Concerns/Notes:  none     PAIN    Pain Assessment    Pain Intensity 1: 0 (03/18/18 0424)    Pain Location 1: Abdomen    Pain Intervention(s) 1: Medication (see MAR)    Patient Stated Pain Goal: 0  o Interventions for Pain:  none  o Intervention effective: no  o Time of last intervention: 0700   o Reassessment Completed: no      Last 3 Weights:  Last 3 Recorded Weights in this Encounter    03/15/18 1323   Weight: 68 kg (150 lb)     Weight change:      INTAKE/OUPUT    Current Shift: 03/17 1901 - 03/18 0700  In: -   Out: 350 [Urine:350]    Last three shifts: 03/16 0701 - 03/17 1900  In: 0   Out: 3650 [Urine:3650]     LAB RESULTS     Recent Labs      03/18/18   0330  03/17/18   0440  03/16/18   0510   WBC  16.5*  13.8*  17.6*   HGB  8.8*  8.2*  9.3*   HCT  29.8*  27.6*  29.9*   PLT  329  327  350        Recent Labs      03/18/18   0330  03/17/18   0440  03/16/18   0510   NA  143  142  136   K  3.4*  3.6  4.3   GLU  110*  98  172*   BUN  10  6*  8   CREA  0.78  0.73  0.87   CA  8.7  8.8  8.7   MG  1.9  2.0  1.9       RECOMMENDATIONS AND DISCHARGE PLANNING     1. Pending tests/procedures/ Plan of Care or Other Needs: TBD     2. Discharge plan for patient and Needs/Barriers: TBD    3. Estimated Discharge Date: TBD Posted on Whiteboard in Patients Room: no      4. The patient's care plan was reviewed with the oncoming nurse. \"HEALS\" SAFETY CHECK      Fall Risk    Total Score: 2    Safety Measures: Safety Measures: Bed/Chair alarm on, Bed/Chair-Wheels locked, Bed in low position, Call light within reach, Fall prevention (comment)    A safety check occurred in the patient's room between off going nurse and oncoming nurse listed above.     The safety check included the below items  Area Items   H  High Alert Medications - Verify all high alert medication drips (heparin, PCA, etc.)   E  Equipment - Suction is set up for ALL patients (with denzel)  - Red plugs utilized for all equipment (IV pumps, etc.)  - WOWs wiped down at end of shift.  - Room stocked with oxygen, suction, and other unit-specific supplies   A  Alarms - Bed alarm is set for fall risk patients  - Ensure chair alarm is in place and activated if patient is up in a chair   L  Lines - Check IV for any infiltration  - Webb bag is empty if patient has a Webb   - Tubing and IV bags are labeled   S  Safety   - Room is clean, patient is clean, and equipment is clean. - Hallways are clear from equipment besides carts. - Fall bracelet on for fall risk patients  - Ensure room is clear and free of clutter  - Suction is set up for ALL patients (with denzel)  - Hallways are clear from equipment besides carts.    - Isolation precautions followed, supplies available outside room, sign posted     Zeinab Castillo RN

## 2018-03-18 NOTE — ROUTINE PROCESS
Mobility Intervention:       [] Pt dangled at edge of bed    [] Pt assisted OOB to bedside commode    [] Pt assisted OOB to chair    [] Pt ambulated to bathroom    [] Patient was ambulated in room/hallway    Assistive Device Utilized:       [] Rolling walker   [] Crutches   [] Straight Cane   [] Knee immobilizer   [] IV pole    After Mobilization:     [x] Patient left in no apparent distress sitting up in chair  [x] Patient left in no apparent distress in bed  [x] Call bell left within reach  [x] SCDs on & machine turned on  [] Ice applied  [] RN notified  [] Caregiver present  [x] Bed alarm activated    Reason patient not mobilized:      [] Patient refused   [] Nausea/vomiting   [] Low blood pressure   [x] Drowsy/lethargic    Pain Rating:     [x] 0  [] 1  Assistive Device:        [] 2  [] 3  [] 4  [] 5  [] 6  Assistive Device:        [] 7  [] 8  [] 9  [] 10    Comments:

## 2018-03-18 NOTE — ROUTINE PROCESS
2312: Pt resting in bed. Heart rate improving. CTA completed, results pending. Pt denies pain, nausea or vomiting. Leg tremors observed with each touching of the legs. Normal saline 500 ml bolus infusing as ordered. Will continue to monitor the pt.

## 2018-03-19 LAB
ANION GAP SERPL CALC-SCNC: 13 MMOL/L (ref 3–18)
BUN SERPL-MCNC: 12 MG/DL (ref 7–18)
BUN/CREAT SERPL: 16 (ref 12–20)
CALCIUM SERPL-MCNC: 8.8 MG/DL (ref 8.5–10.1)
CHLORIDE SERPL-SCNC: 113 MMOL/L (ref 100–108)
CO2 SERPL-SCNC: 18 MMOL/L (ref 21–32)
CREAT SERPL-MCNC: 0.76 MG/DL (ref 0.6–1.3)
ERYTHROCYTE [DISTWIDTH] IN BLOOD BY AUTOMATED COUNT: 26.2 % (ref 11.6–14.5)
GLUCOSE SERPL-MCNC: 95 MG/DL (ref 74–99)
HCT VFR BLD AUTO: 30.2 % (ref 36–48)
HGB BLD-MCNC: 9 G/DL (ref 13–16)
MAGNESIUM SERPL-MCNC: 1.8 MG/DL (ref 1.6–2.6)
MCH RBC QN AUTO: 20.6 PG (ref 24–34)
MCHC RBC AUTO-ENTMCNC: 29.8 G/DL (ref 31–37)
MCV RBC AUTO: 69.3 FL (ref 74–97)
PHOSPHATE SERPL-MCNC: 3.3 MG/DL (ref 2.5–4.9)
PLATELET # BLD AUTO: 357 K/UL (ref 135–420)
PMV BLD AUTO: 9.8 FL (ref 9.2–11.8)
POTASSIUM SERPL-SCNC: 3.8 MMOL/L (ref 3.5–5.5)
RBC # BLD AUTO: 4.36 M/UL (ref 4.7–5.5)
SODIUM SERPL-SCNC: 144 MMOL/L (ref 136–145)
WBC # BLD AUTO: 14.7 K/UL (ref 4.6–13.2)

## 2018-03-19 PROCEDURE — 65270000029 HC RM PRIVATE

## 2018-03-19 PROCEDURE — 83735 ASSAY OF MAGNESIUM: CPT | Performed by: COLON & RECTAL SURGERY

## 2018-03-19 PROCEDURE — 36415 COLL VENOUS BLD VENIPUNCTURE: CPT | Performed by: COLON & RECTAL SURGERY

## 2018-03-19 PROCEDURE — 85027 COMPLETE CBC AUTOMATED: CPT | Performed by: COLON & RECTAL SURGERY

## 2018-03-19 PROCEDURE — 84100 ASSAY OF PHOSPHORUS: CPT | Performed by: COLON & RECTAL SURGERY

## 2018-03-19 PROCEDURE — 80048 BASIC METABOLIC PNL TOTAL CA: CPT | Performed by: COLON & RECTAL SURGERY

## 2018-03-19 PROCEDURE — 74011250636 HC RX REV CODE- 250/636: Performed by: COLON & RECTAL SURGERY

## 2018-03-19 PROCEDURE — 74011250637 HC RX REV CODE- 250/637: Performed by: COLON & RECTAL SURGERY

## 2018-03-19 PROCEDURE — 74011000250 HC RX REV CODE- 250: Performed by: COLON & RECTAL SURGERY

## 2018-03-19 RX ORDER — DEXTROSE, SODIUM CHLORIDE, AND POTASSIUM CHLORIDE 5; .45; .15 G/100ML; G/100ML; G/100ML
50 INJECTION INTRAVENOUS CONTINUOUS
Status: DISCONTINUED | OUTPATIENT
Start: 2018-03-19 | End: 2018-03-23 | Stop reason: HOSPADM

## 2018-03-19 RX ADMIN — MIRTAZAPINE 15 MG: 15 TABLET, FILM COATED ORAL at 21:42

## 2018-03-19 RX ADMIN — FAMOTIDINE 20 MG: 10 INJECTION INTRAVENOUS at 21:43

## 2018-03-19 RX ADMIN — OXYBUTYNIN CHLORIDE 10 MG: 5 TABLET, EXTENDED RELEASE ORAL at 17:24

## 2018-03-19 RX ADMIN — HEPARIN SODIUM 5000 UNITS: 5000 INJECTION, SOLUTION INTRAVENOUS; SUBCUTANEOUS at 17:24

## 2018-03-19 RX ADMIN — HEPARIN SODIUM 5000 UNITS: 5000 INJECTION, SOLUTION INTRAVENOUS; SUBCUTANEOUS at 10:46

## 2018-03-19 RX ADMIN — DEXTROSE MONOHYDRATE, SODIUM CHLORIDE, AND POTASSIUM CHLORIDE 125 ML/HR: 50; 4.5; 1.49 INJECTION, SOLUTION INTRAVENOUS at 17:24

## 2018-03-19 RX ADMIN — OXYBUTYNIN CHLORIDE 10 MG: 5 TABLET, EXTENDED RELEASE ORAL at 10:45

## 2018-03-19 RX ADMIN — FAMOTIDINE 20 MG: 10 INJECTION INTRAVENOUS at 10:45

## 2018-03-19 RX ADMIN — CITALOPRAM HYDROBROMIDE 10 MG: 10 TABLET ORAL at 10:45

## 2018-03-19 RX ADMIN — DEXTROSE MONOHYDRATE, SODIUM CHLORIDE, AND POTASSIUM CHLORIDE 125 ML/HR: 50; 4.5; 1.49 INJECTION, SOLUTION INTRAVENOUS at 09:12

## 2018-03-19 RX ADMIN — HEPARIN SODIUM 5000 UNITS: 5000 INJECTION, SOLUTION INTRAVENOUS; SUBCUTANEOUS at 02:03

## 2018-03-19 NOTE — PROGRESS NOTES
NUTRITION    Nutrition Screen      RECOMMENDATIONS / PLAN:     - Advance diet as tolerated. - Continue RD inpatient monitoring and evaluation. NUTRITION INTERVENTIONS & DIAGNOSIS:     [x] Meals/snacks: modified composition  [x] Collaboration and referral of nutrition care: discussed swallowing with nursing. Nutrition Diagnosis: Inadequate energy intake related to insufficient energy provided by diet as evidenced by pt on a clear liquid diet s/p colectomy. ASSESSMENT:     S/p right colectomy 3/15. +BM. Started on liquid diet today. Pt sleeping at time of visit, only woke up long enough to report good meal intake PTA. Noted hx of dysphagia, per nurse, pt swallowing well, no signs/symptoms of difficulty.      Average po intake adequate to meet patients estimated nutritional needs:   [] Yes     [x] No   [] Unable to determine at this time    Diet: DIET CLEAR LIQUID      Food Allergies: NFKA  Current Appetite:   [] Good     [] Fair     [] Poor     [x] Other: unknown  Appetite/meal intake prior to admission:   [x] Good     [] Fair     [] Poor     [] Other:  Feeding Limitations:  [] Swallowing difficulty    [] Chewing difficulty    [x] Other: hx of dysphagia  Current Meal Intake: Patient Vitals for the past 100 hrs:   % Diet Eaten   03/19/18 1038 45 %       BM:  3/15  Skin Integrity: surgical incision to abdomen  Edema: none  Pertinent Medications: Reviewed: D5 1/2NS with 20 mEq/L KCl at 125 mL/hr (150 gm dextrose, 510 kcal), pepcid, remeron     Recent Labs      03/19/18   0422  03/18/18   0330  03/17/18   0440   NA  144  143  142   K  3.8  3.4*  3.6   CL  113*  112*  109*   CO2  18*  21  23   GLU  95  110*  98   BUN  12  10  6*   CREA  0.76  0.78  0.73   CA  8.8  8.7  8.8   MG  1.8  1.9  2.0   PHOS  3.3  1.8*  1.5*       Intake/Output Summary (Last 24 hours) at 03/19/18 1310  Last data filed at 03/19/18 1038   Gross per 24 hour   Intake         71178.66 ml   Output              800 ml   Net 05580.66 ml       Anthropometrics:  Ht Readings from Last 1 Encounters:   03/15/18 5' 8\" (1.727 m)     Last 3 Recorded Weights in this Encounter    03/15/18 1323   Weight: 68 kg (150 lb)     Body mass index is 22.81 kg/(m^2). Weight History:   Weight Metrics 3/15/2018 3/14/2018 3/2/2018 3/1/2018 2/20/2018 2/8/2018 11/16/2017   Weight 150 lb - 148 lb 148 lb - 148 lb -   BMI - 22.81 kg/m2 22.5 kg/m2 22.5 kg/m2 22.5 kg/m2 - 19.94 kg/m2        Admitting Diagnosis: Colonic mass [K63.9]  Pertinent PMHx: bladder cancer, CVA, dysphagia, HTN, stroke    Education Needs:        [x] None identified  [] Identified - Not appropriate at this time  []  Identified and addressed - refer to education log  Learning Limitations:   [x] None identified  [] Identified    Cultural, Restorationism & ethnic food preferences:  [x] None identified    [] Identified and addressed     ESTIMATED NUTRITION NEEDS:     Calories: 0142-7236 kcal (MSJx1.2-1.3) based on  [] Actual BW      [x] IBW 70 kg  Protein: 56-84 gm (0.8-1.2 gm/kg) based on  [] Actual BW      [x] IBW   Fluid: 1 mL/kcal     MONITORING & EVALUATION:     Nutrition Goal(s):   1. Po intake of meals will meet >75% of patient estimated nutritional needs within the next 7 days.   Outcome:  [] Met/Ongoing    []  Not Met    [x] New/Initial Goal     Monitoring:   [x] Food and beverage intake   [x] Diet order   [x] Nutrition-focused physical findings   [x] Treatment/therapy   [] Weight   [] Enteral nutrition intake        Previous Recommendations (for follow-up assessments only):     []   Implemented       []   Not Implemented (RD to address)      [] No Longer Appropriate     [] No Recommendation Made     Discharge Planning: low sodium diet, pending diet tolerance  [x] Participated in care planning, discharge planning, & interdisciplinary rounds as appropriate      Eitan Parker, [address] [place] [address]   Pager: [phone]

## 2018-03-19 NOTE — ROUTINE PROCESS
Bedside and Verbal shift change report given to RN (oncoming nurse) by Madie Lynne RN (offgoing nurse). Report included the following information SBAR, Kardex, MAR and Recent Results.     SITUATION:    Code Status: No Order   Reason for Admission: Colonic mass [K63.9]    [facility] day: 5   Problem List:       Hospital Problems  Date Reviewed: 3/15/2018          Codes Class Noted POA    Anemia ICD-10-CM: D64.9  ICD-9-CM: 285.9  3/14/2018 Unknown              BACKGROUND:    Past Medical History:   Past Medical History:   Diagnosis Date    Abnormal computed tomography of bladder     Anemia     Arthritis     Basal ganglia disease     Benign prostatic hyperplasia with lower urinary tract symptoms     Bladder calculus     Bladder cancer ([facility] [address] 3/13/14    Bladder mass     Bladder tumor     Brain aneurysm     CVA (cerebral vascular accident) ([facility] [address] [facility] [address] 2012    hemiplegia    Dysphagia     Erythematous bladder mucosa     Webb catheter in place     Gross hematuria     HTN (hypertension)     Hx MRSA infection 09/2017    urine    Neurogenic bladder     Psychiatric disorder     Retention, urine     Stroke [facility])     Urethral stricture          Patient taking anticoagulants no     ASSESSMENT:    Changes in Assessment Throughout Shift: No     Patient has Central Line: no Reasons if yes: No   Patient has Webb Cath: no Reasons if yes: No      Last Vitals:     Vitals:    03/18/18 1105 03/18/18 1539 03/18/18 1924 03/19/18 0328   BP: 119/82 142/78 120/79 115/72   Pulse: 86 86 93 95   Resp: 16 14 16 18   Temp: 98.7 °F (37.1 °C) 99.3 °F (37.4 °C) 98.2 °F (36.8 °C) 99.3 °F (37.4 °C)   SpO2: 97% 96% 95% 95%   Weight:       Height:            IV and DRAINS (will only show if present)   [REMOVED] Peripheral IV Right Arm-Site Assessment: Clean, dry, & intact  Peripheral IV Right Antecubital-Site Assessment: Clean, dry, & intact  Peripheral IV 03/17/18 Right Forearm-Site Assessment: Clean, dry, & intact     WOUND (if present)   Wound Type:  none   Dressing present Dressing Present : No   Wound Concerns/Notes:  none     PAIN    Pain Assessment    Pain Intensity 1: 0 (03/18/18 2023)    Pain Location 1: Abdomen    Pain Intervention(s) 1: Medication (see MAR)    Patient Stated Pain Goal: 0  o Interventions for Pain:  none  o Intervention effective: no  o Time of last intervention: 0700   o Reassessment Completed: no      Last 3 Weights:  Last 3 Recorded Weights in this Encounter    03/15/18 1323   Weight: 68 kg (150 lb)     Weight change:      INTAKE/OUPUT    Current Shift: 03/18 1901 - 03/19 0700  In: 27316.8 [P.O.:90; I.V.:59354.3]  Out: 400 [Urine:400]    Last three shifts: 03/17 0701 - 03/18 1900  In: 858.3 [I.V.:858.3]  Out: 900 [Urine:900]     LAB RESULTS     Recent Labs      03/19/18   0422  03/18/18   0330  03/17/18   0440   WBC  14.7*  16.5*  13.8*   HGB  9.0*  8.8*  8.2*   HCT  30.2*  29.8*  27.6*   PLT  357  329  327        Recent Labs      03/19/18   0422  03/18/18   0330  03/17/18   0440   NA  144  143  142   K  3.8  3.4*  3.6   GLU  95  110*  98   BUN  12  10  6*   CREA  0.76  0.78  0.73   CA  8.8  8.7  8.8   MG  1.8  1.9  2.0       RECOMMENDATIONS AND DISCHARGE PLANNING     1. Pending tests/procedures/ Plan of Care or Other Needs: TBD     2. Discharge plan for patient and Needs/Barriers: TBD    3. Estimated Discharge Date: TBD Posted on Whiteboard in Patients Room: no      4. The patient's care plan was reviewed with the oncoming nurse. \"HEALS\" SAFETY CHECK      Fall Risk    Total Score: 2    Safety Measures: Safety Measures: Bed/Chair-Wheels locked, Bed in low position, Call light within reach, Side rails X 3    A safety check occurred in the patient's room between off going nurse and oncoming nurse listed above.     The safety check included the below items  Area Items   H  High Alert Medications - Verify all high alert medication drips (heparin, PCA, etc.)   E  Equipment - Suction is set up for ALL patients (with denzel)  - Red plugs utilized for all equipment (IV pumps, etc.)  - WOWs wiped down at end of shift.  - Room stocked with oxygen, suction, and other unit-specific supplies   A  Alarms - Bed alarm is set for fall risk patients  - Ensure chair alarm is in place and activated if patient is up in a chair   L  Lines - Check IV for any infiltration  - Webb bag is empty if patient has a Webb   - Tubing and IV bags are labeled   S  Safety   - Room is clean, patient is clean, and equipment is clean. - Hallways are clear from equipment besides carts. - Fall bracelet on for fall risk patients  - Ensure room is clear and free of clutter  - Suction is set up for ALL patients (with denzel)  - Hallways are clear from equipment besides carts.    - Isolation precautions followed, supplies available outside room, sign posted     Homar Seo RN

## 2018-03-19 NOTE — PROGRESS NOTES
When  attempted to visit patient it was not a good time to visit. He was resting.  provided a Spiritual Greeting card at bedside and offered a silent prayer. He is on contact precautions. Chaplains will provide spiritual care as needed, as requested. Jorge Desouza MDiv.   Board Certified [employer] [phone]

## 2018-03-19 NOTE — PROGRESS NOTES
[facility]  [address] [place] [id number] [phone]  Colon and Rectal Surgery Progress Note      Patient: Vista Homans MRN: [id number]  SSN: [id number]    YOB: 1945  Age: 81 y.o. Sex: male      Admit Date: 3/14/2018    LOS: 5 days     Subjective:     Passing flatus. bm yesterday per pt.      Objective:     Vitals:    03/18/18 1539 03/18/18 1924 03/19/18 0328 03/19/18 0656   BP: 142/78 120/79 115/72 152/86   Pulse: 86 93 95 85   Resp: 14 16 18 15   Temp: 99.3 °F (37.4 °C) 98.2 °F (36.8 °C) 99.3 °F (37.4 °C) 97.9 °F (36.6 °C)   SpO2: 96% 95% 95% 97%   Weight:       Height:            Intake and Output:  Current Shift:    Last three shifts: 03/17 1901 - 03/19 0700  In: 78698.7 [P.O.:90; I.V.:01841.7]  Out: 1300 [Urine:1300]    Physical Exam:     abd soft miguelito tender, ND    Lab/Data Review:    BMP:   Lab Results   Component Value Date/Time     03/19/2018 04:22 AM    K 3.8 03/19/2018 04:22 AM     (H) 03/19/2018 04:22 AM    CO2 18 (L) 03/19/2018 04:22 AM    AGAP 13 03/19/2018 04:22 AM    GLU 95 03/19/2018 04:22 AM    BUN 12 03/19/2018 04:22 AM    CREA 0.76 03/19/2018 04:22 AM    GFRAA >60 03/19/2018 04:22 AM    GFRNA >60 03/19/2018 04:22 AM     CBC:   Lab Results   Component Value Date/Time    WBC 14.7 (H) 03/19/2018 04:22 AM    HGB 9.0 (L) 03/19/2018 04:22 AM    HCT 30.2 (L) 03/19/2018 04:22 AM     03/19/2018 04:22 AM        Assessment:     POD 4 s/p lap R colectomy for ascending colon polyp    Plan:     Start clears      Signed By: Jayleen Pena MD        March 19, 2018

## 2018-03-20 LAB
ANION GAP SERPL CALC-SCNC: 9 MMOL/L (ref 3–18)
BUN SERPL-MCNC: 8 MG/DL (ref 7–18)
BUN/CREAT SERPL: 12 (ref 12–20)
CALCIUM SERPL-MCNC: 8.7 MG/DL (ref 8.5–10.1)
CHLORIDE SERPL-SCNC: 112 MMOL/L (ref 100–108)
CO2 SERPL-SCNC: 21 MMOL/L (ref 21–32)
CREAT SERPL-MCNC: 0.69 MG/DL (ref 0.6–1.3)
ERYTHROCYTE [DISTWIDTH] IN BLOOD BY AUTOMATED COUNT: 26.3 % (ref 11.6–14.5)
GLUCOSE SERPL-MCNC: 148 MG/DL (ref 74–99)
HCT VFR BLD AUTO: 29.7 % (ref 36–48)
HGB BLD-MCNC: 8.6 G/DL (ref 13–16)
MAGNESIUM SERPL-MCNC: 1.9 MG/DL (ref 1.6–2.6)
MCH RBC QN AUTO: 20.1 PG (ref 24–34)
MCHC RBC AUTO-ENTMCNC: 29 G/DL (ref 31–37)
MCV RBC AUTO: 69.4 FL (ref 74–97)
PHOSPHATE SERPL-MCNC: 2 MG/DL (ref 2.5–4.9)
PLATELET # BLD AUTO: 357 K/UL (ref 135–420)
PMV BLD AUTO: 9.6 FL (ref 9.2–11.8)
POTASSIUM SERPL-SCNC: 3.7 MMOL/L (ref 3.5–5.5)
RBC # BLD AUTO: 4.28 M/UL (ref 4.7–5.5)
SODIUM SERPL-SCNC: 142 MMOL/L (ref 136–145)
WBC # BLD AUTO: 13.2 K/UL (ref 4.6–13.2)

## 2018-03-20 PROCEDURE — 83735 ASSAY OF MAGNESIUM: CPT | Performed by: COLON & RECTAL SURGERY

## 2018-03-20 PROCEDURE — 84100 ASSAY OF PHOSPHORUS: CPT | Performed by: COLON & RECTAL SURGERY

## 2018-03-20 PROCEDURE — 74011250636 HC RX REV CODE- 250/636: Performed by: COLON & RECTAL SURGERY

## 2018-03-20 PROCEDURE — 80048 BASIC METABOLIC PNL TOTAL CA: CPT | Performed by: COLON & RECTAL SURGERY

## 2018-03-20 PROCEDURE — 65270000029 HC RM PRIVATE

## 2018-03-20 PROCEDURE — 74011000250 HC RX REV CODE- 250: Performed by: COLON & RECTAL SURGERY

## 2018-03-20 PROCEDURE — 36415 COLL VENOUS BLD VENIPUNCTURE: CPT | Performed by: COLON & RECTAL SURGERY

## 2018-03-20 PROCEDURE — 85027 COMPLETE CBC AUTOMATED: CPT | Performed by: COLON & RECTAL SURGERY

## 2018-03-20 PROCEDURE — 74011250637 HC RX REV CODE- 250/637: Performed by: COLON & RECTAL SURGERY

## 2018-03-20 RX ADMIN — OXYBUTYNIN CHLORIDE 10 MG: 5 TABLET, EXTENDED RELEASE ORAL at 17:28

## 2018-03-20 RX ADMIN — FAMOTIDINE 20 MG: 10 INJECTION INTRAVENOUS at 22:33

## 2018-03-20 RX ADMIN — FAMOTIDINE 20 MG: 10 INJECTION INTRAVENOUS at 09:36

## 2018-03-20 RX ADMIN — HEPARIN SODIUM 5000 UNITS: 5000 INJECTION, SOLUTION INTRAVENOUS; SUBCUTANEOUS at 17:27

## 2018-03-20 RX ADMIN — DEXTROSE MONOHYDRATE, SODIUM CHLORIDE, AND POTASSIUM CHLORIDE 125 ML/HR: 50; 4.5; 1.49 INJECTION, SOLUTION INTRAVENOUS at 05:11

## 2018-03-20 RX ADMIN — HEPARIN SODIUM 5000 UNITS: 5000 INJECTION, SOLUTION INTRAVENOUS; SUBCUTANEOUS at 01:14

## 2018-03-20 RX ADMIN — MIRTAZAPINE 15 MG: 15 TABLET, FILM COATED ORAL at 22:33

## 2018-03-20 RX ADMIN — HEPARIN SODIUM 5000 UNITS: 5000 INJECTION, SOLUTION INTRAVENOUS; SUBCUTANEOUS at 09:52

## 2018-03-20 RX ADMIN — CITALOPRAM HYDROBROMIDE 10 MG: 10 TABLET ORAL at 09:35

## 2018-03-20 RX ADMIN — OXYBUTYNIN CHLORIDE 10 MG: 5 TABLET, EXTENDED RELEASE ORAL at 09:35

## 2018-03-20 RX ADMIN — DEXTROSE MONOHYDRATE, SODIUM CHLORIDE, AND POTASSIUM CHLORIDE 125 ML/HR: 50; 4.5; 1.49 INJECTION, SOLUTION INTRAVENOUS at 01:21

## 2018-03-20 NOTE — ROUTINE PROCESS
Bedside and Verbal shift change report given to [id number] Chris Ramos Mary (oncoming nurse) by Cheryl Rm (offgoing nurse). Report included the following information SBAR, Kardex, MAR and Recent Results.     SITUATION:    Code Status: No Order   Reason for Admission: Colonic mass [K63.9]    [facility] day: 6   Problem List:       Hospital Problems  Date Reviewed: 3/15/2018          Codes Class Noted POA    Anemia ICD-10-CM: D64.9  ICD-9-CM: 285.9  3/14/2018 Unknown              BACKGROUND:    Past Medical History:   Past Medical History:   Diagnosis Date    Abnormal computed tomography of bladder     Anemia     Arthritis     Basal ganglia disease     Benign prostatic hyperplasia with lower urinary tract symptoms     Bladder calculus     Bladder cancer ([facility] [address] 3/13/14    Bladder mass     Bladder tumor     Brain aneurysm     CVA (cerebral vascular accident) ([facility] [address] [facility] [address] 2012    hemiplegia    Dysphagia     Erythematous bladder mucosa     Webb catheter in place     Gross hematuria     HTN (hypertension)     Hx MRSA infection 09/2017    urine    Neurogenic bladder     Psychiatric disorder     Retention, urine     Stroke [facility])     Urethral stricture          Patient taking anticoagulants yes     ASSESSMENT:    Changes in Assessment Throughout Shift: no     Patient has Central Line: no Reasons if yes:    Patient has Webb Cath: yes Reasons if yes:       Last Vitals:     Vitals:    03/20/18 0404 03/20/18 0735 03/20/18 1211 03/20/18 1628   BP: 118/80 115/73 121/82 111/77   Pulse: 81 79 74 89   Resp: 17 16 16 14   Temp: 97.6 °F (36.4 °C) 99.4 °F (37.4 °C) 98.2 °F (36.8 °C) 99.2 °F (37.3 °C)   SpO2: 94% 94% 98% 96%   Weight:       Height:            IV and DRAINS (will only show if present)   [REMOVED] Peripheral IV Right Arm-Site Assessment: Clean, dry, & intact  Peripheral IV Right Antecubital-Site Assessment: Clean, dry, & intact  Peripheral IV 03/17/18 Right Forearm-Site Assessment: Clean, dry, & intact     WOUND (if present)   Wound Type:  none, incnision   Dressing present Dressing Present : No   Wound Concerns/Notes:  none     PAIN    Pain Assessment    Pain Intensity 1: 0 (03/20/18 0512)    Pain Location 1: Abdomen    Pain Intervention(s) 1: Medication (see MAR)    Patient Stated Pain Goal: 0  o Interventions for Pain:  none  o Intervention effective: yes  o Time of last intervention: n/a  o Reassessment Completed: yes      Last 3 Weights:  Last 3 Recorded Weights in this Encounter    03/15/18 1323   Weight: 68 kg (150 lb)     Weight change:      INTAKE/OUPUT    Current Shift: 03/20 0701 - 03/20 1900  In: 100 [P.O.:100]  Out: 600 [Urine:600]    Last three shifts: 03/18 1901 - 03/20 0700  In: 45057 [P.O.:360; I.V.:13327]  Out: 925 [Urine:925]     LAB RESULTS     Recent Labs      03/20/18   0441  03/19/18   0422  03/18/18   0330   WBC  13.2  14.7*  16.5*   HGB  8.6*  9.0*  8.8*   HCT  29.7*  30.2*  29.8*   PLT  357  357  329        Recent Labs      03/20/18   0441  03/19/18   0422  03/18/18   0330   NA  142  144  143   K  3.7  3.8  3.4*   GLU  148*  95  110*   BUN  8  12  10   CREA  0.69  0.76  0.78   CA  8.7  8.8  8.7   MG  1.9  1.8  1.9       RECOMMENDATIONS AND DISCHARGE PLANNING     1. Pending tests/procedures/ Plan of Care or Other Needs:      2. Discharge plan for patient and Needs/Barriers:     3. Estimated Discharge Date:  Posted on Whiteboard in Patients Room: no      4. The patient's care plan was reviewed with the oncoming nurse. \"HEALS\" SAFETY CHECK      Fall Risk    Total Score: 2    Safety Measures: Safety Measures: Bed/Chair-Wheels locked, Bed in low position, Call light within reach, Fall prevention (comment), Gripper socks, Side rails X 3    A safety check occurred in the patient's room between off going nurse and oncoming nurse listed above.     The safety check included the below items  Area Items   H  High Alert Medications - Verify all high alert medication drips (heparin, PCA, etc.) E  Equipment - Suction is set up for ALL patients (with denzel)  - Red plugs utilized for all equipment (IV pumps, etc.)  - WOWs wiped down at end of shift.  - Room stocked with oxygen, suction, and other unit-specific supplies   A  Alarms - Bed alarm is set for fall risk patients  - Ensure chair alarm is in place and activated if patient is up in a chair   L  Lines - Check IV for any infiltration  - Webb bag is empty if patient has a Webb   - Tubing and IV bags are labeled   S  Safety   - Room is clean, patient is clean, and equipment is clean. - Hallways are clear from equipment besides carts. - Fall bracelet on for fall risk patients  - Ensure room is clear and free of clutter  - Suction is set up for ALL patients (with denzel)  - Hallways are clear from equipment besides carts.    - Isolation precautions followed, supplies available outside room, sign posted     Mony Hankins

## 2018-03-20 NOTE — ROUTINE PROCESS
Bedside and Verbal shift change report given to Axel-Sarabjit (oncoming nurse) by Barry Delgadillo RN (offgoing nurse). Report included the following information SBAR, Kardex, MAR and Recent Results.     SITUATION:    Code Status: No Order   Reason for Admission: Colonic mass [K63.9]    [facility] day: 6   Problem List:       Hospital Problems  Date Reviewed: 3/15/2018          Codes Class Noted POA    Anemia ICD-10-CM: D64.9  ICD-9-CM: 285.9  3/14/2018 Unknown              BACKGROUND:    Past Medical History:   Past Medical History:   Diagnosis Date    Abnormal computed tomography of bladder     Anemia     Arthritis     Basal ganglia disease     Benign prostatic hyperplasia with lower urinary tract symptoms     Bladder calculus     Bladder cancer ([facility] [address] 3/13/14    Bladder mass     Bladder tumor     Brain aneurysm     CVA (cerebral vascular accident) ([facility] [address] [facility] [address] 2012    hemiplegia    Dysphagia     Erythematous bladder mucosa     Webb catheter in place     Gross hematuria     HTN (hypertension)     Hx MRSA infection 09/2017    urine    Neurogenic bladder     Psychiatric disorder     Retention, urine     Stroke ([facility] [address]     Urethral stricture          Patient taking anticoagulants no     ASSESSMENT:    Changes in Assessment Throughout Shift: No     Patient has Central Line: no Reasons if yes: no   Patient has Webb Cath: no Reasons if yes: no      Last Vitals:     Vitals:    03/19/18 0656 03/19/18 1234 03/19/18 1535 03/19/18 2100   BP: 152/86 127/84 134/90 112/81   Pulse: 85 89 92 85   Resp: 15 16 19 20   Temp: 97.9 °F (36.6 °C) 97.9 °F (36.6 °C) 99.1 °F (37.3 °C) 99.2 °F (37.3 °C)   SpO2: 97% 98% 97% 93%   Weight:       Height:            IV and DRAINS (will only show if present)   [REMOVED] Peripheral IV Right Arm-Site Assessment: Clean, dry, & intact  Peripheral IV Right Antecubital-Site Assessment: Clean, dry, & intact  Peripheral IV 03/17/18 Right Forearm-Site Assessment: Clean, dry, & intact     WOUND (if present)   Wound Type:  none   Dressing present Dressing Present : No   Wound Concerns/Notes:  none     PAIN    Pain Assessment    Pain Intensity 1: 0 (03/20/18 0123)    Pain Location 1: Abdomen    Pain Intervention(s) 1: Medication (see MAR)    Patient Stated Pain Goal: 0  o Interventions for Pain:  none  o Intervention effective: no  o Time of last intervention: 0700   o Reassessment Completed: no      Last 3 Weights:  Last 3 Recorded Weights in this Encounter    03/15/18 1323   Weight: 68 kg (150 lb)     Weight change:      INTAKE/OUPUT    Current Shift: 03/19 1901 - 03/20 0700  In: 50 [P.O.:50]  Out: -     Last three shifts: 03/18 0701 - 03/19 1900  In: 10367.7 [P.O.:210; I.V.:17260.7]  Out: 900 [Urine:900]     LAB RESULTS     Recent Labs      03/19/18   0422  03/18/18   0330  03/17/18   0440   WBC  14.7*  16.5*  13.8*   HGB  9.0*  8.8*  8.2*   HCT  30.2*  29.8*  27.6*   PLT  357  329  327        Recent Labs      03/19/18   0422  03/18/18   0330  03/17/18   0440   NA  144  143  142   K  3.8  3.4*  3.6   GLU  95  110*  98   BUN  12  10  6*   CREA  0.76  0.78  0.73   CA  8.8  8.7  8.8   MG  1.8  1.9  2.0       RECOMMENDATIONS AND DISCHARGE PLANNING     1. Pending tests/procedures/ Plan of Care or Other Needs: TBD     2. Discharge plan for patient and Needs/Barriers: TBD    3. Estimated Discharge Date: TBD Posted on Whiteboard in Patients Room: no      4. The patient's care plan was reviewed with the oncoming nurse. \"HEALS\" SAFETY CHECK      Fall Risk    Total Score: 2    Safety Measures: Safety Measures: Bed/Chair-Wheels locked, Bed in low position, Caregiver at bedside, Call light within reach, Gripper socks, Side rails X 3    A safety check occurred in the patient's room between off going nurse and oncoming nurse listed above.     The safety check included the below items  Area Items   H  High Alert Medications - Verify all high alert medication drips (heparin, PCA, etc.) E  Equipment - Suction is set up for ALL patients (with denzel)  - Red plugs utilized for all equipment (IV pumps, etc.)  - WOWs wiped down at end of shift.  - Room stocked with oxygen, suction, and other unit-specific supplies   A  Alarms - Bed alarm is set for fall risk patients  - Ensure chair alarm is in place and activated if patient is up in a chair   L  Lines - Check IV for any infiltration  - Webb bag is empty if patient has a Webb   - Tubing and IV bags are labeled   S  Safety   - Room is clean, patient is clean, and equipment is clean. - Hallways are clear from equipment besides carts. - Fall bracelet on for fall risk patients  - Ensure room is clear and free of clutter  - Suction is set up for ALL patients (with denzel)  - Hallways are clear from equipment besides carts.    - Isolation precautions followed, supplies available outside room, sign posted     Angely Morrow RN

## 2018-03-20 NOTE — ROUTINE PROCESS
Bedside and Verbal shift change report given to Cancel (oncoming nurse) by Keena Louis RN (offgoing nurse). Report included the following information SBAR, Kardex, MAR and Recent Results.     SITUATION:    Code Status: No Order   Reason for Admission: Colonic mass [K63.9]    [facility] day: 6   Problem List:       Hospital Problems  Date Reviewed: 3/15/2018          Codes Class Noted POA    Anemia ICD-10-CM: D64.9  ICD-9-CM: 285.9  3/14/2018 Unknown              BACKGROUND:    Past Medical History:   Past Medical History:   Diagnosis Date    Abnormal computed tomography of bladder     Anemia     Arthritis     Basal ganglia disease     Benign prostatic hyperplasia with lower urinary tract symptoms     Bladder calculus     Bladder cancer ([facility] [address] 3/13/14    Bladder mass     Bladder tumor     Brain aneurysm     CVA (cerebral vascular accident) ([facility] [address] [facility] [address] 2012    hemiplegia    Dysphagia     Erythematous bladder mucosa     Webb catheter in place     Gross hematuria     HTN (hypertension)     Hx MRSA infection 09/2017    urine    Neurogenic bladder     Psychiatric disorder     Retention, urine     Stroke [facility])     Urethral stricture          Patient taking anticoagulants no     ASSESSMENT:    Changes in Assessment Throughout Shift: No     Patient has Central Line: no Reasons if yes: No   Patient has Webb Cath: no Reasons if yes: No      Last Vitals:     Vitals:    03/19/18 0656 03/19/18 1234 03/19/18 1535 03/19/18 2100   BP: 152/86 127/84 134/90 112/81   Pulse: 85 89 92 85   Resp: 15 16 19 20   Temp: 97.9 °F (36.6 °C) 97.9 °F (36.6 °C) 99.1 °F (37.3 °C) 99.2 °F (37.3 °C)   SpO2: 97% 98% 97% 93%   Weight:       Height:            IV and DRAINS (will only show if present)   [REMOVED] Peripheral IV Right Arm-Site Assessment: Clean, dry, & intact  Peripheral IV Right Antecubital-Site Assessment: Clean, dry, & intact  Peripheral IV 03/17/18 Right Forearm-Site Assessment: Clean, dry, & intact     WOUND (if present)   Wound Type:  none   Dressing present Dressing Present : No   Wound Concerns/Notes:  none     PAIN    Pain Assessment    Pain Intensity 1: 0 (03/20/18 0123)    Pain Location 1: Abdomen    Pain Intervention(s) 1: Medication (see MAR)    Patient Stated Pain Goal: 0  o Interventions for Pain:  none  o Intervention effective: no  o Time of last intervention: 0700   o Reassessment Completed: no      Last 3 Weights:  Last 3 Recorded Weights in this Encounter    03/15/18 1323   Weight: 68 kg (150 lb)     Weight change:      INTAKE/OUPUT    Current Shift: 03/19 1901 - 03/20 0700  In: 50 [P.O.:50]  Out: -     Last three shifts: 03/18 0701 - 03/19 1900  In: 82526.7 [P.O.:210; I.V.:62425.7]  Out: 900 [Urine:900]     LAB RESULTS     Recent Labs      03/19/18   0422  03/18/18   0330  03/17/18   0440   WBC  14.7*  16.5*  13.8*   HGB  9.0*  8.8*  8.2*   HCT  30.2*  29.8*  27.6*   PLT  357  329  327        Recent Labs      03/19/18   0422  03/18/18   0330  03/17/18   0440   NA  144  143  142   K  3.8  3.4*  3.6   GLU  95  110*  98   BUN  12  10  6*   CREA  0.76  0.78  0.73   CA  8.8  8.7  8.8   MG  1.8  1.9  2.0       RECOMMENDATIONS AND DISCHARGE PLANNING     1. Pending tests/procedures/ Plan of Care or Other Needs: TBD     2. Discharge plan for patient and Needs/Barriers: TBD    3. Estimated Discharge Date: TBD Posted on Whiteboard in Patients Room: no      4. The patient's care plan was reviewed with the oncoming nurse. \"HEALS\" SAFETY CHECK      Fall Risk    Total Score: 2    Safety Measures: Safety Measures: Bed/Chair-Wheels locked, Bed in low position, Caregiver at bedside, Call light within reach, Gripper socks, Side rails X 3    A safety check occurred in the patient's room between off going nurse and oncoming nurse listed above.     The safety check included the below items  Area Items   H  High Alert Medications - Verify all high alert medication drips (heparin, PCA, etc.) E  Equipment - Suction is set up for ALL patients (with denzel)  - Red plugs utilized for all equipment (IV pumps, etc.)  - WOWs wiped down at end of shift.  - Room stocked with oxygen, suction, and other unit-specific supplies   A  Alarms - Bed alarm is set for fall risk patients  - Ensure chair alarm is in place and activated if patient is up in a chair   L  Lines - Check IV for any infiltration  - Webb bag is empty if patient has a Webb   - Tubing and IV bags are labeled   S  Safety   - Room is clean, patient is clean, and equipment is clean. - Hallways are clear from equipment besides carts. - Fall bracelet on for fall risk patients  - Ensure room is clear and free of clutter  - Suction is set up for ALL patients (with denzel)  - Hallways are clear from equipment besides carts.    - Isolation precautions followed, supplies available outside room, sign posted     Hill Jimenez RN

## 2018-03-20 NOTE — ROUTINE PROCESS
0222: Stable. Webb draining well. Scheduled medication given. No issue. Will continue with care as planned.

## 2018-03-20 NOTE — PROGRESS NOTES
[facility]  [address] [place] [address] [id number] [phone]  Colon and Rectal Surgery Progress Note      Patient: Alonzo Pryor MRN: [id number]  SSN: [id number]    YOB: 1945  Age: 81 y.o. Sex: male      Admit Date: 3/14/2018    LOS: 6 days     Subjective:     BM. Tolerating small amt clears but doesn't like it.     Objective:     Vitals:    03/19/18 2100 03/20/18 0404 03/20/18 0735 03/20/18 1211   BP: 112/81 118/80 115/73 121/82   Pulse: 85 81 79 74   Resp: 20 17 16 16   Temp: 99.2 °F (37.3 °C) 97.6 °F (36.4 °C) 99.4 °F (37.4 °C) 98.2 °F (36.8 °C)   SpO2: 93% 94% 94% 98%   Weight:       Height:            Intake and Output:  Current Shift:    Last three shifts: 03/18 1901 - 03/20 0700  In: 46853 [P.O.:360; I.V.:96526]  Out: 925 [Urine:925]    Physical Exam:     abd soft miguelito tender, ND    Lab/Data Review:    BMP:   Lab Results   Component Value Date/Time     03/20/2018 04:41 AM    K 3.7 03/20/2018 04:41 AM     (H) 03/20/2018 04:41 AM    CO2 21 03/20/2018 04:41 AM    AGAP 9 03/20/2018 04:41 AM     (H) 03/20/2018 04:41 AM    BUN 8 03/20/2018 04:41 AM    CREA 0.69 03/20/2018 04:41 AM    GFRAA >60 03/20/2018 04:41 AM    GFRNA >60 03/20/2018 04:41 AM     CBC:   Lab Results   Component Value Date/Time    WBC 13.2 03/20/2018 04:41 AM    HGB 8.6 (L) 03/20/2018 04:41 AM    HCT 29.7 (L) 03/20/2018 04:41 AM     03/20/2018 04:41 AM        Assessment:     POD 5 s/p lap R colectomy for ascending colon polyp    Plan:     Advance diet to solids      Signed By: Pamela Saenz MD        March 20, 2018

## 2018-03-20 NOTE — ROUTINE PROCESS
Patient stable, no signs of distress. Patient in bed resting. Patient cleaned up, patient had a bowel movement.

## 2018-03-20 NOTE — PROGRESS NOTES
Problem: Falls - Risk of  Goal: *Absence of Falls  Document Cecelia Fall Risk and appropriate interventions in the flowsheet.    Outcome: Progressing Towards Goal  Fall Risk Interventions:  Mobility Interventions: Bed/chair exit alarm    Mentation Interventions: Adequate sleep, hydration, pain control    Medication Interventions: Bed/chair exit alarm    Elimination Interventions: Bed/chair exit alarm, Call light in reach    History of Falls Interventions: Bed/chair exit alarm

## 2018-03-21 LAB
ANION GAP SERPL CALC-SCNC: 8 MMOL/L (ref 3–18)
BUN SERPL-MCNC: 5 MG/DL (ref 7–18)
BUN/CREAT SERPL: 7 (ref 12–20)
CALCIUM SERPL-MCNC: 9 MG/DL (ref 8.5–10.1)
CHLORIDE SERPL-SCNC: 113 MMOL/L (ref 100–108)
CO2 SERPL-SCNC: 20 MMOL/L (ref 21–32)
CREAT SERPL-MCNC: 0.75 MG/DL (ref 0.6–1.3)
ERYTHROCYTE [DISTWIDTH] IN BLOOD BY AUTOMATED COUNT: 26.3 % (ref 11.6–14.5)
GLUCOSE SERPL-MCNC: 134 MG/DL (ref 74–99)
HCT VFR BLD AUTO: 31.2 % (ref 36–48)
HGB BLD-MCNC: 9.4 G/DL (ref 13–16)
MAGNESIUM SERPL-MCNC: 1.9 MG/DL (ref 1.6–2.6)
MCH RBC QN AUTO: 20.6 PG (ref 24–34)
MCHC RBC AUTO-ENTMCNC: 30.1 G/DL (ref 31–37)
MCV RBC AUTO: 68.4 FL (ref 74–97)
PHOSPHATE SERPL-MCNC: 2.5 MG/DL (ref 2.5–4.9)
PLATELET # BLD AUTO: 320 K/UL (ref 135–420)
PMV BLD AUTO: 9.6 FL (ref 9.2–11.8)
POTASSIUM SERPL-SCNC: 3.9 MMOL/L (ref 3.5–5.5)
RBC # BLD AUTO: 4.56 M/UL (ref 4.7–5.5)
SODIUM SERPL-SCNC: 141 MMOL/L (ref 136–145)
WBC # BLD AUTO: 12 K/UL (ref 4.6–13.2)

## 2018-03-21 PROCEDURE — 83735 ASSAY OF MAGNESIUM: CPT | Performed by: COLON & RECTAL SURGERY

## 2018-03-21 PROCEDURE — 65270000029 HC RM PRIVATE

## 2018-03-21 PROCEDURE — 36415 COLL VENOUS BLD VENIPUNCTURE: CPT | Performed by: COLON & RECTAL SURGERY

## 2018-03-21 PROCEDURE — 74011250637 HC RX REV CODE- 250/637: Performed by: COLON & RECTAL SURGERY

## 2018-03-21 PROCEDURE — 84100 ASSAY OF PHOSPHORUS: CPT | Performed by: COLON & RECTAL SURGERY

## 2018-03-21 PROCEDURE — 74011000250 HC RX REV CODE- 250: Performed by: COLON & RECTAL SURGERY

## 2018-03-21 PROCEDURE — 80048 BASIC METABOLIC PNL TOTAL CA: CPT | Performed by: COLON & RECTAL SURGERY

## 2018-03-21 PROCEDURE — 74011250636 HC RX REV CODE- 250/636: Performed by: COLON & RECTAL SURGERY

## 2018-03-21 PROCEDURE — 85027 COMPLETE CBC AUTOMATED: CPT | Performed by: COLON & RECTAL SURGERY

## 2018-03-21 RX ORDER — FAMOTIDINE 20 MG/1
20 TABLET, FILM COATED ORAL 2 TIMES DAILY
Status: DISCONTINUED | OUTPATIENT
Start: 2018-03-21 | End: 2018-03-23 | Stop reason: HOSPADM

## 2018-03-21 RX ADMIN — FAMOTIDINE 20 MG: 20 TABLET, FILM COATED ORAL at 17:28

## 2018-03-21 RX ADMIN — CITALOPRAM HYDROBROMIDE 10 MG: 10 TABLET ORAL at 08:26

## 2018-03-21 RX ADMIN — OXYBUTYNIN CHLORIDE 10 MG: 5 TABLET, EXTENDED RELEASE ORAL at 17:28

## 2018-03-21 RX ADMIN — MIRTAZAPINE 15 MG: 15 TABLET, FILM COATED ORAL at 21:39

## 2018-03-21 RX ADMIN — DEXTROSE MONOHYDRATE, SODIUM CHLORIDE, AND POTASSIUM CHLORIDE 50 ML/HR: 50; 4.5; 1.49 INJECTION, SOLUTION INTRAVENOUS at 21:44

## 2018-03-21 RX ADMIN — HEPARIN SODIUM 5000 UNITS: 5000 INJECTION, SOLUTION INTRAVENOUS; SUBCUTANEOUS at 02:00

## 2018-03-21 RX ADMIN — FAMOTIDINE 20 MG: 10 INJECTION INTRAVENOUS at 08:27

## 2018-03-21 RX ADMIN — OXYBUTYNIN CHLORIDE 10 MG: 5 TABLET, EXTENDED RELEASE ORAL at 08:26

## 2018-03-21 NOTE — ROUTINE PROCESS
Bedside and Verbal shift change report given to Axel-Sarabjit (oncoming nurse) by Adolfo Rivas RN (offgoing nurse). Report included the following information SBAR, Kardex, MAR and Recent Results.     SITUATION:    Code Status: No Order   Reason for Admission: Colonic mass [K63.9]    [facility] day: 6   Problem List:       Hospital Problems  Date Reviewed: 3/15/2018          Codes Class Noted POA    Anemia ICD-10-CM: D64.9  ICD-9-CM: 285.9  3/14/2018 Unknown              BACKGROUND:    Past Medical History:   Past Medical History:   Diagnosis Date    Abnormal computed tomography of bladder     Anemia     Arthritis     Basal ganglia disease     Benign prostatic hyperplasia with lower urinary tract symptoms     Bladder calculus     Bladder cancer ([facility] [address] 3/13/14    Bladder mass     Bladder tumor     Brain aneurysm     CVA (cerebral vascular accident) ([facility] [address] DEC 2012    hemiplegia    Dysphagia     Erythematous bladder mucosa     Webb catheter in place     Gross hematuria     HTN (hypertension)     Hx MRSA infection 09/2017    urine    Neurogenic bladder     Psychiatric disorder     Retention, urine     Stroke [facility])     Urethral stricture          Patient taking anticoagulants no     ASSESSMENT:    Changes in Assessment Throughout Shift: No     Patient has Central Line: no Reasons if yes: No   Patient has Webb Cath: no Reasons if yes: No      Last Vitals:     Vitals:    03/20/18 0735 03/20/18 1211 03/20/18 1628 03/20/18 1928   BP: 115/73 121/82 111/77 124/84   Pulse: 79 74 89 88   Resp: 16 16 14 16   Temp: 99.4 °F (37.4 °C) 98.2 °F (36.8 °C) 99.2 °F (37.3 °C) 99.3 °F (37.4 °C)   SpO2: 94% 98% 96% 97%   Weight:       Height:            IV and DRAINS (will only show if present)   [REMOVED] Peripheral IV Right Arm-Site Assessment: Clean, dry, & intact  Peripheral IV Right Antecubital-Site Assessment: Clean, dry, & intact  Peripheral IV 03/17/18 Right Forearm-Site Assessment: Clean, dry, & intact     WOUND (if present)   Wound Type:  none   Dressing present Dressing Present : No   Wound Concerns/Notes:  none     PAIN    Pain Assessment    Pain Intensity 1: 0 (03/20/18 2236)    Pain Location 1: Abdomen    Pain Intervention(s) 1: Medication (see MAR)    Patient Stated Pain Goal: 0  o Interventions for Pain:  none  o Intervention effective: no  o Time of last intervention: 0700   o Reassessment Completed: no      Last 3 Weights:  Last 3 Recorded Weights in this Encounter    03/15/18 1323   Weight: 68 kg (150 lb)     Weight change:      INTAKE/OUPUT    Current Shift: 03/20 1901 - 03/21 0700  In: -   Out: 500 [Urine:500]    Last three shifts: 03/19 0701 - 03/20 1900  In: 2786.7 [P.O.:370; I.V.:2416.7]  Out: 1125 [Urine:1125]     LAB RESULTS     Recent Labs      03/20/18   0441  03/19/18   0422  03/18/18   0330   WBC  13.2  14.7*  16.5*   HGB  8.6*  9.0*  8.8*   HCT  29.7*  30.2*  29.8*   PLT  357  357  329        Recent Labs      03/20/18   0441  03/19/18   0422  03/18/18   0330   NA  142  144  143   K  3.7  3.8  3.4*   GLU  148*  95  110*   BUN  8  12  10   CREA  0.69  0.76  0.78   CA  8.7  8.8  8.7   MG  1.9  1.8  1.9       RECOMMENDATIONS AND DISCHARGE PLANNING     1. Pending tests/procedures/ Plan of Care or Other Needs: TBD     2. Discharge plan for patient and Needs/Barriers: TBD      3. Estimated Discharge Date: TBD Posted on Whiteboard in Patients Room: no      4. The patient's care plan was reviewed with the oncoming nurse. \"HEALS\" SAFETY CHECK      Fall Risk    Total Score: 2    Safety Measures: Safety Measures: Bed/Chair-Wheels locked, Bed in low position, Caregiver at bedside, Call light within reach, Gripper socks, Side rails X 3    A safety check occurred in the patient's room between off going nurse and oncoming nurse listed above.     The safety check included the below items  Area Items   H  High Alert Medications - Verify all high alert medication drips (heparin, PCA, etc.) E  Equipment - Suction is set up for ALL patients (with denzel)  - Red plugs utilized for all equipment (IV pumps, etc.)  - WOWs wiped down at end of shift.  - Room stocked with oxygen, suction, and other unit-specific supplies   A  Alarms - Bed alarm is set for fall risk patients  - Ensure chair alarm is in place and activated if patient is up in a chair   L  Lines - Check IV for any infiltration  - Webb bag is empty if patient has a Webb   - Tubing and IV bags are labeled   S  Safety   - Room is clean, patient is clean, and equipment is clean. - Hallways are clear from equipment besides carts. - Fall bracelet on for fall risk patients  - Ensure room is clear and free of clutter  - Suction is set up for ALL patients (with denzel)  - Hallways are clear from equipment besides carts.    - Isolation precautions followed, supplies available outside room, sign posted     Mary Pruitt RN

## 2018-03-21 NOTE — CONSULTS
CARDIOTHORACIC SURGERY CONSULTATION NOTE    3/21/2018  6:13 PM    I am seeing this patient for the first time in consultation at the request of Dr. Jair Montez, with whom I have discussed the patient's history and test results, specifically the chest CT scan. I have also reviewed his records and pertinent studies. Gigi Gonzalez is a 81 y.o. male who presented for a colon resection which was performed several days ago successfully. He has recovered uneventfully except for transient tachycardia which has resolved. However, in response to that, a chest CT scan was performed demonstrating an intimal flap in the distal ascending aorta. He denies chest pain, either sharp of pressure, in the past.  He did suffer a stroke in the past which has left him quite debilitated, and he is unsure as to what caused the stroke (and there is nothing in the records to suggest what did) and it has left him unable to walk. He denies chest pain, chest pressure/discomfort, dizziness, dyspnea on exertion, exertional chest pressure/discomfort, fatigue, lower extremity edema, near-syncope, orthopnea, palpitations, paroxysmal nocturnal dyspnea, syncope, tachypnea. Cardiac risk factors include hypertension. There is no history of smoking/ tobacco exposure, family history, dyslipidemia, diabetes mellitus.     Past Medical History:   Diagnosis Date    Abnormal computed tomography of bladder     Anemia     Arthritis     Basal ganglia disease     Benign prostatic hyperplasia with lower urinary tract symptoms     Bladder calculus     Bladder cancer ([facility] [address] 3/13/14    Bladder mass     Bladder tumor     Brain aneurysm     CVA (cerebral vascular accident) ([facility] [address] [facility] [address] 2012    hemiplegia    Dysphagia     Erythematous bladder mucosa     Webb catheter in place     Gross hematuria     HTN (hypertension)     Hx MRSA infection 09/2017    urine    Neurogenic bladder     Psychiatric disorder     Retention, urine     Stroke [facility])     Urethral stricture          Past Surgical History:   Procedure Laterality Date    COLONOSCOPY N/A 1/15/2018    COLONOSCOPY w/ biopsies w/ polypectomy w/ ink injection performed by Long Choudhury MD at [address] HX GI      PEG TUBE--REMOVED    HX HEENT      H/O [facility]    HX UROLOGICAL      super pubic tube    HX UROLOGICAL  3/13/14    TURBT, Dr. Maria E Bro, [address] [place] [address] UROLOGICAL  10/8/15    Cysto, Dr. Saba Espinoza, [facility]    LAP,SURG,COLECTOMY, PARTIAL, W/ANAST N/A 03/15/2018    Dr. Jay Harrington         Present medications include   Current Facility-Administered Medications   Medication Dose Route Frequency Provider Last Rate Last Dose    famotidine (PEPCID) tablet 20 mg  20 mg Oral BID Ralf Hankins MD   20 mg at 03/21/18 1728    dextrose 5% - 0.45% NaCl with KCl 20 mEq/L infusion  50 mL/hr IntraVENous CONTINUOUS Ralf Hankins MD 50 mL/hr at 03/21/18 1659 50 mL/hr at 03/21/18 1659    morphine injection 2-4 mg  2-4 mg IntraVENous Q3H PRN Ralf Hankins MD   2 mg at 03/17/18 1538    diphenhydrAMINE (BENADRYL) injection 25 mg  25 mg IntraVENous Q6H PRN Ralf Hankins MD        heparin (porcine) injection 5,000 Units  5,000 Units SubCUTAneous Q8H Ralf Hankins MD   5,000 Units at 03/21/18 0200    acetaminophen (TYLENOL) tablet 650 mg  650 mg Oral Q6H PRN Ralf Hankins MD   650 mg at 03/16/18 2142    ondansetron (ZOFRAN) injection 4 mg  4 mg IntraVENous Q6H PRN Ralf Hankins MD        mirtazapine (REMERON) tablet 15 mg  15 mg Oral QHS Ralf Hankins MD   15 mg at 03/20/18 2233    citalopram (CELEXA) tablet 10 mg  10 mg Oral DAILY Ralf Hankins MD   10 mg at 03/21/18 5820    oxybutynin chloride XL (DITROPAN XL) tablet 10 mg  10 mg Oral BID Ralf Hankins MD   10 mg at 03/21/18 1728       Drug allergies: Allergies   Allergen Reactions    Pcn [Penicillins] Other (comments) and Itching     intolerance       Family History:  There is not a history of heart disease in the family. Social History: Smoking history as above. He denies alcohol use. He lives with his spouse and does not work. REVIEW OF SYSTEMS:   Constitutional:        negative for significant weight gain or loss recently       negative for fevers, chills    Integumentary:       negative for recent rashes  Eyes:        negative for diplopia  ENMT:        negative for hearing loss         negative for sore throat  Respiratory:        negative for chronic cough   Cardiovascular: as noted above in history   Gastrointestinal:       negative for abdominal pain         negative for constipation  Genitourinary:         negative for dysuria  Musculoskeletal:        negative for chronic back pain         negative for recent fractures   Hematologic / Lymphatic:        negative for easy bruisability   Neurological:       negative for headaches        negative for seizures  Psychiatric:        negative for anxiety     PHYSICAL EXAMINATION:  Vital signs:   BP Readings from Last 3 Encounters:   18 (!) 133/92   18 120/78   18 (!) 126/91     Temp (24hrs), Av.9 °F (37.2 °C), Min:97.7 °F (36.5 °C), Max:99.4 °F (37.4 °C)    Wt Readings from Last 3 Encounters:   03/15/18 68 kg (150 lb)   18 67.1 kg (148 lb)   18 67.1 kg (148 lb)     Ht Readings from Last 3 Encounters:   03/15/18 5' 8\" (1.727 m)   18 5' 8\" (1.727 m)   18 5' 8\" (1.727 m)     General appearance:  He appeared malnourished and of small build. He had significant contractures of his left arm and leg. Integument: The skin was warm and dry and had poor turgor. There were no lower extremity venous stasis changes. Head and Neck: The head was normocephalic and was atraumatic. The neck was supple with limited range of motion. Thyromegaly was absent, and cervical and supraclavicular lymphadenopathy were absent. EENMT: Conjunctivae were not injected. The sclerae were anicteric, and the nares were not patent bilaterally.   Oral mucosa were not moist.    Back:  He was kyphotic. Lungs: Respirations were not labored, and the lungs were clear to auscultation bilaterally, without rales, without rhonchi, and without wheezes. Heart: The rate and rhythm were regular, without an S3, without JVD, and without a murmur. The PMI was not displaced laterally. Abdomen: The abdomen was scaphoid and was soft and was not tender, with good bowel sounds. Pulsatile masses and bruits in the abdomen were absent. Vascular: Carotid pulses were 1+ bilaterally without bruits, and radial pulses were 1+ bilaterally. Pedal pulses were present bilaterally. Varicose veins were absent in both legs. Extremities: Clubbing was absent, and pedal edema was absent. Neurologic: He was alert and was a fair historian. Psychiatric: He was pleasant, calm, and had an abnormal affect. LABORATORIES:   Lab Results   Component Value Date/Time    WBC 12.0 03/21/2018 04:45 AM    HCT 31.2 (L) 03/21/2018 04:45 AM     03/21/2018 04:45 AM      Lab Results   Component Value Date/Time     03/21/2018 04:45 AM    K 3.9 03/21/2018 04:45 AM     (H) 03/21/2018 04:45 AM    CO2 20 (L) 03/21/2018 04:45 AM     (H) 03/21/2018 04:45 AM    BUN 5 (L) 03/21/2018 04:45 AM    CREA 0.75 03/21/2018 04:45 AM    CREA 0.69 03/20/2018 04:41 AM    CREA 0.76 03/19/2018 04:22 AM     The EKG print-out, which I have personally reviewed, shows sinus rhythm. I have also personally reviewed the chest CT scan images, which revealed an intimal flap in the distal ascending aorta and a probable dual lumen aortic arch, with a possible chronic dissection into the left subclavian artery with true and false lumens, the latter with thrombus. Impression:  Diagnoses:  1. Probable chronic Type A aortic dissection without evidence of distal organ compromise (unless this was the source years ago of his stroke)  2.  Essential hypertension    Given his lack of symptoms, I suspect that this is a chronic dissection, and given his baseline state, do not think that surgical intervention is indicated. Recommendations:  As above. Thank you for involving me in his care.     Olive Benoit MD

## 2018-03-21 NOTE — PROGRESS NOTES
This patient meets the following P&T approved criteria and has been converted from IV to oral therapy for the following medication: Famotidine    1. INITIAL IV Therapy  Patient has received an initial 48 hours of IV therapy for azithromycin and levetiracetam OR received an initial 24 hours of IV therapy for all other medications. 2. Clinically Stable   HR?100 Pulse (Heart Rate): 86   SBP ?90 BP: (!) 142/98     RR ? 24 Resp Rate: 18   Temp < 100.4° F Temp: 99.4 °F (37.4 °C)   O2 ?90% O2 Sat (%): 96 %   3. Functional GI Tract  Please note that a No response means the patient is not a candidate for IV to PO conversion   No active NPO order (check order review activity) Yes   Taking enteral meds (PO, NG, GT, etc)   (check medication activity or order review for a tube) Yes   Able to tolerate enteral medications without risk of aspiration   (check progress notes) Yes   Non-tubed patients can swallow without difficulty  (check progress notes) Yes   Eating or tolerating feeds at goal rate   (check progress notes, order review activity, LDA flowsheet)  Diet =GI lite  Yes   NG access is available if patient is unconscious Yes   NG tube, if present,  is not on continuous suction   (check progress notes) Yes   Continuous tube feedings can be interrupted for an extended period of time for the drug administration (e.g. Ciprofloxacin)   (check progress notes, order review) Yes   No severe or persistent nausea or vomiting       (check progress notes, use of antiemetics) Yes   No malabsorption  syndromes   (e.g. gastrectomy, intestinal resection, bariatric surgery, uncontrolled diarrhea, short bowel syndrome, ileus, gastrointestinal obstruction)   (check progress notes) Yes   No active gastrointestinal bleeding     (check progress notes)   Yes   4.  Infection Specific Criteria (ABX only)  Please note that a No response means the patient is not a candidate for IV to PO conversion WBC normal or normalizing (check labs) Yes    WBC =  Lab Results   Component Value Date/Time    WBC 12.0 03/21/2018 04:45 AM      Neutropenia (ANC < 1500 cells/microL) NOT present  (check labs)   Yes   No infection with high treatment failure rate  (e.g. meningitis, brain abscess,orbital cellulitis, other central nervous system infections, endophthalmitis, mediastinitis,  IV TMP/SMX treatment for PCP in AIDs, fungemia, ventriculitis, endocarditis, Pseudomonas pneumonia, intra-abdominal abscess, empyema, necrotizing soft tissue infections, osteomyelitis or post-obstructive pneumonia) (check indication for antibiotic, progress notes) Yes   5. Miscellaneous Criteria    Please note that a No response means the patient is not a candidate for IV to PO conversion   Not on high doses of vasopressor medications        (check medication activity) Yes   Not actively seizing or risk of actively seizing        (check progress notes) Yes     Pharmacist Notes: Famotidine IV to PO P&T protocol  Pharmacy will continue to monitor for the duration of therapy to ensure the patient continues to meet protocol criteria and the conversion remains appropriate.     Margarito Ayala Pharmacist  3/21/2018 3:21 PM

## 2018-03-21 NOTE — ROUTINE PROCESS
Bedside shift change report given to [id number] Chris Hernandez (oncoming nurse) by Olesya Chisholm (offgoing nurse). Report included the following information SBAR, Kardex and MAR.

## 2018-03-21 NOTE — PROGRESS NOTES
Problem: Falls - Risk of  Goal: *Absence of Falls  Document Cecelia Fall Risk and appropriate interventions in the flowsheet.    Outcome: Progressing Towards Goal  Fall Risk Interventions:  Mobility Interventions: Bed/chair exit alarm, Communicate number of staff needed for ambulation/transfer    Mentation Interventions: Adequate sleep, hydration, pain control, Door open when patient unattended    Medication Interventions: Bed/chair exit alarm    Elimination Interventions: Call light in reach, Patient to call for help with toileting needs    History of Falls Interventions: Door open when patient unattended

## 2018-03-21 NOTE — ROUTINE PROCESS
2346: Pt stable and sleeping in bed. Denies pain, no nausea or vomiting. On GI lite diet. Remains on isolation for MRSA. Will continue to monitor the pt.

## 2018-03-21 NOTE — PROGRESS NOTES
[facility]  [address] [place] [id number] [phone]  Colon and Rectal Surgery Progress Note      Patient: Fazal Rosen MRN: [id number]  SSN: [id number]    YOB: 1945  Age: 81 y.o. Sex: male      Admit Date: 3/14/2018    LOS: 7 days     Subjective:     BM. Tolerating ensure, not eating much food. Objective:     Vitals:    03/21/18 0424 03/21/18 0811 03/21/18 1237 03/21/18 1610   BP: 122/85 127/81 (!) 142/98 (!) 133/92   Pulse: 80 90 86 91   Resp: 16 16 18 18   Temp: 97.7 °F (36.5 °C) 98.7 °F (37.1 °C) 99.4 °F (37.4 °C) 99.3 °F (37.4 °C)   SpO2: 97% 97% 96% 95%   Weight:       Height:            Intake and Output:  Current Shift: 03/21 0701 - 03/21 1900  In: 623.3 [P.O.:240;  I.V.:383.3]  Out: 200 [Urine:200]  Last three shifts: 03/19 1901 - 03/21 0700  In: 6060.8 [P.O.:490; I.V.:5570.8]  Out: 2125 [Urine:2125]    Physical Exam:     abd soft miguelito tender, distended    Lab/Data Review:    BMP:   Lab Results   Component Value Date/Time     03/21/2018 04:45 AM    K 3.9 03/21/2018 04:45 AM     (H) 03/21/2018 04:45 AM    CO2 20 (L) 03/21/2018 04:45 AM    AGAP 8 03/21/2018 04:45 AM     (H) 03/21/2018 04:45 AM    BUN 5 (L) 03/21/2018 04:45 AM    CREA 0.75 03/21/2018 04:45 AM    GFRAA >60 03/21/2018 04:45 AM    GFRNA >60 03/21/2018 04:45 AM     CBC:   Lab Results   Component Value Date/Time    WBC 12.0 03/21/2018 04:45 AM    HGB 9.4 (L) 03/21/2018 04:45 AM    HCT 31.2 (L) 03/21/2018 04:45 AM     03/21/2018 04:45 AM        Assessment:     POD 6 s/p lap R colectomy for ascending colon polyp    Plan:     Continue solids and see if he continues to tolerate  Distension on exam concerning for ileus  CT surgery consulted today to comment on CT-A findings      Signed By: Kandace Orozco MD        March 21, 2018

## 2018-03-21 NOTE — PROGRESS NOTES
Problem: Falls - Risk of  Goal: *Absence of Falls  Document Cecelia Fall Risk and appropriate interventions in the flowsheet.    Outcome: Progressing Towards Goal  Fall Risk Interventions:  Mobility Interventions: Bed/chair exit alarm    Mentation Interventions: Adequate sleep, hydration, pain control    Medication Interventions: Bed/chair exit alarm    Elimination Interventions: Call light in reach    History of Falls Interventions: Door open when patient unattended

## 2018-03-21 NOTE — PROGRESS NOTES
NUTRITION    Nutrition Screen      RECOMMENDATIONS / PLAN:     - Add supplements: Ensure Enlive BID.  - Monitor po intake and diet tolerance. - Continue RD inpatient monitoring and evaluation. NUTRITION INTERVENTIONS & DIAGNOSIS:     [x] Meals/snacks: modified composition  [x] Medical food supplement therapy: initiate     Nutrition Diagnosis: Inadequate oral intake related to decreased appetite as evidenced by pt consuming 50% or less of most recent meals, variable intake of clear liquids and started on solid diet 3/20. ASSESSMENT:     3/21: Advanced to solid diet yesterday afternoon, tolerating with decreased appetite and fair to poor meal intake. Denies abdominal pain/nausea/vomiting. Agreeable to supplements. 3/19: S/p right colectomy 3/15. +BM. Started on liquid diet today. Pt sleeping at time of visit, only woke up long enough to report good meal intake PTA. Noted hx of dysphagia, per nurse, pt swallowing well, no signs/symptoms of difficulty.      Average po intake adequate to meet patients estimated nutritional needs:   [] Yes     [x] No   [] Unable to determine at this time    Diet: DIET GI LITE (POST SURGICAL)      Food Allergies: NFKA  Current Appetite:   [] Good     [x] Fair     [x] Poor     [] Other:   Appetite/meal intake prior to admission:   [x] Good     [] Fair     [] Poor     [] Other:  Feeding Limitations:  [] Swallowing difficulty    [] Chewing difficulty    [x] Other: hx of dysphagia, tolerating GI Lite diet per pt report 3/21/18  Current Meal Intake:   Patient Vitals for the past 100 hrs:   % Diet Eaten   03/21/18 0922 0 %   03/20/18 1738 75 %   03/19/18 1038 45 %     BM: 3/20, loose, abdomen distended, hypoactive bowel sounds   Skin Integrity: surgical incision to abdomen  Edema: none  Pertinent Medications: Reviewed: D5 1/2NS with 20 mEq/L KCl at 125 mL/hr (150 gm dextrose, 510 kcal per day), pepcid, remeron, zofran     Recent Labs      03/21/18   0445  03/20/18   0441 03/19/18   0422   NA  141  142  144   K  3.9  3.7  3.8   CL  113*  112*  113*   CO2  20*  21  18*   GLU  134*  148*  95   BUN  5*  8  12   CREA  0.75  0.69  0.76   CA  9.0  8.7  8.8   MG  1.9  1.9  1.8   PHOS  2.5  2.0*  3.3       Intake/Output Summary (Last 24 hours) at 03/21/18 0946  Last data filed at 03/21/18 2537   Gross per 24 hour   Intake           3877.5 ml   Output             1800 ml   Net           2077.5 ml       Anthropometrics:  Ht Readings from Last 1 Encounters:   03/15/18 5' 8\" (1.727 m)     Last 3 Recorded Weights in this Encounter    03/15/18 1323   Weight: 68 kg (150 lb)     Body mass index is 22.81 kg/(m^2). Weight History:   Weight Metrics 3/15/2018 3/14/2018 3/2/2018 3/1/2018 2/20/2018 2/8/2018 11/16/2017   Weight 150 lb - 148 lb 148 lb - 148 lb -   BMI - 22.81 kg/m2 22.5 kg/m2 22.5 kg/m2 22.5 kg/m2 - 19.94 kg/m2        Admitting Diagnosis: Colonic mass [K63.9]  Pertinent PMHx: bladder cancer, CVA, dysphagia, HTN, stroke    Education Needs:        [x] None identified  [] Identified - Not appropriate at this time  []  Identified and addressed - refer to education log  Learning Limitations:   [x] None identified  [] Identified    Cultural, Quaker & ethnic food preferences:  [x] None identified    [] Identified and addressed     ESTIMATED NUTRITION NEEDS:     Calories: 0282-8533 kcal (MSJx1.2-1.3) based on  [] Actual BW      [x] IBW 70 kg  Protein: 56-84 gm (0.8-1.2 gm/kg) based on  [] Actual BW      [x] IBW   Fluid: 1 mL/kcal     MONITORING & EVALUATION:     Nutrition Goal(s):   1. Po intake of meals will meet >75% of patient estimated nutritional needs within the next 7 days.   Outcome:  [] Met/Ongoing    [x]  Not Met/Progressing    [] New/Initial Goal     Monitoring:   [x] Food and beverage intake   [x] Diet order   [x] Nutrition-focused physical findings   [x] Treatment/therapy   [] Weight   [] Enteral nutrition intake        Previous Recommendations (for follow-up assessments only):     [x]   Implemented       []   Not Implemented (RD to address)      [] No Longer Appropriate     [] No Recommendation Made     Discharge Planning: low sodium diet, pending diet tolerance  [x] Participated in care planning, discharge planning, & interdisciplinary rounds as appropriate      Felix Ramon, [address] [place] [address]   Pager: [phone]

## 2018-03-22 ENCOUNTER — APPOINTMENT (OUTPATIENT)
Dept: GENERAL RADIOLOGY | Age: 73
DRG: 331 | End: 2018-03-22
Attending: COLON & RECTAL SURGERY
Payer: MEDICARE

## 2018-03-22 LAB
ANION GAP SERPL CALC-SCNC: 10 MMOL/L (ref 3–18)
BUN SERPL-MCNC: 5 MG/DL (ref 7–18)
BUN/CREAT SERPL: 7 (ref 12–20)
CALCIUM SERPL-MCNC: 9 MG/DL (ref 8.5–10.1)
CHLORIDE SERPL-SCNC: 110 MMOL/L (ref 100–108)
CO2 SERPL-SCNC: 20 MMOL/L (ref 21–32)
CREAT SERPL-MCNC: 0.76 MG/DL (ref 0.6–1.3)
ERYTHROCYTE [DISTWIDTH] IN BLOOD BY AUTOMATED COUNT: 26.4 % (ref 11.6–14.5)
GLUCOSE SERPL-MCNC: 111 MG/DL (ref 74–99)
HCT VFR BLD AUTO: 33.2 % (ref 36–48)
HGB BLD-MCNC: 9.4 G/DL (ref 13–16)
MAGNESIUM SERPL-MCNC: 1.9 MG/DL (ref 1.6–2.6)
MCH RBC QN AUTO: 20 PG (ref 24–34)
MCHC RBC AUTO-ENTMCNC: 28.3 G/DL (ref 31–37)
MCV RBC AUTO: 70.5 FL (ref 74–97)
PHOSPHATE SERPL-MCNC: 3 MG/DL (ref 2.5–4.9)
PLATELET # BLD AUTO: 337 K/UL (ref 135–420)
POTASSIUM SERPL-SCNC: 4.2 MMOL/L (ref 3.5–5.5)
RBC # BLD AUTO: 4.71 M/UL (ref 4.7–5.5)
SODIUM SERPL-SCNC: 140 MMOL/L (ref 136–145)
WBC # BLD AUTO: 13.3 K/UL (ref 4.6–13.2)

## 2018-03-22 PROCEDURE — 83735 ASSAY OF MAGNESIUM: CPT | Performed by: COLON & RECTAL SURGERY

## 2018-03-22 PROCEDURE — 74018 RADEX ABDOMEN 1 VIEW: CPT

## 2018-03-22 PROCEDURE — 85027 COMPLETE CBC AUTOMATED: CPT | Performed by: COLON & RECTAL SURGERY

## 2018-03-22 PROCEDURE — 80048 BASIC METABOLIC PNL TOTAL CA: CPT | Performed by: COLON & RECTAL SURGERY

## 2018-03-22 PROCEDURE — 84100 ASSAY OF PHOSPHORUS: CPT | Performed by: COLON & RECTAL SURGERY

## 2018-03-22 PROCEDURE — 36415 COLL VENOUS BLD VENIPUNCTURE: CPT | Performed by: COLON & RECTAL SURGERY

## 2018-03-22 PROCEDURE — 74011250637 HC RX REV CODE- 250/637: Performed by: COLON & RECTAL SURGERY

## 2018-03-22 PROCEDURE — 74011250636 HC RX REV CODE- 250/636: Performed by: COLON & RECTAL SURGERY

## 2018-03-22 PROCEDURE — 65270000029 HC RM PRIVATE

## 2018-03-22 RX ORDER — OXYCODONE HYDROCHLORIDE 10 MG/1
10 TABLET ORAL
Qty: 40 TAB | Refills: 0 | Status: SHIPPED | OUTPATIENT
Start: 2018-03-22

## 2018-03-22 RX ADMIN — FAMOTIDINE 20 MG: 20 TABLET, FILM COATED ORAL at 17:32

## 2018-03-22 RX ADMIN — HEPARIN SODIUM 5000 UNITS: 5000 INJECTION, SOLUTION INTRAVENOUS; SUBCUTANEOUS at 01:46

## 2018-03-22 RX ADMIN — FAMOTIDINE 20 MG: 20 TABLET, FILM COATED ORAL at 10:32

## 2018-03-22 RX ADMIN — CITALOPRAM HYDROBROMIDE 10 MG: 10 TABLET ORAL at 10:32

## 2018-03-22 RX ADMIN — OXYBUTYNIN CHLORIDE 10 MG: 5 TABLET, EXTENDED RELEASE ORAL at 17:32

## 2018-03-22 RX ADMIN — MIRTAZAPINE 15 MG: 15 TABLET, FILM COATED ORAL at 21:23

## 2018-03-22 RX ADMIN — HEPARIN SODIUM 5000 UNITS: 5000 INJECTION, SOLUTION INTRAVENOUS; SUBCUTANEOUS at 10:32

## 2018-03-22 RX ADMIN — DEXTROSE MONOHYDRATE, SODIUM CHLORIDE, AND POTASSIUM CHLORIDE 50 ML/HR: 50; 4.5; 1.49 INJECTION, SOLUTION INTRAVENOUS at 14:44

## 2018-03-22 RX ADMIN — OXYBUTYNIN CHLORIDE 10 MG: 5 TABLET, EXTENDED RELEASE ORAL at 10:32

## 2018-03-22 RX ADMIN — DEXTROSE MONOHYDRATE, SODIUM CHLORIDE, AND POTASSIUM CHLORIDE 50 ML/HR: 50; 4.5; 1.49 INJECTION, SOLUTION INTRAVENOUS at 05:31

## 2018-03-22 NOTE — PROGRESS NOTES
NUTRITION    Nutrition Screen      RECOMMENDATIONS / PLAN:     - Calorie count x 3 days (3/22-3/24) per MD.   - Continue RD inpatient monitoring and evaluation. NUTRITION INTERVENTIONS & DIAGNOSIS:     [x] Meals/snacks: modified composition  [x] Medical food supplement therapy: Ensure Enlive BID    Nutrition Diagnosis: Inadequate oral intake related to decreased appetite as evidenced by pt consuming 50% or less of most recent meals, variable intake of clear liquids and started on solid diet 3/20. ASSESSMENT:     3/22: Calorie count per Surgery. Pt with poor meal intake. 3/21: Advanced to solid diet yesterday afternoon, tolerating with decreased appetite and fair to poor meal intake. Denies abdominal pain/nausea/vomiting. Agreeable to supplements. 3/19: S/p right colectomy 3/15. +BM. Started on liquid diet today. Pt sleeping at time of visit, only woke up long enough to report good meal intake PTA. Noted hx of dysphagia, per nurse, pt swallowing well, no signs/symptoms of difficulty.      Average po intake adequate to meet patients estimated nutritional needs:   [] Yes     [x] No   [] Unable to determine at this time    Diet: DIET GI LITE (POST SURGICAL)  DIET NUTRITIONAL SUPPLEMENTS Lunch, Dinner; ENSURE ENLIVE      Food Allergies: NFKA  Current Appetite:   [] Good     [x] Fair     [x] Poor     [] Other:   Appetite/meal intake prior to admission:   [x] Good     [] Fair     [] Poor     [] Other:  Feeding Limitations:  [] Swallowing difficulty    [] Chewing difficulty    [x] Other: hx of dysphagia, tolerating GI Lite diet per pt report 3/21/18  Current Meal Intake:   Patient Vitals for the past 100 hrs:   % Diet Eaten   03/22/18 1239 25 %   03/21/18 1858 0 %   03/21/18 1240 15 %   03/21/18 0922 0 %   03/20/18 1738 75 %   03/19/18 1038 45 %     BM: 3/20  Skin Integrity: surgical incision to abdomen  Edema: none  Pertinent Medications: Reviewed    Recent Labs      03/22/18   0516  03/21/18   5015 03/20/18   0441   NA  140  141  142   K  4.2  3.9  3.7   CL  110*  113*  112*   CO2  20*  20*  21   GLU  111*  134*  148*   BUN  5*  5*  8   CREA  0.76  0.75  0.69   CA  9.0  9.0  8.7   MG  1.9  1.9  1.9   PHOS  3.0  2.5  2.0*       Intake/Output Summary (Last 24 hours) at 03/22/18 1330  Last data filed at 03/22/18 1239   Gross per 24 hour   Intake          1887.08 ml   Output             1320 ml   Net           567.08 ml       Anthropometrics:  Ht Readings from Last 1 Encounters:   03/15/18 5' 8\" (1.727 m)     Last 3 Recorded Weights in this Encounter    03/15/18 1323   Weight: 68 kg (150 lb)     Body mass index is 22.81 kg/(m^2). Weight History:   Weight Metrics 3/15/2018 3/14/2018 3/2/2018 3/1/2018 2/20/2018 2/8/2018 11/16/2017   Weight 150 lb - 148 lb 148 lb - 148 lb -   BMI - 22.81 kg/m2 22.5 kg/m2 22.5 kg/m2 22.5 kg/m2 - 19.94 kg/m2        Admitting Diagnosis: Colonic mass [K63.9]  Pertinent PMHx: bladder cancer, CVA, dysphagia, HTN, stroke    Education Needs:        [x] None identified  [] Identified - Not appropriate at this time  []  Identified and addressed - refer to education log  Learning Limitations:   [x] None identified  [] Identified    Cultural, Holiness & ethnic food preferences:  [x] None identified    [] Identified and addressed     ESTIMATED NUTRITION NEEDS:     Calories: 7471-1276 kcal (MSJx1.2-1.3) based on  [] Actual BW      [x] IBW 70 kg  Protein: 56-84 gm (0.8-1.2 gm/kg) based on  [] Actual BW      [x] IBW   Fluid: 1 mL/kcal     MONITORING & EVALUATION:     Nutrition Goal(s):   1. Po intake of meals will meet >75% of patient estimated nutritional needs within the next 7 days.   Outcome:  [] Met/Ongoing    [x]  Not Met/Progressing    [] New/Initial Goal     Monitoring:   [x] Food and beverage intake   [x] Diet order   [x] Nutrition-focused physical findings   [x] Treatment/therapy   [] Weight   [] Enteral nutrition intake        Previous Recommendations (for follow-up assessments only):     [x]   Implemented       []   Not Implemented (RD to address)      [] No Longer Appropriate     [] No Recommendation Made     Discharge Planning: low sodium diet  [x] Participated in care planning, discharge planning, & interdisciplinary rounds as appropriate      Miriam Rome, [address] [place] [address]   Pager: [phone]

## 2018-03-22 NOTE — ROUTINE PROCESS
Bedside and Verbal shift change report given to Grabiel Rodriguez (oncoming nurse) by Iraj Ramos RN (offgoing nurse). Report included the following information SBAR, Kardex, MAR and Recent Results.     SITUATION:    Code Status: No Order   Reason for Admission: Colonic mass [K63.9]    [facility] day: 8   Problem List:       Hospital Problems  Date Reviewed: 3/15/2018          Codes Class Noted POA    Anemia ICD-10-CM: D64.9  ICD-9-CM: 285.9  3/14/2018 Unknown              BACKGROUND:    Past Medical History:   Past Medical History:   Diagnosis Date    Abnormal computed tomography of bladder     Anemia     Arthritis     Basal ganglia disease     Benign prostatic hyperplasia with lower urinary tract symptoms     Bladder calculus     Bladder cancer ([facility] 3/13/14    Bladder mass     Bladder tumor     Brain aneurysm     CVA (cerebral vascular accident) ([facility] [address] [facility] [address]    hemiplegia    Dysphagia     Erythematous bladder mucosa     Webb catheter in place     Gross hematuria     HTN (hypertension)     Hx MRSA infection 09/2017    urine    Neurogenic bladder     Psychiatric disorder     Retention, urine     Stroke ([facility]     Urethral stricture          Patient taking anticoagulants no     ASSESSMENT:    Changes in Assessment Throughout Shift: No     Patient has Central Line: no Reasons if yes: No   Patient has Webb Cath: no Reasons if yes: No      Last Vitals:     Vitals:    03/21/18 1237 03/21/18 1610 03/21/18 2008 03/21/18 2040   BP: (!) 142/98 (!) 133/92 125/83 119/80   Pulse: 86 91 86 81   Resp: 18 18 16 17   Temp: 99.4 °F (37.4 °C) 99.3 °F (37.4 °C) 99.2 °F (37.3 °C) 98.8 °F (37.1 °C)   SpO2: 96% 95% 96% 97%   Weight:       Height:            IV and DRAINS (will only show if present)   [REMOVED] Peripheral IV Right Arm-Site Assessment: Clean, dry, & intact  Peripheral IV Right Antecubital-Site Assessment: Clean, dry, & intact  Peripheral IV 03/17/18 Right Forearm-Site Assessment: Clean, dry, & intact     WOUND (if present)   Wound Type:  none   Dressing present Dressing Present : No   Wound Concerns/Notes:  none     PAIN    Pain Assessment    Pain Intensity 1: 0 (03/22/18 0151)    Pain Location 1: Abdomen    Pain Intervention(s) 1: Medication (see MAR)    Patient Stated Pain Goal: 0  o Interventions for Pain:  none  o Intervention effective: no  o Time of last intervention: 0700   o Reassessment Completed: no      Last 3 Weights:  Last 3 Recorded Weights in this Encounter    03/15/18 1323   Weight: 68 kg (150 lb)     Weight change:      INTAKE/OUPUT    Current Shift: 03/21 1901 - 03/22 0700  In: -   Out: 645 [Urine:645]    Last three shifts: 03/20 0701 - 03/21 1900  In: 4117.5 [P.O.:580; I.V.:3537.5]  Out: 2000 [Urine:2000]     LAB RESULTS     Recent Labs      03/21/18   0445  03/20/18   0441  03/19/18   0422   WBC  12.0  13.2  14.7*   HGB  9.4*  8.6*  9.0*   HCT  31.2*  29.7*  30.2*   PLT  320  357  357        Recent Labs      03/21/18   0445  03/20/18   0441  03/19/18   0422   NA  141  142  144   K  3.9  3.7  3.8   GLU  134*  148*  95   BUN  5*  8  12   CREA  0.75  0.69  0.76   CA  9.0  8.7  8.8   MG  1.9  1.9  1.8       RECOMMENDATIONS AND DISCHARGE PLANNING     1. Pending tests/procedures/ Plan of Care or Other Needs: TBD     2. Discharge plan for patient and Needs/Barriers: TBD    3. Estimated Discharge Date: TBD Posted on Whiteboard in Patients Room: no      4. The patient's care plan was reviewed with the oncoming nurse. \"HEALS\" SAFETY CHECK      Fall Risk    Total Score: 2    Safety Measures: Safety Measures: Bed/Chair-Wheels locked, Bed in low position, Caregiver at bedside, Call light within reach, Gripper socks, Side rails X 3    A safety check occurred in the patient's room between off going nurse and oncoming nurse listed above.     The safety check included the below items  Area Items   H  High Alert Medications - Verify all high alert medication drips (heparin, PCA, etc.) E  Equipment - Suction is set up for ALL patients (with denzel)  - Red plugs utilized for all equipment (IV pumps, etc.)  - WOWs wiped down at end of shift.  - Room stocked with oxygen, suction, and other unit-specific supplies   A  Alarms - Bed alarm is set for fall risk patients  - Ensure chair alarm is in place and activated if patient is up in a chair   L  Lines - Check IV for any infiltration  - Webb bag is empty if patient has a Webb   - Tubing and IV bags are labeled   S  Safety   - Room is clean, patient is clean, and equipment is clean. - Hallways are clear from equipment besides carts. - Fall bracelet on for fall risk patients  - Ensure room is clear and free of clutter  - Suction is set up for ALL patients (with denzel)  - Hallways are clear from equipment besides carts.    - Isolation precautions followed, supplies available outside room, sign posted     Fariha Wooten RN

## 2018-03-22 NOTE — PROGRESS NOTES
[facility]  [address] [place] [address] [phone]  Colon and Rectal Surgery Progress Note      Patient: Homero Richey MRN: [id number]  SSN: [id number]    YOB: 1945  Age: 81 y.o. Sex: male      Admit Date: 3/14/2018    LOS: 8 days     Subjective:     BM. Tolerating diet. Denies bloating, nausea, no vomiting.      Objective:     Vitals:    03/21/18 1610 03/21/18 2008 03/21/18 2040 03/22/18 0418   BP: (!) 133/92 125/83 119/80 145/87   Pulse: 91 86 81 75   Resp: 18 16 17 16   Temp: 99.3 °F (37.4 °C) 99.2 °F (37.3 °C) 98.8 °F (37.1 °C) 98.6 °F (37 °C)   SpO2: 95% 96% 97% 95%   Weight:       Height:            Intake and Output:  Current Shift:    Last three shifts: 03/20 1901 - 03/22 0700  In: 5664.6 [P.O.:480; I.V.:5184.6]  Out: 2045 [Urine:2045]    Physical Exam:     abd soft miguelito tender, mildly distended    Lab/Data Review:    BMP:   Lab Results   Component Value Date/Time     03/22/2018 05:16 AM    K 4.2 03/22/2018 05:16 AM     (H) 03/22/2018 05:16 AM    CO2 20 (L) 03/22/2018 05:16 AM    AGAP 10 03/22/2018 05:16 AM     (H) 03/22/2018 05:16 AM    BUN 5 (L) 03/22/2018 05:16 AM    CREA 0.76 03/22/2018 05:16 AM    GFRAA >60 03/22/2018 05:16 AM    GFRNA >60 03/22/2018 05:16 AM     CBC:   Lab Results   Component Value Date/Time    WBC 13.3 (H) 03/22/2018 05:16 AM    HGB 9.4 (L) 03/22/2018 05:16 AM    HCT 33.2 (L) 03/22/2018 05:16 AM     03/22/2018 05:16 AM        Assessment:     POD 7 s/p lap R colectomy for ascending colon polyp    Plan:     Unsure if abd exam is due to stroke or ileus  Check KUB  If no significant bowel distension will discharge    Signed By: Priscila Hodgson MD        March 22, 2018

## 2018-03-22 NOTE — ROUTINE PROCESS
0118: Pt is in stable condition. No change in care plan from the previous shift. Will continue to monitor the pt.

## 2018-03-22 NOTE — PROGRESS NOTES
Bedside and Verbal shift change report given to Valerio Paul RN (oncoming nurse) by Tye Gay (offgoing nurse). Report included the following information SBAR, Kardex, MAR and Recent Results. SITUATION:    Code Status: No Order   Reason for Admission: Colonic mass [K63.9]    [facility] day: 8   Problem List:       Hospital Problems  Date Reviewed: 3/15/2018          Codes Class Noted POA    Anemia ICD-10-CM: D64.9  ICD-9-CM: 285.9  3/14/2018 Unknown              BACKGROUND:    Past Medical History:   Past Medical History:   Diagnosis Date    Abnormal computed tomography of bladder     Anemia     Arthritis     Basal ganglia disease     Benign prostatic hyperplasia with lower urinary tract symptoms     Bladder calculus     Bladder cancer ([facility] [address] 3/13/14    Bladder mass     Bladder tumor     Brain aneurysm     CVA (cerebral vascular accident) ([facility] [address] [address] [facility] [address] 2012    hemiplegia    Dysphagia     Erythematous bladder mucosa     Webb catheter in place     Gross hematuria     HTN (hypertension)     Hx MRSA infection 09/2017    urine    Neurogenic bladder     Psychiatric disorder     Retention, urine     Stroke ([facility]     Urethral stricture          Patient taking anticoagulants no     ASSESSMENT:    Changes in Assessment Throughout Shift: no     Patient has Central Line: no Reasons if yes: .  Patient has Webb Cath: no Reasons if yes: .       Last Vitals:     Vitals:    03/22/18 0418 03/22/18 0745 03/22/18 1121 03/22/18 1509   BP: 145/87 124/84 128/88 128/78   Pulse: 75 79 89 78   Resp: 16 18 19 18   Temp: 98.6 °F (37 °C) 98 °F (36.7 °C) 97.7 °F (36.5 °C) 97.3 °F (36.3 °C)   SpO2: 95% 97% 97% 97%   Weight:       Height:            IV and DRAINS (will only show if present)   [REMOVED] Peripheral IV Right Arm-Site Assessment: Clean, dry, & intact  Peripheral IV Right Antecubital-Site Assessment: Clean, dry, & intact  Peripheral IV 03/17/18 Right Forearm-Site Assessment: Clean, dry, & intact     WOUND (if present)   Wound Type:  none   Dressing present Dressing Present : No   Wound Concerns/Notes:  none     PAIN    Pain Assessment    Pain Intensity 1: 0 (03/22/18 0151)    Pain Location 1: Abdomen    Pain Intervention(s) 1: Medication (see MAR)    Patient Stated Pain Goal: 0  o Interventions for Pain:  See MAR  o Intervention effective: yes  o Time of last intervention: See MAR   o Reassessment Completed: yes      Last 3 Weights:  Last 3 Recorded Weights in this Encounter    03/15/18 1323   Weight: 68 kg (150 lb)     Weight change:      INTAKE/OUPUT    Current Shift: 03/22 0701 - 03/22 1900  In: 532 [P.O.:240; I.V.:555]  Out: 2999 [Urine:1650]    Last three shifts: 03/20 1901 - 03/22 0700  In: 5664.6 [P.O.:480; I.V.:5184.6]  Out: 2320 [Urine:2320]     LAB RESULTS     Recent Labs      03/22/18   0516  03/21/18   0445  03/20/18   0441   WBC  13.3*  12.0  13.2   HGB  9.4*  9.4*  8.6*   HCT  33.2*  31.2*  29.7*   PLT  337  320  357        Recent Labs      03/22/18   0516  03/21/18   0445  03/20/18   0441   NA  140  141  142   K  4.2  3.9  3.7   GLU  111*  134*  148*   BUN  5*  5*  8   CREA  0.76  0.75  0.69   CA  9.0  9.0  8.7   MG  1.9  1.9  1.9       RECOMMENDATIONS AND DISCHARGE PLANNING     1. Pending tests/procedures/ Plan of Care or Other Needs: none     2. Discharge plan for patient and Needs/Barriers: TBD    3. Estimated Discharge Date: TBD Posted on Whiteboard in Patients Room: yes      4. The patient's care plan was reviewed with the oncoming nurse. \"HEALS\" SAFETY CHECK      Fall Risk    Total Score: 2    Safety Measures: Safety Measures: Fall prevention (comment), Bed/Chair-Wheels locked, Bed/Chair alarm on, Bed in low position, Call light within reach, Gripper socks    A safety check occurred in the patient's room between off going nurse and oncoming nurse listed above.     The safety check included the below items  Area Items   H  High Alert Medications - Verify all high alert medication drips (heparin, PCA, etc.)   E  Equipment - Suction is set up for ALL patients (with denzel)  - Red plugs utilized for all equipment (IV pumps, etc.)  - WOWs wiped down at end of shift.  - Room stocked with oxygen, suction, and other unit-specific supplies   A  Alarms - Bed alarm is set for fall risk patients  - Ensure chair alarm is in place and activated if patient is up in a chair   L  Lines - Check IV for any infiltration  - Webb bag is empty if patient has a Webb   - Tubing and IV bags are labeled   S  Safety   - Room is clean, patient is clean, and equipment is clean. - Hallways are clear from equipment besides carts. - Fall bracelet on for fall risk patients  - Ensure room is clear and free of clutter  - Suction is set up for ALL patients (with denzel)  - Hallways are clear from equipment besides carts.    - Isolation precautions followed, supplies available outside room, sign posted     Yari Gaxiola

## 2018-03-22 NOTE — DISCHARGE INSTRUCTIONS
Instructions After Colectomy    No heavy lifting (nothing more than 10-15 lbs)    Low fiber diet - avoid raw fruits and vegetables, but small amounts of cooked vegetables are ok. Eat white bread products instead of wheat bread products. Meats, fish, chicken, noodles are ok. Showers over your wounds are ok, but avoid submerging wounds in pools or baths.  The strips over your wound will fall off on their own or I will remove them in the office    Make an office follow up appointment in 2 weeks

## 2018-03-23 VITALS
RESPIRATION RATE: 18 BRPM | SYSTOLIC BLOOD PRESSURE: 130 MMHG | OXYGEN SATURATION: 95 % | DIASTOLIC BLOOD PRESSURE: 96 MMHG | WEIGHT: 150 LBS | TEMPERATURE: 98 F | BODY MASS INDEX: 22.73 KG/M2 | HEIGHT: 68 IN | HEART RATE: 103 BPM

## 2018-03-23 LAB
ANION GAP SERPL CALC-SCNC: 11 MMOL/L (ref 3–18)
BUN SERPL-MCNC: 6 MG/DL (ref 7–18)
BUN/CREAT SERPL: 7 (ref 12–20)
CALCIUM SERPL-MCNC: 9.3 MG/DL (ref 8.5–10.1)
CHLORIDE SERPL-SCNC: 110 MMOL/L (ref 100–108)
CO2 SERPL-SCNC: 16 MMOL/L (ref 21–32)
CREAT SERPL-MCNC: 0.82 MG/DL (ref 0.6–1.3)
ERYTHROCYTE [DISTWIDTH] IN BLOOD BY AUTOMATED COUNT: 26.5 % (ref 11.6–14.5)
GLUCOSE SERPL-MCNC: 120 MG/DL (ref 74–99)
HCT VFR BLD AUTO: 34.1 % (ref 36–48)
HGB BLD-MCNC: 10.1 G/DL (ref 13–16)
MAGNESIUM SERPL-MCNC: 1.8 MG/DL (ref 1.6–2.6)
MCH RBC QN AUTO: 20.4 PG (ref 24–34)
MCHC RBC AUTO-ENTMCNC: 29.6 G/DL (ref 31–37)
MCV RBC AUTO: 68.8 FL (ref 74–97)
PHOSPHATE SERPL-MCNC: 3 MG/DL (ref 2.5–4.9)
PLATELET # BLD AUTO: 347 K/UL (ref 135–420)
POTASSIUM SERPL-SCNC: 4.3 MMOL/L (ref 3.5–5.5)
RBC # BLD AUTO: 4.96 M/UL (ref 4.7–5.5)
SODIUM SERPL-SCNC: 137 MMOL/L (ref 136–145)
WBC # BLD AUTO: 15.2 K/UL (ref 4.6–13.2)

## 2018-03-23 PROCEDURE — 80048 BASIC METABOLIC PNL TOTAL CA: CPT | Performed by: COLON & RECTAL SURGERY

## 2018-03-23 PROCEDURE — 36415 COLL VENOUS BLD VENIPUNCTURE: CPT | Performed by: COLON & RECTAL SURGERY

## 2018-03-23 PROCEDURE — 74011250637 HC RX REV CODE- 250/637: Performed by: COLON & RECTAL SURGERY

## 2018-03-23 PROCEDURE — 85027 COMPLETE CBC AUTOMATED: CPT | Performed by: COLON & RECTAL SURGERY

## 2018-03-23 PROCEDURE — 84100 ASSAY OF PHOSPHORUS: CPT | Performed by: COLON & RECTAL SURGERY

## 2018-03-23 PROCEDURE — 74011250636 HC RX REV CODE- 250/636: Performed by: COLON & RECTAL SURGERY

## 2018-03-23 PROCEDURE — 83735 ASSAY OF MAGNESIUM: CPT | Performed by: COLON & RECTAL SURGERY

## 2018-03-23 RX ADMIN — HEPARIN SODIUM 5000 UNITS: 5000 INJECTION, SOLUTION INTRAVENOUS; SUBCUTANEOUS at 10:45

## 2018-03-23 RX ADMIN — CITALOPRAM HYDROBROMIDE 10 MG: 10 TABLET ORAL at 10:45

## 2018-03-23 RX ADMIN — FAMOTIDINE 20 MG: 20 TABLET, FILM COATED ORAL at 10:45

## 2018-03-23 RX ADMIN — OXYBUTYNIN CHLORIDE 10 MG: 5 TABLET, EXTENDED RELEASE ORAL at 10:45

## 2018-03-23 RX ADMIN — HEPARIN SODIUM 5000 UNITS: 5000 INJECTION, SOLUTION INTRAVENOUS; SUBCUTANEOUS at 02:35

## 2018-03-23 NOTE — ROUTINE PROCESS
Bedside and Verbal shift change report given to Ayse W Ирина Child (oncoming nurse) by Mary Pruitt RN (offgoing nurse). Report included the following information SBAR, Kardex, MAR and Recent Results.     SITUATION:    Code Status: No Order   Reason for Admission: Colonic mass [K63.9]    [facility] day: 5   Problem List:       Hospital Problems  Date Reviewed: 3/15/2018          Codes Class Noted POA    Anemia ICD-10-CM: D64.9  ICD-9-CM: 285.9  3/14/2018 Unknown              BACKGROUND:    Past Medical History:   Past Medical History:   Diagnosis Date    Abnormal computed tomography of bladder     Anemia     Arthritis     Basal ganglia disease     Benign prostatic hyperplasia with lower urinary tract symptoms     Bladder calculus     Bladder cancer ([facility] [address] 3/13/14    Bladder mass     Bladder tumor     Brain aneurysm     CVA (cerebral vascular accident) [address] [address] [facility] [address] 2012    hemiplegia    Dysphagia     Erythematous bladder mucosa     Webb catheter in place     Gross hematuria     HTN (hypertension)     Hx MRSA infection 09/2017    urine    Neurogenic bladder     Psychiatric disorder     Retention, urine     Stroke ([facility] [address]     Urethral stricture          Patient taking anticoagulants no     ASSESSMENT:    Changes in Assessment Throughout Shift: No     Patient has Central Line: no Reasons if yes: No   Patient has Webb Cath: no Reasons if yes: No      Last Vitals:     Vitals:    03/22/18 0745 03/22/18 1121 03/22/18 1509 03/22/18 2016   BP: 124/84 128/88 128/78 126/83   Pulse: 79 89 78 (!) 103   Resp: 18 19 18 18   Temp: 98 °F (36.7 °C) 97.7 °F (36.5 °C) 97.3 °F (36.3 °C) 99.9 °F (37.7 °C)   SpO2: 97% 97% 97% 96%   Weight:       Height:            IV and DRAINS (will only show if present)   [REMOVED] Peripheral IV Right Arm-Site Assessment: Clean, dry, & intact  Peripheral IV Right Antecubital-Site Assessment: Clean, dry, & intact  Peripheral IV 03/17/18 Right Forearm-Site Assessment: Clean, dry, & intact     WOUND (if present)   Wound Type:  none   Dressing present Dressing Present : No   Wound Concerns/Notes:  none     PAIN    Pain Assessment    Pain Intensity 1: 0 (03/22/18 2130)    Pain Location 1: Abdomen    Pain Intervention(s) 1: Medication (see MAR)    Patient Stated Pain Goal: 0  o Interventions for Pain:  none  o Intervention effective: no  o Time of last intervention: 0700   o Reassessment Completed: no      Last 3 Weights:  Last 3 Recorded Weights in this Encounter    03/15/18 1323   Weight: 68 kg (150 lb)     Weight change:      INTAKE/OUPUT    Current Shift:      Last three shifts: 03/21 0701 - 03/22 1900  In: 3065.4 [P.O.:480; I.V.:2585.4]  Out: 2770 [Urine:2770]     LAB RESULTS     Recent Labs      03/22/18   0516  03/21/18   0445  03/20/18   0441   WBC  13.3*  12.0  13.2   HGB  9.4*  9.4*  8.6*   HCT  33.2*  31.2*  29.7*   PLT  337  320  357        Recent Labs      03/22/18   0516  03/21/18   0445  03/20/18   0441   NA  140  141  142   K  4.2  3.9  3.7   GLU  111*  134*  148*   BUN  5*  5*  8   CREA  0.76  0.75  0.69   CA  9.0  9.0  8.7   MG  1.9  1.9  1.9       RECOMMENDATIONS AND DISCHARGE PLANNING     1. Pending tests/procedures/ Plan of Care or Other Needs: TBD     2. Discharge plan for patient and Needs/Barriers: TBD    3. Estimated Discharge Date: TBD Posted on Whiteboard in Patients Room: no      4. The patient's care plan was reviewed with the oncoming nurse. \"HEALS\" SAFETY CHECK      Fall Risk    Total Score: 2    Safety Measures: Safety Measures: Bed/Chair alarm on, Bed/Chair-Wheels locked, Bed in low position, Call light within reach, Side rails X 3    A safety check occurred in the patient's room between off going nurse and oncoming nurse listed above.     The safety check included the below items  Area Items   H  High Alert Medications - Verify all high alert medication drips (heparin, PCA, etc.)   E  Equipment - Suction is set up for ALL patients (with yanker)  - Red plugs utilized for all equipment (IV pumps, etc.)  - WOWs wiped down at end of shift.  - Room stocked with oxygen, suction, and other unit-specific supplies   A  Alarms - Bed alarm is set for fall risk patients  - Ensure chair alarm is in place and activated if patient is up in a chair   L  Lines - Check IV for any infiltration  - Webb bag is empty if patient has a Webb   - Tubing and IV bags are labeled   S  Safety   - Room is clean, patient is clean, and equipment is clean. - Hallways are clear from equipment besides carts. - Fall bracelet on for fall risk patients  - Ensure room is clear and free of clutter  - Suction is set up for ALL patients (with yanker)  - Hallways are clear from equipment besides carts.    - Isolation precautions followed, supplies available outside room, sign posted     Hallie Guan RN

## 2018-03-23 NOTE — DISCHARGE SUMMARY
Colon and Rectal Surgery Discharge Summary     Patient: Felipe Muñiz MRN: [id number]  SSN: [id number]    YOB: 1945  Age: 81 y.o.   Sex: male       Admit Date: 3/14/2018    Discharge Date: 3/23/2018      Admission Diagnoses: Colonic mass [K63.9]    Discharge Diagnoses: Malignant polyp of ascending colon     Procedure: Laparoscopic Right Colectomy    Problem List as of 3/23/2018  Date Reviewed: 3/15/2018          Codes Class Noted - Resolved    Anemia ICD-10-CM: D64.9  ICD-9-CM: 285.9  3/14/2018 - Present        Hx of bladder cancer ICD-10-CM: Z85.51  ICD-9-CM: V10.51  8/3/2017 - Present        Contractures of both knees ICD-10-CM: M24.561, M24.562  ICD-9-CM: 718.46  2/11/2016 - Present        Advanced care planning/counseling discussion ICD-10-CM: Z71.89  ICD-9-CM: V65.49  2/11/2016 - Present        Hemiplegia, left ICD-10-CM: G81.90  ICD-9-CM: 342.90  7/31/2014 - Present        Webb catheter in place ICD-10-CM: Z92.89  ICD-9-CM: V45.89  4/14/2014 - Present        Bladder tumor ICD-10-CM: D49.4  ICD-9-CM: 239.4  3/31/2014 - Present        Malignant [facility] ([facility] ICD-10-CM: C67.9  ICD-9-CM: 188.9  3/20/2014 - Present        Hematuria ICD-10-CM: R31.9  ICD-9-CM: 599.70  3/17/2014 - Present        Retention of urine ICD-10-CM: R33.9  ICD-9-CM: 788.20  2/17/2014 - Present        Neurogenic bladder ICD-10-CM: N31.9  ICD-9-CM: 596.54  2/17/2014 - Present        CVA (cerebral vascular accident) ([facility] [address] ICD-10-CM: I63.9  ICD-9-CM: 434.91  Unknown - Present        HTN (hypertension) ICD-10-CM: I10  ICD-9-CM: 401.9  Unknown - Present        Dysphagia ICD-10-CM: R13.10  ICD-9-CM: 787.20  Unknown - Present        Basal ganglia disease ICD-10-CM: G25.9  ICD-9-CM: 333.90  Unknown - Present        Hx MRSA infection ICD-10-CM: Z86.14  ICD-9-CM: V12.04  Unknown - Present        Stroke ([facility] [address] ICD-10-CM: I63.9  ICD-9-CM: 434.91  Unknown - Present        Brain aneurysm ICD-10-CM: I67.1  ICD-9-CM: 437.3  Unknown - Present        Methicillin resistant Staphylococcus aureus infection ICD-10-CM: A49.02  ICD-9-CM: 041.12  12/13/2012 - Present        Urinary tract infection ICD-10-CM: N39.0  ICD-9-CM: 599.0  12/8/2012 - Present        Hyperosmolality and/or hypernatremia ICD-10-CM: E87.0  ICD-9-CM: 276.0  12/7/2012 - Present        Intracerebral hemorrhage ([facility] ICD-10-CM: I61.9  ICD-9-CM: 627  12/2/2012 - Present    Overview Signed 7/30/2014  3:45 PM by Jana Little     Overview:   RIGHT thalamic, w/ intravenrticular extension             Cerebral edema ([facility] ICD-10-CM: G93.6  ICD-9-CM: 348.5  12/2/2012 - Present        RESOLVED: Hemiplegia ([facility] ICD-10-CM: G81.90  ICD-9-CM: 342.90  12/2/2012 - 7/31/2014               Discharge Condition: Good    Hospital Course: To OR for above surgery. Post operatively, diet advanced. Pt tolerated solids with BM and flatus on D/C. Webb catheter remained in place as it was chronic indwelling. Remained afebrile throughout. Consults: None    Significant Diagnostic Studies: None    Disposition: long term care facility    Discharge Medications:   Current Discharge Medication List      CONTINUE these medications which have CHANGED    Details   oxyCODONE IR (ROXICODONE) 10 mg tab immediate release tablet Take 1 Tab by mouth every six (6) hours as needed. Max Daily Amount: 40 mg.  Qty: 40 Tab, Refills: 0    Associated Diagnoses: Malignant neoplasm of colon, unspecified part of colon ([facility] [address]         CONTINUE these medications which have NOT CHANGED    Details   atorvastatin (LIPITOR) 10 mg tablet Take 10 mg by mouth daily. calcium-vitamin D (OYSTER SHELL CALCIUM-VIT D3) 250-125 mg-unit tablet Take 1 Tab by mouth daily. mirtazapine (REMERON) 15 mg tablet Take  by mouth nightly. DIPHENHYDRAMINE HCL (BENADRYL ALLERGY PO) Take  by mouth. EPINEPHrine HCl, PF, (ADRENALIN) 1 mg/mL (1 mL) injection once.       ascorbic acid, vitamin C, (VITAMIN C) 500 mg tablet Take  by mouth. diclofenac (VOLTAREN) 1 % gel Apply  to affected area four (4) times daily. Menthol-Zinc Oxide (CALMOSEPTINE) 0.44-20.6 % oint Apply  to affected area. docusate sodium (COLACE) 100 mg capsule Take 100 mg by mouth two (2) times a day. food supplemt, lactose-reduced (ENSURE ACTIVE HIGH PROTEIN) liqd Take 237 mL by mouth four (4) times daily. promethazine (PHENERGAN) 25 mg tablet Take 25 mg by mouth every six (6) hours as needed for Nausea. citalopram (CELEXA) 10 mg tablet Take  by mouth daily. lisinopril (PRINIVIL, ZESTRIL) 5 mg tablet Take 1 Tab by mouth daily. Qty: 90 Tab, Refills: 3      ferrous sulfate (IRON, FERROUS SULFATE,) 325 mg (65 mg iron) tablet Take 1 Tab by mouth Daily (before breakfast). Qty: 90 Tab, Refills: 3      oxybutynin chloride XL (DITROPAN XL) 10 mg CR tablet Take 1 Tab by mouth two (2) times a day. Qty: 60 Tab, Refills: 5      calcium citrate-vitamin d3 (CITRACAL+D) 315-200 mg-unit Tab Take 1 Tab by mouth daily (with breakfast). vitamin c-vitamin e (CRANBERRY CONCENTRATE) cap Take  by mouth. MULTIVIT, IRON, MIN NO. 8, FA (THERAGRAN-M PO) Take  by mouth. Cholecalciferol, Vitamin D3, (VITAMIN D3) 1,000 unit cap Take  by mouth. STOP taking these medications       polyethylene glycol (MIRALAX) 17 gram packet Comments:   Reason for Stopping:         oxyCODONE-acetaminophen (PERCOCET) 5-325 mg per tablet Comments:   Reason for Stopping:               Activity: Activity as tolerated and No heavy lifting for 6 weeks. Diet: Regular Diet  Wound Care: None needed    Follow-up Appointments   Procedures    FOLLOW UP VISIT Appointment in: Two Weeks     Standing Status:   Standing     Number of Occurrences:   1     Order Specific Question:   Appointment in     Answer:    Two Weeks       Signed By: Ralf Hankins MD     March 23, 2018

## 2018-03-23 NOTE — PROGRESS NOTES
D/C pt to [facility] and report given to Ms. Rafael Mahoney.   Removed iv's and paperwork given to transporter along with Rx, no c/o pain at this time

## 2018-03-23 NOTE — PROGRESS NOTES
Care Management Interventions  PCP Verified by CM: Yes  Palliative Care Criteria Met (RRAT>21 & CHF Dx)?: No  Mode of Transport at Discharge: BLS ([facility], [facility], [facility] at [facility])  Hospital Transport Time of Discharge: Jarrell Cleary 429 (CM Consult): [address]  Discharge Durable Medical Equipment: No  Physical Therapy Consult: No  Occupational Therapy Consult: No  Speech Therapy Consult: No  Current Support Network: [address] [place] [address]  Confirm Follow Up Transport: Other (see comment) (In his LTC nursing facility)  Plan discussed with Pt/Family/Caregiver: Yes  Freedom of Choice Offered: Yes  Discharge Location  Discharge Placement: [address] confirmed for me at bedside that he lives in [address] [facility] at [place] [address], called and spoke with Vicenta Torre he still has his bed waiting on him. [facility] transport set up for 1700.

## 2018-03-23 NOTE — PROGRESS NOTES
[facility]  [address] [place] [address]  [phone]  Colon and Rectal Surgery Progress Note      Patient: Carissa Hernandes MRN: [id number]  SSN: [id number]    YOB: 1945  Age: 81 y.o. Sex: male      Admit Date: 3/14/2018    LOS: 9 days     Subjective:     BM. Tolerating diet. Passing flatus.     Objective:     Vitals:    03/22/18 1509 03/22/18 2016 03/23/18 0411 03/23/18 0635   BP: 128/78 126/83 130/81 104/81   Pulse: 78 (!) 103 99 94   Resp: 18 18 20 14   Temp: 97.3 °F (36.3 °C) 99.9 °F (37.7 °C) 98.9 °F (37.2 °C) 98.5 °F (36.9 °C)   SpO2: 97% 96% 97% 97%   Weight:       Height:            Intake and Output:  Current Shift:    Last three shifts: 03/21 1901 - 03/23 0700  In: 2592.1 [P.O.:390; I.V.:2202.1]  Out: 3546 [Urine:3320]    Physical Exam:     abd soft miguelito tender, non-distended  Lab/Data Review:    BMP:   Lab Results   Component Value Date/Time     03/23/2018 04:45 AM    K 4.3 03/23/2018 04:45 AM     (H) 03/23/2018 04:45 AM    CO2 16 (L) 03/23/2018 04:45 AM    AGAP 11 03/23/2018 04:45 AM     (H) 03/23/2018 04:45 AM    BUN 6 (L) 03/23/2018 04:45 AM    CREA 0.82 03/23/2018 04:45 AM    GFRAA >60 03/23/2018 04:45 AM    GFRNA >60 03/23/2018 04:45 AM     CBC:   Lab Results   Component Value Date/Time    WBC 15.2 (H) 03/23/2018 04:45 AM    HGB 10.1 (L) 03/23/2018 04:45 AM    HCT 34.1 (L) 03/23/2018 04:45 AM     03/23/2018 04:45 AM        Assessment:     POD 8 s/p lap R colectomy for ascending colon polyp    Plan:     D/C to SNF    Signed By: Chetan Alas MD        March 23, 2018

## 2018-03-23 NOTE — ROUTINE PROCESS
0115: Stable and sleeping. No change of care from the previous shift. Will continue to monitor the pt.

## 2018-04-09 ENCOUNTER — OFFICE VISIT (OUTPATIENT)
Dept: SURGERY | Age: 73
End: 2018-04-09

## 2018-04-09 VITALS
HEIGHT: 68 IN | RESPIRATION RATE: 17 BRPM | BODY MASS INDEX: 22.73 KG/M2 | HEART RATE: 80 BPM | OXYGEN SATURATION: 93 % | WEIGHT: 150 LBS | TEMPERATURE: 98.3 F

## 2018-04-09 DIAGNOSIS — C18.2 MALIGNANT NEOPLASM OF ASCENDING COLON (HCC): Primary | ICD-10-CM

## 2018-04-09 RX ORDER — POLYETHYLENE GLYCOL 3350 17 G/17G
17 POWDER, FOR SOLUTION ORAL DAILY
COMMUNITY

## 2018-04-09 NOTE — LETTER
4/9/2018 12:19 PM 
 
Patient:  Juan J Church YOB: 1945 Date of Visit: 4/9/2018 Juliana Lopez MD 
[address] [place] [address] [address] [address] [place] [address] VIA Facsimile: [phone] Jacoby Manzano MD 
[address] [facility] [address] VIA Facsimile: [phone] Dear Mike Leblanc is seen here in follow-up after his laparoscopic right colectomy for what turned out to be a malignant polyp of the ascending colon. There was invasion of the stalk but not beyond, and all lymph nodes were negative for metastatic disease. His post-operative course was without complication. In the office today he tells me he is tolerating a diet and is without complaint. His exam is benign. He will follow-up in 1 month. Given the stage of his disease I will not refer him for chemotherapy. I will be sure he returns to you in a year for colonoscopy. Thank you very much for your referral of Mr. Umang James. If you have questions, please do not hesitate to call me. I look forward to following Mr. Ponce Albrecht along with you and will keep you updated as to his progress. Sincerely, Thong Langford MD

## 2018-04-09 NOTE — PROGRESS NOTES
1. Have you been to the ER, urgent care clinic since your last visit? Hospitalized since your last visit? No    2. Have you seen or consulted any other health care providers outside of the [facility] since your last visit? Include any pap smears or colon screening.  No

## 2018-04-09 NOTE — PROGRESS NOTES
Subjective: He is tolerating a diet. He has not had a bowel movement in 3-4 days. Past medical history and ROS were reviewed and unchanged. Abd: soft, NTND  Wounds healed, no hernia    Pathology T1 cancer    Assessment / Plan    Status post lap scopic right colectomy for malignant polyp in the ascending colon, TERI  Start MiraLAX once daily  F/U 1 month    The diagnoses and plan were discussed with patient. All questions answered. Plan of care agreed to by all concerned.

## 2018-04-16 ENCOUNTER — HOSPITAL ENCOUNTER (EMERGENCY)
Age: 73
Discharge: NURSING, REHAB OR LONG-TERM CARE | End: 2018-04-16
Attending: EMERGENCY MEDICINE
Payer: MEDICARE

## 2018-04-16 VITALS
SYSTOLIC BLOOD PRESSURE: 113 MMHG | OXYGEN SATURATION: 100 % | DIASTOLIC BLOOD PRESSURE: 78 MMHG | HEART RATE: 72 BPM | HEIGHT: 68 IN | TEMPERATURE: 99.1 F | WEIGHT: 150 LBS | BODY MASS INDEX: 22.73 KG/M2 | RESPIRATION RATE: 16 BRPM

## 2018-04-16 DIAGNOSIS — T83.9XXA FOLEY CATHETER PROBLEM, INITIAL ENCOUNTER (HCC): Primary | ICD-10-CM

## 2018-04-16 LAB
APPEARANCE UR: ABNORMAL
BACTERIA URNS QL MICRO: ABNORMAL /HPF
BILIRUB UR QL: ABNORMAL
COLOR UR: ABNORMAL
EPITH CASTS URNS QL MICRO: NEGATIVE /LPF (ref 0–5)
GLUCOSE UR STRIP.AUTO-MCNC: NEGATIVE MG/DL
HGB UR QL STRIP: ABNORMAL
KETONES UR QL STRIP.AUTO: NEGATIVE MG/DL
LEUKOCYTE ESTERASE UR QL STRIP.AUTO: ABNORMAL
MUCOUS THREADS URNS QL MICRO: ABNORMAL /LPF
NITRITE UR QL STRIP.AUTO: NEGATIVE
PH UR STRIP: 7 [PH] (ref 5–8)
PROT UR STRIP-MCNC: 300 MG/DL
RBC #/AREA URNS HPF: ABNORMAL /HPF (ref 0–5)
SP GR UR REFRACTOMETRY: 1.02 (ref 1–1.03)
UROBILINOGEN UR QL STRIP.AUTO: 0.2 EU/DL (ref 0.2–1)
WBC URNS QL MICRO: ABNORMAL /HPF (ref 0–4)

## 2018-04-16 PROCEDURE — 87086 URINE CULTURE/COLONY COUNT: CPT | Performed by: EMERGENCY MEDICINE

## 2018-04-16 PROCEDURE — 99285 EMERGENCY DEPT VISIT HI MDM: CPT

## 2018-04-16 PROCEDURE — 87077 CULTURE AEROBIC IDENTIFY: CPT | Performed by: EMERGENCY MEDICINE

## 2018-04-16 PROCEDURE — 81001 URINALYSIS AUTO W/SCOPE: CPT | Performed by: EMERGENCY MEDICINE

## 2018-04-16 PROCEDURE — 74011000250 HC RX REV CODE- 250: Performed by: EMERGENCY MEDICINE

## 2018-04-16 PROCEDURE — 74011250637 HC RX REV CODE- 250/637: Performed by: EMERGENCY MEDICINE

## 2018-04-16 PROCEDURE — 87186 SC STD MICRODIL/AGAR DIL: CPT | Performed by: EMERGENCY MEDICINE

## 2018-04-16 RX ORDER — LIDOCAINE HYDROCHLORIDE 20 MG/ML
JELLY TOPICAL ONCE
Status: COMPLETED | OUTPATIENT
Start: 2018-04-16 | End: 2018-04-16

## 2018-04-16 RX ORDER — SULFAMETHOXAZOLE AND TRIMETHOPRIM 800; 160 MG/1; MG/1
1 TABLET ORAL 2 TIMES DAILY
Qty: 20 TAB | Refills: 0 | Status: SHIPPED | OUTPATIENT
Start: 2018-04-16 | End: 2018-04-26

## 2018-04-16 RX ORDER — NITROFURANTOIN (MACROCRYSTALS) 100 MG/1
100 CAPSULE ORAL 2 TIMES DAILY
Qty: 20 CAP | Refills: 0 | Status: SHIPPED | OUTPATIENT
Start: 2018-04-16 | End: 2018-04-26

## 2018-04-16 RX ORDER — SULFAMETHOXAZOLE AND TRIMETHOPRIM 800; 160 MG/1; MG/1
1 TABLET ORAL
Status: COMPLETED | OUTPATIENT
Start: 2018-04-16 | End: 2018-04-16

## 2018-04-16 RX ORDER — NITROFURANTOIN MACROCRYSTALS 50 MG/1
100 CAPSULE ORAL ONCE
Status: COMPLETED | OUTPATIENT
Start: 2018-04-16 | End: 2018-04-16

## 2018-04-16 RX ADMIN — LIDOCAINE HYDROCHLORIDE: 20 JELLY TOPICAL at 06:06

## 2018-04-16 RX ADMIN — NITROFURANTOIN MACROCRYSTALS 100 MG: 50 CAPSULE ORAL at 07:36

## 2018-04-16 RX ADMIN — SULFAMETHOXAZOLE AND TRIMETHOPRIM 1 TABLET: 800; 160 TABLET ORAL at 07:35

## 2018-04-16 NOTE — ED PROVIDER NOTES
EMERGENCY DEPARTMENT HISTORY AND PHYSICAL EXAM    4:17 AM      Date: 4/16/2018  Patient Name: Fito Burgess    History of Presenting Illness     Chief Complaint   Patient presents with    Other     perales catheter stuck in penis         History Provided By: Patient and Nursing home    Chief Complaint: Perales Catheter Problem   Duration: 1 Days  Timing:  Constant  Location: Penis   Quality: N/A  Severity: N/A  Modifying Factors: None   Associated Symptoms: denies any other associated signs or symptoms      Additional History (Context): Fito Burgess is a 81 y.o. male with hx of HTN, bladder cancer, brain aneurysm and CVA presenting to the ED via EMS from [facility] with c/o constant perales catheter problem. Caregiver at nursing home facility was unable to replace perales catheter. Pt reports he has had a perales placed about 2 years ago and has them replaced ever 2-3 weeks. States this catheter was placed 2 weeks ago. Denies any fever, chills, abdominal pain, nausea, vomiting, diarrhea, hematuria, dysuria, penile pain or penile discharge. As the patient is without complaints of pain, there is no severity of pain, quality of pain, modifying factors, or associated signs and symptoms regarding the pt's presenting complaint. No other sx or complaints given at this time. PCP: Reggie Em MD    Current Outpatient Prescriptions   Medication Sig Dispense Refill    trimethoprim-sulfamethoxazole (BACTRIM DS, SEPTRA DS) 160-800 mg per tablet Take 1 Tab by mouth two (2) times a day for 10 days. 20 Tab 0    nitrofurantoin (MACRODANTIN) 100 mg capsule Take 1 Cap by mouth two (2) times a day for 10 days. 20 Cap 0    polyethylene glycol (MIRALAX) 17 gram packet Take 17 g by mouth daily.  oxyCODONE IR (ROXICODONE) 10 mg tab immediate release tablet Take 1 Tab by mouth every six (6) hours as needed. Max Daily Amount: 40 mg. 40 Tab 0    atorvastatin (LIPITOR) 10 mg tablet Take 10 mg by mouth daily.  calcium-vitamin D (OYSTER SHELL CALCIUM-VIT D3) 250-125 mg-unit tablet Take 1 Tab by mouth daily.  mirtazapine (REMERON) 15 mg tablet Take  by mouth nightly.  DIPHENHYDRAMINE HCL (BENADRYL ALLERGY PO) Take  by mouth.  EPINEPHrine HCl, PF, (ADRENALIN) 1 mg/mL (1 mL) injection once.  ascorbic acid, vitamin C, (VITAMIN C) 500 mg tablet Take  by mouth.  diclofenac (VOLTAREN) 1 % gel Apply  to affected area four (4) times daily.  Menthol-Zinc Oxide (CALMOSEPTINE) 0.44-20.6 % oint Apply  to affected area.  docusate sodium (COLACE) 100 mg capsule Take 100 mg by mouth two (2) times a day.  food supplemt, lactose-reduced (ENSURE ACTIVE HIGH PROTEIN) liqd Take 237 mL by mouth four (4) times daily.  promethazine (PHENERGAN) 25 mg tablet Take 25 mg by mouth every six (6) hours as needed for Nausea.  citalopram (CELEXA) 10 mg tablet Take  by mouth daily.  lisinopril (PRINIVIL, ZESTRIL) 5 mg tablet Take 1 Tab by mouth daily. 90 Tab 3    ferrous sulfate (IRON, FERROUS SULFATE,) 325 mg (65 mg iron) tablet Take 1 Tab by mouth Daily (before breakfast). 90 Tab 3    oxybutynin chloride XL (DITROPAN XL) 10 mg CR tablet Take 1 Tab by mouth two (2) times a day. 60 Tab 5    calcium citrate-vitamin d3 (CITRACAL+D) 315-200 mg-unit Tab Take 1 Tab by mouth daily (with breakfast).  vitamin c-vitamin e (CRANBERRY CONCENTRATE) cap Take  by mouth.  MULTIVIT, IRON, MIN NO. 8, FA (THERAGRAN-M PO) Take  by mouth.  Cholecalciferol, Vitamin D3, (VITAMIN D3) 1,000 unit cap Take  by mouth.          Past History     Past Medical History:  Past Medical History:   Diagnosis Date    Abnormal computed tomography of bladder     Anemia     Arthritis     Basal ganglia disease     Benign prostatic hyperplasia with lower urinary tract symptoms     Bladder calculus     Bladder cancer ([facility] [address] 3/13/14    Bladder mass     Bladder tumor     Brain aneurysm     CVA (cerebral vascular accident) [facility]) [address]    hemiplegia    Dysphagia     Erythematous bladder mucosa     Webb catheter in place     Gross hematuria     HTN (hypertension)     Hx MRSA infection 09/2017    urine    Neurogenic bladder     Psychiatric disorder     Retention, urine     Stroke [facility])     Urethral stricture        Past Surgical History:  Past Surgical History:   Procedure Laterality Date    COLONOSCOPY N/A 1/15/2018    COLONOSCOPY w/ biopsies w/ polypectomy w/ ink injection performed by Breanne Hou MD at [address] HX GI      PEG TUBE--REMOVED    HX HEENT      H/O TRACH    HX UROLOGICAL      super pubic tube    HX UROLOGICAL  3/13/14    TURBT, Dr. Lynette Carrera    HX UROLOGICAL  10/8/15    Cysto, Dr. Debra Ferguson, [facility]    LAP,SURG,COLECTOMY, PARTIAL, W/ANAST N/A 03/15/2018    Dr. Mitchell Lindsay       Family History:  Family History   Problem Relation Age of Onset    Hypertension Mother     Hypertension Father        Social History:  Social History   Substance Use Topics    Smoking status: Former Smoker     Quit date: 1/29/2010    Smokeless tobacco: Never Used    Alcohol use No       Allergies: Allergies   Allergen Reactions    Pcn [Penicillins] Other (comments) and Itching     intolerance         Review of Systems       Review of Systems   Constitutional: Negative for chills and fever. HENT: Negative for rhinorrhea. Respiratory: Negative for shortness of breath. Cardiovascular: Negative for chest pain. Gastrointestinal: Negative for abdominal pain, nausea and vomiting. Endocrine: Negative for polyuria. Genitourinary: Negative for dysuria. Webb catheter problem   Musculoskeletal: Negative for back pain. Skin: Negative for rash. Neurological: Negative for headaches. All other systems reviewed and are negative. Physical Exam   No data found. Physical Exam   Constitutional: He is oriented to person, place, and time. He appears well-developed and well-nourished. Speaking in full sentences   HENT:   Head: Normocephalic and atraumatic. Eyes: Conjunctivae are normal. Pupils are equal, round, and reactive to light. Neck: Normal range of motion. Neck supple. Cardiovascular: Normal rate and regular rhythm. Pulmonary/Chest: Effort normal and breath sounds normal. No respiratory distress. He has no wheezes. Abdominal: Soft. Bowel sounds are normal. He exhibits no distension. There is no tenderness. There is no rebound and no guarding. Genitourinary:   Genitourinary Comments: Perales catheter in place in penis   Musculoskeletal: Normal range of motion. Lower extremities contracted at baseline    Neurological: He is alert and oriented to person, place, and time. Skin: Skin is warm and dry. Psychiatric: He has a normal mood and affect. Thought content normal.   Nursing note and vitals reviewed. Diagnostic Study Results     Labs -  No results found for this or any previous visit (from the past 12 hour(s)). Radiologic Studies -   No results found. Medical Decision Making   I am the first provider for this patient. I reviewed the vital signs, available nursing notes, past medical history, past surgical history, family history and social history. Vital Signs-Reviewed the patient's vital signs. Pulse Oximetry Analysis -  96% on room air, stable     Cardiac Monitor:  Rate: 72  Rhythm:  Normal Sinus Rhythm     Records Reviewed: Nursing Notes and Old Medical Records (Time of Review: 4:17 AM)    Provider Notes (Medical Decision Making): Patient with perales catheter issue. Catheter was removed here in ED. Nursing placed new perales. UA with possible UTI. Will cover for both bacteria seen previously on urine cultures. ED Course: Progress Notes, Reevaluation, and Consults:    Diagnosis     Clinical Impression:   1.  Perales catheter problem, initial encounter [facility])        Disposition: Discharge    Follow-up Information     Follow up With Details Comments [address], [place] In 1 day  [facility]  [phone]             Discharge Medication List as of 4/16/2018  7:25 AM      START taking these medications    Details   trimethoprim-sulfamethoxazole (BACTRIM DS, SEPTRA DS) 160-800 mg per tablet Take 1 Tab by mouth two (2) times a day for 10 days. , Print, Disp-20 Tab, R-0      nitrofurantoin (MACRODANTIN) 100 mg capsule Take 1 Cap by mouth two (2) times a day for 10 days. , Print, Disp-20 Cap, R-0         CONTINUE these medications which have NOT CHANGED    Details   polyethylene glycol (MIRALAX) 17 gram packet Take 17 g by mouth daily. , Historical Med      oxyCODONE IR (ROXICODONE) 10 mg tab immediate release tablet Take 1 Tab by mouth every six (6) hours as needed. Max Daily Amount: 40 mg., Print, Disp-40 Tab, R-0      atorvastatin (LIPITOR) 10 mg tablet Take 10 mg by mouth daily. , Historical Med      calcium-vitamin D (OYSTER SHELL CALCIUM-VIT D3) 250-125 mg-unit tablet Take 1 Tab by mouth daily. , Historical Med      mirtazapine (REMERON) 15 mg tablet Take  by mouth nightly., Historical Med      DIPHENHYDRAMINE HCL (BENADRYL ALLERGY PO) Take  by mouth., Historical Med      EPINEPHrine HCl, PF, (ADRENALIN) 1 mg/mL (1 mL) injection once., Historical Med      ascorbic acid, vitamin C, (VITAMIN C) 500 mg tablet Take  by mouth., Historical Med      diclofenac (VOLTAREN) 1 % gel Apply  to affected area four (4) times daily. , Historical Med      Menthol-Zinc Oxide (CALMOSEPTINE) 0.44-20.6 % oint Apply  to affected area., Historical Med      docusate sodium (COLACE) 100 mg capsule Take 100 mg by mouth two (2) times a day., Historical Med      food supplemt, lactose-reduced (ENSURE ACTIVE HIGH PROTEIN) liqd Take 237 mL by mouth four (4) times daily. , Historical Med      promethazine (PHENERGAN) 25 mg tablet Take 25 mg by mouth every six (6) hours as needed for Nausea., Historical Med      citalopram (CELEXA) 10 mg tablet Take  by mouth daily. , Historical Med      lisinopril (PRINIVIL, ZESTRIL) 5 mg tablet Take 1 Tab by mouth daily. , Normal, Disp-90 Tab, R-3      ferrous sulfate (IRON, FERROUS SULFATE,) 325 mg (65 mg iron) tablet Take 1 Tab by mouth Daily (before breakfast). , Normal, Disp-90 Tab, R-3      oxybutynin chloride XL (DITROPAN XL) 10 mg CR tablet Take 1 Tab by mouth two (2) times a day., Normal, Disp-60 Tab, R-5      calcium citrate-vitamin d3 (CITRACAL+D) 315-200 mg-unit Tab Take 1 Tab by mouth daily (with breakfast). Historical Med, 1 Tab      vitamin c-vitamin e (CRANBERRY CONCENTRATE) cap Take  by mouth. Historical Med      MULTIVIT, IRON, MIN NO. 8, FA (THERAGRAN-M PO) Take  by mouth. Historical Med      Cholecalciferol, Vitamin D3, (VITAMIN D3) 1,000 unit cap Take  by mouth. Historical Med           _______________________________    Attestations:  Scribe [address] [place] [address] acting as a scribe for and in the presence of Charli Ibarra MD      April 16, 2018 at 4:17 AM       Provider Attestation:      I personally performed the services described in the documentation, reviewed the documentation, as recorded by the scribe in my presence, and it accurately and completely records my words and actions.  April 16, 2018 at 4:17 AM - Charli Ibarra MD    _______________________________

## 2018-04-16 NOTE — ED TRIAGE NOTES
Pt arrived via EMS, per medic pt is a resident in [address] Per medic facility staff tried to remove perales catheter   and were unable to perales catheter was cut and staff was still unable to remove it  Staff reported to medic before cutting perales they removed 10 ML.

## 2018-04-16 NOTE — ED NOTES
Bedside shift change report given to Radha Armstrong RN (oncoming nurse) by Justyn Knapp RN (offgoing nurse). Report included the following information SBAR, ED Summary, MAR and Recent Results. Pt awake in bed with call button in hand. Turned pt room lights on per his request. Pt in NAD at this time resting comfortably on stretcher.

## 2018-04-16 NOTE — ED NOTES
I have reviewed discharge instructions with the patient. The patient verbalized understanding. Pt left ED in NAD, nonambulatory, safety intact.

## 2018-04-20 LAB
BACTERIA SPEC CULT: ABNORMAL
BACTERIA SPEC CULT: ABNORMAL
SERVICE CMNT-IMP: ABNORMAL

## 2018-04-25 PROBLEM — C18.2 COLON CANCER, ASCENDING (HCC): Status: ACTIVE | Noted: 2018-04-25

## 2018-05-10 ENCOUNTER — OFFICE VISIT (OUTPATIENT)
Dept: SURGERY | Age: 73
End: 2018-05-10

## 2018-05-10 VITALS
RESPIRATION RATE: 16 BRPM | BODY MASS INDEX: 22.73 KG/M2 | HEART RATE: 65 BPM | WEIGHT: 150 LBS | TEMPERATURE: 98.3 F | HEIGHT: 68 IN | OXYGEN SATURATION: 97 %

## 2018-05-10 DIAGNOSIS — C18.2 COLON CANCER, ASCENDING (HCC): Primary | ICD-10-CM

## 2018-05-10 NOTE — PROGRESS NOTES
Subjective: He is tolerating diet moving his bowels albeit only every 4-5 days. He states this is his baseline. He is on MiraLAX and a stool softener. Past medical history and ROS were reviewed and unchanged. Abdomen: Soft, nontender nondistended  Wound healed, no hernia    Assessment / Plan    Status post laparoscopic right colectomy for T1N0 malignant polyp of the ascending colon March 2018, TERI  Follow up in 3 months for surveillance    The diagnoses and plan were discussed with patient. All questions answered. Plan of care agreed to by all concerned.

## 2018-05-30 ENCOUNTER — HOSPITAL ENCOUNTER (EMERGENCY)
Age: 73
Discharge: HOME | End: 2018-05-30
Attending: EMERGENCY MEDICINE
Payer: MEDICARE

## 2018-05-30 VITALS
WEIGHT: 150 LBS | OXYGEN SATURATION: 99 % | DIASTOLIC BLOOD PRESSURE: 72 MMHG | TEMPERATURE: 98.6 F | RESPIRATION RATE: 18 BRPM | SYSTOLIC BLOOD PRESSURE: 112 MMHG | HEART RATE: 78 BPM | BODY MASS INDEX: 22.81 KG/M2

## 2018-05-30 DIAGNOSIS — T83.9XXA PROBLEM WITH FOLEY CATHETER, INITIAL ENCOUNTER (HCC): Primary | ICD-10-CM

## 2018-05-30 PROCEDURE — 77030005514 HC CATH URETH FOL14 BARD -A

## 2018-05-30 PROCEDURE — 99284 EMERGENCY DEPT VISIT MOD MDM: CPT

## 2018-05-30 NOTE — ED PROVIDER NOTES
EMERGENCY DEPARTMENT HISTORY AND PHYSICAL EXAM    3:11 PM      Date: 5/30/2018  Patient Name: Juan Carlos Rubi    History of Presenting Illness     Chief Complaint   Patient presents with    Urinary Catheter Problem         History Provided By: Patient    Chief Complaint: catheter problem  Duration: 1 Days  Timing:  Acute  Location: urinary   Quality: n/a  Severity: N/A  Modifying Factors: none  Associated Symptoms: denies any other associated signs or symptoms      Additional History (Context): Juan Carlos Rubi is a 81 y.o. male with hypertension and stroke who presents with 1 day of acute onset urinary catheter problem. Pt reports that his catheter is clogged and not draining. No other concerns or symptoms at this time. PCP: Cary Berg MD    Current Outpatient Prescriptions   Medication Sig Dispense Refill    polyethylene glycol (MIRALAX) 17 gram packet Take 17 g by mouth daily.  oxyCODONE IR (ROXICODONE) 10 mg tab immediate release tablet Take 1 Tab by mouth every six (6) hours as needed. Max Daily Amount: 40 mg. 40 Tab 0    atorvastatin (LIPITOR) 10 mg tablet Take 10 mg by mouth daily.  calcium-vitamin D (OYSTER SHELL CALCIUM-VIT D3) 250-125 mg-unit tablet Take 1 Tab by mouth daily.  mirtazapine (REMERON) 15 mg tablet Take  by mouth nightly.  DIPHENHYDRAMINE HCL (BENADRYL ALLERGY PO) Take  by mouth.  EPINEPHrine HCl, PF, (ADRENALIN) 1 mg/mL (1 mL) injection once.  ascorbic acid, vitamin C, (VITAMIN C) 500 mg tablet Take  by mouth.  diclofenac (VOLTAREN) 1 % gel Apply  to affected area four (4) times daily.  Menthol-Zinc Oxide (CALMOSEPTINE) 0.44-20.6 % oint Apply  to affected area.  docusate sodium (COLACE) 100 mg capsule Take 100 mg by mouth two (2) times a day.  food supplemt, lactose-reduced (ENSURE ACTIVE HIGH PROTEIN) liqd Take 237 mL by mouth four (4) times daily.       promethazine (PHENERGAN) 25 mg tablet Take 25 mg by mouth every six (6) hours as needed for Nausea.  citalopram (CELEXA) 10 mg tablet Take  by mouth daily.  lisinopril (PRINIVIL, ZESTRIL) 5 mg tablet Take 1 Tab by mouth daily. 90 Tab 3    ferrous sulfate (IRON, FERROUS SULFATE,) 325 mg (65 mg iron) tablet Take 1 Tab by mouth Daily (before breakfast). 90 Tab 3    oxybutynin chloride XL (DITROPAN XL) 10 mg CR tablet Take 1 Tab by mouth two (2) times a day. 60 Tab 5    calcium citrate-vitamin d3 (CITRACAL+D) 315-200 mg-unit Tab Take 1 Tab by mouth daily (with breakfast).  vitamin c-vitamin e (CRANBERRY CONCENTRATE) cap Take  by mouth.  MULTIVIT, IRON, MIN NO. 8, FA (THERAGRAN-M PO) Take  by mouth.  Cholecalciferol, Vitamin D3, (VITAMIN D3) 1,000 unit cap Take  by mouth.          Past History     Past Medical History:  Past Medical History:   Diagnosis Date    Abnormal computed tomography of bladder     Anemia     Arthritis     Basal ganglia disease     Benign prostatic hyperplasia with lower urinary tract symptoms     Bladder calculus     Bladder cancer ([facility] 3/13/14    Bladder mass     Bladder tumor     Brain aneurysm     CVA (cerebral vascular accident) ([facility] DEC 2012    hemiplegia    Dysphagia     Erythematous bladder mucosa     Webb catheter in place     Gross hematuria     HTN (hypertension)     Hx MRSA infection 09/2017    urine    Neurogenic bladder     Psychiatric disorder     Retention, urine     Stroke ([facility]     Urethral stricture        Past Surgical History:  Past Surgical History:   Procedure Laterality Date    COLONOSCOPY N/A 1/15/2018    COLONOSCOPY w/ biopsies w/ polypectomy w/ ink injection performed by Sagrario Miller MD at [address] [place] [address] HX GI      PEG TUBE--REMOVED    HX HEENT      H/O [facility]    HX UROLOGICAL      super pubic tube    HX UROLOGICAL  3/13/14    TURBT, Dr. Denise Miller, [address] [place] [address] UROLOGICAL  10/8/15    Cysto, Dr. Cheyenne Crenshaw, [facility]    LAP,SURG,COLECTOMY, PARTIAL, W/ANAST N/A 03/15/2018    Dr. Greg Mauricio Family History:  Family History   Problem Relation Age of Onset    Hypertension Mother     Hypertension Father        Social History:  Social History   Substance Use Topics    Smoking status: Former Smoker     Quit date: 1/29/2010    Smokeless tobacco: Never Used    Alcohol use No       Allergies: Allergies   Allergen Reactions    Pcn [Penicillins] Other (comments) and Itching     intolerance         Review of Systems       Review of Systems   All other systems reviewed and are negative. Physical Exam     Visit Vitals    /70 (BP 1 Location: Right arm)    Pulse 72    Temp 99.5 °F (37.5 °C)    Resp 18    SpO2 99%         Physical Exam   Constitutional: He appears well-developed and well-nourished. Alert and appropriate in no apparent marked discomfort or acute respiratory distress   HENT:   Head: Normocephalic and atraumatic. Right Ear: External ear normal.   Left Ear: External ear normal.   Mouth/Throat: Oropharynx is clear and moist. No oropharyngeal exudate. Eyes: Conjunctivae and EOM are normal. Pupils are equal, round, and reactive to light. Right eye exhibits no discharge. Left eye exhibits no discharge. No scleral icterus. Neck: Normal range of motion. Neck supple. No tracheal deviation present. No thyromegaly present. Cardiovascular: Normal rate, regular rhythm, normal heart sounds and intact distal pulses. No murmur heard. Pulmonary/Chest: Effort normal and breath sounds normal. No respiratory distress. He has no wheezes. He has no rales. Abdominal: Soft. Bowel sounds are normal. He exhibits no distension. There is no tenderness. There is no rebound and no guarding. Genitourinary: Penis normal. No penile tenderness. Genitourinary Comments: Catheter in place with dark yellow urine in tubing and bag; no evidence of purulent drainage or hemorrhage   Musculoskeletal: Normal range of motion. He exhibits no edema or tenderness.    Lymphadenopathy:     He has no cervical adenopathy. Neurological: He is alert. He exhibits abnormal muscle tone. Lower extremity weakness/atrophy   Skin: Skin is warm. No rash noted. No erythema. Psychiatric: He has a normal mood and affect. His behavior is normal. Judgment and thought content normal.   Nursing note and vitals reviewed. Diagnostic Study Results     Labs -  No results found for this or any previous visit (from the past 12 hour(s)). Radiologic Studies -   No orders to display         Medical Decision Making   I am the first provider for this patient. I reviewed the vital signs, available nursing notes, past medical history, past surgical history, family history and social history. Vital Signs-Reviewed the patient's vital signs. Records Reviewed: Nursing Notes (Time of Review: 3:11 PM)    ED Course: Progress Notes, Reevaluation, and Consults:  Catheter changed out without difficulty without evidence of large amount of retained urine. Provider Notes (Medical Decision Making): Patient with reportedly poorly functioning perales without evidence of marked bladder distension, fever, or focal intraabdominal infection. Penis examination is reassuring without signs of breakdown or cellulitis. Will replace catheter and have continue regular treatments if replaced without difficulty. Diagnosis     Clinical Impression:   1. Problem with Perales catheter, initial encounter [facility])        Disposition: Discharge    Follow-up Information     Follow up With Details Comments Contact Info    [facility] EMERGENCY DEPT  As needed, If symptoms worsen Scott Nino MD In 1 day As needed for any persistent new symptoms [address] [place] [address]  [phone]             Patient's Medications   Start Taking    No medications on file   Continue Taking    ASCORBIC ACID, VITAMIN C, (VITAMIN C) 500 MG TABLET    Take  by mouth. ATORVASTATIN (LIPITOR) 10 MG TABLET    Take 10 mg by mouth daily. CALCIUM CITRATE-VITAMIN D3 (CITRACAL+D) 315-200 MG-UNIT TAB    Take 1 Tab by mouth daily (with breakfast). CALCIUM-VITAMIN D (OYSTER SHELL CALCIUM-VIT D3) 250-125 MG-UNIT TABLET    Take 1 Tab by mouth daily. CHOLECALCIFEROL, VITAMIN D3, (VITAMIN D3) 1,000 UNIT CAP    Take  by mouth. CITALOPRAM (CELEXA) 10 MG TABLET    Take  by mouth daily. DICLOFENAC (VOLTAREN) 1 % GEL    Apply  to affected area four (4) times daily. DIPHENHYDRAMINE HCL (BENADRYL ALLERGY PO)    Take  by mouth. DOCUSATE SODIUM (COLACE) 100 MG CAPSULE    Take 100 mg by mouth two (2) times a day. EPINEPHRINE HCL, PF, (ADRENALIN) 1 MG/ML (1 ML) INJECTION    once. FERROUS SULFATE (IRON, FERROUS SULFATE,) 325 MG (65 MG IRON) TABLET    Take 1 Tab by mouth Daily (before breakfast). FOOD SUPPLEMT, LACTOSE-REDUCED (ENSURE ACTIVE HIGH PROTEIN) LIQD    Take 237 mL by mouth four (4) times daily. LISINOPRIL (PRINIVIL, ZESTRIL) 5 MG TABLET    Take 1 Tab by mouth daily. MENTHOL-ZINC OXIDE (CALMOSEPTINE) 0.44-20.6 % OINT    Apply  to affected area. MIRTAZAPINE (REMERON) 15 MG TABLET    Take  by mouth nightly. MULTIVIT, IRON, MIN NO. 8, FA (THERAGRAN-M PO)    Take  by mouth. OXYBUTYNIN CHLORIDE XL (DITROPAN XL) 10 MG CR TABLET    Take 1 Tab by mouth two (2) times a day. OXYCODONE IR (ROXICODONE) 10 MG TAB IMMEDIATE RELEASE TABLET    Take 1 Tab by mouth every six (6) hours as needed. Max Daily Amount: 40 mg. POLYETHYLENE GLYCOL (MIRALAX) 17 GRAM PACKET    Take 17 g by mouth daily. PROMETHAZINE (PHENERGAN) 25 MG TABLET    Take 25 mg by mouth every six (6) hours as needed for Nausea. VITAMIN C-VITAMIN E (CRANBERRY CONCENTRATE) CAP    Take  by mouth.    These Medications have changed    No medications on file   Stop Taking    No medications on file     _______________________________    Attestations:  [id number] Nikita Goodrich acting as a scribe for and in the presence of Tia Eduardo MD      May 30, 2018 at 3:11 PM       Provider Attestation:      I personally performed the services described in the documentation, reviewed the documentation, as recorded by the scribe in my presence, and it accurately and completely records my words and actions.  May 30, 2018 at 3:11 PM - Tia Eduardo MD    _______________________________

## 2018-05-30 NOTE — ED NOTES
Pt's catheter changed. Minimal thick yellow output. MD Kaushik Chatman made aware, plan to discharge pt.

## 2018-05-30 NOTE — DISCHARGE INSTRUCTIONS
Resume normal medications  Return immediately for increasing pain, fever, clots in catheter or failure or Webb to function.

## 2018-05-30 NOTE — ED NOTES
Assumed care of pt from EMS. Pt reports to ED with catheter complications. Pt states \"they told me my catheter was clogged. \" pt noted to have yellow drainage around catheter insertion and depends. Pt A&Ox4. Denies N/V/F/C. Will continue to monitor and intervene as necessary.

## 2018-08-16 ENCOUNTER — OFFICE VISIT (OUTPATIENT)
Dept: SURGERY | Age: 73
End: 2018-08-16

## 2018-08-16 ENCOUNTER — DOCUMENTATION ONLY (OUTPATIENT)
Dept: SURGERY | Age: 73
End: 2018-08-16

## 2018-08-16 VITALS — DIASTOLIC BLOOD PRESSURE: 70 MMHG | TEMPERATURE: 98.5 F | RESPIRATION RATE: 18 BRPM | SYSTOLIC BLOOD PRESSURE: 104 MMHG

## 2018-08-16 DIAGNOSIS — Z85.00 PERSONAL HISTORY OF MALIGNANT NEOPLASM OF GASTROINTESTINAL TRACT: ICD-10-CM

## 2018-08-16 DIAGNOSIS — Z08 ENCOUNTER FOR ROUTINE CANCER FOLLOW-UP: Primary | ICD-10-CM

## 2018-08-16 NOTE — MR AVS SNAPSHOT
[address] [facility] [address] [facility] 
[phone] Patient: Palmer Neal MRN: [id number] Visit Information Date & Time Provider Department Dept. Phone Encounter #  
 2018 10:45 AM MD MAUDE Gerard [facility] [phone] [id number] Follow-up Instructions Return in about 4 months (around 2018). Your Appointments 2018 10:50 AM  
Nurse Visit with Kale Henriquez [facility] ([address]) Appt Note: 1mo Cath Change  [facility] [address] [place] [address]  
  
   
 [address] [address] [facility]  
  
    
 2018  9:30 AM  
Follow Up with MD MAUDE Gerard [facility] ([facility]) Appt Note: 4 month f/up [address] [facility] 240 [id number] [address] [facility] Upcoming Health Maintenance Date Due Hepatitis C Screening 1945 DTaP/Tdap/Td series (1 - Tdap) 1968 FOBT Q 1 YEAR AGE 50-75 2017 GLAUCOMA SCREENING Q2Y 3/3/2017 MEDICARE YEARLY EXAM 3/14/2018 Influenza Age 5 to Adult 2018 Allergies as of 2018  Review Complete On: 2018 By: Hany Lopez MD  
  
 Severity Noted Reaction Type Reactions Pcn [Penicillins]  2013    Other (comments), Itching  
 intolerance Current Immunizations  Never Reviewed Name Date Pneumococcal Conjugate (PCV-13) 2016  4:00 PM  
 Pneumococcal Vaccine (Unspecified Type) 10/9/2012 Not reviewed this visit You Were Diagnosed With   
  
 Codes Comments [facility]    -  Primary ICD-10-CM: C18.2 ICD-9-CM: 153.6 Vitals BP Temp Resp Smoking Status 104/70 98.5 °F (36.9 °C) 18 Former Smoker Vitals History Preferred Pharmacy Pharmacy Name Phone [address] [facility],[address] Monica [facility] [place] [phone] Your Updated Medication List  
  
   
This list is accurate as of 8/16/18 10:48 AM.  Always use your most recent med list.  
  
  
  
  
 atorvastatin 10 mg tablet Commonly known as:  LIPITOR Take 10 mg by mouth daily. BENADRYL ALLERGY PO Take  by mouth.  
  
 calcium citrate-vitamin d3 315-200 mg-unit Tab Commonly known as:  CITRACAL+D Take 1 Tab by mouth daily (with breakfast). citalopram 10 mg tablet Commonly known as:  Ovi Gola Take  by mouth daily. COLACE 100 mg capsule Generic drug:  docusate sodium Take 100 mg by mouth two (2) times a day. CRANBERRY CONCENTRATE Cap Generic drug:  vitamin c-vitamin e Take  by mouth. diclofenac 1 % Gel Commonly known as:  VOLTAREN Apply  to affected area four (4) times daily. ENSURE ACTIVE HIGH PROTEIN Liqd Generic drug:  food supplemt, lactose-reduced Take 237 mL by mouth four (4) times daily. EPINEPHrine HCl (PF) 1 mg/mL (1 mL) injection Commonly known as:  ADRENALIN  
once. ferrous sulfate 325 mg (65 mg iron) tablet Commonly known as:  Iron (Ferrous Sulfate) Take 1 Tab by mouth Daily (before breakfast). lisinopril 5 mg tablet Commonly known as:  Marilynne Shows Take 1 Tab by mouth daily. Menthol-Zinc Oxide 0.44-20.6 % Oint Commonly known as:  Calmoseptine Apply  to affected area. MIRALAX 17 gram packet Generic drug:  polyethylene glycol Take 17 g by mouth daily. mirtazapine 15 mg tablet Commonly known as:  Shelagh Kick Take  by mouth nightly. oxybutynin chloride XL 10 mg CR tablet Commonly known as:  DITROPAN XL Take 1 Tab by mouth two (2) times a day. oxyCODONE IR 10 mg Tab immediate release tablet Commonly known as:  Lolita Roberson  
 Take 1 Tab by mouth every six (6) hours as needed. Max Daily Amount: 40 mg. OYSTER SHELL CALCIUM-VIT D3 250-125 mg-unit tablet Generic drug:  calcium-vitamin D Take 1 Tab by mouth daily. promethazine 25 mg tablet Commonly known as:  PHENERGAN Take 25 mg by mouth every six (6) hours as needed for Nausea. THERAGRAN-M PO Take  by mouth. VITAMIN C 500 mg tablet Generic drug:  ascorbic acid (vitamin C) Take  by mouth. VITAMIN D3 1,000 unit Cap Generic drug:  cholecalciferol Take  by mouth. Follow-up Instructions Return in about 4 months (around 12/16/2018). Introducing [facility]! [employer] introduces [employer] patient portal. Now you can access parts of your medical record, email your doctor's office, and request medication refills online. 1. In your internet browser, go to [email or web address] 2. Click on the First Time User? Click Here link in the Sign In box. You will see the New Member Sign Up page. 3. Enter your [employer] Access Code exactly as it appears below. You will not need to use this code after youve completed the sign-up process. If you do not sign up before the expiration date, you must request a new code. · [employer] Access Code: [email or web address] Expires: 10/9/2018  4:05 PM 
 
4. Enter the last four digits of your Social Security Number (xxxx) and Date of Birth (mm/dd/yyyy) as indicated and click Submit. You will be taken to the next sign-up page. 5. Create a [employer] ID. This will be your [employer] login ID and cannot be changed, so think of one that is secure and easy to remember. 6. Create a [employer] password. You can change your password at any time. 7. Enter your Password Reset Question and Answer. This can be used at a later time if you forget your password. 8. Enter your e-mail address. You will receive e-mail notification when new information is available in [address] 9. Click Sign Up. You can now view and download portions of your medical record. 10. Click the Download Summary menu link to download a portable copy of your medical information. If you have questions, please visit the Frequently Asked Questions section of the [employer] website. Remember, [employer] is NOT to be used for urgent needs. For medical emergencies, dial 911. Now available from your iPhone and Android! Please provide this summary of care documentation to your next provider. Your primary care clinician is listed as Brandi Cervantes. If you have any questions after today's visit, please call [phone].

## 2018-08-16 NOTE — PROGRESS NOTES
Subjective: Tolerating diet and moving his bowels. His only lost a few pounds since surgery. Past medical history and ROS were reviewed and unchanged. 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 The diagnoses and plan were discussed with patient. All questions answered. Plan of care agreed to by all concerned.

## 2018-08-16 NOTE — PROGRESS NOTES
Met with patient and reviewed survivorship care plan. Patient given a copy, and all questions answered. Patient has my contact information in case a need arises.

## 2018-08-31 ENCOUNTER — HOSPITAL ENCOUNTER (OUTPATIENT)
Dept: CT IMAGING | Age: 73
Discharge: HOME | End: 2018-08-31
Attending: COLON & RECTAL SURGERY
Payer: MEDICARE

## 2018-08-31 DIAGNOSIS — Z85.00 PERSONAL HISTORY OF MALIGNANT NEOPLASM OF GASTROINTESTINAL TRACT: ICD-10-CM

## 2018-08-31 LAB — CREAT UR-MCNC: 0.8 MG/DL (ref 0.6–1.3)

## 2018-08-31 PROCEDURE — 74011636320 HC RX REV CODE- 636/320: Performed by: COLON & RECTAL SURGERY

## 2018-08-31 PROCEDURE — 82565 ASSAY OF CREATININE: CPT

## 2018-08-31 PROCEDURE — 74177 CT ABD & PELVIS W/CONTRAST: CPT

## 2018-08-31 RX ADMIN — IOPAMIDOL 97 ML: 612 INJECTION, SOLUTION INTRAVENOUS at 12:00

## 2018-08-31 RX ADMIN — DIATRIZOATE MEGLUMINE AND DIATRIZOATE SODIUM 30 ML: 600; 100 SOLUTION ORAL; RECTAL at 13:00

## 2019-01-05 ENCOUNTER — HOSPITAL ENCOUNTER (EMERGENCY)
Age: 74
Discharge: OTHER INSTITUTION | End: 2019-01-05
Attending: EMERGENCY MEDICINE
Payer: MEDICARE

## 2019-01-05 VITALS
HEART RATE: 70 BPM | OXYGEN SATURATION: 96 % | SYSTOLIC BLOOD PRESSURE: 132 MMHG | TEMPERATURE: 98.4 F | DIASTOLIC BLOOD PRESSURE: 69 MMHG | RESPIRATION RATE: 18 BRPM

## 2019-01-05 DIAGNOSIS — T83.9XXA PROBLEM WITH FOLEY CATHETER, INITIAL ENCOUNTER (HCC): ICD-10-CM

## 2019-01-05 DIAGNOSIS — E86.0 DEHYDRATION: ICD-10-CM

## 2019-01-05 DIAGNOSIS — R31.9 HEMATURIA, UNSPECIFIED TYPE: Primary | ICD-10-CM

## 2019-01-05 LAB
ALBUMIN SERPL-MCNC: 3.5 G/DL (ref 3.4–5)
ALBUMIN/GLOB SERPL: 0.7 {RATIO} (ref 0.8–1.7)
ALP SERPL-CCNC: 124 U/L (ref 45–117)
ALT SERPL-CCNC: 189 U/L (ref 16–61)
ANION GAP SERPL CALC-SCNC: 4 MMOL/L (ref 3–18)
APPEARANCE UR: ABNORMAL
AST SERPL-CCNC: 80 U/L (ref 15–37)
BACTERIA URNS QL MICRO: ABNORMAL /HPF
BASOPHILS # BLD: 0 K/UL (ref 0–0.1)
BASOPHILS NFR BLD: 0 % (ref 0–2)
BILIRUB SERPL-MCNC: 0.2 MG/DL (ref 0.2–1)
BILIRUB UR QL: ABNORMAL
BUN SERPL-MCNC: 26 MG/DL (ref 7–18)
BUN/CREAT SERPL: 27 (ref 12–20)
CALCIUM SERPL-MCNC: 9.2 MG/DL (ref 8.5–10.1)
CHLORIDE SERPL-SCNC: 108 MMOL/L (ref 100–108)
CO2 SERPL-SCNC: 28 MMOL/L (ref 21–32)
COLOR UR: ABNORMAL
CREAT SERPL-MCNC: 0.97 MG/DL (ref 0.6–1.3)
DIFFERENTIAL METHOD BLD: ABNORMAL
EOSINOPHIL # BLD: 0.2 K/UL (ref 0–0.4)
EOSINOPHIL NFR BLD: 2 % (ref 0–5)
EPITH CASTS URNS QL MICRO: ABNORMAL /LPF (ref 0–5)
ERYTHROCYTE [DISTWIDTH] IN BLOOD BY AUTOMATED COUNT: 15.4 % (ref 11.6–14.5)
GLOBULIN SER CALC-MCNC: 5 G/DL (ref 2–4)
GLUCOSE SERPL-MCNC: 119 MG/DL (ref 74–99)
GLUCOSE UR STRIP.AUTO-MCNC: NEGATIVE MG/DL
HCT VFR BLD AUTO: 46.4 % (ref 36–48)
HGB BLD-MCNC: 15 G/DL (ref 13–16)
HGB UR QL STRIP: ABNORMAL
KETONES UR QL STRIP.AUTO: ABNORMAL MG/DL
LEUKOCYTE ESTERASE UR QL STRIP.AUTO: ABNORMAL
LYMPHOCYTES # BLD: 1.2 K/UL (ref 0.9–3.6)
LYMPHOCYTES NFR BLD: 11 % (ref 21–52)
MCH RBC QN AUTO: 29.8 PG (ref 24–34)
MCHC RBC AUTO-ENTMCNC: 32.3 G/DL (ref 31–37)
MCV RBC AUTO: 92.1 FL (ref 74–97)
MONOCYTES # BLD: 0.8 K/UL (ref 0.05–1.2)
MONOCYTES NFR BLD: 7 % (ref 3–10)
NEUTS SEG # BLD: 8.9 K/UL (ref 1.8–8)
NEUTS SEG NFR BLD: 80 % (ref 40–73)
NITRITE UR QL STRIP.AUTO: NEGATIVE
PH UR STRIP: 8.5 [PH] (ref 5–8)
PLATELET # BLD AUTO: 327 K/UL (ref 135–420)
PMV BLD AUTO: 10.6 FL (ref 9.2–11.8)
POTASSIUM SERPL-SCNC: 4.3 MMOL/L (ref 3.5–5.5)
PROT SERPL-MCNC: 8.5 G/DL (ref 6.4–8.2)
PROT UR STRIP-MCNC: >300 MG/DL
RBC # BLD AUTO: 5.04 M/UL (ref 4.7–5.5)
RBC #/AREA URNS HPF: ABNORMAL /HPF (ref 0–5)
SODIUM SERPL-SCNC: 140 MMOL/L (ref 136–145)
SP GR UR REFRACTOMETRY: 1.02 (ref 1–1.03)
UROBILINOGEN UR QL STRIP.AUTO: 0.2 EU/DL (ref 0.2–1)
WBC # BLD AUTO: 11.2 K/UL (ref 4.6–13.2)
WBC URNS QL MICRO: ABNORMAL /HPF (ref 0–4)

## 2019-01-05 PROCEDURE — 81001 URINALYSIS AUTO W/SCOPE: CPT

## 2019-01-05 PROCEDURE — 87086 URINE CULTURE/COLONY COUNT: CPT

## 2019-01-05 PROCEDURE — 99284 EMERGENCY DEPT VISIT MOD MDM: CPT

## 2019-01-05 PROCEDURE — 96365 THER/PROPH/DIAG IV INF INIT: CPT

## 2019-01-05 PROCEDURE — 85025 COMPLETE CBC W/AUTO DIFF WBC: CPT

## 2019-01-05 PROCEDURE — 80053 COMPREHEN METABOLIC PANEL: CPT

## 2019-01-05 PROCEDURE — 87077 CULTURE AEROBIC IDENTIFY: CPT

## 2019-01-05 PROCEDURE — 74011250636 HC RX REV CODE- 250/636: Performed by: EMERGENCY MEDICINE

## 2019-01-05 PROCEDURE — 87186 SC STD MICRODIL/AGAR DIL: CPT

## 2019-01-05 PROCEDURE — 74011000250 HC RX REV CODE- 250: Performed by: EMERGENCY MEDICINE

## 2019-01-05 RX ORDER — CEFDINIR 300 MG/1
300 CAPSULE ORAL 2 TIMES DAILY
Qty: 14 CAP | Refills: 0 | Status: SHIPPED | OUTPATIENT
Start: 2019-01-05 | End: 2019-01-12

## 2019-01-05 RX ORDER — CEFDINIR 300 MG/1
300 CAPSULE ORAL 2 TIMES DAILY
Qty: 14 CAP | Refills: 0 | Status: SHIPPED | OUTPATIENT
Start: 2019-01-05 | End: 2019-01-05

## 2019-01-05 RX ORDER — CEFTRIAXONE 250 MG/8ML
1000 INJECTION, POWDER, FOR SOLUTION INTRAMUSCULAR; INTRAVENOUS ONCE
Status: DISCONTINUED | OUTPATIENT
Start: 2019-01-05 | End: 2019-01-05 | Stop reason: SDUPTHER

## 2019-01-05 RX ADMIN — CEFTRIAXONE SODIUM 1 G: 1 INJECTION, POWDER, FOR SOLUTION INTRAMUSCULAR; INTRAVENOUS at 21:22

## 2019-01-05 RX ADMIN — SODIUM CHLORIDE 1000 ML: 900 INJECTION, SOLUTION INTRAVENOUS at 21:23

## 2019-01-05 NOTE — ED PROVIDER NOTES
EMERGENCY DEPARTMENT HISTORY AND PHYSICAL EXAM 
 
5:38 PM 
 
 
Date: 1/5/2019 Patient Name: Adrien Dowling History of Presenting Illness Chief Complaint Patient presents with  
 Other Bleeding around perales History Provided By: bleeding around perales catheter Chief Complaint: bleeding around perales catheter Duration: today Timing:  Acute Location: around his perales catheter Quality: bleeding Severity: n/a Modifying Factors: none Associated Symptoms: some mild pain around catheter site. Denies fevers, chills, changes in appetite. Additional History (Context): Adrien Dowling is a 72 y.o. male with a history of HTN, CVA presenting to the ED for the evaluation of acute bleeding around his perales catheter. Patient explains that the catheter was last changed approximately 3 weeks ago and today he had noticed some bleeding. He does note some mild pain around the area where his catheter is. Denies any fevers, chills. No changes in appetite. No other complaints or concerns at this time. PCP: Jenny Barcenas MD 
 
Current Facility-Administered Medications Medication Dose Route Frequency Provider Last Rate Last Dose  sodium chloride 0.9 % bolus infusion 1,000 mL  1,000 mL IntraVENous ONCE Salomonsky, Paulette Cockayne, MD      
 cefTRIAXone (ROCEPHIN) injection 1,000 mg  1,000 mg IntraVENous ONCE Hany Alvarez MD      
 
Current Outpatient Medications Medication Sig Dispense Refill  cefdinir (OMNICEF) 300 mg capsule Take 1 Cap by mouth two (2) times a day for 7 days. 14 Cap 0  
 polyethylene glycol (MIRALAX) 17 gram packet Take 17 g by mouth daily.  oxyCODONE IR (ROXICODONE) 10 mg tab immediate release tablet Take 1 Tab by mouth every six (6) hours as needed. Max Daily Amount: 40 mg. 40 Tab 0  
 atorvastatin (LIPITOR) 10 mg tablet Take 10 mg by mouth daily.     
 calcium-vitamin D (OYSTER SHELL CALCIUM-VIT D3) 250-125 mg-unit tablet Take 1 Tab by mouth daily.  mirtazapine (REMERON) 15 mg tablet Take  by mouth nightly.  DIPHENHYDRAMINE HCL (BENADRYL ALLERGY PO) Take  by mouth.  EPINEPHrine HCl, PF, (ADRENALIN) 1 mg/mL (1 mL) injection once.  ascorbic acid, vitamin C, (VITAMIN C) 500 mg tablet Take  by mouth.  diclofenac (VOLTAREN) 1 % gel Apply  to affected area four (4) times daily.  Menthol-Zinc Oxide (CALMOSEPTINE) 0.44-20.6 % oint Apply  to affected area.  docusate sodium (COLACE) 100 mg capsule Take 100 mg by mouth two (2) times a day.  food supplemt, lactose-reduced (ENSURE ACTIVE HIGH PROTEIN) liqd Take 237 mL by mouth four (4) times daily.  promethazine (PHENERGAN) 25 mg tablet Take 25 mg by mouth every six (6) hours as needed for Nausea.  citalopram (CELEXA) 10 mg tablet Take  by mouth daily.  lisinopril (PRINIVIL, ZESTRIL) 5 mg tablet Take 1 Tab by mouth daily. 90 Tab 3  
 ferrous sulfate (IRON, FERROUS SULFATE,) 325 mg (65 mg iron) tablet Take 1 Tab by mouth Daily (before breakfast). 90 Tab 3  
 oxybutynin chloride XL (DITROPAN XL) 10 mg CR tablet Take 1 Tab by mouth two (2) times a day. 60 Tab 5  calcium citrate-vitamin d3 (CITRACAL+D) 315-200 mg-unit Tab Take 1 Tab by mouth daily (with breakfast).  vitamin c-vitamin e (CRANBERRY CONCENTRATE) cap Take  by mouth.  MULTIVIT, IRON, MIN NO. 8, FA (THERAGRAN-M PO) Take  by mouth.  Cholecalciferol, Vitamin D3, (VITAMIN D3) 1,000 unit cap Take  by mouth. Past History Past Medical History: 
Past Medical History:  
Diagnosis Date  Abnormal computed tomography of bladder  Anemia  Arthritis  Basal ganglia disease  Benign prostatic hyperplasia with lower urinary tract symptoms  Bladder calculus  Bladder cancer ([facility] [address] 3/13/14  Bladder mass  Bladder tumor  Brain aneurysm  CVA (cerebral vascular accident) ([facility] [address] 2012  
 hemiplegia  Dysphagia  Erythematous bladder mucosa  Webb catheter in place  Gross hematuria  Hemiplegia ([facility] [address]  HTN (hypertension)  Hx MRSA infection 2017  
 urine  Neurogenic bladder  Psychiatric disorder  Retention, urine  Stroke ([facility] [address]  Urethral stricture Past Surgical History: 
Past Surgical History:  
Procedure Laterality Date  COLONOSCOPY N/A 1/15/2018 COLONOSCOPY w/ biopsies w/ polypectomy w/ ink injection performed by Cassia Dela Cruz MD at [address] [facility]    
 PEG TUBE--REMOVED  HX HEENT    
 H/O TRACH  
 HX UROLOGICAL    
 super pubic tube  HX UROLOGICAL  3/13/14 TURBT, Dr. Delgado Craig  HX UROLOGICAL  10/8/15 Cysto, Dr. Alex Cota  LAP,SURG,COLECTOMY, PARTIAL, W/ANAST N/A 03/15/2018 Dr. Aixa Solorio Family History: 
Family History Problem Relation Age of Onset  Hypertension Mother  Hypertension Father Social History: 
Social History Tobacco Use  Smoking status: Former Smoker Last attempt to quit: 2010 Years since quittin.9  Smokeless tobacco: Never Used Substance Use Topics  Alcohol use: No  
 Drug use: No  
 
 
Allergies: Allergies Allergen Reactions  Pcn [Penicillins] Other (comments) and Itching  
  intolerance Review of Systems Review of Systems Constitutional: Negative for activity change, appetite change, chills, fatigue and fever. HENT: Negative for congestion and rhinorrhea. Eyes: Negative for visual disturbance. Respiratory: Negative for shortness of breath. Cardiovascular: Negative for chest pain and palpitations. Gastrointestinal: Negative for abdominal pain, diarrhea, nausea and vomiting. Genitourinary: Negative for dysuria and hematuria. (+) bleeding around catheter site with some mild pain Musculoskeletal: Negative for back pain. Skin: Negative for rash.   
Neurological: Negative for dizziness, weakness, light-headedness and numbness. All other systems reviewed and are negative. Physical Exam  
 
Visit Vitals /71 Pulse 70 Temp 98.4 °F (36.9 °C) Resp 18 SpO2 98% Physical Exam  
Constitutional: He appears well-developed and well-nourished. No distress. Contracted HENT:  
Head: Normocephalic and atraumatic. Right Ear: External ear normal.  
Left Ear: External ear normal.  
Nose: Nose normal.  
Mouth/Throat: Oropharynx is clear and moist.  
Eyes: Conjunctivae and EOM are normal. Pupils are equal, round, and reactive to light. No scleral icterus. Neck: Normal range of motion. Neck supple. No JVD present. No tracheal deviation present. No thyromegaly present. Cardiovascular: Normal rate, regular rhythm, normal heart sounds and intact distal pulses. Exam reveals no gallop and no friction rub. No murmur heard. Pulmonary/Chest: Effort normal and breath sounds normal. He exhibits no tenderness. Abdominal: Soft. Bowel sounds are normal. He exhibits no distension. There is tenderness. There is no rebound and no guarding. Suprapubic pain Genitourinary:  
Genitourinary Comments: Perales in place, perales bag dry, bloody urine draining around the perales site Musculoskeletal: He exhibits no edema or tenderness. Trace edema Lymphadenopathy:  
  He has no cervical adenopathy. Neurological: He is alert. No cranial nerve deficit. Coordination normal.  
Oriented x 2, contracted L arm and leg, uses R arm Skin: Skin is warm and dry. Psychiatric: He has a normal mood and affect. His behavior is normal. Judgment and thought content normal.  
Pt presents from SNF, no family at the bedside Nursing note and vitals reviewed. Diagnostic Study Results Labs - Recent Results (from the past 12 hour(s)) URINALYSIS W/ RFLX MICROSCOPIC Collection Time: 01/05/19  6:15 PM  
Result Value Ref Range Color DARK YELLOW Appearance BLOODY  Specific gravity 1.025 1.003 - 1.030    
 pH (UA) 8.5 (H) 5.0 - 8.0 Protein >300 (A) NEG mg/dL Glucose NEGATIVE  NEG mg/dL Ketone TRACE (A) NEG mg/dL Bilirubin SMALL (A) NEG Blood LARGE (A) NEG Urobilinogen 0.2 0.2 - 1.0 EU/dL Nitrites NEGATIVE  NEG Leukocyte Esterase TRACE (A) NEG URINE MICROSCOPIC ONLY Collection Time: 01/05/19  6:15 PM  
Result Value Ref Range WBC  0 - 4 /hpf UNABLE TO QUANTITATE MICROSCOPIC PARAMETERS DUE TO EXCESSIVE RBCS  
 RBC TOO NUMEROUS TO COUNT 0 - 5 /hpf Epithelial cells  0 - 5 /lpf UNABLE TO QUANTITATE MICROSCOPIC PARAMETERS DUE TO EXCESSIVE RBCS Bacteria (A) NEG /hpf UNABLE TO QUANTITATE MICROSCOPIC PARAMETERS DUE TO EXCESSIVE RBCS  
CBC WITH AUTOMATED DIFF Collection Time: 01/05/19  6:27 PM  
Result Value Ref Range WBC 11.2 4.6 - 13.2 K/uL  
 RBC 5.04 4.70 - 5.50 M/uL  
 HGB 15.0 13.0 - 16.0 g/dL HCT 46.4 36.0 - 48.0 % MCV 92.1 74.0 - 97.0 FL  
 MCH 29.8 24.0 - 34.0 PG  
 MCHC 32.3 31.0 - 37.0 g/dL  
 RDW 15.4 (H) 11.6 - 14.5 % PLATELET 727 250 - 886 K/uL MPV 10.6 9.2 - 11.8 FL  
 NEUTROPHILS 80 (H) 40 - 73 % LYMPHOCYTES 11 (L) 21 - 52 % MONOCYTES 7 3 - 10 % EOSINOPHILS 2 0 - 5 % BASOPHILS 0 0 - 2 %  
 ABS. NEUTROPHILS 8.9 (H) 1.8 - 8.0 K/UL  
 ABS. LYMPHOCYTES 1.2 0.9 - 3.6 K/UL  
 ABS. MONOCYTES 0.8 0.05 - 1.2 K/UL  
 ABS. EOSINOPHILS 0.2 0.0 - 0.4 K/UL  
 ABS. BASOPHILS 0.0 0.0 - 0.1 K/UL  
 DF AUTOMATED METABOLIC PANEL, COMPREHENSIVE Collection Time: 01/05/19  6:27 PM  
Result Value Ref Range Sodium 140 136 - 145 mmol/L Potassium 4.3 3.5 - 5.5 mmol/L Chloride 108 100 - 108 mmol/L  
 CO2 28 21 - 32 mmol/L Anion gap 4 3.0 - 18 mmol/L Glucose 119 (H) 74 - 99 mg/dL BUN 26 (H) 7.0 - 18 MG/DL Creatinine 0.97 0.6 - 1.3 MG/DL  
 BUN/Creatinine ratio 27 (H) 12 - 20 GFR est AA >60 >60 ml/min/1.73m2 GFR est non-AA >60 >60 ml/min/1.73m2 Calcium 9.2 8.5 - 10.1 MG/DL  Bilirubin, total 0.2 0.2 - 1.0 MG/DL  
 ALT (SGPT) 189 (H) 16 - 61 U/L  
 AST (SGOT) 80 (H) 15 - 37 U/L Alk. phosphatase 124 (H) 45 - 117 U/L Protein, total 8.5 (H) 6.4 - 8.2 g/dL Albumin 3.5 3.4 - 5.0 g/dL Globulin 5.0 (H) 2.0 - 4.0 g/dL A-G Ratio 0.7 (L) 0.8 - 1.7 Radiologic Studies - No orders to display Medical Decision Making I am the first provider for this patient. I reviewed the vital signs, available nursing notes, past medical history, past surgical history, family history and social history. Vital Signs-Reviewed the patient's vital signs. Pulse Oximetry Analysis -  98% RA (Interpretation) non-hypoxic Cardiac Monitor: 
Rate: 70 bpm 
Rhythm: regular Records Reviewed: Nursing Notes (Time of Review: 5:38 PM) Provider Notes (Medical Decision Making): MDM Number of Diagnoses or Management Options Dehydration:  
Hematuria, unspecified type:  
Problem with Perales catheter, initial encounter [facility]):  
Diagnosis management comments: Pt is a 79yo male with a hx of CVA, perales cath presents from the SNF with complaint of blood draining around his perales. Pt is otherwise without complaint. It appears his perales is clogged and leaking around. The perales was replaced and functioning well with blood urine noted. Suspect this is related to cystitis. Pt hydrated and abx started. Will proceed with close outpatient care and patient is to return if at all worsened or concerned. Brianna Lomeli DO 5:31 PM 
 
 
 
 
ED Course: Progress Notes, Reevaluation, and Consults: 
Pt hydrated and perales changed. Pt comfortable and will proceed with close outpatient care and pt is to return if at all worsened or concerned. Brianna Lomeli DO 5:31 PM 
 
 
Diagnosis Clinical Impression: 1. Hematuria, unspecified type 2. Problem with Perales catheter, initial encounter ([facility] [address] 3. Dehydration Disposition: Discharge Follow-up Information Follow up With Specialties Details Why Contact Info Juan Diego Sharp MD Internal Medicine Schedule an appointment as soon as possible for a visit in 2 days  [address] [facility] 
[phone] [facility] EMERGENCY DEPT Emergency Medicine Go to As needed, If symptoms worsen [phone] [id number] 
[phone] Medication List  
  
START taking these medications   
cefdinir 300 mg capsule Commonly known as:  OMNICEF Take 1 Cap by mouth two (2) times a day for 7 days. CONTINUE taking these medications   
atorvastatin 10 mg tablet Commonly known as:  LIPITOR 
  
BENADRYL ALLERGY PO 
  
calcium citrate-vitamin d3 315-200 mg-unit Tab Commonly known as:  CITRACAL+D 
  
citalopram 10 mg tablet Commonly known as:  CELEXA 
  
COLACE 100 mg capsule Generic drug:  docusate sodium CRANBERRY CONCENTRATE Cap Generic drug:  vitamin c-vitamin e 
  
diclofenac 1 % Gel Commonly known as:  VOLTAREN 
  
ENSURE ACTIVE HIGH PROTEIN Liqd Generic drug:  food supplemt, lactose-reduced EPINEPHrine HCl (PF) 1 mg/mL (1 mL) injection Commonly known as:  ADRENALIN 
  
ferrous sulfate 325 mg (65 mg iron) tablet Commonly known as:  Iron (Ferrous Sulfate) Take 1 Tab by mouth Daily (before breakfast). lisinopril 5 mg tablet Commonly known as:  Arabella Comer Take 1 Tab by mouth daily. menthol-zinc oxide 0.44-20.6 % Oint Commonly known as:  CALMOSEPTINE 
  
MIRALAX 17 gram packet Generic drug:  polyethylene glycol 
  
mirtazapine 15 mg tablet Commonly known as:  REMERON 
  
oxybutynin chloride XL 10 mg CR tablet Commonly known as:  DITROPAN XL Take 1 Tab by mouth two (2) times a day. oxyCODONE IR 10 mg Tab immediate release tablet Commonly known as:  Charlotte Henderson Take 1 Tab by mouth every six (6) hours as needed. Max Daily Amount: 40 mg. OYSTER SHELL CALCIUM-VIT D3 250-125 mg-unit tablet Generic drug:  calcium-vitamin D 
  
promethazine 25 mg tablet Commonly known as:  PHENERGAN 
  
THERAGRAN-M PO 
 VITAMIN C 500 mg tablet Generic drug:  ascorbic acid (vitamin C) VITAMIN D3 1,000 unit Cap Generic drug:  cholecalciferol Where to Get Your Medications Information about where to get these medications is not yet available Ask your nurse or doctor about these medications · cefdinir 300 mg capsule 
  
 
_______________________________ Attestations: 
Scribe Attestation Joanna Best acting as a scribe for and in the presence of Aicha Hackett DO     
January 05, 2019 at 5:38 PM 
    
Provider Attestation:     
I personally performed the services described in the documentation, reviewed the documentation, as recorded by the scribe in my presence, and it accurately and completely records my words and actions. January 05, 2019 at 5:38 PM - Aicha Hackett DO  
_______________________________

## 2019-01-05 NOTE — ED TRIAGE NOTES
Pt arrived via EMS from nursing facility c/o blood around perlaes catheter site. Pt stated, \"I think the nurse forced the catheter in\"

## 2019-01-06 NOTE — ED NOTES
Pt left facility via [facility] transport. Report called to Rayshawn Chilel and given to [address] [place] [address].

## 2019-01-06 NOTE — DISCHARGE INSTRUCTIONS
Patient Education        Dehydration: Care Instructions  Your Care Instructions  Dehydration happens when your body loses too much fluid. This might happen when you do not drink enough water or you lose large amounts of fluids from your body because of diarrhea, vomiting, or sweating. Severe dehydration can be life-threatening. Water and minerals called electrolytes help put your body fluids back in balance. Learn the early signs of fluid loss, and drink more fluids to prevent dehydration. Follow-up care is a key part of your treatment and safety. Be sure to make and go to all appointments, and call your doctor if you are having problems. It's also a good idea to know your test results and keep a list of the medicines you take. How can you care for yourself at home? · To prevent dehydration, drink plenty of fluids, enough so that your urine is light yellow or clear like water. Choose water and other caffeine-free clear liquids until you feel better. If you have kidney, heart, or liver disease and have to limit fluids, talk with your doctor before you increase the amount of fluids you drink. · If you do not feel like eating or drinking, try taking small sips of water, sports drinks, or other rehydration drinks. · Get plenty of rest.  To prevent dehydration  · Add more fluids to your diet and daily routine, unless your doctor has told you not to. · During hot weather, drink more fluids. Drink even more fluids if you exercise a lot. Stay away from drinks with alcohol or caffeine. · Watch for the symptoms of dehydration. These include:  ? A dry, sticky mouth. ? Dark yellow urine, and not much of it. ? Dry and sunken eyes. ? Feeling very tired. · Learn what problems can lead to dehydration. These include:  ? Diarrhea, fever, and vomiting. ? Any illness with a fever, such as pneumonia or the flu. ?  Activities that cause heavy sweating, such as endurance races and heavy outdoor work in hot or humid weather. ? Alcohol or drug abuse or withdrawal.  ? Certain medicines, such as cold and allergy pills (antihistamines), diet pills (diuretics), and laxatives. ? Certain diseases, such as diabetes, cancer, and heart or kidney disease. When should you call for help? Call 911 anytime you think you may need emergency care. For example, call if:    · You passed out (lost consciousness).    Call your doctor now or seek immediate medical care if:    · You are confused and cannot think clearly.     · You are dizzy or lightheaded, or you feel like you may faint.     · You have signs of needing more fluids. You have sunken eyes and a dry mouth, and you pass only a little dark urine.     · You cannot keep fluids down.    Watch closely for changes in your health, and be sure to contact your doctor if:    · You are not making tears.     · Your skin is very dry and sags slowly back into place after you pinch it.     · Your mouth and eyes are very dry. Where can you learn more? Go to http://lorna-kristina.info/. Enter D542 in the search box to learn more about \"Dehydration: Care Instructions. \"  Current as of: November 20, 2017  Content Version: 11.8  © 9809-8571 [employer]. Care instructions adapted under license by [employer] (which disclaims liability or warranty for this information). If you have questions about a medical condition or this instruction, always ask your healthcare professional. [place] [address] any warranty or liability for your use of this information. Patient Education        Blood in the Urine: Care Instructions  Your Care Instructions    Blood in the urine, or hematuria, may make the urine look red, brown, or pink. There may be blood every time you urinate or just from time to time. You cannot always see blood in the urine, but it will show up in a urine test.  Blood in the urine may be serious.  It should always be checked by a doctor. Your doctor may recommend more tests, including an X-ray, a CT scan, or a cystoscopy (which lets a doctor look inside the urethra and bladder). Blood in the urine can be a sign of another problem. Common causes are bladder infections and kidney stones. An injury to your groin or your genital area can also cause bleeding in the urinary tract. Very hard exercise--such as running a marathon--can cause blood in the urine. Blood in the urine can also be a sign of kidney disease or cancer in the bladder or kidney. Many cases of blood in the urine are caused by a harmless condition that runs in families. This is called benign familial hematuria. It does not need any treatment. Sometimes your urine may look red or brown even though it does not contain blood. For example, not getting enough fluids (dehydration), taking certain medicines, or having a liver problem can change the color of your urine. Eating foods such as beets, rhubarb, or blackberries or foods with red food coloring can make your urine look red or pink. Follow-up care is a key part of your treatment and safety. Be sure to make and go to all appointments, and call your doctor if you are having problems. It's also a good idea to know your test results and keep a list of the medicines you take. When should you call for help? Call your doctor now or seek immediate medical care if:    · You have symptoms of a urinary infection. For example:  ? You have pus in your urine. ? You have pain in your back just below your rib cage. This is called flank pain. ? You have a fever, chills, or body aches. ? It hurts to urinate. ? You have groin or belly pain.     · You have more blood in your urine.    Watch closely for changes in your health, and be sure to contact your doctor if:    · You have new urination problems.     · You do not get better as expected. Where can you learn more? Go to http://lorna-kristina.info/.   Enter T624 in the search box to learn more about \"Blood in the Urine: Care Instructions. \"  Current as of: March 21, 2018  Content Version: 11.8  © 8162-8707 [employer]. Care instructions adapted under license by [employer] (which disclaims liability or warranty for this information). If you have questions about a medical condition or this instruction, always ask your healthcare professional. [place] [address] any warranty or liability for your use of this information.

## 2019-01-07 ENCOUNTER — OFFICE VISIT (OUTPATIENT)
Dept: SURGERY | Age: 74
End: 2019-01-07

## 2019-01-07 VITALS
BODY MASS INDEX: 22.73 KG/M2 | WEIGHT: 150 LBS | HEIGHT: 68 IN | HEART RATE: 62 BPM | RESPIRATION RATE: 16 BRPM | OXYGEN SATURATION: 98 % | TEMPERATURE: 98.4 F

## 2019-01-07 DIAGNOSIS — Z08 ENCOUNTER FOR ROUTINE CANCER FOLLOW-UP: ICD-10-CM

## 2019-01-07 DIAGNOSIS — Z85.038 HISTORY OF COLON CANCER: Primary | ICD-10-CM

## 2019-01-07 NOTE — PROGRESS NOTES
1. Have you been to the ER, urgent care clinic since your last visit? Hospitalized since your last visit? Yes Reason for visit: today for bleeding from perales    2. Have you seen or consulted any other health care providers outside of the [address] [place] [address] since your last visit? Include any pap smears or colon screening. Yes Where: [facility]     Chief Complaint   Patient presents with    Follow-up     hx colon cancer, CEA and CT f/u     Patient having no abd or bowel issues. Miralax daily.

## 2019-01-07 NOTE — PROGRESS NOTES
Subjective: He complains of hematuria. Otherwise is tolerating diet. Moving his bowels. Past medical history and ROS were reviewed and unchanged. Abdomen: Soft, nontender nondistended    CT A/P 8/2018 negative  CT chest 5/2018 no metastatic disease  CEA 9/18 is 2.9    Status post laparoscopic right colectomy for T1N0 malignant polyp of the ascending colon March 2018, [facility]  Check CEA  F/U 6 months  Check CEA  Follow-up with urology regarding hematuria  Follow-up with gastroenterology to schedule colonoscopy    A total of 15 minutes was spent with the patient, with >50% of time spent on counseling and coordination of care. The diagnoses and plan were discussed with patient. All questions answered. Plan of care agreed to by all concerned.

## 2019-01-08 ENCOUNTER — TELEPHONE (OUTPATIENT)
Dept: SURGERY | Age: 74
End: 2019-01-08

## 2019-01-08 NOTE — TELEPHONE ENCOUNTER
Called to make pt appt for his urologist and they state he has appt for today appt made here for July 8 1100 and appt made with dr Jeff Stewart

## 2019-01-09 DIAGNOSIS — Z85.038 HISTORY OF COLON CANCER: ICD-10-CM

## 2019-01-09 DIAGNOSIS — Z85.00 PERSONAL HISTORY OF MALIGNANT NEOPLASM OF GASTROINTESTINAL TRACT: ICD-10-CM

## 2019-01-09 LAB
BACTERIA SPEC CULT: ABNORMAL
SERVICE CMNT-IMP: ABNORMAL

## 2019-01-28 DIAGNOSIS — R97.0 ELEVATED CEA: ICD-10-CM

## 2019-01-28 DIAGNOSIS — C18.2 COLON CANCER, ASCENDING (HCC): Primary | ICD-10-CM

## 2019-02-08 ENCOUNTER — HOSPITAL ENCOUNTER (OUTPATIENT)
Dept: CT IMAGING | Age: 74
Discharge: HOME | End: 2019-02-08
Attending: COLON & RECTAL SURGERY
Payer: MEDICARE

## 2019-02-08 DIAGNOSIS — C18.2 COLON CANCER, ASCENDING (HCC): ICD-10-CM

## 2019-02-08 DIAGNOSIS — R97.0 ELEVATED CEA: ICD-10-CM

## 2019-02-08 LAB — CREAT UR-MCNC: 1 MG/DL (ref 0.6–1.3)

## 2019-02-08 PROCEDURE — 82565 ASSAY OF CREATININE: CPT

## 2019-02-08 PROCEDURE — 74011636320 HC RX REV CODE- 636/320: Performed by: COLON & RECTAL SURGERY

## 2019-02-08 PROCEDURE — 74177 CT ABD & PELVIS W/CONTRAST: CPT

## 2019-02-08 RX ADMIN — DIATRIZOATE MEGLUMINE AND DIATRIZOATE SODIUM 30 ML: 600; 100 SOLUTION ORAL; RECTAL at 14:50

## 2019-02-08 RX ADMIN — IOPAMIDOL 100 ML: 612 INJECTION, SOLUTION INTRAVENOUS at 14:50

## 2019-02-13 ENCOUNTER — HOSPITAL ENCOUNTER (EMERGENCY)
Age: 74
Discharge: HOME | End: 2019-02-14
Attending: EMERGENCY MEDICINE
Payer: MEDICARE

## 2019-02-13 DIAGNOSIS — R31.9 HEMATURIA, UNSPECIFIED TYPE: Primary | ICD-10-CM

## 2019-02-13 DIAGNOSIS — T83.511D URINARY TRACT INFECTION ASSOCIATED WITH INDWELLING URETHRAL CATHETER, SUBSEQUENT ENCOUNTER: ICD-10-CM

## 2019-02-13 DIAGNOSIS — N39.0 URINARY TRACT INFECTION ASSOCIATED WITH INDWELLING URETHRAL CATHETER, SUBSEQUENT ENCOUNTER: ICD-10-CM

## 2019-02-13 PROCEDURE — 96374 THER/PROPH/DIAG INJ IV PUSH: CPT

## 2019-02-13 PROCEDURE — 51702 INSERT TEMP BLADDER CATH: CPT

## 2019-02-13 PROCEDURE — 77030005514 HC CATH URETH FOL14 BARD -A

## 2019-02-13 PROCEDURE — 99284 EMERGENCY DEPT VISIT MOD MDM: CPT

## 2019-02-13 PROCEDURE — 96361 HYDRATE IV INFUSION ADD-ON: CPT

## 2019-02-14 ENCOUNTER — APPOINTMENT (OUTPATIENT)
Dept: CT IMAGING | Age: 74
End: 2019-02-14
Attending: NURSE PRACTITIONER
Payer: MEDICARE

## 2019-02-14 VITALS
HEART RATE: 75 BPM | DIASTOLIC BLOOD PRESSURE: 74 MMHG | OXYGEN SATURATION: 96 % | RESPIRATION RATE: 12 BRPM | TEMPERATURE: 100.2 F | SYSTOLIC BLOOD PRESSURE: 121 MMHG

## 2019-02-14 LAB
ALBUMIN SERPL-MCNC: 3.4 G/DL (ref 3.4–5)
ALBUMIN/GLOB SERPL: 0.7 {RATIO} (ref 0.8–1.7)
ALP SERPL-CCNC: 110 U/L (ref 45–117)
ALT SERPL-CCNC: 92 U/L (ref 16–61)
AMORPH CRY URNS QL MICRO: ABNORMAL
ANION GAP SERPL CALC-SCNC: 7 MMOL/L (ref 3–18)
APPEARANCE UR: ABNORMAL
AST SERPL-CCNC: 36 U/L (ref 15–37)
BACTERIA URNS QL MICRO: ABNORMAL /HPF
BASOPHILS # BLD: 0 K/UL (ref 0–0.1)
BASOPHILS NFR BLD: 0 % (ref 0–2)
BILIRUB SERPL-MCNC: 0.3 MG/DL (ref 0.2–1)
BILIRUB UR QL: ABNORMAL
BUN SERPL-MCNC: 26 MG/DL (ref 7–18)
BUN/CREAT SERPL: 27 (ref 12–20)
CALCIUM SERPL-MCNC: 9.4 MG/DL (ref 8.5–10.1)
CHLORIDE SERPL-SCNC: 109 MMOL/L (ref 100–108)
CO2 SERPL-SCNC: 26 MMOL/L (ref 21–32)
COLOR UR: ABNORMAL
CREAT SERPL-MCNC: 0.95 MG/DL (ref 0.6–1.3)
DIFFERENTIAL METHOD BLD: ABNORMAL
EOSINOPHIL # BLD: 0.2 K/UL (ref 0–0.4)
EOSINOPHIL NFR BLD: 1 % (ref 0–5)
EPITH CASTS URNS QL MICRO: ABNORMAL /LPF (ref 0–5)
ERYTHROCYTE [DISTWIDTH] IN BLOOD BY AUTOMATED COUNT: 14.8 % (ref 11.6–14.5)
GLOBULIN SER CALC-MCNC: 4.6 G/DL (ref 2–4)
GLUCOSE SERPL-MCNC: 127 MG/DL (ref 74–99)
GLUCOSE UR STRIP.AUTO-MCNC: NEGATIVE MG/DL
HCT VFR BLD AUTO: 40.8 % (ref 36–48)
HGB BLD-MCNC: 13.3 G/DL (ref 13–16)
HGB UR QL STRIP: ABNORMAL
INR PPP: 1 (ref 0.8–1.2)
KETONES UR QL STRIP.AUTO: NEGATIVE MG/DL
LEUKOCYTE ESTERASE UR QL STRIP.AUTO: ABNORMAL
LYMPHOCYTES # BLD: 1.3 K/UL (ref 0.9–3.6)
LYMPHOCYTES NFR BLD: 12 % (ref 21–52)
MAGNESIUM SERPL-MCNC: 2 MG/DL (ref 1.6–2.6)
MCH RBC QN AUTO: 29.3 PG (ref 24–34)
MCHC RBC AUTO-ENTMCNC: 32.6 G/DL (ref 31–37)
MCV RBC AUTO: 89.9 FL (ref 74–97)
MONOCYTES # BLD: 0.8 K/UL (ref 0.05–1.2)
MONOCYTES NFR BLD: 8 % (ref 3–10)
NEUTS SEG # BLD: 8.9 K/UL (ref 1.8–8)
NEUTS SEG NFR BLD: 79 % (ref 40–73)
NITRITE UR QL STRIP.AUTO: NEGATIVE
PH UR STRIP: 8 [PH] (ref 5–8)
PLATELET # BLD AUTO: 279 K/UL (ref 135–420)
PMV BLD AUTO: 10.3 FL (ref 9.2–11.8)
POTASSIUM SERPL-SCNC: 3.9 MMOL/L (ref 3.5–5.5)
PROT SERPL-MCNC: 8 G/DL (ref 6.4–8.2)
PROT UR STRIP-MCNC: >300 MG/DL
PROTHROMBIN TIME: 13.3 SEC (ref 11.5–15.2)
RBC # BLD AUTO: 4.54 M/UL (ref 4.7–5.5)
RBC #/AREA URNS HPF: ABNORMAL /HPF (ref 0–5)
SODIUM SERPL-SCNC: 142 MMOL/L (ref 136–145)
SP GR UR REFRACTOMETRY: 1.01 (ref 1–1.03)
UROBILINOGEN UR QL STRIP.AUTO: 2 EU/DL (ref 0.2–1)
WBC # BLD AUTO: 11.2 K/UL (ref 4.6–13.2)
WBC URNS QL MICRO: ABNORMAL /HPF (ref 0–4)

## 2019-02-14 PROCEDURE — 85610 PROTHROMBIN TIME: CPT

## 2019-02-14 PROCEDURE — 80053 COMPREHEN METABOLIC PANEL: CPT

## 2019-02-14 PROCEDURE — 74011250636 HC RX REV CODE- 250/636: Performed by: NURSE PRACTITIONER

## 2019-02-14 PROCEDURE — 85025 COMPLETE CBC W/AUTO DIFF WBC: CPT

## 2019-02-14 PROCEDURE — 74011000250 HC RX REV CODE- 250: Performed by: NURSE PRACTITIONER

## 2019-02-14 PROCEDURE — 74176 CT ABD & PELVIS W/O CONTRAST: CPT

## 2019-02-14 PROCEDURE — 83735 ASSAY OF MAGNESIUM: CPT

## 2019-02-14 PROCEDURE — 81001 URINALYSIS AUTO W/SCOPE: CPT

## 2019-02-14 RX ORDER — CIPROFLOXACIN 500 MG/1
500 TABLET ORAL 2 TIMES DAILY
Qty: 20 TAB | Refills: 0 | Status: SHIPPED | OUTPATIENT
Start: 2019-02-14 | End: 2019-02-24

## 2019-02-14 RX ADMIN — SODIUM CHLORIDE 1000 ML: 900 INJECTION, SOLUTION INTRAVENOUS at 00:44

## 2019-02-14 RX ADMIN — WATER 1 G: 1 INJECTION INTRAMUSCULAR; INTRAVENOUS; SUBCUTANEOUS at 02:27

## 2019-02-14 NOTE — DISCHARGE INSTRUCTIONS
Patient Education        Blood in the Urine: Care Instructions  Your Care Instructions    Blood in the urine, or hematuria, may make the urine look red, brown, or pink. There may be blood every time you urinate or just from time to time. You cannot always see blood in the urine, but it will show up in a urine test.  Blood in the urine may be serious. It should always be checked by a doctor. Your doctor may recommend more tests, including an X-ray, a CT scan, or a cystoscopy (which lets a doctor look inside the urethra and bladder). Blood in the urine can be a sign of another problem. Common causes are bladder infections and kidney stones. An injury to your groin or your genital area can also cause bleeding in the urinary tract. Very hard exercise--such as running a marathon--can cause blood in the urine. Blood in the urine can also be a sign of kidney disease or cancer in the bladder or kidney. Many cases of blood in the urine are caused by a harmless condition that runs in families. This is called benign familial hematuria. It does not need any treatment. Sometimes your urine may look red or brown even though it does not contain blood. For example, not getting enough fluids (dehydration), taking certain medicines, or having a liver problem can change the color of your urine. Eating foods such as beets, rhubarb, or blackberries or foods with red food coloring can make your urine look red or pink. Follow-up care is a key part of your treatment and safety. Be sure to make and go to all appointments, and call your doctor if you are having problems. It's also a good idea to know your test results and keep a list of the medicines you take. When should you call for help? Call your doctor now or seek immediate medical care if:    · You have symptoms of a urinary infection. For example:  ? You have pus in your urine. ? You have pain in your back just below your rib cage. This is called flank pain. ?  You have a fever, chills, or body aches. ? It hurts to urinate. ? You have groin or belly pain.     · You have more blood in your urine.    Watch closely for changes in your health, and be sure to contact your doctor if:    · You have new urination problems.     · You do not get better as expected. Where can you learn more? Go to http://lorna-kristina.info/. Enter T448 in the search box to learn more about \"Blood in the Urine: Care Instructions. \"  Current as of: March 20, 2018  Content Version: 11.9  © 4539-7221 [employer]. Care instructions adapted under license by [employer] (which disclaims liability or warranty for this information). If you have questions about a medical condition or this instruction, always ask your healthcare professional. [address] any warranty or liability for your use of this information. Patient Education        Urinary Tract Infections in Men: Care Instructions  Your Care Instructions    A urinary tract infection, or UTI, is a general term for an infection anywhere between the kidneys and the tip of the penis. UTIs can also be a result of a prostate problem. Most cause pain or burning when you urinate. Most UTIs are caused by bacteria and can be cured with antibiotics. It is important to complete your treatment so that the infection does not get worse. Follow-up care is a key part of your treatment and safety. Be sure to make and go to all appointments, and call your doctor if you are having problems. It's also a good idea to know your test results and keep a list of the medicines you take. How can you care for yourself at home? · Take your antibiotics as prescribed. Do not stop taking them just because you feel better. You need to take the full course of antibiotics. · Take your medicines exactly as prescribed.  Your doctor may have prescribed a medicine, such as phenazopyridine (Pyridium), to help relieve pain when you urinate. This turns your urine orange. You may stop taking it when your symptoms get better. But be sure to take all of your antibiotics, which treat the infection. · Drink extra water for the next day or two. This will help make the urine less concentrated and help wash out the bacteria causing the infection. (If you have kidney, heart, or liver disease and have to limit your fluids, talk with your doctor before you increase your fluid intake.)  · Avoid drinks that are carbonated or have caffeine. They can irritate the bladder. · Urinate often. Try to empty your bladder each time. · To relieve pain, take a hot bath or lay a heating pad (set on low) over your lower belly or genital area. Never go to sleep with a heating pad in place. To help prevent UTIs  · Drink plenty of fluids, enough so that your urine is light yellow or clear like water. If you have kidney, heart, or liver disease and have to limit fluids, talk with your doctor before you increase the amount of fluids you drink. · Urinate when you have the urge. Do not hold your urine for a long time. Urinate before you go to sleep. · Keep your penis clean. Catheter care  If you have a drainage tube (catheter) in place, the following steps will help you care for it. · Always wash your hands before and after touching your catheter. · Check the area around the urethra for inflammation or signs of infection. Signs of infection include irritated, swollen, red, or tender skin, or pus around the catheter. · Clean the area around the catheter with soap and water two times a day. Dry with a clean towel afterward. · Do not apply powder or lotion to the skin around the catheter. To empty the urine collection bag  · Wash your hands with soap and water. · Without touching the drain spout, remove the spout from its sleeve at the bottom of the collection bag. Open the valve on the spout. · Let the urine flow out of the bag and into the toilet or a container.  Do not let the tubing or drain spout touch anything. · After you empty the bag, clean the end of the drain spout with tissue and water. Close the valve and put the drain spout back into its sleeve at the bottom of the collection bag. · Wash your hands with soap and water. When should you call for help? Call your doctor now or seek immediate medical care if:    · Symptoms such as a fever, chills, nausea, or vomiting get worse or happen for the first time.     · You have new pain in your back just below your rib cage. This is called flank pain.     · There is new blood or pus in your urine.     · You are not able to take or keep down your antibiotics.    Watch closely for changes in your health, and be sure to contact your doctor if:    · You are not getting better after taking an antibiotic for 2 days.     · Your symptoms go away but then come back. Where can you learn more? Go to http://lorna-kristina.info/. Enter V478 in the search box to learn more about \"Urinary Tract Infections in Men: Care Instructions. \"  Current as of: March 20, 2018  Content Version: 11.9  © 4089-8583 [employer]. Care instructions adapted under license by [employer] (which disclaims liability or warranty for this information). If you have questions about a medical condition or this instruction, always ask your healthcare professional. [place] [address] any warranty or liability for your use of this information.

## 2019-02-14 NOTE — ED PROVIDER NOTES
Pt presents to ed from the nursing home with blood leaking around his perales catheter. Pt has a hx of bladder cancer and BPH. No complaint of abd pain or chest pain. Pt treated for uti with rocephin,  Perales is draining well since change, no bleeding around perales. I suspect the perales had been clotted and it was leaking around the catheter. The rn will call the nursing home and suggest the perales is flushed daily. He will be continued on cipro for uti The history is provided by the patient. No  was used. Urinary Catheter Problem This is a recurrent problem. The current episode started more than 2 days ago. The problem has not changed since onset. Pertinent negatives include no chest pain, no abdominal pain, no headaches and no shortness of breath. Nothing aggravates the symptoms. Nothing relieves the symptoms. He has tried nothing for the symptoms. Past Medical History:  
Diagnosis Date  Abnormal computed tomography of bladder  Anemia  Arthritis  Basal ganglia disease  Benign prostatic hyperplasia with lower urinary tract symptoms  Bladder calculus  Bladder cancer ([address] 3/13/14  Bladder mass  Bladder tumor  Brain aneurysm  CVA (cerebral vascular accident) ([address] [facility] [address] 2012  
 hemiplegia  Dysphagia  Erythematous bladder mucosa  Perales catheter in place  Gross hematuria  Hemiplegia ([address]  HTN (hypertension)  Hx MRSA infection 09/2017  
 urine  Neurogenic bladder  Psychiatric disorder  Retention, urine  Stroke ([address]  Urethral stricture Past Surgical History:  
Procedure Laterality Date  COLONOSCOPY N/A 1/15/2018 COLONOSCOPY w/ biopsies w/ polypectomy w/ ink injection performed by Lizet Sampson MD at [address] HX GI    
 PEG TUBE--REMOVED  HX HEENT    
 H/O TRACH  
 HX UROLOGICAL    
 super pubic tube  HX UROLOGICAL  3/13/14 TURBT, Dr. Sue Donnelly  HX UROLOGICAL  10/8/15 Cysto, Dr. Rama Richard  LAP,SURG,COLECTOMY, PARTIAL, W/ANAST N/A 03/15/2018 Dr. Adelaida Macias Family History:  
Problem Relation Age of Onset  Hypertension Mother  Hypertension Father Social History Socioeconomic History  Marital status:  Spouse name: Not on file  Number of children: 2  
 Years of education: 15  
 Highest education level: Not on file Social Needs  Financial resource strain: Not on file  Food insecurity - worry: Not on file  Food insecurity - inability: Not on file  Transportation needs - medical: Not on file  Transportation needs - non-medical: Not on file Occupational History  Occupation: other Employer: RETIRED Tobacco Use  Smoking status: Former Smoker Last attempt to quit: 2010 Years since quittin.0  Smokeless tobacco: Never Used Substance and Sexual Activity  Alcohol use: No  
 Drug use: No  
 Sexual activity: Not Currently Partners: Female Other Topics Concern   Service No  
 Blood Transfusions No  
 Caffeine Concern No  
 Occupational Exposure No  
 Hobby Hazards No  
 Sleep Concern No  
 Stress Concern No  
 Weight Concern No  
 Special Diet No  
 Back Care No  
 Exercise No  
 Bike Helmet No  
 Seat Belt Yes  Self-Exams No  
Social History Narrative  Not on file ALLERGIES: Pcn [penicillins] Review of Systems Respiratory: Negative for shortness of breath. Cardiovascular: Negative for chest pain. Gastrointestinal: Negative for abdominal pain. Genitourinary: Positive for hematuria. Negative for penile pain and scrotal swelling. Neurological: Negative for headaches. All other systems reviewed and are negative. Vitals:  
 19 0030 19 0100 19 0130 19 0200 BP: (!) 132/96 113/77 134/78 112/86 Pulse: 96 89 87 90 Resp: 21 25 16 22 Temp:      
SpO2: 96% 96% 96% 94% Physical Exam  
Constitutional: He is oriented to person, place, and time. No distress. thin HENT:  
Head: Normocephalic and atraumatic. Eyes: Conjunctivae and EOM are normal. Pupils are equal, round, and reactive to light. Neck: Normal range of motion. Neck supple. Cardiovascular: Normal rate and regular rhythm. Pulmonary/Chest: Effort normal and breath sounds normal.  
Abdominal: Soft. Bowel sounds are normal. There is no tenderness. There is no guarding. Genitourinary: Penis normal.  
Genitourinary Comments: Gross blood and clots around perales cath, dark red urine in bag Musculoskeletal: Normal range of motion. Neurological: He is alert and oriented to person, place, and time. He has normal reflexes. Skin: Skin is warm and dry. Psychiatric: He has a normal mood and affect. His behavior is normal. Judgment and thought content normal.  
Nursing note and vitals reviewed. MDM Number of Diagnoses or Management Options Diagnosis management comments: Irrigation ordered for perales. Light brown stool noted in diaper,  I do not suspect the blood coming from rectal source. Perales would not flush per nurse, ordered a change of perales. Gross blood flowing from perales after change, ct ordered of renal  
 
  
Amount and/or Complexity of Data Reviewed Clinical lab tests: ordered and reviewed Tests in the radiology section of CPT®: ordered and reviewed Review and summarize past medical records: yes Independent visualization of images, tracings, or specimens: yes Risk of Complications, Morbidity, and/or Mortality Presenting problems: moderate Diagnostic procedures: moderate Management options: moderate Patient Progress Patient progress: stable Procedures Vitals: 
Patient Vitals for the past 12 hrs: 
 Temp Pulse Resp BP SpO2  
02/14/19 0200  90 22 112/86 94 % 02/14/19 0130  87 16 134/78 96 % 02/14/19 0100  89 25 113/77 96 % 02/14/19 0030  96 21 (!) 132/96 96 % 02/14/19 0000    128/90   
02/13/19 2330  99 20 (!) 157/95 96 % 02/13/19 2210 100.2 °F (37.9 °C) 98 20 (!) 128/96 96 % Medications ordered:  
Medications  
sodium chloride 0.9 % bolus infusion 1,000 mL (1,000 mL IntraVENous New Bag 2/14/19 0044) cefTRIAXone (ROCEPHIN) 1 g in sterile water (preservative free) 10 mL IV syringe (1 g IntraVENous Given 2/14/19 0227) Lab findings: 
Recent Results (from the past 12 hour(s)) URINALYSIS W/ RFLX MICROSCOPIC Collection Time: 02/14/19 12:15 AM  
Result Value Ref Range Color DARK YELLOW Appearance OPAQUE Specific gravity 1.010 1.003 - 1.030    
 pH (UA) 8.0 5.0 - 8.0 Protein >300 (A) NEG mg/dL Glucose NEGATIVE  NEG mg/dL Ketone NEGATIVE  NEG mg/dL Bilirubin MODERATE (A) NEG Blood LARGE (A) NEG Urobilinogen 2.0 (H) 0.2 - 1.0 EU/dL Nitrites NEGATIVE  NEG Leukocyte Esterase SMALL (A) NEG URINE MICROSCOPIC ONLY Collection Time: 02/14/19 12:15 AM  
Result Value Ref Range WBC 10 to 20 0 - 4 /hpf  
 RBC TOO NUMEROUS TO COUNT 0 - 5 /hpf Epithelial cells 1+ 0 - 5 /lpf Bacteria 4+ (A) NEG /hpf Amorphous Crystals 2+ (A) NEG  
CBC WITH AUTOMATED DIFF Collection Time: 02/14/19 12:30 AM  
Result Value Ref Range WBC 11.2 4.6 - 13.2 K/uL  
 RBC 4.54 (L) 4.70 - 5.50 M/uL  
 HGB 13.3 13.0 - 16.0 g/dL HCT 40.8 36.0 - 48.0 % MCV 89.9 74.0 - 97.0 FL  
 MCH 29.3 24.0 - 34.0 PG  
 MCHC 32.6 31.0 - 37.0 g/dL  
 RDW 14.8 (H) 11.6 - 14.5 % PLATELET 414 100 - 576 K/uL MPV 10.3 9.2 - 11.8 FL  
 NEUTROPHILS 79 (H) 40 - 73 % LYMPHOCYTES 12 (L) 21 - 52 % MONOCYTES 8 3 - 10 % EOSINOPHILS 1 0 - 5 % BASOPHILS 0 0 - 2 %  
 ABS. NEUTROPHILS 8.9 (H) 1.8 - 8.0 K/UL  
 ABS. LYMPHOCYTES 1.3 0.9 - 3.6 K/UL  
 ABS. MONOCYTES 0.8 0.05 - 1.2 K/UL  
 ABS. EOSINOPHILS 0.2 0.0 - 0.4 K/UL  
 ABS. BASOPHILS 0.0 0.0 - 0.1 K/UL  
 DF AUTOMATED PROTHROMBIN TIME + INR  Collection Time: 02/14/19 12:30 AM  
 Result Value Ref Range Prothrombin time 13.3 11.5 - 15.2 sec INR 1.0 0.8 - 1.2 METABOLIC PANEL, COMPREHENSIVE Collection Time: 02/14/19 12:30 AM  
Result Value Ref Range Sodium 142 136 - 145 mmol/L Potassium 3.9 3.5 - 5.5 mmol/L Chloride 109 (H) 100 - 108 mmol/L  
 CO2 26 21 - 32 mmol/L Anion gap 7 3.0 - 18 mmol/L Glucose 127 (H) 74 - 99 mg/dL BUN 26 (H) 7.0 - 18 MG/DL Creatinine 0.95 0.6 - 1.3 MG/DL  
 BUN/Creatinine ratio 27 (H) 12 - 20 GFR est AA >60 >60 ml/min/1.73m2 GFR est non-AA >60 >60 ml/min/1.73m2 Calcium 9.4 8.5 - 10.1 MG/DL Bilirubin, total 0.3 0.2 - 1.0 MG/DL  
 ALT (SGPT) 92 (H) 16 - 61 U/L  
 AST (SGOT) 36 15 - 37 U/L Alk. phosphatase 110 45 - 117 U/L Protein, total 8.0 6.4 - 8.2 g/dL Albumin 3.4 3.4 - 5.0 g/dL Globulin 4.6 (H) 2.0 - 4.0 g/dL A-G Ratio 0.7 (L) 0.8 - 1.7 MAGNESIUM Collection Time: 02/14/19 12:30 AM  
Result Value Ref Range Magnesium 2.0 1.6 - 2.6 mg/dL EKG: 
NSR. .. Normal Axis. Normal Intervals. No ST elevation or depression. No Hypertrophy. 1:08 AM 
X-Ray, CT or other radiology findings or impressions: 
CT ABD PELV WO CONT Final Result IMPRESSION:  
  
Hyperdensity in the bladder most likely represents hemorrhagic debris given the  
clinical findings of hematuria. Smaller amount of highly dense dependent  
material, persistent since August, likely representing layering stones. Bladder  
wall remains mildly thickened which could be from nondistention or cystitis. No renal calculi or hydronephrosis. Disposition: 
 
Diagnosis: 1. Hematuria, unspecified type 2. Urinary tract infection associated with indwelling urethral catheter, subsequent encounter Disposition: to Home Follow-up Information Follow up With Specialties Details Why Contact Info Diogo Vila MD Internal Medicine In 2 days  [address] [place] [id number] 
[phone] Medication List  
  
START taking these medications   
ciprofloxacin HCl 500 mg tablet Commonly known as:  CIPRO Take 1 Tab by mouth two (2) times a day for 10 days. ASK your doctor about these medications   
atorvastatin 10 mg tablet Commonly known as:  LIPITOR 
  
BENADRYL ALLERGY PO 
  
calcium citrate-vitamin d3 315-200 mg-unit Tab Commonly known as:  CITRACAL+D 
  
citalopram 10 mg tablet Commonly known as:  CELEXA 
  
COLACE 100 mg capsule Generic drug:  docusate sodium CRANBERRY CONCENTRATE Cap Generic drug:  vitamin c-vitamin e 
  
diclofenac 1 % Gel Commonly known as:  VOLTAREN 
  
ENSURE ACTIVE HIGH PROTEIN Liqd Generic drug:  food supplemt, lactose-reduced EPINEPHrine HCl (PF) 1 mg/mL (1 mL) injection Commonly known as:  ADRENALIN 
  
ferrous sulfate 325 mg (65 mg iron) tablet Commonly known as:  Iron (Ferrous Sulfate) Take 1 Tab by mouth Daily (before breakfast). finasteride 5 mg tablet Commonly known as:  PROSCAR Take 1 Tab by mouth daily. lisinopril 5 mg tablet Commonly known as:  Marci Mikie Take 1 Tab by mouth daily. menthol-zinc oxide 0.44-20.6 % Oint Commonly known as:  CALMOSEPTINE 
  
MIRALAX 17 gram packet Generic drug:  polyethylene glycol 
  
mirtazapine 15 mg tablet Commonly known as:  REMERON 
  
oxybutynin chloride XL 10 mg CR tablet Commonly known as:  DITROPAN XL Take 1 Tab by mouth two (2) times a day. oxyCODONE IR 10 mg Tab immediate release tablet Commonly known as:  Seldon Breslow Take 1 Tab by mouth every six (6) hours as needed. Max Daily Amount: 40 mg. OYSTER SHELL CALCIUM-VIT D3 250-125 mg-unit tablet Generic drug:  calcium-vitamin D 
  
promethazine 25 mg tablet Commonly known as:  PHENERGAN 
  
VITAMIN C 500 mg tablet Generic drug:  ascorbic acid (vitamin C) VITAMIN D3 1,000 unit Cap Generic drug:  cholecalciferol Where to Get Your Medications Information about where to get these medications is not yet available Ask your nurse or doctor about these medications · ciprofloxacin HCl 500 mg tablet Return to the ER if you are unable to obtain referral as directed. Paua Bence Davenport's  results have been reviewed with him. He has been counseled regarding his diagnosis, treatment, and plan. He verbally conveys understanding and agreement of the signs, symptoms, diagnosis, treatment and prognosis and additionally agrees to follow up as discussed. He also agrees with the care-plan and conveys that all of his questions have been answered. I have also provided discharge instructions for him that include: educational information regarding their diagnosis and treatment, and list of reasons why they would want to return to the ED prior to their follow-up appointment, should his condition change.  
 
 
Brenda Howell ENP-C,FNP-C

## 2019-02-26 ENCOUNTER — TELEPHONE (OUTPATIENT)
Dept: SURGERY | Age: 74
End: 2019-02-26

## 2019-02-26 NOTE — TELEPHONE ENCOUNTER
----- Message from Jayne Simmonds, MD sent at 2/11/2019  8:17 AM EST -----  Please call the patient and tell him CT is normal.    Notified [address] where patient lives that his CT was normal. This was documented in their system.

## 2019-03-16 ENCOUNTER — HOSPITAL ENCOUNTER (INPATIENT)
Age: 74
LOS: 3 days | Discharge: NURSING, REHAB OR LONG-TERM CARE | DRG: 698 | End: 2019-03-19
Attending: UROLOGY | Admitting: UROLOGY
Payer: MEDICARE

## 2019-03-16 ENCOUNTER — ANESTHESIA EVENT (OUTPATIENT)
Dept: SURGERY | Age: 74
DRG: 698 | End: 2019-03-16
Payer: MEDICARE

## 2019-03-16 DIAGNOSIS — R31.9 HEMATURIA, UNSPECIFIED TYPE: ICD-10-CM

## 2019-03-16 DIAGNOSIS — N31.9 NEUROGENIC BLADDER: ICD-10-CM

## 2019-03-16 DIAGNOSIS — Z97.8 FOLEY CATHETER IN PLACE: ICD-10-CM

## 2019-03-16 DIAGNOSIS — I10 ESSENTIAL HYPERTENSION: Primary | ICD-10-CM

## 2019-03-16 PROBLEM — N20.9: Status: ACTIVE | Noted: 2019-03-16

## 2019-03-16 LAB
ANION GAP SERPL CALC-SCNC: 4 MMOL/L (ref 3–18)
BUN SERPL-MCNC: 28 MG/DL (ref 7–18)
BUN/CREAT SERPL: 33 (ref 12–20)
CALCIUM SERPL-MCNC: 9.9 MG/DL (ref 8.5–10.1)
CHLORIDE SERPL-SCNC: 107 MMOL/L (ref 100–108)
CO2 SERPL-SCNC: 32 MMOL/L (ref 21–32)
CREAT SERPL-MCNC: 0.85 MG/DL (ref 0.6–1.3)
ERYTHROCYTE [DISTWIDTH] IN BLOOD BY AUTOMATED COUNT: 14.8 % (ref 11.6–14.5)
GLUCOSE SERPL-MCNC: 93 MG/DL (ref 74–99)
HCT VFR BLD AUTO: 44.4 % (ref 36–48)
HGB BLD-MCNC: 14.4 G/DL (ref 13–16)
MCH RBC QN AUTO: 29.6 PG (ref 24–34)
MCHC RBC AUTO-ENTMCNC: 32.4 G/DL (ref 31–37)
MCV RBC AUTO: 91.4 FL (ref 74–97)
PLATELET # BLD AUTO: 271 K/UL (ref 135–420)
PMV BLD AUTO: 11.1 FL (ref 9.2–11.8)
POTASSIUM SERPL-SCNC: 4.2 MMOL/L (ref 3.5–5.5)
RBC # BLD AUTO: 4.86 M/UL (ref 4.7–5.5)
SODIUM SERPL-SCNC: 143 MMOL/L (ref 136–145)
WBC # BLD AUTO: 8.5 K/UL (ref 4.6–13.2)

## 2019-03-16 PROCEDURE — 87186 SC STD MICRODIL/AGAR DIL: CPT

## 2019-03-16 PROCEDURE — 77030034849

## 2019-03-16 PROCEDURE — 65270000029 HC RM PRIVATE

## 2019-03-16 PROCEDURE — 87086 URINE CULTURE/COLONY COUNT: CPT

## 2019-03-16 PROCEDURE — 74011250637 HC RX REV CODE- 250/637: Performed by: UROLOGY

## 2019-03-16 PROCEDURE — 74011250636 HC RX REV CODE- 250/636: Performed by: UROLOGY

## 2019-03-16 PROCEDURE — 77030027138 HC INCENT SPIROMETER -A

## 2019-03-16 PROCEDURE — 36415 COLL VENOUS BLD VENIPUNCTURE: CPT

## 2019-03-16 PROCEDURE — 85027 COMPLETE CBC AUTOMATED: CPT

## 2019-03-16 PROCEDURE — 87641 MR-STAPH DNA AMP PROBE: CPT

## 2019-03-16 PROCEDURE — 77030035230

## 2019-03-16 PROCEDURE — 87077 CULTURE AEROBIC IDENTIFY: CPT

## 2019-03-16 PROCEDURE — 74011000250 HC RX REV CODE- 250: Performed by: UROLOGY

## 2019-03-16 PROCEDURE — 80048 BASIC METABOLIC PNL TOTAL CA: CPT

## 2019-03-16 RX ORDER — DIPHENHYDRAMINE HYDROCHLORIDE 50 MG/ML
12.5 INJECTION, SOLUTION INTRAMUSCULAR; INTRAVENOUS
Status: DISCONTINUED | OUTPATIENT
Start: 2019-03-16 | End: 2019-03-20 | Stop reason: HOSPADM

## 2019-03-16 RX ORDER — HEPARIN SODIUM 5000 [USP'U]/ML
5000 INJECTION, SOLUTION INTRAVENOUS; SUBCUTANEOUS EVERY 8 HOURS
Status: DISCONTINUED | OUTPATIENT
Start: 2019-03-17 | End: 2019-03-20 | Stop reason: HOSPADM

## 2019-03-16 RX ORDER — NALOXONE HYDROCHLORIDE 0.4 MG/ML
0.4 INJECTION, SOLUTION INTRAMUSCULAR; INTRAVENOUS; SUBCUTANEOUS AS NEEDED
Status: DISCONTINUED | OUTPATIENT
Start: 2019-03-16 | End: 2019-03-20 | Stop reason: HOSPADM

## 2019-03-16 RX ORDER — DOCUSATE SODIUM 100 MG/1
100 CAPSULE, LIQUID FILLED ORAL 2 TIMES DAILY
Status: DISCONTINUED | OUTPATIENT
Start: 2019-03-16 | End: 2019-03-20 | Stop reason: HOSPADM

## 2019-03-16 RX ORDER — FINASTERIDE 5 MG/1
5 TABLET, FILM COATED ORAL DAILY
Status: DISCONTINUED | OUTPATIENT
Start: 2019-03-17 | End: 2019-03-20 | Stop reason: HOSPADM

## 2019-03-16 RX ORDER — LISINOPRIL 5 MG/1
5 TABLET ORAL DAILY
Status: DISCONTINUED | OUTPATIENT
Start: 2019-03-17 | End: 2019-03-20 | Stop reason: HOSPADM

## 2019-03-16 RX ORDER — ATORVASTATIN CALCIUM 10 MG/1
10 TABLET, FILM COATED ORAL DAILY
Status: DISCONTINUED | OUTPATIENT
Start: 2019-03-17 | End: 2019-03-20 | Stop reason: HOSPADM

## 2019-03-16 RX ORDER — HEPARIN SODIUM 5000 [USP'U]/ML
5000 INJECTION, SOLUTION INTRAVENOUS; SUBCUTANEOUS EVERY 8 HOURS
Status: DISCONTINUED | OUTPATIENT
Start: 2019-03-16 | End: 2019-03-16

## 2019-03-16 RX ORDER — OXYBUTYNIN CHLORIDE 5 MG/1
10 TABLET, EXTENDED RELEASE ORAL 2 TIMES DAILY
Status: DISCONTINUED | OUTPATIENT
Start: 2019-03-16 | End: 2019-03-20 | Stop reason: HOSPADM

## 2019-03-16 RX ORDER — ACETAMINOPHEN 325 MG/1
650 TABLET ORAL
Status: DISCONTINUED | OUTPATIENT
Start: 2019-03-16 | End: 2019-03-20 | Stop reason: HOSPADM

## 2019-03-16 RX ORDER — POLYETHYLENE GLYCOL 3350 17 G/17G
17 POWDER, FOR SOLUTION ORAL DAILY
Status: DISCONTINUED | OUTPATIENT
Start: 2019-03-17 | End: 2019-03-20 | Stop reason: HOSPADM

## 2019-03-16 RX ORDER — CALCIUM CARBONATE/VITAMIN D3 250-3.125
1 TABLET ORAL DAILY
Status: DISCONTINUED | OUTPATIENT
Start: 2019-03-17 | End: 2019-03-20 | Stop reason: HOSPADM

## 2019-03-16 RX ORDER — ONDANSETRON 2 MG/ML
4 INJECTION INTRAMUSCULAR; INTRAVENOUS
Status: DISCONTINUED | OUTPATIENT
Start: 2019-03-16 | End: 2019-03-20 | Stop reason: HOSPADM

## 2019-03-16 RX ORDER — OXYCODONE HYDROCHLORIDE 5 MG/1
5 TABLET ORAL
Status: DISCONTINUED | OUTPATIENT
Start: 2019-03-16 | End: 2019-03-20 | Stop reason: HOSPADM

## 2019-03-16 RX ORDER — SODIUM CHLORIDE 0.9 % (FLUSH) 0.9 %
5-40 SYRINGE (ML) INJECTION AS NEEDED
Status: DISCONTINUED | OUTPATIENT
Start: 2019-03-16 | End: 2019-03-20 | Stop reason: HOSPADM

## 2019-03-16 RX ORDER — CITALOPRAM 10 MG/1
10 TABLET ORAL DAILY
Status: DISCONTINUED | OUTPATIENT
Start: 2019-03-17 | End: 2019-03-20 | Stop reason: HOSPADM

## 2019-03-16 RX ORDER — SODIUM CHLORIDE 0.9 % (FLUSH) 0.9 %
5-40 SYRINGE (ML) INJECTION EVERY 8 HOURS
Status: DISCONTINUED | OUTPATIENT
Start: 2019-03-16 | End: 2019-03-20 | Stop reason: HOSPADM

## 2019-03-16 RX ORDER — DOCUSATE SODIUM 100 MG/1
100 CAPSULE, LIQUID FILLED ORAL 2 TIMES DAILY
Status: DISCONTINUED | OUTPATIENT
Start: 2019-03-16 | End: 2019-03-16 | Stop reason: SDUPTHER

## 2019-03-16 RX ORDER — MIRTAZAPINE 15 MG/1
15 TABLET, FILM COATED ORAL
Status: DISCONTINUED | OUTPATIENT
Start: 2019-03-16 | End: 2019-03-20 | Stop reason: HOSPADM

## 2019-03-16 RX ADMIN — DOCUSATE SODIUM 100 MG: 100 CAPSULE, LIQUID FILLED ORAL at 20:01

## 2019-03-16 RX ADMIN — Medication 10 ML: at 20:01

## 2019-03-16 RX ADMIN — HEPARIN SODIUM 5000 UNITS: 5000 INJECTION INTRAVENOUS; SUBCUTANEOUS at 20:01

## 2019-03-16 RX ADMIN — MEROPENEM 500 MG: 500 INJECTION, POWDER, FOR SOLUTION INTRAVENOUS at 20:00

## 2019-03-16 RX ADMIN — MIRTAZAPINE 15 MG: 15 TABLET, FILM COATED ORAL at 21:10

## 2019-03-16 RX ADMIN — OXYBUTYNIN CHLORIDE 10 MG: 5 TABLET, EXTENDED RELEASE ORAL at 20:01

## 2019-03-16 NOTE — H&P
H&P    Patient: Kj Chowdary               Sex: male          DOA: 3/16/2019       YOB: 1945      Age:  72 y.o.                      ASSESSMENT:   1. Hematuria  2. Retained stone material, likely previously encrusted balloon  3. History of CVA  4. Chronic urinary retention with perales in place  5. 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 Only sensitive to meropenem. Repeat culture, labs, change perales and start meropenem. Plan for cysto on Monday, possible biopsy with Unni. PLAN:    Recommend   1. Labs, Culture, cath change  2. Meropenem 500mg IV Q8  3. General diet, NPO after midnignt on Sunday   4. To OR on Monday with Dr. Hali Johnson for Cysto, possible biopsy, possible cystolithopaxy     Tamanna Huizar MD  [phone] Office  [phone]  Pager    CC: Hematuria     HISTORY OF PRESENT ILLNESS:  Kj Chowdary is a 72 y.o. male  With history of CVA, bed bound, chronic perales living in a nursing home with MDR ESBL bacteria found on culture with need for cystoscopic evaluation and possible biopsy. Currently asymptomatic. Catheter clogs frequently. Dr. Nancy Banuelos noted stony debris and difficult to visualize during cystoscopy in office. AUA Symptom Score 8/3/2017   Over the past month how often have you had the sensation that your bladder was not completely empty after you finished urinating? 3   Over the past month, how often have had to urinate again less than 2 hours after you last finished urinating? 3   Over the past month, how often have you found you stopped and started again several times when you urinated? 3   Over the past month, how often have you found it difficult to postpone urination? 3   Over the past month, how often have you had a weak urinary stream? 3   Over the past month, how often have you had to push or strain to begin urinating?  3   Over the past month, how many times did you most typically get up to urinate from the time you went to bed at night until the time you got up in the morning? 3   AUA Score 21   If you were to spend the rest of your life with your urinary condition the way it is now, how would you feel about that?  Mixed-about equally satisfied       Past Medical History:   Diagnosis Date    Abnormal computed tomography of bladder     Anemia     Arthritis     Basal ganglia disease     Benign prostatic hyperplasia with lower urinary tract symptoms     Bladder calculus     Bladder cancer ([facility] 3/13/14    Bladder mass     Bladder tumor     Brain aneurysm     CVA (cerebral vascular accident) ([facility] DEC 2012    hemiplegia    Dysphagia     Erythematous bladder mucosa     Webb catheter in place     Gross hematuria     Hemiplegia ([facility]     HTN (hypertension)     Hx MRSA infection 2017    urine    Neurogenic bladder     Psychiatric disorder     Retention, urine     Stroke ([facility]     Urethral stricture        Past Surgical History:   Procedure Laterality Date    COLONOSCOPY N/A 1/15/2018    COLONOSCOPY w/ biopsies w/ polypectomy w/ ink injection performed by Cassia Dela Cruz MD at [address] HX GI      PEG TUBE--REMOVED    HX HEENT      H/O TRACH    HX UROLOGICAL      super pubic tube    HX UROLOGICAL  3/13/14    TURBT, Dr. Lefty Moralez, [facility]    HX UROLOGICAL  10/8/15    Cysto, Dr. Aicha Kamara, [facility]    LAP,SURG,COLECTOMY, PARTIAL, W/ANAST N/A 03/15/2018    Dr. Aixa Solorio       Social History     Tobacco Use    Smoking status: Former Smoker     Last attempt to quit: 2010     Years since quittin.1    Smokeless tobacco: Never Used   Substance Use Topics    Alcohol use: No    Drug use: No       Allergies   Allergen Reactions    Pcn [Penicillins] Other (comments) and Itching     intolerance       Family History   Problem Relation Age of Onset    Hypertension Mother     Hypertension Father        Current Facility-Administered Medications   Medication Dose Route Frequency Provider Last Rate Last Dose    influenza vaccine 2018-19 (6 mos+)(PF) (FLUARIX QUAD/FLULAVAL QUAD) injection 0.5 mL  0.5 mL IntraMUSCular PRIOR TO DISCHARGE Berny Walker MD        meropenem (MERREM) 500 mg in 0.9% sodium chloride (MBP/ADV) 50 mL MBP  0.5 g IntraVENous Q8H Calixto Aaron MD        sodium chloride (NS) flush 5-40 mL  5-40 mL IntraVENous Q8H Keila Felder MD        sodium chloride (NS) flush 5-40 mL  5-40 mL IntraVENous PRN Keila Felder MD        heparin (porcine) injection 5,000 Units  5,000 Units SubCUTAneous Q8H Calixto Garcia MD        acetaminophen (TYLENOL) tablet 650 mg  650 mg Oral Q4H PRN Keila Felder MD        oxyCODONE IR (ROXICODONE) tablet 5 mg  5 mg Oral Q4H PRN Keila Felder MD        [facility]) injection 0.4 mg  0.4 mg IntraVENous PRN Keila Felder MD        ondansetron [facility]) injection 4 mg  4 mg IntraVENous Q4H PRN Keila Felder MD        diphenhydrAMINE (BENADRYL) injection 12.5 mg  12.5 mg IntraVENous Q4H PRN Keila Felder MD        docusate sodium (COLACE) capsule 100 mg  100 mg Oral BID Keila Felder MD        oxybutynin chloride XL (DITROPAN XL) tablet 10 mg  10 mg Oral BID Keila Felder MD        [START ON 3/17/2019] lisinopril (PRINIVIL, ZESTRIL) tablet 5 mg  5 mg Oral DAILY Keila Felder MD        docusate sodium (COLACE) capsule 100 mg  100 mg Oral BID Keila Felder MD        . PHARMACY TO SUBSTITUTE PER PROTOCOL (Reordered from: food supplemt, lactose-reduced (ENSURE ACTIVE HIGH PROTEIN) liqd)    Per Protocol Keila Felder MD        [START ON 3/17/2019] citalopram (CELEXA) tablet 10 mg  10 mg Oral DAILY Keila Felder MD        mirtazapine (REMERON) tablet 15 mg  15 mg Oral QHS Keila Felder MD        [START ON 3/17/2019] atorvastatin (LIPITOR) tablet 10 mg  10 mg Oral DAILY Keila Felder MD        [START ON 3/17/2019] calcium-vitamin D (OSCAL 250) tablet 1 Tab  1 Tab Oral DAILY Keila Felder MD        [START ON 3/17/2019] polyethylene glycol (Nito Bradshaw) packet 17 g  17 g Oral DAILY Jaylin Shields MD        [START ON 3/17/2019] finasteride (PROSCAR) tablet 5 mg  5 mg Oral DAILY Jaylin Shields MD           Review of Systems  Constitutional: No fever, chills, or weight loss  Respiratory: No dyspnea  Cardiovascular: No chest pain  Gastrointestinal: No vomiting or abdominal pain. Genitourinary: + Hematuria    Neurological: No focal motor changes. PHYSICAL EXAMINATION:   Visit Vitals  /77 (BP 1 Location: Right arm, BP Patient Position: At rest;During activity)   Pulse (!) 55 Comment: nurse aware    Temp 96.8 °F (36 °C)   Resp 16   SpO2 97%     Constitutional: Well developed, well nourished male. No acute distress. HEENT: Normocephalic, Atraumatic, EOM's intact   CV:  Normal radial pulse. Respiratory: No respiratory distress or difficulties breathing   Abdomen:  Nontender, nondistended.  Male:  No CVA tenderness  SCROTUM:  No scrotal rash or lesions noticed. Normal bilateral testes without masses or tenderness. PENIS:Mild iatragenic hypospadias. Webb in place draining turbid urine. Skin: No evidence of jaundice. Normal color  Neuro/Psych:  Alert and oriented. Affect appropriate. Lymphatic:   No enlarged inguinal lymph nodes. REVIEW OF LABS AND IMAGING:      Labs: Results:   Chemistry  No results for input(s): GLU, NA, K, CL, CO2, BUN, CREA, CA, AGAP, BUCR, TBIL, GPT, AP, TP, ALB, GLOB, AGRAT in the last 72 hours. CBC w/Diff No results for input(s): WBC, RBC, HGB, HCT, PLT, GRANS, LYMPH, EOS, HGBEXT, HCTEXT, PLTEXT in the last 72 hours. Cultures No results for input(s): CULT in the last 72 hours.   All Micro Results     Procedure Component Value Units Date/Time    CULTURE, URINE [[id number]]     Order Status:  Sent Specimen:  Webb Specimen     MRSA SCREEN - PCR (NASAL) [[id number]]     Order Status:  Sent Specimen:  Nasal from Nares             Urinalysis Color   Date Value Ref Range Status   02/14/2019 DARK YELLOW   Final Appearance   Date Value Ref Range Status   02/14/2019 OPAQUE   Final     Specific gravity   Date Value Ref Range Status   02/14/2019 1.010 1.003 - 1.030   Final     pH (UA)   Date Value Ref Range Status   02/14/2019 8.0 5.0 - 8.0   Final     Protein   Date Value Ref Range Status   02/14/2019 >300 (A) NEG mg/dL Final     Ketone   Date Value Ref Range Status   02/14/2019 NEGATIVE  NEG mg/dL Final     Bilirubin   Date Value Ref Range Status   02/14/2019 MODERATE (A) NEG   Final     Blood   Date Value Ref Range Status   02/14/2019 LARGE (A) NEG   Final     Urobilinogen   Date Value Ref Range Status   02/14/2019 2.0 (H) 0.2 - 1.0 EU/dL Final     Nitrites   Date Value Ref Range Status   02/14/2019 NEGATIVE  NEG   Final     Leukocyte Esterase   Date Value Ref Range Status   02/14/2019 SMALL (A) NEG   Final     Potassium   Date Value Ref Range Status   02/14/2019 3.9 3.5 - 5.5 mmol/L Final     Creatinine   Date Value Ref Range Status   02/14/2019 0.95 0.6 - 1.3 MG/DL Final     BUN   Date Value Ref Range Status   02/14/2019 26 (H) 7.0 - 18 MG/DL Final      PSA No results for input(s): PSA in the last 72 hours. Coagulation Lab Results   Component Value Date/Time    Prothrombin time 13.3 02/14/2019 12:30 AM    Prothrombin time 13.0 02/02/2018 04:55 PM    INR 1.0 02/14/2019 12:30 AM    INR 1.0 02/02/2018 04:55 PM    aPTT 39.8 (H) 02/02/2018 04:55 PM    aPTT 38.8 (H) 02/22/2017 08:00 AM           US Results (most recent):  No results found for this or any previous visit. chest    CT Results (most recent):   Results from Hospital Encounter encounter on 02/13/19   CT ABD PELV WO CONT    Narrative CT abdomen and pelvis without contrast      HISTORY: Blood around Webb catheter, hematuria. COMPARISON: CT 2/8/2019 and 8/31/2018    TECHNIQUE: 5mm contiguous axial images from the lungs bases to the anal verge. No intravenous contrast was administered.     All CT scans at this facility are performed using dose optimization technique as  appropriate to a performed exam, to include automated exposure control,  adjustment of the MA and/or kV according to patient size (including appropriate  matching for site-specific examinations) or use of  iterative reconstruction  technique. ABDOMEN FINDINGS: Mild bibasilar atelectasis. Unremarkable nonenhanced  appearance of the liver, spleen, pancreas, gallbladder, adrenal glands and  kidneys. No intrarenal calculi or hydronephrosis. No perinephric stranding. Ureters are nondilated. No pneumoperitoneum or ascites. No bowel obstruction. Nonaneurysmal aorta with atherosclerotic change. PELVIC FINDINGS:  Urinary bladder is mildly distended and contains a Webb  catheter balloon. Persistent mild wall thickening. Hyperdensity within the lumen  consistent with hemorrhage. Small amount of highly dense material in the  dependent bladder which was present has been present since the August CT. It is  unlikely that this represents residual contrast and this may alternatively  represent calcification from bladder stones. No pelvic mass or adenopathy. No  free fluid. No dilated bowel. No acute osseous abnormality. Impression IMPRESSION:    Hyperdensity in the bladder most likely represents hemorrhagic debris given the  clinical findings of hematuria. Smaller amount of highly dense dependent  material, persistent since August, likely representing layering stones. Bladder  wall remains mildly thickened which could be from nondistention or cystitis. No renal calculi or hydronephrosis. ABD      MRI Results (most recent): No procedure found. No diagnosis found.

## 2019-03-16 NOTE — ROUTINE PROCESS
Bedside and Verbal shift change report given to AGUSTIN Molina (oncoming nurse) by Yaritza Jackson (offgoing nurse). Report included the following information SBAR, Kardex, Intake/Output, MAR and Recent Results.

## 2019-03-17 LAB
BACTERIA SPEC CULT: NORMAL
SERVICE CMNT-IMP: NORMAL

## 2019-03-17 PROCEDURE — 74011000250 HC RX REV CODE- 250: Performed by: UROLOGY

## 2019-03-17 PROCEDURE — 74011250636 HC RX REV CODE- 250/636: Performed by: UROLOGY

## 2019-03-17 PROCEDURE — 65270000029 HC RM PRIVATE

## 2019-03-17 PROCEDURE — 74011250637 HC RX REV CODE- 250/637: Performed by: UROLOGY

## 2019-03-17 RX ORDER — DEXTROSE, SODIUM CHLORIDE, AND POTASSIUM CHLORIDE 5; .45; .15 G/100ML; G/100ML; G/100ML
125 INJECTION INTRAVENOUS CONTINUOUS
Status: DISCONTINUED | OUTPATIENT
Start: 2019-03-17 | End: 2019-03-20 | Stop reason: HOSPADM

## 2019-03-17 RX ADMIN — DOCUSATE SODIUM 100 MG: 100 CAPSULE, LIQUID FILLED ORAL at 11:07

## 2019-03-17 RX ADMIN — CALCIUM CARBONATE-CHOLECALCIFEROL TAB 250 MG-125 UNIT 1 TABLET: 250-125 TAB at 11:15

## 2019-03-17 RX ADMIN — Medication 10 ML: at 05:41

## 2019-03-17 RX ADMIN — Medication 10 ML: at 21:12

## 2019-03-17 RX ADMIN — DEXTROSE MONOHYDRATE, SODIUM CHLORIDE, AND POTASSIUM CHLORIDE 125 ML/HR: 50; 4.5; 1.49 INJECTION, SOLUTION INTRAVENOUS at 12:30

## 2019-03-17 RX ADMIN — ATORVASTATIN CALCIUM 10 MG: 10 TABLET, FILM COATED ORAL at 11:16

## 2019-03-17 RX ADMIN — OXYCODONE HYDROCHLORIDE 5 MG: 5 TABLET ORAL at 12:40

## 2019-03-17 RX ADMIN — MEROPENEM 500 MG: 500 INJECTION, POWDER, FOR SOLUTION INTRAVENOUS at 05:41

## 2019-03-17 RX ADMIN — POLYETHYLENE GLYCOL 3350 17 G: 17 POWDER, FOR SOLUTION ORAL at 11:14

## 2019-03-17 RX ADMIN — DEXTROSE MONOHYDRATE, SODIUM CHLORIDE, AND POTASSIUM CHLORIDE 125 ML/HR: 50; 4.5; 1.49 INJECTION, SOLUTION INTRAVENOUS at 20:21

## 2019-03-17 RX ADMIN — OXYBUTYNIN CHLORIDE 10 MG: 5 TABLET, EXTENDED RELEASE ORAL at 11:07

## 2019-03-17 RX ADMIN — HEPARIN SODIUM 5000 UNITS: 5000 INJECTION INTRAVENOUS; SUBCUTANEOUS at 20:18

## 2019-03-17 RX ADMIN — DOCUSATE SODIUM 100 MG: 100 CAPSULE, LIQUID FILLED ORAL at 17:40

## 2019-03-17 RX ADMIN — MIRTAZAPINE 15 MG: 15 TABLET, FILM COATED ORAL at 21:11

## 2019-03-17 RX ADMIN — MEROPENEM 500 MG: 500 INJECTION, POWDER, FOR SOLUTION INTRAVENOUS at 14:09

## 2019-03-17 RX ADMIN — HEPARIN SODIUM 5000 UNITS: 5000 INJECTION INTRAVENOUS; SUBCUTANEOUS at 11:15

## 2019-03-17 RX ADMIN — MEROPENEM 500 MG: 500 INJECTION, POWDER, FOR SOLUTION INTRAVENOUS at 21:11

## 2019-03-17 RX ADMIN — CITALOPRAM HYDROBROMIDE 10 MG: 10 TABLET ORAL at 11:14

## 2019-03-17 RX ADMIN — OXYBUTYNIN CHLORIDE 10 MG: 5 TABLET, EXTENDED RELEASE ORAL at 17:40

## 2019-03-17 RX ADMIN — ACETAMINOPHEN 650 MG: 325 TABLET ORAL at 14:10

## 2019-03-17 RX ADMIN — HEPARIN SODIUM 5000 UNITS: 5000 INJECTION INTRAVENOUS; SUBCUTANEOUS at 04:09

## 2019-03-17 NOTE — PROGRESS NOTES
conducted an initial consultation and Spiritual Assessment for Fito Burgess, who is a 72 y.o.,male. Patients Primary Language is: Trenda Myla. According to the patients EMR Hindu Affiliation is: [facility].     The reason the Patient came to the hospital is:   Patient Active Problem List    Diagnosis Date Noted    Urinary tract calculus 03/16/2019    Colon cancer, ascending ([address] 04/25/2018    Anemia 03/14/2018    Hx of bladder cancer 08/03/2017    Contractures of both knees 02/11/2016    Advanced care planning/counseling discussion 02/11/2016    Hemiplegia, left 07/31/2014    Webb catheter in place 04/14/2014    Bladder tumor 03/31/2014    Malignant neoplasm of bladder ([address] 03/20/2014    Hematuria 03/17/2014    Retention of urine 02/17/2014    Neurogenic bladder 02/17/2014    CVA (cerebral vascular accident) ([address]     HTN (hypertension)     Dysphagia     Basal ganglia disease     Hx MRSA infection     Stroke ([address]     Brain aneurysm     Methicillin resistant Staphylococcus aureus infection 12/13/2012    Urinary tract infection 12/08/2012    Hyperosmolality and/or hypernatremia 12/07/2012    Intracerebral hemorrhage ([address] 12/02/2012    Cerebral edema ([address] 12/02/2012        The  provided the following Interventions:  Initiated a relationship of care and support. Listened empathically. Provided chaplaincy education. Provided information about Spiritual Care Services. Offered assurance of continued prayers on patient's behalf. Chart reviewed. The following outcomes where achieved:  Patient expressed gratitude for 's visit. Assessment:  Patient does not have any Adventism/cultural needs that will affect patients preferences in health care. There are no spiritual or Adventism issues which require intervention at this time. Plan:  Chaplains will continue to follow and will provide pastoral care on an as needed/requested basis.    recommends bedside caregivers page  on duty if patient shows signs of acute spiritual or emotional distress.         [address] [facility].   [phone]

## 2019-03-17 NOTE — ROUTINE PROCESS
Bedside and Verbal shift change report given to Nathan Vincent RN (oncoming nurse) by Jory Guzman RN (offgoing nurse). Report included the following information SBAR, Kardex, MAR and Recent Results.     SITUATION:    Code Status: Full Code   Reason for Admission: Urinary tract calculus [N20.9]    [facility] day: 1   Problem List:       Hospital Problems  Date Reviewed: 1/7/2019          Codes Class Noted POA    Urinary tract calculus ICD-10-CM: N20.9  ICD-9-CM: 592.9  3/16/2019 Unknown              BACKGROUND:    Past Medical History:   Past Medical History:   Diagnosis Date    Abnormal computed tomography of bladder     Anemia     Arthritis     Basal ganglia disease     Benign prostatic hyperplasia with lower urinary tract symptoms     Bladder calculus     Bladder cancer ([address] 3/13/14    Bladder mass     Bladder tumor     Brain aneurysm     CVA (cerebral vascular accident) ([facility] DEC 2012    hemiplegia    Dysphagia     Erythematous bladder mucosa     Webb catheter in place     Gross hematuria     Hemiplegia ([facility]     HTN (hypertension)     Hx MRSA infection 09/2017    urine    Neurogenic bladder     Psychiatric disorder     Retention, urine     Stroke ([facility]     Urethral stricture          Patient taking anticoagulants yes     ASSESSMENT:    Changes in Assessment Throughout Shift: none     Patient has Central Line: no Reasons if yes:    Patient has Webb Cath: yes Reasons if yes: chronic retention      Last Vitals:     Vitals:    03/16/19 1213 03/16/19 1901 03/16/19 2200 03/17/19 0600   BP: 113/77 112/70 125/78 101/74   Pulse: (!) 55 (!) 58 (!) 55 69   Resp: 16 18 17 18   Temp: 96.8 °F (36 °C) 97.3 °F (36.3 °C) 97.4 °F (36.3 °C) 98.1 °F (36.7 °C)   SpO2: 97%  100% 94%        IV and DRAINS (will only show if present)   Peripheral IV 03/16/19 Right Wrist-Site Assessment: Clean, dry, & intact     WOUND (if present)   Wound Type:  none   Dressing present Dressing Present : No   Wound Concerns/Notes:  none     PAIN    Pain Assessment    Pain Intensity 1: 0 (03/17/19 0014)              Patient Stated Pain Goal: 0  o Interventions for Pain:  none  o Intervention effective:   o Time of last intervention:    o Reassessment Completed:       Last 3 Weights: There were no vitals filed for this visit. Weight change:      INTAKE/OUPUT    Current Shift: 03/16 1901 - 03/17 0700  In: 240 [P.O.:240]  Out: 550 [Urine:550]    Last three shifts: 03/15 0701 - 03/16 1900  In: 360 [P.O.:360]  Out: -      LAB RESULTS     Recent Labs     03/16/19  1630   WBC 8.5   HGB 14.4   HCT 44.4           Recent Labs     03/16/19  1630      K 4.2   GLU 93   BUN 28*   CREA 0.85   CA 9.9       RECOMMENDATIONS AND DISCHARGE PLANNING     1. Pending tests/procedures/ Plan of Care or Other Needs: none     2. Discharge plan for patient and Needs/Barriers: SNF    3. Estimated Discharge Date: unknown Posted on Whiteboard in Patients Room: no      4. The patient's care plan was reviewed with the oncoming nurse. \"HEALS\" SAFETY CHECK      Fall Risk    Total Score: 3    Safety Measures: Safety Measures: Bed/Chair-Wheels locked, Bed in low position, Call light within reach, Gripper socks, Side rails X 3    A safety check occurred in the patient's room between off going nurse and oncoming nurse listed above.     The safety check included the below items  Area Items   H  High Alert Medications - Verify all high alert medication drips (heparin, PCA, etc.)   E  Equipment - Suction is set up for ALL patients (with yanker)  - Red plugs utilized for all equipment (IV pumps, etc.)  - WOWs wiped down at end of shift.  - Room stocked with oxygen, suction, and other unit-specific supplies   A  Alarms - Bed alarm is set for fall risk patients  - Ensure chair alarm is in place and activated if patient is up in a chair   L  Lines - Check IV for any infiltration  - Webb bag is empty if patient has a Webb   - Tubing and IV bags are labeled   S  Safety   - Room is clean, patient is clean, and equipment is clean. - Hallways are clear from equipment besides carts. - Fall bracelet on for fall risk patients  - Ensure room is clear and free of clutter  - Suction is set up for ALL patients (with denzel)  - Hallways are clear from equipment besides carts.    - Isolation precautions followed, supplies available outside room, sign posted     Jory Guzman RN

## 2019-03-17 NOTE — PROGRESS NOTES
Urology Hospital Progress Note    HD#:   2  POD#:  N/A  Antibiotics:  meropenem    ASSESSMENT:     1. Hematuria  2. Retained stone material, likely previously encrusted balloon  3. History of CVA  4. Chronic urinary retention with perales in place  5. History of PUNLMP  6. UTI with MDR ESBL Klebsiella-- S- Meropenem    Current creatinine:   0.85 (eGFR: >60)  Urine Cx:  3/16 (pending); 3/6- MDR ESBL as outlined above    PLAN:     -to OR in the AM with Dr. Estefania Self for cysto and cystolitholapaxy and possible bladder biopsy. -NPO after midnight tonight  -IVF at 100 ml /hr starting at NPO  -admission culture currently pending-- urine is clear and yellow currently. SUBJECTIVE:     CC: Bladder stone    HPI: No major issues overnight. He is comfortable and denies pain. Perales was exchanged and urine is clear.     ECOG PS: 2        OBJECTIVE:     Visit Vitals  BP 90/64 (BP 1 Location: Right arm, BP Patient Position: At rest)   Pulse 60   Temp 96.4 °F (35.8 °C)   Resp 19   SpO2 98%       Gen: NAD   Pulm: Equal respiratory effort  CV: regular  Abd: soft, nontender, non-distended   Ext: No clubbing, cyanosis or edema; significant contractures noted  Neuro: Grossly intact   Skin: warm and dry     Recent Results (from the past 24 hour(s))   CBC W/O DIFF    Collection Time: 03/16/19  4:30 PM   Result Value Ref Range    WBC 8.5 4.6 - 13.2 K/uL    RBC 4.86 4.70 - 5.50 M/uL    HGB 14.4 13.0 - 16.0 g/dL    HCT 44.4 36.0 - 48.0 %    MCV 91.4 74.0 - 97.0 FL    MCH 29.6 24.0 - 34.0 PG    MCHC 32.4 31.0 - 37.0 g/dL    RDW 14.8 (H) 11.6 - 14.5 %    PLATELET 795 653 - 329 K/uL    MPV 11.1 9.2 - 96.1 FL   METABOLIC PANEL, BASIC    Collection Time: 03/16/19  4:30 PM   Result Value Ref Range    Sodium 143 136 - 145 mmol/L    Potassium 4.2 3.5 - 5.5 mmol/L    Chloride 107 100 - 108 mmol/L    CO2 32 21 - 32 mmol/L    Anion gap 4 3.0 - 18 mmol/L    Glucose 93 74 - 99 mg/dL    BUN 28 (H) 7.0 - 18 MG/DL    Creatinine 0.85 0.6 - 1.3 MG/DL BUN/Creatinine ratio 33 (H) 12 - 20      GFR est AA >60 >60 ml/min/1.73m2    GFR est non-AA >60 >60 ml/min/1.73m2    Calcium 9.9 8.5 - 10.1 MG/DL     Mixed culture more than 2 different organisms the following organisms predominating      Culture KLEBSIELLA PNEUMONIAE (A)     Specimen   Urine - Webb Cath Urine     Organism Antibiotic Method Susceptibility   Klebsiella pneumoniae Amikacin   Sensitive    Ampicillin   Resistant    Ampicillin/Sulbactam   Resistant    Aztreonam   Resistant    Cefazolin   Resistant    Cefepime   Resistant    Ceftriaxone   Resistant    Ciprofloxacin   Resistant    Gentamicin   Resistant    Levofloxacin   Resistant    Meropenem   Sensitive    Nitrofurantoin   Intermediate    Piperacillin/Tazobactam   Sensitive    Tetracycline   Resistant    Tobramycin   Intermediate    Trimethoprim/Sulfamethoxazole   Resistant   Comment:   ** ESBL- PLACE ON CONTACT PRECAUTIONS*   This organism is an EXTENDED SPECTRUM BETA-LACTAMASE  (ESBL). An ESBL producing organism is RESISTANT to penicillins, cephalosporins, and monobactams (aztreonam).  Carbapenems are considered the drug of choice, but beta-lactam/beta-lactamase inhibitors (piperacillin/tazobactam) may also be effective based on the site of infection and the IRWIN.  Please consider an ID consult if clinically appropriate. Jefry Moreno. Dontrell Gutierrez MD, [facility]   Urologic Oncology  [facility] [place]     of Urology  Department of Urology  [address].  [place], [address], [place]     Pager:  [phone]  Office:  [phone]

## 2019-03-17 NOTE — PROGRESS NOTES
Problem: Pressure Injury - Risk of  Goal: *Prevention of pressure injury  Document Robert Scale and appropriate interventions in the flowsheet.   Outcome: Progressing Towards Goal  Pressure Injury Interventions:  Sensory Interventions: Pressure redistribution bed/mattress (bed type)    Moisture Interventions: Absorbent underpads    Activity Interventions: Pressure redistribution bed/mattress(bed type)    Mobility Interventions: Pressure redistribution bed/mattress (bed type)    Nutrition Interventions: Document food/fluid/supplement intake    Friction and Shear Interventions: Lift sheet

## 2019-03-17 NOTE — ROUTINE PROCESS
Patient admitted to floor as a direct admit form SNF. He is awake and alert. No acute distress noted. Patient oriented to room and how to the use the call bell.

## 2019-03-17 NOTE — PROGRESS NOTES
Problem: Falls - Risk of  Goal: *Absence of Falls  Document Cecelia Fall Risk and appropriate interventions in the flowsheet.   Outcome: Progressing Towards Goal  Fall Risk Interventions:       Mentation Interventions: Bed/chair exit alarm    Medication Interventions: Bed/chair exit alarm    Elimination Interventions: Bed/chair exit alarm, Call light in reach

## 2019-03-17 NOTE — ROUTINE PROCESS
Bedside and Verbal shift change report given to AGUSTIN Molina (oncoming nurse) by Jeremie Gallagher (offgoing nurse). Report included the following information SBAR, Kardex, Intake/Output, MAR and Recent Results.

## 2019-03-18 ENCOUNTER — ANESTHESIA (OUTPATIENT)
Dept: SURGERY | Age: 74
DRG: 698 | End: 2019-03-18
Payer: MEDICARE

## 2019-03-18 PROCEDURE — 76210000063 HC OR PH I REC FIRST 0.5 HR: Performed by: UROLOGY

## 2019-03-18 PROCEDURE — 74011000250 HC RX REV CODE- 250: Performed by: UROLOGY

## 2019-03-18 PROCEDURE — 77030019605: Performed by: UROLOGY

## 2019-03-18 PROCEDURE — 77030012961 HC IRR KT CYSTO/TUR ICUM -A: Performed by: UROLOGY

## 2019-03-18 PROCEDURE — C1769 GUIDE WIRE: HCPCS | Performed by: UROLOGY

## 2019-03-18 PROCEDURE — 74011250636 HC RX REV CODE- 250/636

## 2019-03-18 PROCEDURE — 77030018836 HC SOL IRR NACL ICUM -A: Performed by: UROLOGY

## 2019-03-18 PROCEDURE — 74011250636 HC RX REV CODE- 250/636: Performed by: UROLOGY

## 2019-03-18 PROCEDURE — 74011250637 HC RX REV CODE- 250/637: Performed by: UROLOGY

## 2019-03-18 PROCEDURE — 77030032490 HC SLV COMPR SCD KNE COVD -B: Performed by: UROLOGY

## 2019-03-18 PROCEDURE — 77030020782 HC GWN BAIR PAWS FLX 3M -B: Performed by: UROLOGY

## 2019-03-18 PROCEDURE — 65270000029 HC RM PRIVATE

## 2019-03-18 PROCEDURE — 77030010509 HC AIRWY LMA MSK TELE -A: Performed by: ANESTHESIOLOGY

## 2019-03-18 PROCEDURE — 77030005518 HC CATH URETH FOL 2W BARD -B: Performed by: UROLOGY

## 2019-03-18 PROCEDURE — 74011250636 HC RX REV CODE- 250/636: Performed by: NURSE ANESTHETIST, CERTIFIED REGISTERED

## 2019-03-18 PROCEDURE — 74011250637 HC RX REV CODE- 250/637: Performed by: NURSE ANESTHETIST, CERTIFIED REGISTERED

## 2019-03-18 PROCEDURE — 74011000250 HC RX REV CODE- 250

## 2019-03-18 PROCEDURE — 0TCB8ZZ EXTIRPATION OF MATTER FROM BLADDER, VIA NATURAL OR ARTIFICIAL OPENING ENDOSCOPIC: ICD-10-PCS | Performed by: UROLOGY

## 2019-03-18 PROCEDURE — 77030020186 HC BOOT HL PROTCT SAGE -B

## 2019-03-18 PROCEDURE — 76010000161 HC OR TIME 1 TO 1.5 HR INTENSV-TIER 1: Performed by: UROLOGY

## 2019-03-18 PROCEDURE — 76060000033 HC ANESTHESIA 1 TO 1.5 HR: Performed by: UROLOGY

## 2019-03-18 RX ORDER — SODIUM CHLORIDE, SODIUM LACTATE, POTASSIUM CHLORIDE, CALCIUM CHLORIDE 600; 310; 30; 20 MG/100ML; MG/100ML; MG/100ML; MG/100ML
75 INJECTION, SOLUTION INTRAVENOUS CONTINUOUS
Status: DISCONTINUED | OUTPATIENT
Start: 2019-03-18 | End: 2019-03-18 | Stop reason: HOSPADM

## 2019-03-18 RX ORDER — FENTANYL CITRATE 50 UG/ML
50 INJECTION, SOLUTION INTRAMUSCULAR; INTRAVENOUS AS NEEDED
Status: DISCONTINUED | OUTPATIENT
Start: 2019-03-18 | End: 2019-03-18 | Stop reason: HOSPADM

## 2019-03-18 RX ORDER — PROPOFOL 10 MG/ML
INJECTION, EMULSION INTRAVENOUS AS NEEDED
Status: DISCONTINUED | OUTPATIENT
Start: 2019-03-18 | End: 2019-03-18 | Stop reason: HOSPADM

## 2019-03-18 RX ORDER — FAMOTIDINE 20 MG/1
20 TABLET, FILM COATED ORAL ONCE
Status: COMPLETED | OUTPATIENT
Start: 2019-03-18 | End: 2019-03-18

## 2019-03-18 RX ORDER — NALOXONE HYDROCHLORIDE 0.4 MG/ML
0.1 INJECTION, SOLUTION INTRAMUSCULAR; INTRAVENOUS; SUBCUTANEOUS ONCE
Status: DISCONTINUED | OUTPATIENT
Start: 2019-03-18 | End: 2019-03-18 | Stop reason: HOSPADM

## 2019-03-18 RX ORDER — SODIUM CHLORIDE, SODIUM LACTATE, POTASSIUM CHLORIDE, CALCIUM CHLORIDE 600; 310; 30; 20 MG/100ML; MG/100ML; MG/100ML; MG/100ML
25 INJECTION, SOLUTION INTRAVENOUS CONTINUOUS
Status: DISPENSED | OUTPATIENT
Start: 2019-03-18 | End: 2019-03-19

## 2019-03-18 RX ORDER — DEXAMETHASONE SODIUM PHOSPHATE 4 MG/ML
INJECTION, SOLUTION INTRA-ARTICULAR; INTRALESIONAL; INTRAMUSCULAR; INTRAVENOUS; SOFT TISSUE AS NEEDED
Status: DISCONTINUED | OUTPATIENT
Start: 2019-03-18 | End: 2019-03-18 | Stop reason: HOSPADM

## 2019-03-18 RX ORDER — FENTANYL CITRATE 50 UG/ML
INJECTION, SOLUTION INTRAMUSCULAR; INTRAVENOUS AS NEEDED
Status: DISCONTINUED | OUTPATIENT
Start: 2019-03-18 | End: 2019-03-18 | Stop reason: HOSPADM

## 2019-03-18 RX ORDER — LIDOCAINE HYDROCHLORIDE 20 MG/ML
INJECTION, SOLUTION EPIDURAL; INFILTRATION; INTRACAUDAL; PERINEURAL AS NEEDED
Status: DISCONTINUED | OUTPATIENT
Start: 2019-03-18 | End: 2019-03-18 | Stop reason: HOSPADM

## 2019-03-18 RX ADMIN — DEXTROSE MONOHYDRATE, SODIUM CHLORIDE, AND POTASSIUM CHLORIDE 125 ML/HR: 50; 4.5; 1.49 INJECTION, SOLUTION INTRAVENOUS at 05:35

## 2019-03-18 RX ADMIN — PROPOFOL 120 MG: 10 INJECTION, EMULSION INTRAVENOUS at 13:48

## 2019-03-18 RX ADMIN — DEXAMETHASONE SODIUM PHOSPHATE 4 MG: 4 INJECTION, SOLUTION INTRA-ARTICULAR; INTRALESIONAL; INTRAMUSCULAR; INTRAVENOUS; SOFT TISSUE at 13:55

## 2019-03-18 RX ADMIN — SODIUM CHLORIDE, SODIUM LACTATE, POTASSIUM CHLORIDE, AND CALCIUM CHLORIDE 25 ML/HR: 600; 310; 30; 20 INJECTION, SOLUTION INTRAVENOUS at 10:50

## 2019-03-18 RX ADMIN — FENTANYL CITRATE 50 MCG: 50 INJECTION, SOLUTION INTRAMUSCULAR; INTRAVENOUS at 13:56

## 2019-03-18 RX ADMIN — DEXTROSE MONOHYDRATE, SODIUM CHLORIDE, AND POTASSIUM CHLORIDE 125 ML/HR: 50; 4.5; 1.49 INJECTION, SOLUTION INTRAVENOUS at 18:00

## 2019-03-18 RX ADMIN — FENTANYL CITRATE 100 MCG: 50 INJECTION, SOLUTION INTRAMUSCULAR; INTRAVENOUS at 13:48

## 2019-03-18 RX ADMIN — Medication 10 ML: at 21:59

## 2019-03-18 RX ADMIN — HEPARIN SODIUM 5000 UNITS: 5000 INJECTION INTRAVENOUS; SUBCUTANEOUS at 21:58

## 2019-03-18 RX ADMIN — LIDOCAINE HYDROCHLORIDE 40 MG: 20 INJECTION, SOLUTION EPIDURAL; INFILTRATION; INTRACAUDAL; PERINEURAL at 13:48

## 2019-03-18 RX ADMIN — FENTANYL CITRATE 50 MCG: 50 INJECTION, SOLUTION INTRAMUSCULAR; INTRAVENOUS at 14:21

## 2019-03-18 RX ADMIN — MEROPENEM 500 MG: 500 INJECTION, POWDER, FOR SOLUTION INTRAVENOUS at 21:58

## 2019-03-18 RX ADMIN — DEXAMETHASONE SODIUM PHOSPHATE 4 MG: 4 INJECTION, SOLUTION INTRA-ARTICULAR; INTRALESIONAL; INTRAMUSCULAR; INTRAVENOUS; SOFT TISSUE at 14:28

## 2019-03-18 RX ADMIN — MEROPENEM 500 MG: 500 INJECTION, POWDER, FOR SOLUTION INTRAVENOUS at 05:32

## 2019-03-18 RX ADMIN — Medication 10 ML: at 05:32

## 2019-03-18 RX ADMIN — MIRTAZAPINE 15 MG: 15 TABLET, FILM COATED ORAL at 21:58

## 2019-03-18 RX ADMIN — DOCUSATE SODIUM 100 MG: 100 CAPSULE, LIQUID FILLED ORAL at 18:01

## 2019-03-18 RX ADMIN — OXYBUTYNIN CHLORIDE 10 MG: 5 TABLET, EXTENDED RELEASE ORAL at 18:01

## 2019-03-18 RX ADMIN — PROPOFOL 40 MG: 10 INJECTION, EMULSION INTRAVENOUS at 14:18

## 2019-03-18 RX ADMIN — MEROPENEM 500 MG: 500 INJECTION, POWDER, FOR SOLUTION INTRAVENOUS at 13:43

## 2019-03-18 RX ADMIN — FAMOTIDINE 20 MG: 20 TABLET, FILM COATED ORAL at 11:29

## 2019-03-18 NOTE — CDMP QUERY
The medical record reflects the following:    Risk:72 yo male W/PMH CVA bedbound, chronic perales catheter admitted from [facility]     Clinical Indicators: Per H&P  Chronic urinary retention with perales in place    -Per PN 3/17  UTI with MDR ESBL Klebsiella-- S- Meropenem    -URINE CX    100,000 COLONIES/mL GRAM NEGATIVE RODS Abnormal   >100,000 COLONIES/mL POSSIBLE 2ND GRAM NEGATIVE CECY Abnormal    162276 COLONIES/mL POSSIBLE  3RD GRAM NEGATIVE CECY      Treatment: Meropenem, Urine C&S, perales catheter changed, Cysto pending    Please clarify if this patient is being treated/managed for:    =>CAUTI  POA  =>Other Explanation of clinical findings  =>Unable to Determine (no explanation of clinical findings)    Please clarify and document your clinical opinion in the progress notes and discharge summary including the definitive and/or presumptive diagnosis, (suspected or probable), related to the above clinical findings. Please include clinical findings supporting your diagnosis. If you DECLINE this query or would like to communicate with [facility], please utilize the \"[employer] message box\" at the TOP of the Progress Note on the right.       Thank you,  Richard Vides RN [facility]   [phone]

## 2019-03-18 NOTE — OP NOTES
Operative Note      Patient: Mitchell Thomas               Sex: male             MRN: [id number]      YOB: 1945      Age:  72 y.o. Preoperative Diagnosis: R31.9 HEMATURIA    Postoperative Diagnosis:  R31.9 HEMATURIA    Surgeon: Elham Lucio    Anesthesia:  General    Procedure:    1) Cystoscopy  2) cystolithalopaxy    Operative Indication: This is a 72 y.o. male with a history of  Neurogenic bladder, quadriparesis, and a chronic perales catheter. After the risks, benefits, alternatives, and complications were discussed they present now for operative management. Procedure in Detail: The patient was brought to the operative suite. Anesthesia was induced and preoperative antibiotics were administered. They were then placed in the dorsal lithotomy position and their external genitalia was prepped and draped in the usual fashion. He did have significant contractures, therefore he was not placed in full lithotomy. A surgical timeout was performed confirming the patient's name, date of birth, laterality, and antibiotics. All were in agreement. A 22 Vietnamese cystourethroscope was then inserted into the patients bladder. There was trilobar hypertrophy of the prostate. He had a small capacity bladder. There were multiple bladder stones, the largest 1 cm in size. There were no bladder tumors or worrisome lesions noted. A 500 micron laser fiber was introduced and the stones were fragmented into small pieces. The scope was used to evacuate the stone fragments completely. The scope was then removed. A perales catheter was then placed with 10 cc of sterile water in the balloon.      The patient was then awoken and transferred to the recovery room in stable condition. Estimated Blood Loss: minimal           Implants: none    Specimens: none    Drains: 18 fr coude catheter           Complications:  None           Counts: Sponge and needle counts were correct times two.     Dispo: he will be observed until tomorrow. We will keep him on meropenem in the interim and plan for discharge tomorrow if he remains stable.      Wilfrid Thurman MD  3/18/2019

## 2019-03-18 NOTE — PROGRESS NOTES
NUTRITION    Nursing Referral: [facility]     RECOMMENDATIONS / PLAN:     - Resume po diet as appropriate post-op. - Continue IVF until adequate oral intake. - Continue RD inpatient monitoring and evaluation. NUTRITION INTERVENTIONS & DIAGNOSIS:     [x] Meals/snacks: initiate post op  [x] IV Fluids: continue     Nutrition Diagnosis: Unintended weight loss related to predicted inadequate energy intake as evidenced 8.7% weight loss x 1 week. ASSESSMENT:     NPO for bladder biopsy. Unable to obtain hx at this time 2/2 pt out of room x 2 attempts. Pt with significant weight loss and reported decreased po intake PTA per nursing screening. Average po intake adequate to meet patients estimated nutritional needs:   [] Yes     [] No   [x] Unable to determine at this time    Diet: DIET NPO      Food Allergies: NKFA  Current Appetite:   [] Good     [] Fair     [] Poor     [x] Other: unknown   Appetite/meal intake prior to admission:   [] Good     [] Fair     [] Poor     [x] Other: unknown  Feeding Limitations:  [] Swallowing difficulty    [] Chewing difficulty    [x] Other: h/o dysphagia but tolerated a regular consistency diet during previous admission.    Current Meal Intake: Patient Vitals for the past 100 hrs:   % Diet Eaten   03/18/19 0846 0 %   03/17/19 1536 25 %   03/17/19 1240 25 %   03/17/19 0800 50 %   03/16/19 0530 100 %       BM: 3/16  Skin Integrity: WDL  Edema:   [x] No     [] Yes   Pertinent Medications: Reviewed: os-lesly, D5 1/2NS  With 20 mEq/L at 125 mL/hr (150 gm dextrose, 510 kcal), remeron     Recent Labs     03/16/19  1630      K 4.2      CO2 32   GLU 93   BUN 28*   CREA 0.85   CA 9.9       Intake/Output Summary (Last 24 hours) at 3/18/2019 1457  Last data filed at 3/18/2019 1430  Gross per 24 hour   Intake 1100 ml   Output 1600 ml   Net -500 ml       Anthropometrics:  Ht Readings from Last 1 Encounters:   03/08/19 5' 8\" (1.727 m)     Last 3 Recorded Weights in this Encounter 03/17/19 1742   Weight: 62.1 kg (137 lb)     Body mass index is 20.83 kg/m². Weight History: -13# (8.7%) x 1.5 weeks  Weight Metrics 3/17/2019 3/16/2019 3/8/2019 1/8/2019 1/7/2019 1/7/2019 12/17/2018   Weight 137 lb - 150 lb 150 lb 150 lb 150 lb 150 lb   BMI - 20.83 kg/m2 22.81 kg/m2 22.81 kg/m2 22.81 kg/m2 22.81 kg/m2 22.81 kg/m2        Admitting Diagnosis: Urinary tract calculus [N20.9]  Pertinent PMHx: CVA, HTN, dysphagia, colon cancer    Education Needs:        [x] None identified  [] Identified - Not appropriate at this time  []  Identified and addressed - refer to education log  Learning Limitations:   [x] None identified  [] Identified    Cultural, Baptism & ethnic food preferences:  [x] None identified    [] Identified and addressed     ESTIMATED NUTRITION NEEDS:     Calories: 1591-9931 kcal (MSJx1.2-1.3) based on  [x] Actual BW 70 kg     [] IBW   Protein: 56-70 gm (0.8-1 gm/kg) based on  [x] Actual BW      [] IBW   Fluid: 1 mL/kcal     MONITORING & EVALUATION:     Nutrition Goal(s):   1. Po intake of meals will meet >75% of patient estimated nutritional needs within the next 7 days.   Outcome:  [] Met/Ongoing    []  Not Met    [x] New/Initial Goal     Monitoring:   [x] Food and beverage intake   [x] Diet order   [x] Nutrition-focused physical findings   [x] Treatment/therapy   [] Weight   [] Enteral nutrition intake        Previous Recommendations (for follow-up assessments only):     []   Implemented       []   Not Implemented (RD to address)      [] No Longer Appropriate     [] No Recommendation Made     Discharge Planning: pending diet tolerance   [x] Participated in care planning, discharge planning, & interdisciplinary rounds as appropriate      Mignon Knott RD, [address]   Pager: [phone]

## 2019-03-18 NOTE — ROUTINE PROCESS
Bedside and Verbal shift change report given to Phong Roblero RN (oncoming nurse) by Cedric Jo RN (offgoing nurse). Report included the following information SBAR, Kardex, MAR and Recent Results.     SITUATION:    Code Status: Full Code   Reason for Admission: Urinary tract calculus [N20.9]    [facility] day: 2   Problem List:       Hospital Problems  Date Reviewed: 1/7/2019          Codes Class Noted POA    Urinary tract calculus ICD-10-CM: N20.9  ICD-9-CM: 592.9  3/16/2019 Unknown              BACKGROUND:    Past Medical History:   Past Medical History:   Diagnosis Date    Abnormal computed tomography of bladder     Anemia     Arthritis     Basal ganglia disease     Benign prostatic hyperplasia with lower urinary tract symptoms     Bladder calculus     Bladder cancer ([facility] 3/13/14    Bladder mass     Bladder tumor     Brain aneurysm     CVA (cerebral vascular accident) ([facility] [address] [facility] [address]    hemiplegia    Dysphagia     Erythematous bladder mucosa     Webb catheter in place     Gross hematuria     Hemiplegia ([facility] [address] [facility]     HTN (hypertension)     Hx MRSA infection 09/2017    urine    Neurogenic bladder     Psychiatric disorder     Retention, urine     Stroke ([facility] [address] [facility]     Urethral stricture          Patient taking anticoagulants yes     ASSESSMENT:    Changes in Assessment Throughout Shift: none     Patient has Central Line: no Reasons if yes:    Patient has Webb Cath: yes Reasons if yes: chronic retention      Last Vitals:     Vitals:    03/17/19 1742 03/17/19 1824 03/17/19 2340 03/18/19 0537   BP:  135/85 93/56 97/64   Pulse:  71 (!) 52 (!) 52   Resp:  18 16 16   Temp:  97.9 °F (36.6 °C) 98 °F (36.7 °C) 97.2 °F (36.2 °C)   SpO2:  93% 98% 98%   Weight: 62.1 kg (137 lb)           IV and DRAINS (will only show if present)   Peripheral IV 03/16/19 Right Wrist-Site Assessment: Clean, dry, & intact     WOUND (if present)   Wound Type:  none   Dressing present Dressing Present : No   Wound Concerns/Notes:  none     PAIN    Pain Assessment    Pain Intensity 1: 0 (03/18/19 0537)    Pain Location 1: Knee    Pain Intervention(s) 1: Medication (see MAR)    Patient Stated Pain Goal: 0  o Interventions for Pain:  none  o Intervention effective:   o Time of last intervention:    o Reassessment Completed:       Last 3 Weights:  Last 3 Recorded Weights in this Encounter    03/17/19 1742   Weight: 62.1 kg (137 lb)     Weight change:      INTAKE/OUPUT    Current Shift: 03/17 1901 - 03/18 0700  In: 240 [P.O.:240]  Out: -     Last three shifts: 03/16 0701 - 03/17 1900  In: 880 [P.O.:880]  Out: 950 [Urine:750]     LAB RESULTS     Recent Labs     03/16/19  1630   WBC 8.5   HGB 14.4   HCT 44.4           Recent Labs     03/16/19  1630      K 4.2   GLU 93   BUN 28*   CREA 0.85   CA 9.9       RECOMMENDATIONS AND DISCHARGE PLANNING     1. Pending tests/procedures/ Plan of Care or Other Needs: none     2. Discharge plan for patient and Needs/Barriers: SNF    3. Estimated Discharge Date: unknown Posted on Whiteboard in Patients Room: no      4. The patient's care plan was reviewed with the oncoming nurse. \"HEALS\" SAFETY CHECK      Fall Risk    Total Score: 3    Safety Measures: Safety Measures: Bed/Chair-Wheels locked, Bed in low position, Call light within reach, Gripper socks, Side rails X 3    A safety check occurred in the patient's room between off going nurse and oncoming nurse listed above.     The safety check included the below items  Area Items   H  High Alert Medications - Verify all high alert medication drips (heparin, PCA, etc.)   E  Equipment - Suction is set up for ALL patients (with yanker)  - Red plugs utilized for all equipment (IV pumps, etc.)  - WOWs wiped down at end of shift.  - Room stocked with oxygen, suction, and other unit-specific supplies   A  Alarms - Bed alarm is set for fall risk patients  - Ensure chair alarm is in place and activated if patient is up in a chair L  Lines - Check IV for any infiltration  - Webb bag is empty if patient has a Webb   - Tubing and IV bags are labeled   S  Safety   - Room is clean, patient is clean, and equipment is clean. - Hallways are clear from equipment besides carts. - Fall bracelet on for fall risk patients  - Ensure room is clear and free of clutter  - Suction is set up for ALL patients (with yanker)  - Hallways are clear from equipment besides carts.    - Isolation precautions followed, supplies available outside room, sign posted     Josue Poole RN

## 2019-03-18 NOTE — ANESTHESIA PREPROCEDURE EVALUATION
Anesthetic History               Review of Systems / Medical History  Patient summary reviewed and pertinent labs reviewed    Pulmonary                   Neuro/Psych       CVA  TIA and psychiatric history     Cardiovascular    Hypertension: well controlled              Exercise tolerance: >4 METS     GI/Hepatic/Renal  Within defined limits              Endo/Other        Arthritis and cancer     Other Findings              Physical Exam    Airway  Mallampati: III  TM Distance: 4 - 6 cm  Neck ROM: decreased range of motion   Mouth opening: Normal     Cardiovascular    Rhythm: regular  Rate: normal         Dental    Dentition: Poor dentition     Pulmonary  Breath sounds clear to auscultation               Abdominal  GI exam deferred       Other Findings            Anesthetic Plan    ASA: 3  Anesthesia type: general          Induction: Intravenous  Anesthetic plan and risks discussed with: Patient

## 2019-03-18 NOTE — PROGRESS NOTES
[facility] Progress Note    HD#:   3  POD#:  N/A  Antibiotics:  meropenem    ASSESSMENT:     1. Hematuria - resolved  2. Retained stone material, likely previously encrusted balloon  3. History of CVA  4. Chronic urinary retention with perales in place  5. History of PUNLMP  6. UTI with MDR ESBL Klebsiella-- S- Meropenem  7. BPH w/luts    Current creatinine:   0.85 (eGFR: >60)  Urine Cx:  3/16 (>100k GNR); 3/6- MDR ESBL as outlined above    PLAN:     - Maintain NPO status  -Possible OR today with Dr. Nasra Sen for cysto and cystolitholapaxy and possible bladder biopsy.  -Final Urine Culture Pending- spoke with microbiology- Ucx >100k three different GNR's. Sensitivities will not be back until tomorrow. Will further discuss with Dr. Ben Rajan.  -continue IVF at 100 ml /hr while NPO  - Continue Meropenem   - Continue Proscar    I saw and independently examined the patient. I agree with the assessment and plan as listed above. I saw and independently examined the patient. I agree with the assessment and plan as listed above and have made any changes to reflect my direct input. OR today for cysto, bladder biopsy, and possible laser of encrusted stone material.   He has a POA CAUTI. Daniel Rush MD        SUBJECTIVE:      Denies any pain         OBJECTIVE:     Physical Examination:  Visit Vitals  BP 97/64 (BP Patient Position: At rest)   Pulse (!) 52   Temp 97.2 °F (36.2 °C)   Resp 16   Wt 137 lb (62.1 kg)   SpO2 98%   BMI 20.83 kg/m²      Constitutional: WDWN, Pleasant and appropriate affect, No acute distress. Head: Normocephalic and atraumatic. Eyes: No discharge. No scleral icterus. Neck: Normal range of motion. Neck supple. No JVD present. No tracheal deviation present. Pulmonary/Chest: Effort normal. No respiratory distress. ABd: Soft, non-tender, non-distended  Musc:Contracted  Psych: normal affect and mood, well groomed, appropriate answers, A&Ox3  Skin: Skin is warm and dry.  Male: Webb draining clear yellow urine    Scrotum:   normal   Epididymides: nontender, symmetric    Testes:   Nontender, symmetric, no masses   Urethral Meatus:   No stenosis   Penis:  normal   Foreskin:  circumcised        No results found for this or any previous visit (from the past 24 hour(s)). Mixed culture more than 2 different organisms the following organisms predominating      Culture KLEBSIELLA PNEUMONIAE (A)     Specimen   Urine - Webb Cath Urine     Organism Antibiotic Method Susceptibility   Klebsiella pneumoniae Amikacin   Sensitive    Ampicillin   Resistant    Ampicillin/Sulbactam   Resistant    Aztreonam   Resistant    Cefazolin   Resistant    Cefepime   Resistant    Ceftriaxone   Resistant    Ciprofloxacin   Resistant    Gentamicin   Resistant    Levofloxacin   Resistant    Meropenem   Sensitive    Nitrofurantoin   Intermediate    Piperacillin/Tazobactam   Sensitive    Tetracycline   Resistant    Tobramycin   Intermediate    Trimethoprim/Sulfamethoxazole   Resistant   Comment:   ** ESBL- PLACE ON CONTACT PRECAUTIONS*   This organism is an EXTENDED SPECTRUM BETA-LACTAMASE  (ESBL). An ESBL producing organism is RESISTANT to penicillins, cephalosporins, and monobactams (aztreonam).  Carbapenems are considered the drug of choice, but beta-lactam/beta-lactamase inhibitors (piperacillin/tazobactam) may also be effective based on the site of infection and the IRWIN.  Please consider an ID consult if clinically appropriate.

## 2019-03-18 NOTE — ROUTINE PROCESS
Pt has a specialty bed with prevaloon boots. Pt placed on the turn team. Webb patent with yellow urine. No acute distress noted.

## 2019-03-18 NOTE — PERIOP NOTES
TRANSFER - OUT REPORT:    Verbal report given to [facility] DILCIA VELEZ on [place] Bowers  being transferred to [address] [place] [address] for routine post - op       Report consisted of patients Situation, Background, Assessment and   Recommendations(SBAR). Information from the following report(s) SBAR, OR Summary and MAR was reviewed with the receiving nurse. Lines:   Peripheral IV 03/16/19 Right Wrist (Active)   Site Assessment Clean, dry, & intact 3/18/2019  2:56 PM   Phlebitis Assessment 0 3/18/2019  2:56 PM   Infiltration Assessment 0 3/18/2019  2:56 PM   Dressing Status Clean, dry, & intact 3/18/2019  2:56 PM   Dressing Type Transparent;Tape 3/18/2019  2:56 PM   Hub Color/Line Status Blue 3/18/2019  2:56 PM   Alcohol Cap Used Yes 3/17/2019 10:01 PM        Opportunity for questions and clarification was provided.       Patient transported with:   Registered Nurse

## 2019-03-18 NOTE — ANESTHESIA POSTPROCEDURE EVALUATION
Procedure(s):  CYSTOSCOPY/BALLOON ENCRUSTATION ELIMINATION.     Anesthesia Post Evaluation      Multimodal analgesia: multimodal analgesia used between 6 hours prior to anesthesia start to PACU discharge  Patient location during evaluation: bedside  Patient participation: complete - patient participated  Level of consciousness: awake  Pain management: adequate  Airway patency: patent  Anesthetic complications: no  Cardiovascular status: stable  Respiratory status: acceptable  Hydration status: acceptable  Post anesthesia nausea and vomiting:  controlled      Visit Vitals  /84 (BP 1 Location: Right arm, BP Patient Position: At rest)   Pulse 61   Temp 36.8 °C (98.2 °F)   Resp 16   Wt 62.1 kg (137 lb)   SpO2 98%   BMI 20.83 kg/m²

## 2019-03-19 VITALS
HEART RATE: 71 BPM | TEMPERATURE: 98.6 F | SYSTOLIC BLOOD PRESSURE: 110 MMHG | DIASTOLIC BLOOD PRESSURE: 69 MMHG | RESPIRATION RATE: 17 BRPM | BODY MASS INDEX: 20.83 KG/M2 | WEIGHT: 137 LBS | OXYGEN SATURATION: 97 %

## 2019-03-19 PROCEDURE — 74011250637 HC RX REV CODE- 250/637: Performed by: UROLOGY

## 2019-03-19 PROCEDURE — 74011250636 HC RX REV CODE- 250/636: Performed by: UROLOGY

## 2019-03-19 PROCEDURE — 74011000250 HC RX REV CODE- 250: Performed by: UROLOGY

## 2019-03-19 RX ORDER — CEFDINIR 300 MG/1
300 CAPSULE ORAL 2 TIMES DAILY
Qty: 10 CAP | Refills: 0 | Status: SHIPPED | OUTPATIENT
Start: 2019-03-19 | End: 2019-03-24

## 2019-03-19 RX ADMIN — LISINOPRIL 5 MG: 5 TABLET ORAL at 09:00

## 2019-03-19 RX ADMIN — ATORVASTATIN CALCIUM 10 MG: 10 TABLET, FILM COATED ORAL at 08:31

## 2019-03-19 RX ADMIN — DEXTROSE MONOHYDRATE, SODIUM CHLORIDE, AND POTASSIUM CHLORIDE 125 ML/HR: 50; 4.5; 1.49 INJECTION, SOLUTION INTRAVENOUS at 01:34

## 2019-03-19 RX ADMIN — HEPARIN SODIUM 5000 UNITS: 5000 INJECTION INTRAVENOUS; SUBCUTANEOUS at 13:21

## 2019-03-19 RX ADMIN — OXYBUTYNIN CHLORIDE 10 MG: 5 TABLET, EXTENDED RELEASE ORAL at 17:26

## 2019-03-19 RX ADMIN — DOCUSATE SODIUM 100 MG: 100 CAPSULE, LIQUID FILLED ORAL at 17:26

## 2019-03-19 RX ADMIN — MEROPENEM 500 MG: 500 INJECTION, POWDER, FOR SOLUTION INTRAVENOUS at 17:26

## 2019-03-19 RX ADMIN — OXYBUTYNIN CHLORIDE 10 MG: 5 TABLET, EXTENDED RELEASE ORAL at 08:31

## 2019-03-19 RX ADMIN — CALCIUM CARBONATE-CHOLECALCIFEROL TAB 250 MG-125 UNIT 1 TABLET: 250-125 TAB at 08:31

## 2019-03-19 RX ADMIN — Medication 10 ML: at 05:49

## 2019-03-19 RX ADMIN — CITALOPRAM HYDROBROMIDE 10 MG: 10 TABLET ORAL at 08:31

## 2019-03-19 RX ADMIN — POLYETHYLENE GLYCOL 3350 17 G: 17 POWDER, FOR SOLUTION ORAL at 08:31

## 2019-03-19 RX ADMIN — DOCUSATE SODIUM 100 MG: 100 CAPSULE, LIQUID FILLED ORAL at 08:31

## 2019-03-19 RX ADMIN — HEPARIN SODIUM 5000 UNITS: 5000 INJECTION INTRAVENOUS; SUBCUTANEOUS at 03:50

## 2019-03-19 RX ADMIN — MEROPENEM 500 MG: 500 INJECTION, POWDER, FOR SOLUTION INTRAVENOUS at 05:49

## 2019-03-19 NOTE — PROGRESS NOTES
Discharge planning    Discharge order noted for today. Patient has been accepted to [facility] nursing [facility]. Confirmed with Elsa Fountain that bed is available today. Met with patient and agreeable to the transition plan today. Transport to facility has been arranged through medicaid and setup 30 min to 3 hr window. Patient's discharge summary has been forwarded to skilled nursing facility via [facility] Bedside RN, Braulio Odell, has been updated to the transition plan. Discharge information has been updated on the AVS.  Please call report to [phone].    K.  SHADI Restrepo, RN  Pager # [phone]  Care Manager

## 2019-03-19 NOTE — DISCHARGE SUMMARY
Discharge Summary     Patient ID:  Reji Haskins  [id number]  11 y.o.  1945    Admit Date: 3/16/2019    Discharge Date: 3/19/2019    * Admission Diagnoses: Urinary tract calculus [N20.9]    * Discharge Diagnoses:    Hospital Problems as of 3/19/2019 Date Reviewed: 1/7/2019          Codes Class Noted - Resolved POA    Urinary tract calculus ICD-10-CM: N20.9  ICD-9-CM: 592.9  3/16/2019 - Present Unknown               Admission Condition: Fair    * Discharge Condition: improved    * Procedures: Procedure(s):  CYSTOSCOPY/BALLOON ENCRUSTATION ELIMINATION    * Hospital Course:   Normal hospital course for this procedure. Was Pre-admitted for IV abx prior to surgery. Final Urine Cultures pending at discharge. Proteus sensitivities have resulted, Klebsiella still pending. Discussed with Microbiology- Klebsiella appears to be ESBL however was sent out for further testing to confirm because IRWIN was borderline on a few. Sensitive only to Imipenem and Zosyn. Discussed with Dr. Kenroy Cervantes on 5 day's omnicef. Patient has taken omnicef previously and tolerated (allergy to PCN) Patient's VSS, no fevers. He is tolerating perales. Tolerating PO, denies any pain. Ok to discharge back to nursing home. Physical Examination:  Visit Vitals  /64 (BP 1 Location: Left arm, BP Patient Position: At rest)   Pulse (!) 58   Temp 97 °F (36.1 °C)   Resp 16   Wt 137 lb (62.1 kg)   SpO2 97%   BMI 20.83 kg/m²      Constitutional: WDWN, Pleasant and appropriate affect, No acute distress. Head: Normocephalic and atraumatic. Eyes: No discharge. No scleral icterus. Neck: Normal range of motion. Neck supple. No JVD present. No tracheal deviation present. Pulmonary/Chest: Effort normal. No respiratory distress. ABD: soft, non-tender, non-distended   Musc:normal gait and station  Psych: normal affect and mood, well groomed, appropriate answers, A&Ox3  Skin: Skin is warm and dry.  Male:    Perales Draining clear yellow urine    Consults: None    Significant Diagnostic Studies: labs:       Labs: Results:   Chemistry    Recent Labs     03/16/19  1630   GLU 93      K 4.2      CO2 32   BUN 28*   CREA 0.85   CA 9.9   AGAP 4   BUCR 33*      CBC w/Diff Recent Labs     03/16/19  1630   WBC 8.5   RBC 4.86   HGB 14.4   HCT 44.4         Cultures Recent Labs     03/16/19 1945 03/16/19  1800   CULT >100,000 COLONIES/mL PROTEUS MIRABILIS*  >100,000 COLONIES/mL PROTEUS MIRABILIS 2ND MORPHOTYPE*  [id number] COLONIES/mL KLEBSIELLA PNEUMONIAE SENSITIVITY TO FOLLOW* MRSA target DNA is not detected (presumptive not colonized with MRSA)     All Micro Results     Procedure Component Value Units Date/Time    CULTURE, URINE [[id number]]  (Abnormal)  (Susceptibility) Collected:  03/16/19 1945    Order Status:  Completed Specimen:  Webb Specimen Updated:  03/19/19 0848     Special Requests: NO SPECIAL REQUESTS        Culture result:       >100,000 COLONIES/mL PROTEUS MIRABILIS                  >100,000 COLONIES/mL PROTEUS MIRABILIS 2ND MORPHOTYPE                  [id number] COLONIES/mL KLEBSIELLA PNEUMONIAE SENSITIVITY TO FOLLOW          MRSA SCREEN - PCR (NASAL) [[id number]] Collected:  03/16/19 1800    Order Status:  Completed Specimen:  Nasal from Nares Updated:  03/17/19 0798     Special Requests: NO SPECIAL REQUESTS        Culture result:       MRSA target DNA is not detected (presumptive not colonized with MRSA)                  Urinalysis Color   Date Value Ref Range Status   02/14/2019 DARK YELLOW   Final     Appearance   Date Value Ref Range Status   02/14/2019 OPAQUE   Final     Specific gravity   Date Value Ref Range Status   02/14/2019 1.010 1.003 - 1.030   Final     pH (UA)   Date Value Ref Range Status   02/14/2019 8.0 5.0 - 8.0   Final     Protein   Date Value Ref Range Status   02/14/2019 >300 (A) NEG mg/dL Final     Ketone   Date Value Ref Range Status   02/14/2019 NEGATIVE  NEG mg/dL Final     Bilirubin   Date Value Ref Range Status   02/14/2019 MODERATE (A) NEG   Final     Blood   Date Value Ref Range Status   02/14/2019 LARGE (A) NEG   Final     Urobilinogen   Date Value Ref Range Status   02/14/2019 2.0 (H) 0.2 - 1.0 EU/dL Final     Nitrites   Date Value Ref Range Status   02/14/2019 NEGATIVE  NEG   Final     Leukocyte Esterase   Date Value Ref Range Status   02/14/2019 SMALL (A) NEG   Final     Potassium   Date Value Ref Range Status   03/16/2019 4.2 3.5 - 5.5 mmol/L Final     Creatinine   Date Value Ref Range Status   03/16/2019 0.85 0.6 - 1.3 MG/DL Final     BUN   Date Value Ref Range Status   03/16/2019 28 (H) 7.0 - 18 MG/DL Final      PSA No results for input(s): PSA in the last 72 hours. Coagulation Lab Results   Component Value Date/Time    Prothrombin time 13.3 02/14/2019 12:30 AM    Prothrombin time 13.0 02/02/2018 04:55 PM    INR 1.0 02/14/2019 12:30 AM    INR 1.0 02/02/2018 04:55 PM    aPTT 39.8 (H) 02/02/2018 04:55 PM    aPTT 38.8 (H) 02/22/2017 08:00 AM           * Disposition: [place] ([facility])    Discharge Medications:   Current Discharge Medication List      CONTINUE these medications which have NOT CHANGED    Details   finasteride (PROSCAR) 5 mg tablet Take 1 Tab by mouth daily. Qty: 90 Tab, Refills: 3      polyethylene glycol (MIRALAX) 17 gram packet Take 17 g by mouth daily. oxyCODONE IR (ROXICODONE) 10 mg tab immediate release tablet Take 1 Tab by mouth every six (6) hours as needed. Max Daily Amount: 40 mg.  Qty: 40 Tab, Refills: 0    Associated Diagnoses: Malignant neoplasm of colon, unspecified part of colon (HCC)      atorvastatin (LIPITOR) 10 mg tablet Take 10 mg by mouth daily. calcium-vitamin D (OYSTER SHELL CALCIUM-VIT D3) 250-125 mg-unit tablet Take 1 Tab by mouth daily. mirtazapine (REMERON) 15 mg tablet Take  by mouth nightly. DIPHENHYDRAMINE HCL (BENADRYL ALLERGY PO) Take  by mouth. EPINEPHrine HCl, PF, (ADRENALIN) 1 mg/mL (1 mL) injection once.       ascorbic acid, vitamin C, (VITAMIN C) 500 mg tablet Take  by mouth. diclofenac (VOLTAREN) 1 % gel Apply  to affected area four (4) times daily. Menthol-Zinc Oxide (CALMOSEPTINE) 0.44-20.6 % oint Apply  to affected area. docusate sodium (COLACE) 100 mg capsule Take 100 mg by mouth two (2) times a day. food supplemt, lactose-reduced (ENSURE ACTIVE HIGH PROTEIN) liqd Take 237 mL by mouth four (4) times daily. promethazine (PHENERGAN) 25 mg tablet Take 25 mg by mouth every six (6) hours as needed for Nausea. citalopram (CELEXA) 10 mg tablet Take  by mouth daily. lisinopril (PRINIVIL, ZESTRIL) 5 mg tablet Take 1 Tab by mouth daily. Qty: 90 Tab, Refills: 3      ferrous sulfate (IRON, FERROUS SULFATE,) 325 mg (65 mg iron) tablet Take 1 Tab by mouth Daily (before breakfast). Qty: 90 Tab, Refills: 3      oxybutynin chloride XL (DITROPAN XL) 10 mg CR tablet Take 1 Tab by mouth two (2) times a day. Qty: 60 Tab, Refills: 5      calcium citrate-vitamin d3 (CITRACAL+D) 315-200 mg-unit Tab Take 1 Tab by mouth daily (with breakfast). vitamin c-vitamin e (CRANBERRY CONCENTRATE) cap Take  by mouth. Cholecalciferol, Vitamin D3, (VITAMIN D3) 1,000 unit cap Take  by mouth. * Follow-up Care/Patient Instructions: Activity: See surgical instructions  Diet: Resume previous diet  Wound Care: None needed  Wound care discussed with patient.      Follow-up Information     Follow up With Specialties Details Why Ayana Hodgson MD Internal Medicine Schedule an appointment as soon as possible for a visit in 1 week  [place] [address]  [phone]      Loree Olivo MD Urology Go on 4/15/2019 post op appointment at 1:45 pm  [facility]  [address]  [phone]              PCP:  Germaine Samano MD      Signed:  Selena Roberts NP    I have seen and examined this patient independently, I reviewed pertinent labs and imaging, and I agree with the assessing provider's assessment and plan as outlined above, with ammendments as follows:     Doing well   Home today      Kathy Major MD  [facility] [place] [place]  Pager [phone]    March 19, 2019 9:31 AM

## 2019-03-19 NOTE — PROGRESS NOTES
Per Jerry HURD) called [employer] transportation [phone] to schedule stretcher transport and received confirmation #[id number] from Eva Mcgrath. Per Samantha Cooper dispatcher will find and authorize transport provider. Transport has a 30 minute to 3 hour window to  and transport patient to [facility]. Per Eva Mcgrath, will note to  to call nursing station 10 minutes before arrival.  Informed Jerry Diehl of transportation arrangements.

## 2019-03-19 NOTE — PROGRESS NOTES
Reason for Admission:   Urinary tract calculus [N20.9]               RRAT Score:     25             Resources/supports as identified by patient/family:   Pt LTC at [facility] facing patient (as identified by patient/family and CM): Finances/Medication cost?       Transportation      Medical transpot  Support system or lack thereof? Spouse  Living arrangements? LTC at [facility]    Self-care/ADLs/Cognition? Alert and Oriented and complete care       Current Advanced Directive/Advance Care Plan:   no                          Plan for utilizing home health:    LTC                      Likelihood of readmission:   HIGH    Transition of Care Plan:                    Initial assessment completed with patient. Cognitive status of patient: oriented to time, place, person and situation. Face sheet information confirmed:  yes. The patient designates Sanjay Ean, spouse to participate in his discharge plan and to receive any needed information. This patient lives at [place] [address]. Prior to hospitalization, patient was considered to be independent with ADLs/IADLS : no . If not independent,  patient needs assist with : dressing, bathing, food preparation, toileting and grooming    Patient has a current ACP document on file: no  Pt will need medical transport  upon discharge. The patient already has W/C medical equipment available in the facility. Patient is longterm care at [facility]. .      This patient is on dialysis :no    Freedom of choice signed: yes, for [facility]    Currently, the discharge plan is LTC.       Patient's current insurance is Medicare Part A & B and Medicaid      Care Management Interventions  Mode of Transport at Discharge: BLS  Transition of Care Consult (CM Consult): Discharge Planning  MyChart Signup: No  Discharge Durable Medical Equipment: No  Physical Therapy Consult: No  Occupational Therapy Consult: No  Speech Therapy Consult: No  Current Support Network: Jose discussed with Pt/Family/Caregiver: Yes  Discharge Location  Discharge Placement: 1 LISANDRO HuynhN, RN  Pager # [phone]  Care Manager

## 2019-03-19 NOTE — ROUTINE PROCESS
Bedside and Verbal shift change report given to *** (oncoming nurse) by Aniyah Wise (offgoing nurse). Report included the following information SBAR, Kardex, Intake/Output, MAR and Recent Results.

## 2019-03-19 NOTE — DISCHARGE INSTRUCTIONS
Patient Education        Cystoscopy: What to Expect at [facility]    A cystoscopy is a procedure that lets a doctor look inside of the bladder and the urethra. The urethra is the tube that carries urine from the bladder to outside the body. The doctor uses a thin, lighted tool called a cystoscope. Your bladder is filled with fluid. This stretches the bladder so that your doctor can look closely at the inside of your bladder. After the cystoscopy, your urethra may be sore at first, and it may burn when you urinate for the first few days after the procedure. You may feel the need to urinate more often, and your urine may be pink. These symptoms should get better in 1 or 2 days. You will probably be able to go back to most of your usual activities in 1 or 2 days. This care sheet gives you a general idea about how long it will take for you to recover. But each person recovers at a different pace. Follow the steps below to get better as quickly as possible. How can you care for yourself at home? Activity    · Rest when you feel tired. Getting enough sleep will help you recover.     · Try to walk each day. Start by walking a little more than you did the day before. Bit by bit, increase the amount you walk. Walking boosts blood flow and helps prevent pneumonia and constipation.     · Avoid strenuous activities, such as bicycle riding, jogging, weight lifting, or aerobic exercise, until your doctor says it is okay.     · Ask your doctor when you can drive again.     · Most people are able to return to work within 1 or 2 days after the procedure.     · You may shower and take baths as usual.     · Ask your doctor when it is okay for you to have sex. Diet    · You can eat your normal diet. If your stomach is upset, try bland, low-fat foods like plain rice, broiled chicken, toast, and yogurt.     · Drink plenty of fluids (unless your doctor tells you not to).    Medicines    · Take pain medicines exactly as directed. ? If the doctor gave you a prescription medicine for pain, take it as prescribed. ? If you are not taking a prescription pain medicine, ask your doctor if you can take an over-the-counter medicine.     · If you think your pain medicine is making you sick to your stomach:  ? Take your medicine after meals (unless your doctor has told you not to). ? Ask your doctor for a different pain medicine.     · If your doctor prescribed antibiotics, take them as directed. Do not stop taking them just because you feel better. You need to take the full course of antibiotics. Follow-up care is a key part of your treatment and safety. Be sure to make and go to all appointments, and call your doctor if you are having problems. It's also a good idea to know your test results and keep a list of the medicines you take. When should you call for help? Call 911 anytime you think you may need emergency care. For example, call if:    · You passed out (lost consciousness).     · You have severe trouble breathing.     · You have sudden chest pain and shortness of breath, or you cough up blood.     · You have severe belly pain.    Call your doctor now or seek immediate medical care if:    · You are sick to your stomach or cannot keep fluids down.     · Your urine is still red or you see blood clots after you have urinated several times.     · You have trouble passing urine or stool, especially if you have pain or swelling in your lower belly.     · You have signs of a blood clot, such as:  ? Pain in your calf, back of the knee, thigh, or groin. ? Redness and swelling in your leg or groin.     · You develop a fever or severe chills.     · You have pain in your back just below your rib cage. This is called flank pain.    Watch closely for changes in your health, and be sure to contact your doctor if:    · You have pain or burning when you urinate.  A burning feeling is normal for a day or two after the test, but call if it does not get better.     · You have a frequent urge to urinate but can pass only small amounts of urine.     · Your urine is pink, red, or cloudy, or smells bad. It is normal for the urine to have a pinkish color for a few days after the test, but call if it does not get better. Where can you learn more? Go to http://lorna-kristina.info/. Enter M071 in the search box to learn more about \"Cystoscopy: What to Expect at Home. \"  Current as of: March 20, 2018  Content Version: 11.9  © 1803-8692 [employer]. Care instructions adapted under license by [employer] (which disclaims liability or warranty for this information). If you have questions about a medical condition or this instruction, always aPatient Education                  Laser Lithotripsy: What to Expect at [address] lithotripsy is a way to treat kidney stones. This treatment uses a laser to break kidney stones into tiny pieces. For several hours after the procedure you may have a burning feeling when you urinate. You may feel the urge to go even if you don't need to. This feeling should go away within a day. Drinking a lot of water can help. Your doctor also may advise you to take medicine that numbs the burning. This medicine is called phenazopyridine. It is available by prescription and over the counter. Brand names include Pyridium and Uristat. Your doctor may prescribe an antibiotic. This will help prevent an infection. You may have some blood in your urine for 2 or 3 days. Your doctor may have placed a small tube inside one of your ureters. Ureters are the tubes that connect the kidneys to the bladder. The small tube the doctor may have placed is called a stent. It may help the stone fragments pass through your body. Your doctor may remove the stent in a few weeks. Most stone fragments that are not removed pass out of the body within 24 hours. But sometimes it can take many weeks.  If you have a large stone, you may need to come back for more treatments. This care sheet gives you a general idea about how long it will take for you to recover. But each person recovers at a different pace. Follow the steps below to feel better as quickly as possible. How can you care for yourself at home? Activity    · Rest as much as you need to after you go home.     · You may do your regular activities. But avoid hard exercise or sports for about a week or until there is no blood in your urine. Diet    · You can eat your normal diet after lithotripsy.     · Continue to drink plenty of fluids, enough so that your urine is light yellow or clear like water. If you have kidney, heart, or liver disease and have to limit fluids, talk with your doctor before you increase the amount of fluids you drink. Medicines    · Your doctor will tell you if and when you can restart your medicines. He or she will also give you instructions about taking any new medicines.     · If you take blood thinners, such as warfarin (Coumadin), clopidogrel (Plavix), or aspirin, be sure to talk to your doctor. He or she will tell you if and when to start taking those medicines again. Make sure that you understand exactly what your doctor wants you to do.     · If you take medicine to stop the burning when you urinate, take it exactly as recommended. Call your doctor if you think you are having a problem with your medicine. This medicine may color your urine orange or red. This is normal. You will get more details on the specific medicine your doctor recommends.     · If your doctor prescribed antibiotics, take them as directed. Do not stop taking them just because you feel better. You need to take the full course of antibiotics.     · Be safe with medicines. Read and follow all instructions on the label. ? If the doctor gave you a prescription medicine for pain, take it as prescribed.   ? If you are not taking a prescription pain medicine, ask your doctor if you can take acetaminophen (Tylenol). Do not take ibuprofen (Advil, Motrin) or naproxen (Aleve) or similar medicines unless your doctor tells you to. ? Do not take two or more pain medicines at the same time unless the doctor told you to. Many pain medicines have acetaminophen, which is Tylenol. Too much acetaminophen (Tylenol) can be harmful.    Heat    · Take a warm bath. This may soothe the burning. Other instructions    · Urinate through the strainer the doctor gives you. Save any stone pieces, including those that look like sand or gravel. Take these to your doctor. This will help your doctor find the cause of your stones. Follow-up care is a key part of your treatment and safety. Be sure to make and go to all appointments, and call your doctor if you are having problems. It's also a good idea to know your test results and keep a list of the medicines you take. When should you call for help? Call 911 anytime you think you may need emergency care. For example, call if:    · You passed out (lost consciousness).     · You have chest pain, are short of breath, or cough up blood.    Call your doctor now or seek immediate medical care if:    · You have pain that does not get better after you take pain medicine.     · You have new or more blood clots in your urine. (It is normal for the urine to be pink for a few days.)     · You cannot urinate.     · You have symptoms of a urinary tract infection. These may include:  ? Pain or burning when you urinate. ? A frequent need to urinate without being able to pass much urine. ? Pain in the flank, which is just below the rib cage and above the waist on either side of the back. ? Blood in the urine. ? A fever.     · You are sick to your stomach or cannot drink fluids.     · You have signs of a blood clot in your leg (called a deep vein thrombosis), such as:  ? Pain in the calf, back of the knee, thigh, or groin. ?  Redness and swelling in your leg.    Watch closely for any changes in your health, and be sure to contact your doctor if you have any problems. Where can you learn more? Go to http://lorna-kristina.info/. Enter Q239 in the search box to learn more about \"Laser Lithotripsy: What to Expect at Home. \"  Current as of: March 14, 2018  Content Version: 11.9  © 4920-9234 [employer]. Care instructions adapted under license by [employer] (which disclaims liability or warranty for this information). If you have questions about a medical condition or this instruction, always ask your healthcare professional. [place] [address] any warranty or liability for your use of this information. NUTRITION       Continue Oral Nutrition Supplement (ONS) at discharge. Recommend Ensure Enlive or a comparable/similiar product Three times daily for 30 days unless otherwise directed by your Primary Care Physician.        Leno Saenz RD

## 2019-03-19 NOTE — ROUTINE PROCESS
Medical transport pick patient up. Patient IV removed. Report given to transport. Patient vitals was stable and patient clear for discharge.

## 2019-03-19 NOTE — PROGRESS NOTES
NUTRITION    Nursing Referral: [facility]     RECOMMENDATIONS / PLAN:     - Continue to encourage po intake. - Continue oral nutrition supplements, Ensure Enlive TID, on discharge. - Continue IVF until adequate oral intake. - Continue RD inpatient monitoring and evaluation. NUTRITION INTERVENTIONS & DIAGNOSIS:     [x] Meals/snacks: continue  [x] Medical food supplementation: initiate   [x] IV Fluids: continue     Nutrition Diagnosis: Unintended weight loss related to predicted inadequate energy intake as evidenced 8.7% weight loss x 1 week. Patient meets criteria for Severe Protein Calorie Malnutrition as evidenced by:   ASPEN Malnutrition Criteria  Acute Illness, Chronic Illness, or Social/Enviornmental: Chronic illness  Weight Loss: Greater than 1-2% x 1 wk  ASPEN Malnutrition Score - Acute Illness: 6  Acute Illness - Malnutrition Diagnosis: Severe malnutrition  Energy Intake: Less than/equal to 75% of est energy req for greater than/equal to 1 month  ASPEN Malnutrition Score - Chronic Illness: 6.      ASSESSMENT:     3/19: Pt with poor po intake, lunch at bedside untouched. Encouraged pt to each, but declined. Pt reports poor meal intake PTA, but started consuming 2-3 Ensure per day for the last month. Pt states he doesn't eat because he is full from the Enusre; encourage pt to eat as much as he could prior to consuming the Ensure, pt receptive. 3/18: NPO for bladder biopsy. Unable to obtain hx at this time 2/2 pt out of room x 2 attempts. Pt with significant weight loss and reported decreased po intake PTA per nursing screening.      Average po intake adequate to meet patients estimated nutritional needs:   [] Yes     [x] No   [] Unable to determine at this time    Diet: DIET REGULAR  DIET CARDIAC No options chosen      Food Allergies: NKFA  Current Appetite:   [] Good     [] Fair     [x] Poor     [] Other:   Appetite/meal intake prior to admission:   [] Good     [] Fair     [] Poor: x 1 month PTA [] Other:   Feeding Limitations:  [] Swallowing difficulty    [] Chewing difficulty    [x] Other: h/o dysphagia but tolerated a regular consistency diet during previous admission. Current Meal Intake: Patient Vitals for the past 100 hrs:   % Diet Eaten   03/19/19 1021 75 %   03/18/19 1959 25 %   03/18/19 0846 0 %   03/17/19 1536 25 %   03/17/19 1240 25 %   03/17/19 0800 50 %   03/16/19 0530 100 %       BM: 3/19  Skin Integrity: WDL  Edema:   [x] No     [] Yes   Pertinent Medications: Reviewed: os-lesly, D5 1/2NS with 20 mEq/L at 125 mL/hr (150 gm dextrose, 510 kcal), remeron     Recent Labs     03/16/19  1630      K 4.2      CO2 32   GLU 93   BUN 28*   CREA 0.85   CA 9.9       Intake/Output Summary (Last 24 hours) at 3/19/2019 1324  Last data filed at 3/19/2019 1021  Gross per 24 hour   Intake 1300 ml   Output 1850 ml   Net -550 ml       Anthropometrics:  Ht Readings from Last 1 Encounters:   03/08/19 5' 8\" (1.727 m)     Last 3 Recorded Weights in this Encounter    03/17/19 1742   Weight: 62.1 kg (137 lb)     Body mass index is 20.83 kg/m².     Weight History: -13# (8.7%) x 1.5 weeks  Weight Metrics 3/17/2019 3/16/2019 3/8/2019 1/8/2019 1/7/2019 1/7/2019 12/17/2018   Weight 137 lb - 150 lb 150 lb 150 lb 150 lb 150 lb   BMI - 20.83 kg/m2 22.81 kg/m2 22.81 kg/m2 22.81 kg/m2 22.81 kg/m2 22.81 kg/m2        Admitting Diagnosis: Urinary tract calculus [N20.9]  Pertinent PMHx: CVA, HTN, dysphagia, colon cancer    Education Needs:        [x] None identified  [] Identified - Not appropriate at this time  []  Identified and addressed - refer to education log  Learning Limitations:   [x] None identified  [] Identified    Cultural, Temple & ethnic food preferences:  [x] None identified    [] Identified and addressed     ESTIMATED NUTRITION NEEDS:     Calories: 5405-8889 kcal (MSJx1.2-1.3) based on  [x] Actual BW 70 kg     [] IBW   Protein: 56-70 gm (0.8-1 gm/kg) based on  [x] Actual BW      [] IBW   Fluid: 1 mL/kcal     MONITORING & EVALUATION:     Nutrition Goal(s):   1. Po intake of meals will meet >75% of patient estimated nutritional needs within the next 7 days.   Outcome:  [] Met/Ongoing    [x]  Not Met    [] New/Initial Goal     Monitoring:   [x] Food and beverage intake   [x] Diet order   [x] Nutrition-focused physical findings   [x] Treatment/therapy   [] Weight   [] Enteral nutrition intake        Previous Recommendations (for follow-up assessments only):     [x]   Implemented       []   Not Implemented (RD to address)      [] No Longer Appropriate     [] No Recommendation Made     Discharge Planning: regular diet + Ensure Enlive TID  [x] Participated in care planning, discharge planning, & interdisciplinary rounds as appropriate      Vanesa Domingo, CLEO, [address]   Pager: [phone]

## 2019-03-19 NOTE — ROUTINE PROCESS
Pt refusing having sticker on to secure perales catheter. Asked pt if it was making him uncomfortable. Pt started trying tear off perales sticker and stated, \"it does not go up that high I have a perales all the time and I know. \" I explained that our stickers go on the upper thigh but I could put an extender on and put the sticker on his ankle. \" He was p;leased with this option for 30 seconds. When I walked out of the room to get supplies, he call 3 times in the next 30 seconds to have the sticker removed. When I went to replace the sticker and move it lower after extender was placed, he started shouting and saying he \"did not want a sticker and that whoever put it up there put it up there wrong and stickers do not go up there. \" Educated pt that the sticker helps stabilize perales and that if it were to be pulled out it would be very painful and could cause damage to the penis.

## 2019-03-19 NOTE — PROGRESS NOTES
Problem: Pressure Injury - Risk of  Goal: *Prevention of pressure injury  Document Robert Scale and appropriate interventions in the flowsheet. Outcome: Progressing Towards Goal  Pressure Injury Interventions:  Sensory Interventions: Assess need for specialty bed, Avoid rigorous massage over bony prominences, Discuss PT/OT consult with provider, Float heels    Moisture Interventions: Absorbent underpads    Activity Interventions: Increase time out of bed, PT/OT evaluation, Pressure redistribution bed/mattress(bed type)    Mobility Interventions: HOB 30 degrees or less, PT/OT evaluation, Pressure redistribution bed/mattress (bed type), Turn and reposition approx.  every two hours(pillow and wedges)    Nutrition Interventions: Document food/fluid/supplement intake    Friction and Shear Interventions: HOB 30 degrees or less, Lift team/patient mobility team, Minimize layers

## 2019-03-20 LAB
BACTERIA SPEC CULT: ABNORMAL
SERVICE CMNT-IMP: ABNORMAL

## 2019-04-28 ENCOUNTER — ANESTHESIA EVENT (OUTPATIENT)
Dept: ENDOSCOPY | Age: 74
End: 2019-04-28
Payer: MEDICARE

## 2019-04-29 ENCOUNTER — HOSPITAL ENCOUNTER (OUTPATIENT)
Age: 74
Setting detail: OUTPATIENT SURGERY
Discharge: NURSING, REHAB OR LONG-TERM CARE | End: 2019-04-29
Attending: INTERNAL MEDICINE | Admitting: INTERNAL MEDICINE
Payer: MEDICARE

## 2019-04-29 ENCOUNTER — ANESTHESIA (OUTPATIENT)
Dept: ENDOSCOPY | Age: 74
End: 2019-04-29
Payer: MEDICARE

## 2019-04-29 VITALS
HEART RATE: 82 BPM | SYSTOLIC BLOOD PRESSURE: 105 MMHG | RESPIRATION RATE: 14 BRPM | OXYGEN SATURATION: 99 % | TEMPERATURE: 99 F | DIASTOLIC BLOOD PRESSURE: 80 MMHG

## 2019-04-29 PROCEDURE — 74011250636 HC RX REV CODE- 250/636: Performed by: NURSE ANESTHETIST, CERTIFIED REGISTERED

## 2019-04-29 PROCEDURE — 77030018846 HC SOL IRR STRL H20 ICUM -A: Performed by: INTERNAL MEDICINE

## 2019-04-29 PROCEDURE — 77030013992 HC SNR POLYP ENDOSC BSC -B: Performed by: INTERNAL MEDICINE

## 2019-04-29 PROCEDURE — 76040000019: Performed by: INTERNAL MEDICINE

## 2019-04-29 PROCEDURE — 77030021593 HC FCPS BIOP ENDOSC BSC -A: Performed by: INTERNAL MEDICINE

## 2019-04-29 PROCEDURE — 77030008565 HC TBNG SUC IRR ERBE -B: Performed by: INTERNAL MEDICINE

## 2019-04-29 PROCEDURE — 76060000031 HC ANESTHESIA FIRST 0.5 HR: Performed by: INTERNAL MEDICINE

## 2019-04-29 PROCEDURE — 74011000250 HC RX REV CODE- 250: Performed by: NURSE ANESTHETIST, CERTIFIED REGISTERED

## 2019-04-29 RX ORDER — SODIUM CHLORIDE 0.9 % (FLUSH) 0.9 %
5-40 SYRINGE (ML) INJECTION EVERY 8 HOURS
Status: CANCELLED | OUTPATIENT
Start: 2019-04-29

## 2019-04-29 RX ORDER — SODIUM CHLORIDE 0.9 % (FLUSH) 0.9 %
5-40 SYRINGE (ML) INJECTION EVERY 8 HOURS
Status: DISCONTINUED | OUTPATIENT
Start: 2019-04-29 | End: 2019-04-29 | Stop reason: HOSPADM

## 2019-04-29 RX ORDER — SODIUM CHLORIDE 0.9 % (FLUSH) 0.9 %
5-40 SYRINGE (ML) INJECTION AS NEEDED
Status: CANCELLED | OUTPATIENT
Start: 2019-04-29

## 2019-04-29 RX ORDER — INSULIN LISPRO 100 [IU]/ML
INJECTION, SOLUTION INTRAVENOUS; SUBCUTANEOUS ONCE
Status: DISCONTINUED | OUTPATIENT
Start: 2019-04-29 | End: 2019-04-29 | Stop reason: HOSPADM

## 2019-04-29 RX ORDER — SODIUM CHLORIDE 0.9 % (FLUSH) 0.9 %
5-40 SYRINGE (ML) INJECTION AS NEEDED
Status: DISCONTINUED | OUTPATIENT
Start: 2019-04-29 | End: 2019-04-29 | Stop reason: HOSPADM

## 2019-04-29 RX ORDER — SODIUM CHLORIDE, SODIUM LACTATE, POTASSIUM CHLORIDE, CALCIUM CHLORIDE 600; 310; 30; 20 MG/100ML; MG/100ML; MG/100ML; MG/100ML
50 INJECTION, SOLUTION INTRAVENOUS CONTINUOUS
Status: CANCELLED | OUTPATIENT
Start: 2019-04-29

## 2019-04-29 RX ORDER — SODIUM CHLORIDE, SODIUM LACTATE, POTASSIUM CHLORIDE, CALCIUM CHLORIDE 600; 310; 30; 20 MG/100ML; MG/100ML; MG/100ML; MG/100ML
75 INJECTION, SOLUTION INTRAVENOUS CONTINUOUS
Status: DISCONTINUED | OUTPATIENT
Start: 2019-04-29 | End: 2019-04-29 | Stop reason: HOSPADM

## 2019-04-29 RX ADMIN — FAMOTIDINE 20 MG: 10 INJECTION INTRAVENOUS at 08:31

## 2019-04-29 RX ADMIN — SODIUM CHLORIDE, SODIUM LACTATE, POTASSIUM CHLORIDE, AND CALCIUM CHLORIDE 75 ML/HR: 600; 310; 30; 20 INJECTION, SOLUTION INTRAVENOUS at 08:31

## 2019-04-29 RX ADMIN — SODIUM CHLORIDE, SODIUM LACTATE, POTASSIUM CHLORIDE, AND CALCIUM CHLORIDE: 600; 310; 30; 20 INJECTION, SOLUTION INTRAVENOUS at 09:04

## 2019-04-29 NOTE — ANESTHESIA POSTPROCEDURE EVALUATION
Procedure(s): 
COLONOSCOPY. MAC Anesthesia Post Evaluation Multimodal analgesia: multimodal analgesia used between 6 hours prior to anesthesia start to PACU discharge Patient location during evaluation: PACU Patient participation: complete - patient participated Level of consciousness: awake Pain management: adequate Airway patency: patent Anesthetic complications: no 
Cardiovascular status: acceptable Respiratory status: acceptable Hydration status: acceptable Post anesthesia nausea and vomiting:  controlled No vitals data found for the desired time range.

## 2019-04-29 NOTE — DISCHARGE INSTRUCTIONS
Sigmoidoscopy: What to Expect at [address] sigmoidoscopy lets your doctor look inside the lower part of your large intestine. This is also called the colon. The doctor uses a lighted tube called a sigmoidoscope (or scope). This test let the doctor look for small growths (called polyps), cancer, bleeding, hemorrhoids, or other problems. The doctor also may have used the scope to remove polyps. Or he or she may have used it to take tissue samples that need to be tested. You shouldn't have any pain after the procedure. But it is normal to pass gas. You may have mild discomfort from having gas. If your doctor removed polyps, you will likely need to schedule a colonoscopy to look at the whole colon. This care sheet gives you a general idea about how long it will take for you to recover. But each person recovers at a different pace. Follow the steps below to get better as quickly as possible. How can you care for yourself at home? Activity    · Most people are able to return to work right away unless they have had a sedative during the procedure.     · You may need someone to drive you home if you have had a sedative. In most cases, you can drive yourself home. Diet    · You can eat your normal diet. If your stomach is upset, try bland, low-fat foods like plain rice, broiled chicken, toast, and yogurt.     · Be sure to drink plenty of liquids to replace those you have lost during the preparation for the procedure. Exercise    · You can return to normal exercise right away.    Medicine    · Your doctor will tell you if and when you can restart your medicines. He or she will also give you instructions about taking any new medicines.     · If you take blood thinners, such as warfarin (Coumadin), clopidogrel (Plavix), or aspirin, be sure to talk to your doctor. He or she will tell you if and when to start taking those medicines again.  Make sure that you understand exactly what your doctor wants you to do.   Follow-up care is a key part of your treatment and safety. Be sure to make and go to all appointments, and call your doctor if you are having problems. It's also a good idea to know your test results and keep a list of the medicines you take. When should you call for help? Call your doctor now or seek immediate medical care if:    · You have new or worse belly pain.     · You have blood in your stools.    Watch closely for changes in your health, and be sure to contact your doctor if you have any problems. Where can you learn more? Go to http://lorna-kristina.info/. Enter O223 in the search box to learn more about \"Sigmoidoscopy: What to Expect at Home. \"  Current as of: March 27, 2018  Content Version: 11.9  © 8401-4009 [employer], [employer]. Care instructions adapted under license by [employer] (which disclaims liability or warranty for this information). If you have questions about a medical condition or this instruction, always ask your healthcare professional. [place] [address] any warranty or liability for your use of this information. DISCHARGE SUMMARY from Nurse    PATIENT INSTRUCTIONS:    Report the following to your surgeon:  · Excessive pain, swelling, redness or odor of or around the surgical area  · Temperature over 100.5  · Nausea and vomiting lasting longer than 4 hours or if unable to take medications  · Any signs of decreased circulation or nerve impairment to extremity: change in color, persistent  numbness, tingling, coldness or increase pain  · Any questions    *  Please give a list of your current medications to your Primary Care Provider. *  Please update this list whenever your medications are discontinued, doses are      changed, or new medications (including over-the-counter products) are added. *  Please carry medication information at all times in case of emergency situations.     These are general instructions for a healthy lifestyle:    No smoking/ No tobacco products/ Avoid exposure to second hand smoke  Surgeon General's Warning:  Quitting smoking now greatly reduces serious risk to your health. Obesity, smoking, and sedentary lifestyle greatly increases your risk for illness    A healthy diet, regular physical exercise & weight monitoring are important for maintaining a healthy lifestyle    You may be retaining fluid if you have a history of heart failure or if you experience any of the following symptoms:  Weight gain of 3 pounds or more overnight or 5 pounds in a week, increased swelling in our hands or feet or shortness of breath while lying flat in bed. Please call your doctor as soon as you notice any of these symptoms; do not wait until your next office visit. Recognize signs and symptoms of STROKE:    F-face looks uneven    A-arms unable to move or move unevenly    S-speech slurred or non-existent    T-time-call 911 as soon as signs and symptoms begin-DO NOT go       Back to bed or wait to see if you get better-TIME IS BRAIN. Warning Signs of HEART ATTACK     Call 911 if you have these symptoms:   Chest discomfort. Most heart attacks involve discomfort in the center of the chest that lasts more than a few minutes, or that goes away and comes back. It can feel like uncomfortable pressure, squeezing, fullness, or pain.  Discomfort in other areas of the upper body. Symptoms can include pain or discomfort in one or both arms, the back, neck, jaw, or stomach.  Shortness of breath with or without chest discomfort.  Other signs may include breaking out in a cold sweat, nausea, or lightheadedness. Don't wait more than five minutes to call 911 - MINUTES MATTER! Fast action can save your life. Calling 911 is almost always the fastest way to get lifesaving treatment. Emergency Medical Services staff can begin treatment when they arrive -- up to an hour sooner than if someone gets to the hospital by car.      The discharge information has been reviewed with the patient. The patient verbalized understanding. Discharge medications reviewed with the patient and appropriate educational materials and side effects teaching were provided.   ___________________________________________________________________________________________________________________________________

## 2019-04-29 NOTE — PERIOP NOTES
TRANSFER - OUT REPORT: 
 
Verbal report given to Negrita @ [address] [place] [address] on Ivy Tellez  being transferred to Unit 2 for routine post - op Report consisted of patients Situation, Background, Assessment and  
Recommendations(SBAR). Information from the following report(s) Procedure Summary was reviewed with the receiving nurse. Lines:  
Peripheral IV 04/29/19 Anterior;Right Forearm (Active) Site Assessment Clean, dry, & intact 4/29/2019  8:30 AM  
Phlebitis Assessment 0 4/29/2019  8:30 AM  
Infiltration Assessment 0 4/29/2019  8:30 AM  
Dressing Status Clean, dry, & intact 4/29/2019  8:30 AM  
Dressing Type Tape;Transparent 4/29/2019  8:30 AM  
Hub Color/Line Status Pink; Infusing 4/29/2019  8:30 AM  
  
 
Opportunity for questions and clarification was provided. Patient transported with: 
Medical transport and pt personal pillows. Came with no other belongings.

## 2019-04-29 NOTE — H&P
[email or web address] 
[phone] Gastroenterology pre op History and Physical 
  
Impression: 1. High risk colon cancer screening exam 
 
 
Plan: 1. Colonoscopy Chief Complaint: high risk colon cancer screening exam. 
 
 
HPI: 
Reva Looney is a 72 y.o. male who is being seen on consult for high risk colon cancer screening with colonoscopy. There is a previous history of colon polyps, and the patient returns for a surveillence exam 
 
PMH:  
Past Medical History:  
Diagnosis Date  Abnormal computed tomography of bladder  Anemia  Arthritis  Basal ganglia disease  Benign prostatic hyperplasia with lower urinary tract symptoms  Bladder calculus  Bladder cancer ([facility] [address] 3/13/14  Bladder mass  Bladder tumor  Brain aneurysm  CVA (cerebral vascular accident) ([facility] [address] 2012  
 hemiplegia  Dysphagia  Erythematous bladder mucosa  Webb catheter in place  Gross hematuria  Hemiplegia ([facility] [address]  HTN (hypertension)  Hx MRSA infection 09/2017  
 urine  Neurogenic bladder  Psychiatric disorder  Retention, urine  Stroke ([facility] [address]  Urethral stricture PSH:  
Past Surgical History:  
Procedure Laterality Date  COLONOSCOPY N/A 1/15/2018 COLONOSCOPY w/ biopsies w/ polypectomy w/ ink injection performed by Madi Dallas MD at [address] [facility]    
 PEG TUBE--REMOVED  HX HEENT    
 H/O TRACH  
 HX UROLOGICAL    
 super pubic tube  HX UROLOGICAL  3/13/14 TURBT, Dr. Frankie Stoddard  HX UROLOGICAL  10/8/15 Cysto, Dr. Man [facility]  LAP,SURG,COLECTOMY, PARTIAL, W/ANAST N/A 03/15/2018 Dr. Gladis Craig Social HX:  
Social History Socioeconomic History  Marital status:  Spouse name: Not on file  Number of children: 2  
 Years of education: 15  
 Highest education level: Not on file Occupational History  Occupation: other Employer: RETIRED Social Needs  Financial resource strain: Not on file  Food insecurity:  
  Worry: Not on file Inability: Not on file  Transportation needs:  
  Medical: Not on file Non-medical: Not on file Tobacco Use  Smoking status: Former Smoker Last attempt to quit: 2010 Years since quittin.2  Smokeless tobacco: Never Used Substance and Sexual Activity  Alcohol use: No  
 Drug use: No  
 Sexual activity: Not Currently Partners: Female Lifestyle  Physical activity:  
  Days per week: Not on file Minutes per session: Not on file  Stress: Not on file Relationships  Social connections:  
  Talks on phone: Not on file Gets together: Not on file Attends Cheondoism service: Not on file Active member of club or organization: Not on file Attends meetings of clubs or organizations: Not on file Relationship status: Not on file  Intimate partner violence:  
  Fear of current or ex partner: Not on file Emotionally abused: Not on file Physically abused: Not on file Forced sexual activity: Not on file Other Topics Concern   Service No  
 Blood Transfusions No  
 Caffeine Concern No  
 Occupational Exposure No  
 Hobby Hazards No  
 Sleep Concern No  
 Stress Concern No  
 Weight Concern No  
 Special Diet No  
 Back Care No  
 Exercise No  
 Bike Helmet No  
 Seat Belt Yes  Self-Exams No  
Social History Narrative  Not on file FHX:  
Family History Problem Relation Age of Onset  Hypertension Mother  Hypertension Father Allergy: Allergies Allergen Reactions  Penicillins Other (comments), Itching and Hives  
  intolerance Home Medications:  
 
Medications Prior to Admission Medication Sig  
 finasteride (PROSCAR) 5 mg tablet Take 1 Tab by mouth daily.   
 oxyCODONE IR (ROXICODONE) 10 mg tab immediate release tablet Take 1 Tab by mouth every six (6) hours as needed. Max Daily Amount: 40 mg.  
 atorvastatin (LIPITOR) 10 mg tablet Take 10 mg by mouth daily.  mirtazapine (REMERON) 15 mg tablet Take  by mouth nightly.  DIPHENHYDRAMINE HCL (BENADRYL ALLERGY PO) Take  by mouth.  ascorbic acid, vitamin C, (VITAMIN C) 500 mg tablet Take  by mouth.  oxybutynin chloride XL (DITROPAN XL) 10 mg CR tablet Take 1 Tab by mouth two (2) times a day.  polyethylene glycol (MIRALAX) 17 gram packet Take 17 g by mouth daily.  calcium-vitamin D (OYSTER SHELL CALCIUM-VIT D3) 250-125 mg-unit tablet Take 1 Tab by mouth daily.  EPINEPHrine HCl, PF, (ADRENALIN) 1 mg/mL (1 mL) injection once.  diclofenac (VOLTAREN) 1 % gel Apply  to affected area four (4) times daily.  Menthol-Zinc Oxide (CALMOSEPTINE) 0.44-20.6 % oint Apply  to affected area.  docusate sodium (COLACE) 100 mg capsule Take 100 mg by mouth two (2) times a day.  food supplemt, lactose-reduced (ENSURE ACTIVE HIGH PROTEIN) liqd Take 237 mL by mouth four (4) times daily.  promethazine (PHENERGAN) 25 mg tablet Take 25 mg by mouth every six (6) hours as needed for Nausea.  citalopram (CELEXA) 10 mg tablet Take  by mouth daily.  lisinopril (PRINIVIL, ZESTRIL) 5 mg tablet Take 1 Tab by mouth daily.  ferrous sulfate (IRON, FERROUS SULFATE,) 325 mg (65 mg iron) tablet Take 1 Tab by mouth Daily (before breakfast).  calcium citrate-vitamin d3 (CITRACAL+D) 315-200 mg-unit Tab Take 1 Tab by mouth daily (with breakfast).  vitamin c-vitamin e (CRANBERRY CONCENTRATE) cap Take  by mouth.  Cholecalciferol, Vitamin D3, (VITAMIN D3) 1,000 unit cap Take  by mouth. Review of Systems:  
 
Constitutional: No fevers, chills, weight loss, fatigue. Skin: No rashes, pruritis, jaundice, ulcerations, erythema. HENT: No headaches, nosebleeds, sinus pressure, rhinorrhea, sore throat. Eyes: No visual changes, blurred vision, eye pain, photophobia, jaundice. Cardiovascular: No chest pain, heart palpitations. Respiratory: No cough, SOB, wheezing, chest discomfort, orthopnea. Gastrointestinal:   
Genitourinary: No dysuria, bleeding, discharge, pyuria. Musculoskeletal: No weakness, arthralgias, wasting. Endo: No sweats. Heme: No bruising, easy bleeding. Allergies: As noted. Neurological: Cranial nerves intact. Alert and oriented. Gait not assessed. Psychiatric:  No anxiety, depression, hallucinations. There were no vitals taken for this visit. Physical Assessment:  
 
constitutional: appearance: well developed, well nourished, normal habitus, no deformities, in no acute distress. skin: inspection: no rashes, ulcers, icterus or other lesions; no clubbing or telangiectasias. palpation: no induration or subcutaneos nodules. eyes: inspection: normal conjunctivae and lids; no jaundice pupils: symmetrical, normoreactive to light, normal accommodation and size. ENMT: mouth: normal oral mucosa,lips and gums; good dentition. oropharynx: normal tongue, hard and soft palate; posterior pharynx without erithema, exudate or lesions. neck: thyroid: normal size, consistency and position; no masses or tenderness. respiratory: effort: normal chest excursion; no intercostal retraction or accessory muscle use. cardiovascular: abdominal aorta: normal size and position; no bruits. palpation: PMI of normal size and position; normal rhythm; no thrill or murmurs. abdominal: abdomen: normal consistency; no tenderness or masses. hernias: no hernias appreciated. liver: normal size and consistency. spleen: not palpable. rectal: hemoccult/guaiac: not performed. musculoskeletal: digits and nails: no clubbing, cyanosis, petechiae or other inflammatory conditions. gait: normal gait and station head and neck: normal range of motion; no pain, crepitation or contracture. spine/ribs/pelvis: normal range of motion; no pain, deformity or contracture. lymphatic: axilae: not palpable. groin: not palpable. neck: within normal limits. other: not palpable. neurologic: cranial nerves: II-XII normal.  
psychiatric: judgement/insight: within normal limits. memory: within normal limits for recent and remote events. mood and affect: no evidence of depression, anxiety or agitation. orientation: oriented to time, space and person. Basic Metabolic Profile No results for input(s): NA, K, CL, CO2, BUN, GLU, CA, MG, PHOS in the last 72 hours. No lab exists for component: CREAT  
  
CBC w/Diff No results for input(s): WBC, RBC, HGB, HCT, MCV, MCH, MCHC, RDW, PLT, HGBEXT, HCTEXT, PLTEXT in the last 72 hours. No lab exists for component: MPV No results for input(s): GRANS, LYMPH, EOS, PRO, MYELO, METAS, BLAST in the last 72 hours. No lab exists for component: MONO, BASO Hepatic Function No results for input(s): ALB, TP, TBILI, GPT, SGOT, AP, AML, LPSE in the last 72 hours. No lab exists for component: JEOVANNY Nugent MD 
[facility], [address] [place] [address] 
[email or web address]

## 2019-04-29 NOTE — ANESTHESIA PREPROCEDURE EVALUATION
Anesthetic History No history of anesthetic complications Review of Systems / Medical History Patient summary reviewed and pertinent labs reviewed Pulmonary Neuro/Psych CVA 
TIA and psychiatric history Cardiovascular Hypertension: well controlled Exercise tolerance: >4 METS Comments: CVA with hemiplegia GI/Hepatic/Renal 
  
 
 
Renal disease Endo/Other Arthritis, cancer and anemia Other Findings Physical Exam 
 
Airway Mallampati: III 
TM Distance: 4 - 6 cm Neck ROM: decreased range of motion Mouth opening: Normal 
 
 Cardiovascular Rhythm: regular Rate: normal 
 
 
 
 Dental 
 
Dentition: Poor dentition Pulmonary Breath sounds clear to auscultation Abdominal 
GI exam deferred Other Findings Anesthetic Plan ASA: 3 Anesthesia type: MAC Induction: Intravenous

## 2019-07-08 ENCOUNTER — OFFICE VISIT (OUTPATIENT)
Dept: SURGERY | Age: 74
End: 2019-07-08

## 2019-07-08 VITALS
DIASTOLIC BLOOD PRESSURE: 67 MMHG | WEIGHT: 145 LBS | HEIGHT: 68 IN | TEMPERATURE: 97.7 F | SYSTOLIC BLOOD PRESSURE: 109 MMHG | HEART RATE: 60 BPM | OXYGEN SATURATION: 98 % | BODY MASS INDEX: 21.98 KG/M2 | RESPIRATION RATE: 17 BRPM

## 2019-07-08 DIAGNOSIS — Z08 ENCOUNTER FOR ROUTINE CANCER FOLLOW-UP: ICD-10-CM

## 2019-07-08 DIAGNOSIS — E04.1 THYROID NODULE: ICD-10-CM

## 2019-07-08 DIAGNOSIS — Z85.038 PERSONAL HISTORY OF COLON CANCER: Primary | ICD-10-CM

## 2019-07-08 PROBLEM — C18.2 COLON CANCER, ASCENDING (HCC): Status: RESOLVED | Noted: 2018-04-25 | Resolved: 2019-07-08

## 2019-07-08 RX ORDER — LACTULOSE 10 G/15ML
SOLUTION ORAL; RECTAL 3 TIMES DAILY
COMMUNITY

## 2019-07-08 NOTE — PROGRESS NOTES
Subjective: Complains of constipation. Moves his bowels once per week. They have been giving him lactulose. Colonoscopy was attempted but the prep was poor. Past medical history and ROS were reviewed and unchanged. Abdomen: Soft, nontender nondistended    CT C/A/P 2/2019 with 11 mm thyroid nodule  CEA 9/18 is 2.9     Status post laparoscopic right colectomy for T1N0 malignant polyp of the ascending colon March 2018, TERI  Check thyroid ultrasound  Follow-up CEA result that was drawn today  Colonoscopy as planned  Follow-up in 6 months     A total of 15 minutes was spent with the patient, with >50% of time spent on counseling and coordination of care. The diagnoses and plan were discussed with patient. All questions answered. Plan of care agreed to by all concerned.

## 2020-06-09 ENCOUNTER — HOSPITAL ENCOUNTER (EMERGENCY)
Age: 75
Discharge: OTHER INSTITUTION | End: 2020-06-10
Attending: EMERGENCY MEDICINE | Admitting: EMERGENCY MEDICINE
Payer: MEDICARE

## 2020-06-09 VITALS
DIASTOLIC BLOOD PRESSURE: 69 MMHG | SYSTOLIC BLOOD PRESSURE: 105 MMHG | HEART RATE: 96 BPM | RESPIRATION RATE: 16 BRPM | OXYGEN SATURATION: 94 %

## 2020-06-09 DIAGNOSIS — T83.021A DISLODGED FOLEY CATHETER, INITIAL ENCOUNTER: Primary | ICD-10-CM

## 2020-06-09 PROCEDURE — 51702 INSERT TEMP BLADDER CATH: CPT

## 2020-06-09 PROCEDURE — 99282 EMERGENCY DEPT VISIT SF MDM: CPT

## 2020-06-10 NOTE — ED TRIAGE NOTES
Pt arrived via Medical Transport from [facility] after coude perales was pulled out by patient. Per staff at facility, last perales was placed by urology.

## 2020-06-10 NOTE — ED PROVIDER NOTES
EMERGENCY DEPARTMENT HISTORY AND PHYSICAL EXAM    11:16 PM  Date: 6/9/2020  Patient Name: Dulce Leslie    History of Presenting Illness     Chief Complaint   Patient presents with    Urinary Catheter Problem        History Provided By: Patient and EMS    HPI: Dulce Leslie is a 69 y.o. male with history multiple medical problems as below. Brought in by ambulance from nursing facility after he pulled out his Webb for replacement. Patient has no complaints. Location:  Severity:  Timing/course:    Onset/Duration:     PCP: Haroon Khan MD    Past History     Past Medical History:  Past Medical History:   Diagnosis Date    Abnormal computed tomography of bladder     Anemia     Arthritis     Basal ganglia disease     Benign prostatic hyperplasia with lower urinary tract symptoms     Bladder calculus     Bladder cancer ([address] 3/13/14    Bladder mass     Bladder tumor     Brain aneurysm     CVA (cerebral vascular accident) ([address] DEC 2012    hemiplegia    Dysphagia     Erythematous bladder mucosa     Webb catheter in place     Gross hematuria     Hemiplegia ([address]     HTN (hypertension)     Hx MRSA infection 09/2017    urine    Neurogenic bladder     Psychiatric disorder     Retention, urine     Stroke ([address]     Urethral stricture        Past Surgical History:  Past Surgical History:   Procedure Laterality Date    COLONOSCOPY N/A 1/15/2018    COLONOSCOPY w/ biopsies w/ polypectomy w/ ink injection performed by Karlos Faustin MD at [address] COLONOSCOPY N/A 4/29/2019    COLONOSCOPY performed by Eber Abrams MD at [facility] ENDOSCOPY    HX GI      PEG TUBE--REMOVED    [facility] HEENT      [facility]    HX UROLOGICAL      super pubic tube    HX UROLOGICAL  3/13/14    TURBT, Dr. Emma Isaac, [place]    HX UROLOGICAL  10/8/15    [facility] Dr. Harrison Lo, [facility]    LAP,SURG,COLECTOMY, PARTIAL, W/ANAST N/A 03/15/2018    Dr. Lizzette Pope       Family History:  Family History   Problem Relation Age of Onset  Hypertension Mother     Hypertension Father        Social History:  Social History     Tobacco Use    Smoking status: Former Smoker     Last attempt to quit: 1/29/2010     Years since quitting: 10.3    Smokeless tobacco: Never Used   Substance Use Topics    Alcohol use: No    Drug use: No       Allergies: Allergies   Allergen Reactions    Penicillins Other (comments), Itching and Hives     intolerance       Review of Systems   Review of Systems   Genitourinary: Positive for difficulty urinating. All other systems reviewed and are negative. Physical Exam     Patient Vitals for the past 12 hrs:   Pulse Resp BP SpO2   06/09/20 2129 96 16 105/69 94 %       Physical Exam  Vitals signs and nursing note reviewed. HENT:      Head: Normocephalic and atraumatic. Eyes:      Extraocular Movements: Extraocular movements intact. Neck:      Musculoskeletal: Neck supple. Cardiovascular:      Rate and Rhythm: Normal rate. Pulmonary:      Effort: Pulmonary effort is normal. No respiratory distress. Musculoskeletal:      Comments: Contracted at baseline   Neurological:      Mental Status: He is alert. Mental status is at baseline. Diagnostic Study Results     Labs -  No results found for this or any previous visit (from the past 12 hour(s)). Radiologic Studies -   No results found. Medical Decision Making     ED Course: Progress Notes, Reevaluation, and Consults:    11:16 PM Initial assessment performed. The patients presenting problems have been discussed, and they/their family are in agreement with the care plan formulated and outlined with them. I have encouraged them to ask questions as they arise throughout their visit. Provider Notes (Medical Decision Making): 57-year-old male presenting for Webb replacement from nursing facility. Well-appearing on exam and has no complaints. Will replace Webb and discharge.     Procedures:     Critical Care Time:     Vital Signs-Reviewed the patient's vital signs. Reviewed pt's pulse ox reading. EKG: Interpreted by the EP. Time Interpreted:    Rate:    Rhythm:    Interpretation:   Comparison:     Records Reviewed: Nursing Notes (Time of Review: 11:16 PM)  -I am the first provider for this patient.  -I reviewed the vital signs, available nursing notes, past medical history, past surgical history, family history and social history. Current Outpatient Medications   Medication Sig Dispense Refill    lactulose (CHRONULAC) 10 gram/15 mL solution Take  by mouth three (3) times daily.  finasteride (PROSCAR) 5 mg tablet Take 1 Tab by mouth daily. 90 Tab 3    polyethylene glycol (MIRALAX) 17 gram packet Take 17 g by mouth daily.  oxyCODONE IR (ROXICODONE) 10 mg tab immediate release tablet Take 1 Tab by mouth every six (6) hours as needed. Max Daily Amount: 40 mg. 40 Tab 0    atorvastatin (LIPITOR) 10 mg tablet Take 10 mg by mouth daily.  calcium-vitamin D (OYSTER SHELL CALCIUM-VIT D3) 250-125 mg-unit tablet Take 1 Tab by mouth daily.  mirtazapine (REMERON) 15 mg tablet Take 30 mg by mouth nightly.  DIPHENHYDRAMINE HCL (BENADRYL ALLERGY PO) Take 25 mg by mouth.  EPINEPHrine HCl, PF, (ADRENALIN) 1 mg/mL (1 mL) injection once.  ascorbic acid, vitamin C, (VITAMIN C) 500 mg tablet Take  by mouth.  diclofenac (VOLTAREN) 1 % gel Apply  to affected area four (4) times daily.  Menthol-Zinc Oxide (CALMOSEPTINE) 0.44-20.6 % oint Apply  to affected area.  docusate sodium (COLACE) 100 mg capsule Take 100 mg by mouth two (2) times a day.  food supplemt, lactose-reduced (ENSURE ACTIVE HIGH PROTEIN) liqd Take 237 mL by mouth four (4) times daily.  promethazine (PHENERGAN) 25 mg tablet Take 25 mg by mouth every six (6) hours as needed for Nausea.  citalopram (CELEXA) 10 mg tablet Take  by mouth daily.  lisinopril (PRINIVIL, ZESTRIL) 5 mg tablet Take 1 Tab by mouth daily.  90 Tab 3    ferrous sulfate (IRON, FERROUS SULFATE,) 325 mg (65 mg iron) tablet Take 1 Tab by mouth Daily (before breakfast). 90 Tab 3    oxybutynin chloride XL (DITROPAN XL) 10 mg CR tablet Take 1 Tab by mouth two (2) times a day. 60 Tab 5    calcium citrate-vitamin d3 (CITRACAL+D) 315-200 mg-unit Tab Take 1 Tab by mouth daily (with breakfast).  vitamin c-vitamin e (CRANBERRY CONCENTRATE) cap Take  by mouth.  Cholecalciferol, Vitamin D3, (VITAMIN D3) 1,000 unit cap Take  by mouth. Clinical Impression     Clinical Impression: No diagnosis found. Disposition: DC      This note was dictated utilizing voice recognition software which may lead to typographical errors. I apologize in advance if the situation occurs. If questions arise please do not hesitate to contact me or call our department.     Tiffany Govea MD  11:16 PM

## 2020-06-10 NOTE — DISCHARGE INSTRUCTIONS
Patient Education        Learning About How to Care for a Person's Indwelling Urinary Catheter  Introduction     A urinary catheter is a flexible plastic tube used to drain urine from the bladder when a person cannot urinate. A doctor will place the catheter into the bladder by inserting it through the urethra. The urethra is the opening that carries urine from the bladder to the outside of the body. When the catheter is in the bladder, a small balloon is used to keep the catheter in place. The catheter lets urine drain from the bladder into a collection bag. Urinary catheters can be used in both men and women. A catheter that stays in place for a longer period of time is called an indwelling catheter. A catheter may be needed because of certain medical conditions. These include an enlarged prostate or problems controlling urine. It may be used after surgery on the pelvis or urinary tract. Urinary catheters are also used when the lower part of the body is paralyzed. When helping a loved one with a catheter, try to be relaxed. Caring for a catheter can be embarrassing for both of you. If you are calm and don't seem embarrassed, the person may feel more comfortable. How do you take care of the catheter? Wear disposable gloves when handling someone's catheter. Make sure to follow all of the instructions the doctor has given. And always wash your hands before and after you're done. Here are some other things to remember when caring for someone's catheter:  · Make sure that urine is running out of the catheter into the urine collection bag. And make sure that the catheter tubing does not get twisted or bent. · Keep the urine collection bag below the level of the bladder. At night it may be helpful to hang the bag on the side of the bed. · Make sure that the urine collection bag does not drag and pull on the catheter.   · It is okay to shower with a catheter and urine collection bag in place, unless the doctor says not to. · Check for swelling or signs of infection in the area around the catheter. Signs of infection include pus or irritated, swollen, red, or tender skin. · Clean the area around the catheter twice a day with soap and water. Dry with a clean towel afterward. · Do not apply powder or lotion to the skin around the catheter. · Do not tug or pull on the catheter. · Sexual intercourse may still be possible for individuals who wear a catheter. It is best to talk with a doctor about options. How do you empty the bag? The urine collection bag needs to be emptied regularly. It is best to empty the bag when it's about half full or at bedtime. If the doctor has asked you to measure the amount of urine, do that before you empty the urine into the toilet. When you are ready to empty the bag, follow these steps:  1. Put on disposable gloves. 2. Remove the drain spout from its sleeve at the bottom of the collection bag. Open the valve on the spout. 3. Let the urine flow out of the bag and into the toilet or a container. Do not let the tubing or drain spout touch anything. 4. After you empty the bag, close the valve and put the drain spout back into its sleeve. 5. Remove your gloves and throw them away. 6. Wash your hands with soap and water. How do you care for someone after the catheter is removed? After the catheter is taken out, the person may have trouble urinating. If this happens, try helping them sit in a few inches of warm water (sitz bath). If the urge to urinate comes during the sitz bath, it may be easier for them to urinate while still in the bath. Some burning may happen the first few times the person urinates. If the burning lasts longer, it may be a sign of an infection. If the catheter causes irritation or a rash, wearing loose, cotton underwear may help. Watch closely for changes in the person's health, and be sure to contact their doctor if you notice any problems.   Where can you learn more?  Go to http://lorna-kristina.info/  Enter X535 in the search box to learn more about \"Learning About How to Care for a Person's Indwelling Urinary Catheter. \"  Current as of: December 9, 2019               Content Version: 12.5  © 2498-5133 [employer]. Care instructions adapted under license by [employer] (which disclaims liability or warranty for this information). If you have questions about a medical condition or this instruction, always ask your healthcare professional. [address] any warranty or liability for your use of this information.

## 2020-11-10 ENCOUNTER — HOSPITAL ENCOUNTER (EMERGENCY)
Age: 75
Discharge: HOME | End: 2020-11-11
Attending: EMERGENCY MEDICINE
Payer: MEDICARE

## 2020-11-10 DIAGNOSIS — T83.9XXA FOLEY CATHETER PROBLEM, INITIAL ENCOUNTER (HCC): Primary | ICD-10-CM

## 2020-11-10 DIAGNOSIS — J18.9 COMMUNITY ACQUIRED PNEUMONIA OF LEFT LOWER LOBE OF LUNG: ICD-10-CM

## 2020-11-10 PROCEDURE — 96361 HYDRATE IV INFUSION ADD-ON: CPT

## 2020-11-10 PROCEDURE — 99284 EMERGENCY DEPT VISIT MOD MDM: CPT

## 2020-11-10 PROCEDURE — 51702 INSERT TEMP BLADDER CATH: CPT

## 2020-11-10 PROCEDURE — 96360 HYDRATION IV INFUSION INIT: CPT

## 2020-11-11 ENCOUNTER — APPOINTMENT (OUTPATIENT)
Dept: CT IMAGING | Age: 75
End: 2020-11-11
Attending: NURSE PRACTITIONER
Payer: MEDICARE

## 2020-11-11 ENCOUNTER — APPOINTMENT (OUTPATIENT)
Dept: GENERAL RADIOLOGY | Age: 75
End: 2020-11-11
Attending: EMERGENCY MEDICINE
Payer: MEDICARE

## 2020-11-11 VITALS
HEIGHT: 68 IN | BODY MASS INDEX: 22.73 KG/M2 | RESPIRATION RATE: 18 BRPM | OXYGEN SATURATION: 98 % | TEMPERATURE: 98.6 F | WEIGHT: 150 LBS | HEART RATE: 72 BPM | SYSTOLIC BLOOD PRESSURE: 126 MMHG | DIASTOLIC BLOOD PRESSURE: 65 MMHG

## 2020-11-11 LAB
ALBUMIN SERPL-MCNC: 3.2 G/DL (ref 3.4–5)
ALBUMIN/GLOB SERPL: 0.7 {RATIO} (ref 0.8–1.7)
ALP SERPL-CCNC: 102 U/L (ref 45–117)
ALT SERPL-CCNC: 46 U/L (ref 16–61)
ANION GAP SERPL CALC-SCNC: 4 MMOL/L (ref 3–18)
APPEARANCE UR: ABNORMAL
AST SERPL-CCNC: 24 U/L (ref 10–38)
BACTERIA URNS QL MICRO: ABNORMAL /HPF
BASOPHILS # BLD: 0 K/UL (ref 0–0.1)
BASOPHILS NFR BLD: 0 % (ref 0–2)
BILIRUB SERPL-MCNC: 0.3 MG/DL (ref 0.2–1)
BILIRUB UR QL: NEGATIVE
BUN SERPL-MCNC: 20 MG/DL (ref 7–18)
BUN/CREAT SERPL: 21 (ref 12–20)
CALCIUM SERPL-MCNC: 9.5 MG/DL (ref 8.5–10.1)
CHLORIDE SERPL-SCNC: 113 MMOL/L (ref 100–111)
CO2 SERPL-SCNC: 28 MMOL/L (ref 21–32)
COLOR UR: ABNORMAL
CREAT SERPL-MCNC: 0.96 MG/DL (ref 0.6–1.3)
DIFFERENTIAL METHOD BLD: ABNORMAL
EOSINOPHIL # BLD: 0.4 K/UL (ref 0–0.4)
EOSINOPHIL NFR BLD: 3 % (ref 0–5)
EPITH CASTS URNS QL MICRO: ABNORMAL /LPF (ref 0–5)
ERYTHROCYTE [DISTWIDTH] IN BLOOD BY AUTOMATED COUNT: 15 % (ref 11.6–14.5)
GLOBULIN SER CALC-MCNC: 4.6 G/DL (ref 2–4)
GLUCOSE SERPL-MCNC: 104 MG/DL (ref 74–99)
GLUCOSE UR STRIP.AUTO-MCNC: NEGATIVE MG/DL
HCT VFR BLD AUTO: 41.7 % (ref 36–48)
HGB BLD-MCNC: 13.7 G/DL (ref 13–16)
HGB UR QL STRIP: ABNORMAL
KETONES UR QL STRIP.AUTO: NEGATIVE MG/DL
LEUKOCYTE ESTERASE UR QL STRIP.AUTO: ABNORMAL
LYMPHOCYTES # BLD: 1.2 K/UL (ref 0.9–3.6)
LYMPHOCYTES NFR BLD: 11 % (ref 21–52)
MAGNESIUM SERPL-MCNC: 2.1 MG/DL (ref 1.6–2.6)
MCH RBC QN AUTO: 29.7 PG (ref 24–34)
MCHC RBC AUTO-ENTMCNC: 32.9 G/DL (ref 31–37)
MCV RBC AUTO: 90.5 FL (ref 74–97)
MONOCYTES # BLD: 0.7 K/UL (ref 0.05–1.2)
MONOCYTES NFR BLD: 7 % (ref 3–10)
NEUTS SEG # BLD: 9.1 K/UL (ref 1.8–8)
NEUTS SEG NFR BLD: 79 % (ref 40–73)
NITRITE UR QL STRIP.AUTO: POSITIVE
PH UR STRIP: 8 [PH] (ref 5–8)
PLATELET # BLD AUTO: 289 K/UL (ref 135–420)
PMV BLD AUTO: 11.1 FL (ref 9.2–11.8)
POTASSIUM SERPL-SCNC: 3.7 MMOL/L (ref 3.5–5.5)
PROT SERPL-MCNC: 7.8 G/DL (ref 6.4–8.2)
PROT UR STRIP-MCNC: 300 MG/DL
RBC # BLD AUTO: 4.61 M/UL (ref 4.7–5.5)
RBC #/AREA URNS HPF: ABNORMAL /HPF (ref 0–5)
SODIUM SERPL-SCNC: 145 MMOL/L (ref 136–145)
SP GR UR REFRACTOMETRY: 1.02 (ref 1–1.03)
UROBILINOGEN UR QL STRIP.AUTO: 1 EU/DL (ref 0.2–1)
WBC # BLD AUTO: 11.4 K/UL (ref 4.6–13.2)
WBC URNS QL MICRO: ABNORMAL /HPF (ref 0–4)

## 2020-11-11 PROCEDURE — 96361 HYDRATE IV INFUSION ADD-ON: CPT

## 2020-11-11 PROCEDURE — 85025 COMPLETE CBC W/AUTO DIFF WBC: CPT

## 2020-11-11 PROCEDURE — 87086 URINE CULTURE/COLONY COUNT: CPT

## 2020-11-11 PROCEDURE — 83735 ASSAY OF MAGNESIUM: CPT

## 2020-11-11 PROCEDURE — 96360 HYDRATION IV INFUSION INIT: CPT

## 2020-11-11 PROCEDURE — 74011250636 HC RX REV CODE- 250/636: Performed by: EMERGENCY MEDICINE

## 2020-11-11 PROCEDURE — 87186 SC STD MICRODIL/AGAR DIL: CPT

## 2020-11-11 PROCEDURE — 87077 CULTURE AEROBIC IDENTIFY: CPT

## 2020-11-11 PROCEDURE — 81001 URINALYSIS AUTO W/SCOPE: CPT

## 2020-11-11 PROCEDURE — 74176 CT ABD & PELVIS W/O CONTRAST: CPT

## 2020-11-11 PROCEDURE — 80053 COMPREHEN METABOLIC PANEL: CPT

## 2020-11-11 PROCEDURE — 71045 X-RAY EXAM CHEST 1 VIEW: CPT

## 2020-11-11 PROCEDURE — 74011250637 HC RX REV CODE- 250/637: Performed by: EMERGENCY MEDICINE

## 2020-11-11 RX ORDER — DEXTROMETHORPHAN POLISTIREX 30 MG/5 ML
SUSPENSION, EXTENDED RELEASE 12 HR ORAL AS NEEDED
Status: DISCONTINUED | OUTPATIENT
Start: 2020-11-11 | End: 2020-11-11 | Stop reason: HOSPADM

## 2020-11-11 RX ORDER — DOXYCYCLINE 100 MG/1
100 CAPSULE ORAL 2 TIMES DAILY
Qty: 20 CAP | Refills: 0 | Status: SHIPPED | OUTPATIENT
Start: 2020-11-11 | End: 2020-11-21

## 2020-11-11 RX ORDER — DOXYCYCLINE 100 MG/1
100 CAPSULE ORAL
Status: COMPLETED | OUTPATIENT
Start: 2020-11-11 | End: 2020-11-11

## 2020-11-11 RX ADMIN — DOXYCYCLINE HYCLATE 100 MG: 100 CAPSULE ORAL at 14:50

## 2020-11-11 RX ADMIN — SODIUM CHLORIDE 1000 ML: 900 INJECTION, SOLUTION INTRAVENOUS at 05:57

## 2020-11-11 NOTE — ED NOTES
Assumed care for the patient from Dr. Yuri Danielson pending voiding. Patient did not have any urine output overnight despite having a Wbeb. It looks like the Webb was at the vesicourethral junction and when we deflated the balloon to try to adjust the catheter we were unable to pull it out or pressure and no stuck. Patient started to have bleeding and urination around the Webb so we stopped and we consulted urology. Dr. Rosa Elena Isabel came to the ER and evaluated the patient he was able to pull the catheter out and replace it with a new 12 [employer] Jasmyn. Recommending sending a culture and follow-up with his urologist as an outpatient. The patient also had findings suggestive of pneumonia on the CT scan so we will start him on doxycycline to cover for both urinary tract infection and UTI.

## 2020-11-11 NOTE — CONSULTS
Urology Consult      Assessment:     Active Problems:    * No active hospital problems. *        1. Retained perales cath: able to be removed with some exertion  2. Chronic catheters due to neurogenic bladder  3. Likely calcifications in bladder on CT. Pt has had previous cystoscopy to remove encrustrations in 3/19  4. Hx of Bladder Cancer               S/p TURBT 2/2014, pathology PUNLMP              Cystoscopy 1/8/2019 - encrustation material accumulation from prior perales balloon noted w/ blood - unable to wash away all encrustation/blood - thus possibly bladder lesion in area      Plan:     1. Perales to gravity. Irrigate prn  2, UA and Urine Cx  3. Will need Cystoscopy under anesthesia in near future to further evaluate CT findings with prior encrustations and hx of bladder cancer   Can be arranged as outpt    Signed By: Dorothe Schirmer, MD                         November 11, 2020  Sheryl Steve MD   Urologic Oncologist  [facility]  [facility] [phone]      Subjective:     Date of Consultation:  November 11, 2020    Referring Physician: Pama Cheadle NP    Reason for Consultation:  Retained Perales    Patient Name: Sariah Wolfe  MRN: [id number]    History of Present Illness:   Patient is a 70 y.o.  male who is being seen for retained perales. He was admitted to the hospital for No admission diagnoses are documented for this encounter. .    Pt with chronic perales for years who was sent toER by SNF as the were unable to remove perales during routine cath change. ER staff able to deflate balloon but unable to remove perales. Pt followed by Dr Nirmala Kilgore and recently seen 11/6/20 in office. Denies F/C, N/V, Abd pain. Some bleeding around perales from ER manipulation.     CT done last night; Films reviewed   Perales catheter balloon at the lowermost bladder, possibly extending into the  vesicourethral junction. Consider adjustment/advancement if warranted.  -Sizable calculi/concretions in the bladder.  -Additional more ill-defined rounded hyperdensity within the bladder lumen,  possibly blood products. Consider with urinalysis and/or ultrasound to further  characterize if warranted. -Perhaps mild bladder wall thickening likely exaggerated by nondistention. Correlate clinically for cystitis.      Past Medical History:   Diagnosis Date    Abnormal computed tomography of bladder     Anemia     Arthritis     Basal ganglia disease     Benign prostatic hyperplasia with lower urinary tract symptoms     Bladder calculus     Bladder cancer ([address] 3/13/14    Bladder mass     Bladder tumor     Brain aneurysm     CVA (cerebral vascular accident) ([address] DEC 2012    hemiplegia    Dysphagia     Erythematous bladder mucosa     Webb catheter in place     Gross hematuria     Hemiplegia ([address]     HTN (hypertension)     Hx MRSA infection 09/2017    urine    Neurogenic bladder     Psychiatric disorder     Retention, urine     Stroke ([address]     Urethral stricture       Past Surgical History:   Procedure Laterality Date    COLONOSCOPY N/A 1/15/2018    COLONOSCOPY w/ biopsies w/ polypectomy w/ ink injection performed by Ramona Shane MD at [address] COLONOSCOPY N/A 4/29/2019    COLONOSCOPY performed by Thomas Neal MD at [facility] ENDOSCOPY    HX GI      PEG TUBE--REMOVED    HX [facility]      [facility]    HX UROLOGICAL      super pubic tube    HX UROLOGICAL  3/13/14    TURBT, Dr. Antolin Pagan, [place]    HX UROLOGICAL  10/8/15    [facility], [facility]. Mercedes Reno, [facility]    LAP,SURG,COLECTOMY, PARTIAL, W/ANAST N/A 03/15/2018    Dr. Ashlyn Shaw      Family History   Problem Relation Age of Onset    Hypertension Mother     Hypertension Father       Social History     Tobacco Use    Smoking status: Former Smoker     Last attempt to quit: 1/29/2010     Years since quitting: 10.7    Smokeless tobacco: Never Used Substance Use Topics    Alcohol use: No     Allergies   Allergen Reactions    Penicillins Other (comments), Itching and Hives     intolerance      Prior to Admission medications    Medication Sig Start Date End Date Taking? Authorizing Provider   lactulose (CHRONULAC) 10 gram/15 mL solution Take  by mouth three (3) times daily. Provider, Historical   finasteride (PROSCAR) 5 mg tablet Take 1 Tab by mouth daily. 1/8/19   Radhika Pavon MD   polyethylene glycol (MIRALAX) 17 gram packet Take 17 g by mouth daily. Provider, Historical   oxyCODONE IR (ROXICODONE) 10 mg tab immediate release tablet Take 1 Tab by mouth every six (6) hours as needed. Max Daily Amount: 40 mg. 3/22/18   Shilpa Hill MD   atorvastatin (LIPITOR) 10 mg tablet Take 10 mg by mouth daily. Provider, Historical   calcium-vitamin D (OYSTER SHELL CALCIUM-VIT D3) 250-125 mg-unit tablet Take 1 Tab by mouth daily. Provider, Historical   DIPHENHYDRAMINE HCL (BENADRYL ALLERGY PO) Take 25 mg by mouth. Provider, Historical   EPINEPHrine HCl, PF, (ADRENALIN) 1 mg/mL (1 mL) injection once. Provider, Historical   ascorbic acid, vitamin C, (VITAMIN C) 500 mg tablet Take  by mouth. Provider, Historical   diclofenac (VOLTAREN) 1 % gel Apply  to affected area four (4) times daily. Provider, Historical   docusate sodium (COLACE) 100 mg capsule Take 100 mg by mouth two (2) times a day. Provider, Historical   food supplemt, lactose-reduced (ENSURE ACTIVE HIGH PROTEIN) liqd Take 237 mL by mouth four (4) times daily. Provider, Historical   promethazine (PHENERGAN) 25 mg tablet Take 25 mg by mouth every six (6) hours as needed for Nausea. Provider, Historical   lisinopril (PRINIVIL, ZESTRIL) 5 mg tablet Take 1 Tab by mouth daily. 2/17/16   Мария Sandoval MD   ferrous sulfate (IRON, FERROUS SULFATE,) 325 mg (65 mg iron) tablet Take 1 Tab by mouth Daily (before breakfast).  11/17/15   Liseth Haque MD oxybutynin chloride XL (DITROPAN XL) 10 mg CR tablet Take 1 Tab by mouth two (2) times a day. 4/9/15   Ramona Driscoll MD   calcium citrate-vitamin d3 (CITRACAL+D) 315-200 mg-unit Tab Take 1 Tab by mouth daily (with breakfast). Provider, Historical   vitamin c-vitamin e (CRANBERRY CONCENTRATE) cap Take  by mouth. Provider, Historical   Cholecalciferol, Vitamin D3, (VITAMIN D3) 1,000 unit cap Take  by mouth. Provider, Historical         Review of Systems:  A comprehensive review of systems was negative except for that written in the HPI. Objective:     Data Review (Labs):    Recent Labs     20  0246   WBC 11.4   HGB 13.7   MCV 90.5         K 3.7   CREA 0.96   BUN 20*   ALB 3.2*          Patient Vitals for the past 8 hrs:   BP Pulse Resp SpO2   20 1300 126/65   98 %   20 1200 110/65   99 %   20 1100 116/67   98 %   20 1000 123/78 72 18 97 %   20 0900 102/67 66  98 %   20 0830 113/73 65  97 %   20 0800 129/62 73 18 97 %   20 0730 117/61 66 16      Temp (24hrs), Av.6 °F (37 °C), Min:98.6 °F (37 °C), Max:98.6 °F (37 °C)      Intake and Output:   No intake/output data recorded. Physical Exam:            General:    fatigued, cooperative, no distress, appears stated age                     Skin:  Normal.   Lymph nodes:  Cervical, supraclavicular, and axillary nodes normal.             Abdomen[de-identified]  soft, non-tender. No masses,  no organomegaly             Genitalia:  Phallus with mild glandular erosion. Perales partway out          Extremities:  contractures noted       PROCEDURE:  Current perlaes removed with some exertion.  Ballon had folded up with was the issue    Under sterile conditions a new 16 Fr perales was placed with miguelito 50 cc of pink, cloudy urine obtained

## 2020-11-11 NOTE — ED PROVIDER NOTES
[facility]  Emergency Department Treatment Report        10:51 PM Jimmy Huffman is a 70 y.o. male with a history of retention neurogenic bladder chronic Webb catheter in place who presents to ED because at the nursing home they could not get the Webb to come out. Patient gets a monthly catheter change and apparently they tried to the home to take it out today and it would not come out. Patient has not had any urine draining from his Webb catheter at this time. Patient does not complain of any pain no fever has been reported. Patient is awake alert and interactive appropriately. No other complaints, associated symptoms or modifying factors at this time. PCP: Musa Zhu MD      The history is provided by the patient. No  was used. Urinary Catheter Problem   This is a chronic problem. The problem occurs constantly. The problem has not changed since onset. Pertinent negatives include no chest pain, no abdominal pain, no headaches and no shortness of breath. Nothing aggravates the symptoms. Nothing relieves the symptoms. He has tried nothing for the symptoms.         Past Medical History:   Diagnosis Date    Abnormal computed tomography of bladder     Anemia     Arthritis     Basal ganglia disease     Benign prostatic hyperplasia with lower urinary tract symptoms     Bladder calculus     Bladder cancer ([address] 3/13/14    Bladder mass     Bladder tumor     Brain aneurysm     CVA (cerebral vascular accident) ([address]    hemiplegia    Dysphagia     Erythematous bladder mucosa     Webb catheter in place     Gross hematuria     Hemiplegia ([address]     HTN (hypertension)     Hx MRSA infection 09/2017    urine    Neurogenic bladder     Psychiatric disorder     Retention, urine     Stroke ([address]     Urethral stricture        Past Surgical History:   Procedure Laterality Date    COLONOSCOPY N/A 1/15/2018    COLONOSCOPY w/ biopsies w/ polypectomy w/ ink injection performed by Judith Guerrero MD at [address] COLONOSCOPY N/A 4/29/2019    COLONOSCOPY performed by Brii Lindquist MD at [facility]    HX GI      PEG TUBE--REMOVED    HX HEENT      H/O [facility]    HX UROLOGICAL      super pubic tube    HX UROLOGICAL  3/13/14    TURBT, Dr. Areli Elmore, [place]    HX UROLOGICAL  10/8/15    Cysto, Dr. Kaylen Martin, [facility]    LAP,SURG,COLECTOMY, PARTIAL, W/ANAST N/A 03/15/2018    Dr. Wilian Nobles         Family History:   Problem Relation Age of Onset    Hypertension Mother     Hypertension Father        Social History     Socioeconomic History    Marital status:      Spouse name: Not on file    Number of children: 2    Years of education: 15    Highest education level: Not on file   Occupational History    Occupation: other     Employer: RETIRED   Social Needs    Financial resource strain: Not on file    Food insecurity     Worry: Not on file     Inability: Not on file   Autob[employer]ler needs     Medical: Not on file     Non-medical: Not on file   Tobacco Use    Smoking status: Former Smoker     Last attempt to quit: 1/29/2010     Years since quitting: 10.7    Smokeless tobacco: Never Used   Substance and Sexual Activity    Alcohol use: No    Drug use: No    Sexual activity: Not Currently     Partners: Female   Lifestyle    Physical activity     Days per week: Not on file     Minutes per session: Not on file    Stress: Not on file   Relationships    Social connections     Talks on phone: Not on file     Gets together: Not on file     Attends Moravian service: Not on file     Active member of club or organization: Not on file     Attends meetings of clubs or organizations: Not on file     Relationship status: Not on file    Intimate partner violence     Fear of current or ex partner: Not on file     Emotionally abused: Not on file     Physically abused: Not on file     Forced sexual activity: Not on file   Other Topics Concern   [address] Service No  Blood Transfusions No    Caffeine Concern No    Occupational Exposure No    Hobby Hazards No    Sleep Concern No    Stress Concern No    Weight Concern No    Special Diet No    Back Care No    Exercise No    Bike Helmet No    Seat Belt Yes    Self-Exams No   Social History Narrative    Not on file         ALLERGIES: Penicillins    Review of Systems   Constitutional: Negative for fever. Respiratory: Negative for shortness of breath. Cardiovascular: Negative for chest pain. Gastrointestinal: Negative for abdominal pain. Genitourinary: Negative for scrotal swelling and testicular pain. Musculoskeletal: Negative for back pain. Skin: Negative for color change. Neurological: Negative for headaches. All other systems reviewed and are negative. There were no vitals filed for this visit. Physical Exam  Vitals signs and nursing note reviewed. Constitutional:       General: He is not in acute distress. Appearance: He is well-developed. He is not ill-appearing, toxic-appearing or diaphoretic. HENT:      Head: Normocephalic and atraumatic. Mouth/Throat:      Mouth: Mucous membranes are moist.   Eyes:      Conjunctiva/sclera: Conjunctivae normal.      Pupils: Pupils are equal, round, and reactive to light. Neck:      Musculoskeletal: Normal range of motion and neck supple. Cardiovascular:      Rate and Rhythm: Normal rate and regular rhythm. Pulmonary:      Effort: Pulmonary effort is normal. No respiratory distress. Breath sounds: Normal breath sounds. No stridor. Abdominal:      General: Bowel sounds are normal. There is no distension. Palpations: Abdomen is soft. Tenderness: There is no abdominal tenderness. Musculoskeletal: Normal range of motion. General: No swelling. Comments: Is nonambulatory but does move extremities voluntarily   Skin:     General: Skin is warm and dry.       Capillary Refill: Capillary refill takes less than 2 seconds. Neurological:      Mental Status: He is alert and oriented to person, place, and time. Deep Tendon Reflexes: Reflexes are normal and symmetric. Psychiatric:         Behavior: Behavior normal.         Thought Content: Thought content normal.         Judgment: Judgment normal.          MDM  Number of Diagnoses or Management Options  Anuria:   Webb catheter problem, initial encounter [facility]):   Diagnosis management comments: I plan a bladder scan and evaluation of his urinary status. When looking back at patient's chart he has seen the urologist for this similar problem    Discussed with Dr. Hannah Hawley with urology patient will get labs and CT prior to disposition and evaluation of decreased urine output. Amount and/or Complexity of Data Reviewed  Clinical lab tests: ordered  Tests in the radiology section of CPT®: ordered  Review and summarize past medical records: yes  Independent visualization of images, tracings, or specimens: yes    Risk of Complications, Morbidity, and/or Mortality  Presenting problems: moderate  Diagnostic procedures: moderate  Management options: moderate    Patient Progress  Patient progress: stable         Procedures            Vitals:  No data found. Progress notes, Consult notes or additional Procedure notes:     [facility] and she suggested evaluation of kidney function and a CT. Disposition:    Diagnosis:   1. Webb catheter problem, initial encounter ([facility]    2. Anuria        Disposition: TBD      Follow-up Information    None          Patient's Medications   Start Taking    No medications on file   Continue Taking    ASCORBIC ACID, VITAMIN C, (VITAMIN C) 500 MG TABLET    Take  by mouth. ATORVASTATIN (LIPITOR) 10 MG TABLET    Take 10 mg by mouth daily. CALCIUM CITRATE-VITAMIN D3 (CITRACAL+D) 315-200 MG-UNIT TAB    Take 1 Tab by mouth daily (with breakfast).     CALCIUM-VITAMIN D (OYSTER SHELL CALCIUM-VIT D3) 250-125 MG-UNIT TABLET    Take 1 Tab by mouth daily. CHOLECALCIFEROL, VITAMIN D3, (VITAMIN D3) 1,000 UNIT CAP    Take  by mouth. DICLOFENAC (VOLTAREN) 1 % GEL    Apply  to affected area four (4) times daily. DIPHENHYDRAMINE HCL (BENADRYL ALLERGY PO)    Take 25 mg by mouth. DOCUSATE SODIUM (COLACE) 100 MG CAPSULE    Take 100 mg by mouth two (2) times a day. EPINEPHRINE HCL, PF, (ADRENALIN) 1 MG/ML (1 ML) INJECTION    once. FERROUS SULFATE (IRON, FERROUS SULFATE,) 325 MG (65 MG IRON) TABLET    Take 1 Tab by mouth Daily (before breakfast). FINASTERIDE (PROSCAR) 5 MG TABLET    Take 1 Tab by mouth daily. FOOD SUPPLEMT, LACTOSE-REDUCED (ENSURE ACTIVE HIGH PROTEIN) LIQD    Take 237 mL by mouth four (4) times daily. LACTULOSE (CHRONULAC) 10 GRAM/15 ML SOLUTION    Take  by mouth three (3) times daily. LISINOPRIL (PRINIVIL, ZESTRIL) 5 MG TABLET    Take 1 Tab by mouth daily. OXYBUTYNIN CHLORIDE XL (DITROPAN XL) 10 MG CR TABLET    Take 1 Tab by mouth two (2) times a day. OXYCODONE IR (ROXICODONE) 10 MG TAB IMMEDIATE RELEASE TABLET    Take 1 Tab by mouth every six (6) hours as needed. Max Daily Amount: 40 mg. POLYETHYLENE GLYCOL (MIRALAX) 17 GRAM PACKET    Take 17 g by mouth daily. PROMETHAZINE (PHENERGAN) 25 MG TABLET    Take 25 mg by mouth every six (6) hours as needed for Nausea. VITAMIN C-VITAMIN E (CRANBERRY CONCENTRATE) CAP    Take  by mouth. These Medications have changed    No medications on file   Stop Taking    No medications on file          2:02 AM : Pt care transferred to Dr. Kelly Mckeon  ,ED provider. History of patient complaint(s), available diagnostic reports and current treatment plan has been discussed thoroughly. Bedside rounding on patient occured : no .   Intended disposition of patient : TBD  Pending diagnostics reports and/or labs (please list): Pending labs and CT for disposition      Kevin Parada ENP-C,FNP-MOLLY      Dragon voice recognition was used to generate this report, which may have resulted in some phonetic based errors in grammar and contents.  Even though attempts were made to correct all the mistakes, some may have been missed, and remained in the body of the document

## 2020-11-11 NOTE — ED NOTES
Attempted to remove perales catheter to place 3 way perales for bladder irrigation this RN was able to withdraw  4 ml cloudy fluid from port. Perales unable to be removed or advanced. Pt urinated around angella GUILLEN at bedside to evaluate.  MD will consult Urology

## 2020-11-11 NOTE — DISCHARGE INSTRUCTIONS
Patient Education        Pneumonia: Care Instructions  Your Care Instructions     Pneumonia is an infection of the lungs. Most cases are caused by infections from bacteria or viruses. Pneumonia may be mild or very severe. If it is caused by bacteria, you will be treated with antibiotics. It may take a few weeks to a few months to recover fully from pneumonia, depending on how sick you were and whether your overall health is good. Follow-up care is a key part of your treatment and safety. Be sure to make and go to all appointments, and call your doctor if you are having problems. It's also a good idea to know your test results and keep a list of the medicines you take. How can you care for yourself at home? · Take your antibiotics exactly as directed. Do not stop taking the medicine just because you are feeling better. You need to take the full course of antibiotics. · Take your medicines exactly as prescribed. Call your doctor if you think you are having a problem with your medicine. · Get plenty of rest and sleep. You may feel weak and tired for a while, but your energy level will improve with time. · To prevent dehydration, drink plenty of fluids, enough so that your urine is light yellow or clear like water. Choose water and other caffeine-free clear liquids until you feel better. If you have kidney, heart, or liver disease and have to limit fluids, talk with your doctor before you increase the amount of fluids you drink. · Take care of your cough so you can rest. A cough that brings up mucus from your lungs is common with pneumonia. It is one way your body gets rid of the infection. But if coughing keeps you from resting or causes severe fatigue and chest-wall pain, talk to your doctor. He or she may suggest that you take a medicine to reduce the cough. · Use a vaporizer or humidifier to add moisture to your bedroom. Follow the directions for cleaning the machine.   · Do not smoke or allow others to smoke around you. Smoke will make your cough last longer. If you need help quitting, talk to your doctor about stop-smoking programs and medicines. These can increase your chances of quitting for good. · Take an over-the-counter pain medicine, such as acetaminophen (Tylenol), ibuprofen (Advil, Motrin), or naproxen (Aleve). Read and follow all instructions on the label. · Do not take two or more pain medicines at the same time unless the doctor told you to. Many pain medicines have acetaminophen, which is Tylenol. Too much acetaminophen (Tylenol) can be harmful. · If you were given a spirometer to measure how well your lungs are working, use it as instructed. This can help your doctor tell how your recovery is going. · To prevent pneumonia in the future, talk to your doctor about getting a flu vaccine (once a year) and a pneumococcal vaccine (one time only for most people). When should you call for help? Call 911 anytime you think you may need emergency care. For example, call if:    · You have severe trouble breathing. Call your doctor now or seek immediate medical care if:    · You cough up dark brown or bloody mucus (sputum).     · You have new or worse trouble breathing.     · You are dizzy or lightheaded, or you feel like you may faint. Watch closely for changes in your health, and be sure to contact your doctor if:    · You have a new or higher fever.     · You are coughing more deeply or more often.     · You are not getting better after 2 days (48 hours).     · You do not get better as expected. Where can you learn more? Go to http://www.[employer].com/  Enter D336 in the search box to learn more about \"Pneumonia: Care Instructions. \"  Current as of: February 24, 2020               Content Version: 12.6  © 5000-8820 [employer]. Care instructions adapted under license by [employer] (which disclaims liability or warranty for this information).  If you have questions about a medical condition or this instruction, always ask your healthcare professional. [address] any warranty or liability for your use of this information. Patient Education        Learning About How to Care for a Person's Indwelling Urinary Catheter  Introduction     A urinary catheter is a flexible plastic tube used to drain urine from the bladder when a person cannot urinate. A doctor will place the catheter into the bladder by inserting it through the urethra. The urethra is the opening that carries urine from the bladder to the outside of the body. When the catheter is in the bladder, a small balloon is used to keep the catheter in place. The catheter lets urine drain from the bladder into a collection bag. Urinary catheters can be used in both men and women. A catheter that stays in place for a longer period of time is called an indwelling catheter. A catheter may be needed because of certain medical conditions. These include an enlarged prostate or problems controlling urine. It may be used after surgery on the pelvis or urinary tract. Urinary catheters are also used when the lower part of the body is paralyzed. When helping a loved one with a catheter, try to be relaxed. Caring for a catheter can be embarrassing for both of you. If you are calm and don't seem embarrassed, the person may feel more comfortable. How do you take care of the catheter? Wear disposable gloves when handling someone's catheter. Make sure to follow all of the instructions the doctor has given. And always wash your hands before and after you're done. Here are some other things to remember when caring for someone's catheter:  · Make sure that urine is running out of the catheter into the urine collection bag. And make sure that the catheter tubing does not get twisted or bent. · Keep the urine collection bag below the level of the bladder.  At night it may be helpful to hang the bag on the side of the bed. · Make sure that the urine collection bag does not drag and pull on the catheter. · It is okay to shower with a catheter and urine collection bag in place, unless the doctor says not to. · Check for swelling or signs of infection in the area around the catheter. Signs of infection include pus or irritated, swollen, red, or tender skin. · Clean the area around the catheter twice a day with soap and water. Dry with a clean towel afterward. · Do not apply powder or lotion to the skin around the catheter. · Do not tug or pull on the catheter. · Sexual intercourse may still be possible for individuals who wear a catheter. It is best to talk with a doctor about options. How do you empty the bag? The urine collection bag needs to be emptied regularly. It is best to empty the bag when it's about half full or at bedtime. If the doctor has asked you to measure the amount of urine, do that before you empty the urine into the toilet. When you are ready to empty the bag, follow these steps:  1. Put on disposable gloves. 2. Remove the drain spout from its sleeve at the bottom of the collection bag. Open the valve on the spout. 3. Let the urine flow out of the bag and into the toilet or a container. Do not let the tubing or drain spout touch anything. 4. After you empty the bag, close the valve and put the drain spout back into its sleeve. 5. Remove your gloves and throw them away. 6. Wash your hands with soap and water. How do you care for someone after the catheter is removed? After the catheter is taken out, the person may have trouble urinating. If this happens, try helping them sit in a few inches of warm water (sitz bath). If the urge to urinate comes during the sitz bath, it may be easier for them to urinate while still in the bath. Some burning may happen the first few times the person urinates. If the burning lasts longer, it may be a sign of an infection.   If the catheter causes irritation or a rash, wearing loose, cotton underwear may help. Watch closely for changes in the person's health, and be sure to contact their doctor if you notice any problems. Where can you learn more? Go to http://www.gray.com/  Enter X535 in the search box to learn more about \"Learning About How to Care for a Person's Indwelling Urinary Catheter. \"  Current as of: December 9, 2019               Content Version: 12.6  © 3547-0713 [employer]. Care instructions adapted under license by [employer] (which disclaims liability or warranty for this information). If you have questions about a medical condition or this instruction, always ask your healthcare professional. [address] any warranty or liability for your use of this information.

## 2020-11-14 LAB
BACTERIA SPEC CULT: ABNORMAL
BACTERIA SPEC CULT: ABNORMAL
CC UR VC: ABNORMAL
SERVICE CMNT-IMP: ABNORMAL

## 2020-12-24 ENCOUNTER — APPOINTMENT (OUTPATIENT)
Dept: CT IMAGING | Age: 75
End: 2020-12-24
Attending: PHYSICIAN ASSISTANT
Payer: MEDICARE

## 2020-12-24 ENCOUNTER — HOSPITAL ENCOUNTER (EMERGENCY)
Age: 75
Discharge: NURSING, REHAB OR LONG-TERM CARE | End: 2020-12-24
Attending: EMERGENCY MEDICINE
Payer: MEDICARE

## 2020-12-24 VITALS
RESPIRATION RATE: 16 BRPM | HEART RATE: 73 BPM | HEIGHT: 68 IN | TEMPERATURE: 98 F | WEIGHT: 140 LBS | OXYGEN SATURATION: 95 % | DIASTOLIC BLOOD PRESSURE: 73 MMHG | BODY MASS INDEX: 21.22 KG/M2 | SYSTOLIC BLOOD PRESSURE: 112 MMHG

## 2020-12-24 DIAGNOSIS — T83.9XXA COMPLICATION OF FOLEY CATHETER, INITIAL ENCOUNTER (HCC): Primary | ICD-10-CM

## 2020-12-24 LAB
ALBUMIN SERPL-MCNC: 3.3 G/DL (ref 3.4–5)
ALBUMIN/GLOB SERPL: 0.8 {RATIO} (ref 0.8–1.7)
ALP SERPL-CCNC: 93 U/L (ref 45–117)
ALT SERPL-CCNC: 49 U/L (ref 16–61)
ANION GAP SERPL CALC-SCNC: 4 MMOL/L (ref 3–18)
APPEARANCE UR: ABNORMAL
AST SERPL-CCNC: 20 U/L (ref 10–38)
BACTERIA URNS QL MICRO: ABNORMAL /HPF
BASOPHILS # BLD: 0 K/UL (ref 0–0.1)
BASOPHILS NFR BLD: 0 % (ref 0–2)
BILIRUB SERPL-MCNC: 0.4 MG/DL (ref 0.2–1)
BILIRUB UR QL: NEGATIVE
BUN SERPL-MCNC: 21 MG/DL (ref 7–18)
BUN/CREAT SERPL: 27 (ref 12–20)
CALCIUM SERPL-MCNC: 9.4 MG/DL (ref 8.5–10.1)
CHLORIDE SERPL-SCNC: 111 MMOL/L (ref 100–111)
CO2 SERPL-SCNC: 28 MMOL/L (ref 21–32)
COLOR UR: ABNORMAL
CREAT SERPL-MCNC: 0.79 MG/DL (ref 0.6–1.3)
DIFFERENTIAL METHOD BLD: ABNORMAL
EOSINOPHIL # BLD: 0.3 K/UL (ref 0–0.4)
EOSINOPHIL NFR BLD: 2 % (ref 0–5)
EPITH CASTS URNS QL MICRO: ABNORMAL /LPF (ref 0–5)
ERYTHROCYTE [DISTWIDTH] IN BLOOD BY AUTOMATED COUNT: 15.7 % (ref 11.6–14.5)
GLOBULIN SER CALC-MCNC: 4.2 G/DL (ref 2–4)
GLUCOSE SERPL-MCNC: 100 MG/DL (ref 74–99)
GLUCOSE UR STRIP.AUTO-MCNC: NEGATIVE MG/DL
HCT VFR BLD AUTO: 41.7 % (ref 36–48)
HGB BLD-MCNC: 13.6 G/DL (ref 13–16)
HGB UR QL STRIP: ABNORMAL
KETONES UR QL STRIP.AUTO: ABNORMAL MG/DL
LEUKOCYTE ESTERASE UR QL STRIP.AUTO: ABNORMAL
LYMPHOCYTES # BLD: 1.3 K/UL (ref 0.9–3.6)
LYMPHOCYTES NFR BLD: 12 % (ref 21–52)
MCH RBC QN AUTO: 29.5 PG (ref 24–34)
MCHC RBC AUTO-ENTMCNC: 32.6 G/DL (ref 31–37)
MCV RBC AUTO: 90.5 FL (ref 74–97)
MONOCYTES # BLD: 0.6 K/UL (ref 0.05–1.2)
MONOCYTES NFR BLD: 6 % (ref 3–10)
NEUTS SEG # BLD: 8.3 K/UL (ref 1.8–8)
NEUTS SEG NFR BLD: 80 % (ref 40–73)
NITRITE UR QL STRIP.AUTO: POSITIVE
PH UR STRIP: 6 [PH] (ref 5–8)
PLATELET # BLD AUTO: 272 K/UL (ref 135–420)
PMV BLD AUTO: 10.6 FL (ref 9.2–11.8)
POTASSIUM SERPL-SCNC: 3.8 MMOL/L (ref 3.5–5.5)
PROT SERPL-MCNC: 7.5 G/DL (ref 6.4–8.2)
PROT UR STRIP-MCNC: >300 MG/DL
RBC # BLD AUTO: 4.61 M/UL (ref 4.7–5.5)
RBC #/AREA URNS HPF: ABNORMAL /HPF (ref 0–5)
SODIUM SERPL-SCNC: 143 MMOL/L (ref 136–145)
SP GR UR REFRACTOMETRY: 1.02 (ref 1–1.03)
UROBILINOGEN UR QL STRIP.AUTO: 1 EU/DL (ref 0.2–1)
WBC # BLD AUTO: 10.5 K/UL (ref 4.6–13.2)
WBC URNS QL MICRO: ABNORMAL /HPF (ref 0–4)

## 2020-12-24 PROCEDURE — 99284 EMERGENCY DEPT VISIT MOD MDM: CPT

## 2020-12-24 PROCEDURE — 51798 US URINE CAPACITY MEASURE: CPT

## 2020-12-24 PROCEDURE — 74176 CT ABD & PELVIS W/O CONTRAST: CPT

## 2020-12-24 PROCEDURE — 74011000250 HC RX REV CODE- 250: Performed by: PHYSICIAN ASSISTANT

## 2020-12-24 PROCEDURE — 51702 INSERT TEMP BLADDER CATH: CPT

## 2020-12-24 PROCEDURE — 85025 COMPLETE CBC W/AUTO DIFF WBC: CPT

## 2020-12-24 PROCEDURE — 87077 CULTURE AEROBIC IDENTIFY: CPT

## 2020-12-24 PROCEDURE — 87186 SC STD MICRODIL/AGAR DIL: CPT

## 2020-12-24 PROCEDURE — 96374 THER/PROPH/DIAG INJ IV PUSH: CPT

## 2020-12-24 PROCEDURE — 80053 COMPREHEN METABOLIC PANEL: CPT

## 2020-12-24 PROCEDURE — 81001 URINALYSIS AUTO W/SCOPE: CPT

## 2020-12-24 PROCEDURE — 87086 URINE CULTURE/COLONY COUNT: CPT

## 2020-12-24 PROCEDURE — 74011250636 HC RX REV CODE- 250/636: Performed by: PHYSICIAN ASSISTANT

## 2020-12-24 RX ORDER — CEPHALEXIN 500 MG/1
1000 CAPSULE ORAL 2 TIMES DAILY
Qty: 12 CAP | Refills: 0 | Status: SHIPPED | OUTPATIENT
Start: 2020-12-24 | End: 2020-12-27

## 2020-12-24 RX ADMIN — CEFTRIAXONE SODIUM 1 G: 1 INJECTION, POWDER, FOR SOLUTION INTRAMUSCULAR; INTRAVENOUS at 14:52

## 2020-12-24 NOTE — ED NOTES
Departed from the ED to CT department via stretcher, accompanied by hospital transport, in stable condition.

## 2020-12-24 NOTE — ED TRIAGE NOTES
Pt arrived at ED by EMS from [facility]. Pt has bright red bleeding at urinary meatus (around perales cath) and states it is on ongoing issue. Pt states staff at the [facility] inserted current perales cath a few days ago. MARIS Burns asked for bladder scan and scan is being completed now.

## 2020-12-24 NOTE — ED PROVIDER NOTES
EMERGENCY DEPARTMENT HISTORY AND PHYSICAL EXAM      Date: 12/24/2020  Patient Name: Jacalyn Severance    History of Presenting Illness     Chief Complaint   Patient presents with    Urinary Catheter Problem       History Provided By: Patient    HPI: Jacalyn Severance, 70 y.o. male PMHx significant for htn, brain aneurysm, neurogenic bladder, bladder tumor, bladder cancer, [facility], CVA, hemiplegia presents via ambulance to the ED. Pt lives at [facility] assisted living [facility]. Facility, Webb catheter was replaced \"a few days ago\". They noticed bleeding from urethral meatus with no gross blood in urine. Patient poor historian. Patient reports pain to area only with disruption of area. Patient has not taken anything for symptoms. There are no other complaints, changes, or physical findings at this time. PCP: Stevan Darnell MD    No current facility-administered medications on file prior to encounter. Current Outpatient Medications on File Prior to Encounter   Medication Sig Dispense Refill    lactulose (CHRONULAC) 10 gram/15 mL solution Take  by mouth three (3) times daily.  finasteride (PROSCAR) 5 mg tablet Take 1 Tab by mouth daily. 90 Tab 3    polyethylene glycol (MIRALAX) 17 gram packet Take 17 g by mouth daily.  oxyCODONE IR (ROXICODONE) 10 mg tab immediate release tablet Take 1 Tab by mouth every six (6) hours as needed. Max Daily Amount: 40 mg. 40 Tab 0    atorvastatin (LIPITOR) 10 mg tablet Take 10 mg by mouth daily.  calcium-vitamin D (OYSTER SHELL CALCIUM-VIT D3) 250-125 mg-unit tablet Take 1 Tab by mouth daily.  DIPHENHYDRAMINE HCL (BENADRYL ALLERGY PO) Take 25 mg by mouth.  EPINEPHrine HCl, PF, (ADRENALIN) 1 mg/mL (1 mL) injection once.  ascorbic acid, vitamin C, (VITAMIN C) 500 mg tablet Take  by mouth.  diclofenac (VOLTAREN) 1 % gel Apply  to affected area four (4) times daily.       docusate sodium (COLACE) 100 mg capsule Take 100 mg by mouth two (2) times a day.  food supplemt, lactose-reduced (ENSURE ACTIVE HIGH PROTEIN) liqd Take 237 mL by mouth four (4) times daily.  promethazine (PHENERGAN) 25 mg tablet Take 25 mg by mouth every six (6) hours as needed for Nausea.  lisinopril (PRINIVIL, ZESTRIL) 5 mg tablet Take 1 Tab by mouth daily. 90 Tab 3    ferrous sulfate (IRON, FERROUS SULFATE,) 325 mg (65 mg iron) tablet Take 1 Tab by mouth Daily (before breakfast). 90 Tab 3    oxybutynin chloride XL (DITROPAN XL) 10 mg CR tablet Take 1 Tab by mouth two (2) times a day. 60 Tab 5    calcium citrate-vitamin d3 (CITRACAL+D) 315-200 mg-unit Tab Take 1 Tab by mouth daily (with breakfast).  vitamin c-vitamin e (CRANBERRY CONCENTRATE) cap Take  by mouth.  Cholecalciferol, Vitamin D3, (VITAMIN D3) 1,000 unit cap Take  by mouth.          Past History     Past Medical History:  Past Medical History:   Diagnosis Date    Abnormal computed tomography of bladder     Anemia     Arthritis     Basal ganglia disease     Benign prostatic hyperplasia with lower urinary tract symptoms     Bladder calculus     Bladder cancer ([address] 3/13/14    Bladder mass     Bladder tumor     Brain aneurysm     CVA [facility]cerebral vascular accident) [address] [address]    hemiplegia    Dysphagia     Erythematous bladder mucosa     Webb catheter in place     Gross hematuria     Hemiplegia ([address]     HTN (hypertension)     Hx MRSA infection 09/2017    urine    Neurogenic bladder     Psychiatric disorder     Retention, urine     Stroke ([facility]     Urethral stricture        Past Surgical History:  Past Surgical History:   Procedure Laterality Date    COLONOSCOPY N/A 1/15/2018    COLONOSCOPY w/ biopsies w/ polypectomy w/ ink injection performed by Willie Colunga MD at [address] COLONOSCOPY N/A 4/29/2019    COLONOSCOPY performed by Mario Costa MD at [address] HX GI      PEG TUBE--REMOVED    HX HEENT      H/O TRACH    HX UROLOGICAL super pubic tube    HX UROLOGICAL  3/13/14    TURBT, Dr. Lara Torres, [address]    HX UROLOGICAL  10/8/15    Cysto, Dr. Yesenia Bernabe, [facility]    LAP,SURG,COLECTOMY, PARTIAL, W/ANAST N/A 03/15/2018    Dr. Jessica Pizano       Family History:  Family History   Problem Relation Age of Onset    Hypertension Mother     Hypertension Father        Social History:  Social History     Tobacco Use    Smoking status: Former Smoker     Quit date: 1/29/2010     Years since quitting: 10.9    Smokeless tobacco: Never Used   Substance Use Topics    Alcohol use: No    Drug use: No       Allergies: Allergies   Allergen Reactions    Penicillins Other (comments), Itching and Hives     intolerance         Review of Systems   Review of Systems   Constitutional: Negative for chills and fever. HENT: Negative for facial swelling. Eyes: Negative for photophobia and visual disturbance. Respiratory: Negative for shortness of breath. Cardiovascular: Negative for chest pain. Gastrointestinal: Negative for abdominal pain, nausea and vomiting. Genitourinary: Negative for flank pain. Skin: Negative for color change, pallor, rash and wound. Neurological: Negative for dizziness, weakness, light-headedness and headaches. All other systems reviewed and are negative. Physical Exam   Physical Exam  Vitals signs and nursing note reviewed. Constitutional:       General: He is not in acute distress. Appearance: He is well-developed. Comments: Pt in NAD   HENT:      Head: Normocephalic and atraumatic. Eyes:      Conjunctiva/sclera: Conjunctivae normal.   Cardiovascular:      Rate and Rhythm: Normal rate and regular rhythm. Heart sounds: Normal heart sounds. Pulmonary:      Effort: Pulmonary effort is normal. No respiratory distress. Breath sounds: Normal breath sounds. Abdominal:      General: Bowel sounds are normal.      Palpations: Abdomen is soft. Tenderness:  There is abdominal tenderness in the suprapubic area. Comments: Abdomen soft, nondistended  No guarding or rigidity   Genitourinary:     Comments: BRB visualized coming from urethral meatus. TTP. Small amount of nonbloody urine in perales catheter bag. Musculoskeletal: Normal range of motion. Comments: Arms and legs contracted - baseline for pt   Skin:     General: Skin is warm. Findings: No rash. Neurological:      Mental Status: He is alert and oriented to person, place, and time. Cranial Nerves: No cranial nerve deficit. Comments: Alert to person and place - baseline for pt   Psychiatric:         Behavior: Behavior normal.         Diagnostic Study Results     Labs -   No results found for this or any previous visit (from the past 12 hour(s)). Radiologic Studies -   CT ABD PELV WO CONT   Final Result   IMPRESSION:       There is no hydronephrosis. The bladder is not dilated. The Perales catheter is within the urinary bladder. There is increased density posteriorly within the bladder-wall thickening versus   debris. Calcified encrustation is evident on the surface of the Perales indicating   chronicity of placement. .      All CT scans at this facility are performed using dose optimization technique as   appropriate to the performed exam, to include automated exposure control,   adjustment of the mA and/or kV according to patient's size (Including   appropriate matching for site-specific examinations), or use of iterative   reconstruction technique. CT Results  (Last 48 hours)               12/24/20 1526  CT ABD PELV WO CONT Final result    Impression:  IMPRESSION:        There is no hydronephrosis. The bladder is not dilated. The Perales catheter is within the urinary bladder. There is increased density posteriorly within the bladder-wall thickening versus   debris. Calcified encrustation is evident on the surface of the Perales indicating   chronicity of placement.            .       All CT scans at this facility are performed using dose optimization technique as   appropriate to the performed exam, to include automated exposure control,   adjustment of the mA and/or kV according to patient's size (Including   appropriate matching for site-specific examinations), or use of iterative   reconstruction technique. Narrative:  CT ABDOMEN AND PELVIS WITHOUT CONTRAST       COMPARISON: 11 November, 2020. INDICATIONS: Decreased urinary output; confirm location of catheter. TECHNIQUE: Volumetric data acquisition was performed of the abdomen and pelvis   on a multislice scanner and reconstructed in axial coronal and sagittal planes       CT ABDOMEN FINDINGS:        Lung Bases: Hypoventilated with reticular and groundglass densities similar to   previous. Liver/Gallbladder/Biliary: Normal.       Spleen: Normal.       Adrenal Glands: Normal.       Kidneys: Small lower pole right renal cyst unchanged. It is otherwise   unremarkable. Pancreas: Normal.       Stomach, Small Bowel, and Colon: Normal.       Lymph Nodes: Normal in size and number. The abdominal aorta is unremarkable. The IVC is unremarkable. Peritoneal Spaces: No free fluid or free air is present. Abdominal wall: No hernia or mass is evident       Bladder: The bladder is of normal size. There may be some wall thickening or   layering debris. Calcific encrustation is again noted on the Webb catheter. .       Osseous Structures Of Abdomen And Pelvis: Demineralized, scoliotic, and   apparently contracted. CXR Results  (Last 48 hours)    None          Medical Decision Making   I am the first provider for this patient. I reviewed the vital signs, available nursing notes, past medical history, past surgical history, family history and social history. Vital Signs-Reviewed the patient's vital signs. No data found.       Records Reviewed: Nursing Notes and Old Medical Records    Provider Notes (Medical Decision Making):   DDx: Perales catheter, UTI, Bladder Cancer, Urinary retention    71 yo M who presents with blood from urethral meatus. Chronic indwelling perales catheter due to neurogenic bladder. On exam bright red blood coming from urethral meatus. Dr. Ron Brownlee evaluated patient at bedside and determined that the Perales catheter was correctly placed. CT scan showed no acute changes. Urology recommended increased fluid intake and short duration of abx. Urine culture sent. Discussed with pt and assisted living home importance of fluid intake and prompt urology follow-up. Patient nontoxic-appearing in NAD. Patient stable for prompt outpatient follow-up with PCP in 1 to 2 days. Patient given strict instructions to return if symptoms worsen. ED Course:   Initial assessment performed. The patients presenting problems have been discussed, and they are in agreement with the care plan formulated and outlined with them. I have encouraged them to ask questions as they arise throughout their visit. Chart review:   11/10/2020  CT abd pelv w/o contrast  IMPRESSION:  Perales catheter balloon at the lowermost bladder, possibly extending into the  vesicourethral junction. Consider adjustment/advancement if warranted.  -Sizable calculi/concretions in the bladder.  -Additional more ill-defined rounded hyperdensity within the bladder lumen,  possibly blood products. Consider with urinalysis and/or ultrasound to further  characterize if warranted. -Perhaps mild bladder wall thickening likely exaggerated by nondistention. Correlate clinically for cystitis. Urology  Neurogenic bladder  BPH with LUTS 2019  Hx bladder cancer with TURBT  Most recent cystoscopy 2019 showed encrustation    56  Spoke with RN from [address] [place] [address] where pt resides. Perales catheter was replaced 4 days ago.  Dr. Shawn Olson, PCP    pink tinged, peeing around perales this am, discomfort with flush, no output, perales replaced again this am, blood at the head of the penis, pain in pubic area, 175 cc this am 11 am, no urine with replacement of perales    1351  Spoke with Dr Fred Henderson, consult urology. Discussed pt's presentation, exam past medical history. He states he suspects that the Perales catheter is not correctly placed. Requesting basic labs, UA and states he will come replace Perales catheter. Denied need for CT scan. 1  Dr Fred Henderson at bedside evaluating pt.     1443  Dr. Fred Henderson irrigated perales catheter with no gross hematuria. He visualized perales catheter in bladder. He recommends CT scan for further evaluation. 1633  Update Dr Fred Henderson regarding CT scan. He state no new findings. He recommends prompt f/u with urology. He also recommends increased fluid intake to 48 oz a day. He recommends sending urine culture and treat due to difficulty with perales catheter insertion. Discussed with pt need for increased fluid intake. Pt states he \"does not like water\", and typically does not have sufficient fluid intake. Updated assisted living facility of abx prescribed and need for increased fluid intake. Also discussed need for prompt urology follow-up. All questions answered. Nurse states she understands and agrees. Disposition:  Discussed lab and imaging results with pt along with dx and treatment plan. Discussed importance of PCP and urology follow up. All questions answered. Pt voiced they understood. Return if sx worsen. PLAN:  1. Discharge Medication List as of 12/24/2020  5:12 PM        2.    Follow-up Information     Follow up With Specialties Details Why Contact Info    Emmett Davila MD Internal Medicine Schedule an appointment as soon as possible for a visit today  [address] [facility] [address] Josephine Sams MD Urology Schedule an appointment as soon as possible for a visit today  [facility] [address] [facility] EMERGENCY DEPT Emergency Medicine  If symptoms worsen [address] [place] [id number]  [phone]        Return to ED if worse     Diagnosis     Clinical Impression:   1. Complication of Webb catheter, initial encounter [facility])        Attestations:    MARIS Salazar    Please note that this dictation was completed with Dialogic, the computer voice recognition software. Quite often unanticipated grammatical, syntax, homophones, and other interpretive errors are inadvertently transcribed by the computer software. Please disregard these errors. Please excuse any errors that have escaped final proofreading. Thank you.

## 2020-12-25 NOTE — ED NOTES
Pt currently on stretcher in ED waiting for transport to arrive to take him back to [facility].   [address]

## 2021-01-15 ENCOUNTER — HOSPITAL ENCOUNTER (OUTPATIENT)
Dept: LAB | Age: 76
Discharge: HOME | End: 2021-01-15

## 2021-01-15 LAB
BASOPHILS # BLD: 0 K/UL (ref 0–0.06)
BASOPHILS NFR BLD: 0 % (ref 0–3)
DIFFERENTIAL METHOD BLD: ABNORMAL
EOSINOPHIL # BLD: 0.3 K/UL (ref 0–0.4)
EOSINOPHIL NFR BLD: 3 % (ref 0–5)
ERYTHROCYTE [DISTWIDTH] IN BLOOD BY AUTOMATED COUNT: 15.8 % (ref 11.6–14.5)
HCT VFR BLD AUTO: 42.6 % (ref 36–48)
HGB BLD-MCNC: 13.7 G/DL (ref 13–16)
LYMPHOCYTES # BLD: 1.1 K/UL (ref 0.8–3.5)
LYMPHOCYTES NFR BLD: 12 % (ref 20–51)
MCH RBC QN AUTO: 29.8 PG (ref 24–34)
MCHC RBC AUTO-ENTMCNC: 32.2 G/DL (ref 31–37)
MCV RBC AUTO: 92.6 FL (ref 74–97)
MONOCYTES # BLD: 0.7 K/UL (ref 0–1)
MONOCYTES NFR BLD: 8 % (ref 2–9)
NEUTS SEG # BLD: 7 K/UL (ref 1.8–8)
NEUTS SEG NFR BLD: 77 % (ref 42–75)
PLATELET # BLD AUTO: 243 K/UL (ref 135–420)
PMV BLD AUTO: 11.7 FL (ref 9.2–11.8)
RBC # BLD AUTO: 4.6 M/UL (ref 4.7–5.5)
WBC # BLD AUTO: 9.1 K/UL (ref 4.6–13.2)

## 2021-01-15 PROCEDURE — 85025 COMPLETE CBC W/AUTO DIFF WBC: CPT

## 2021-08-03 PROBLEM — I63.9 CVA (CEREBRAL VASCULAR ACCIDENT) (HCC): Status: RESOLVED | Noted: 2021-08-03 | Resolved: 2021-08-03

## 2022-02-24 ENCOUNTER — HOSPITAL ENCOUNTER (EMERGENCY)
Age: 77
Discharge: NURSING, REHAB OR LONG-TERM CARE | End: 2022-02-25
Attending: EMERGENCY MEDICINE
Payer: MEDICARE

## 2022-02-24 DIAGNOSIS — T83.9XXA COMPLICATION OF FOLEY CATHETER, INITIAL ENCOUNTER (HCC): Primary | ICD-10-CM

## 2022-02-24 LAB
ALBUMIN SERPL-MCNC: 3.1 G/DL (ref 3.4–5)
ALBUMIN/GLOB SERPL: 0.8 {RATIO} (ref 0.8–1.7)
ALP SERPL-CCNC: 137 U/L (ref 45–117)
ALT SERPL-CCNC: 33 U/L (ref 16–61)
AMORPH CRY URNS QL MICRO: ABNORMAL
ANION GAP SERPL CALC-SCNC: 4 MMOL/L (ref 3–18)
APPEARANCE UR: ABNORMAL
AST SERPL-CCNC: 20 U/L (ref 10–38)
BACTERIA URNS QL MICRO: ABNORMAL /HPF
BASOPHILS # BLD: 0 K/UL (ref 0–0.1)
BASOPHILS NFR BLD: 0 % (ref 0–2)
BILIRUB SERPL-MCNC: 0.2 MG/DL (ref 0.2–1)
BILIRUB UR QL: NEGATIVE
BUN SERPL-MCNC: 29 MG/DL (ref 7–18)
BUN/CREAT SERPL: 39 (ref 12–20)
CALCIUM SERPL-MCNC: 8.8 MG/DL (ref 8.5–10.1)
CHLORIDE SERPL-SCNC: 110 MMOL/L (ref 100–111)
CO2 SERPL-SCNC: 27 MMOL/L (ref 21–32)
COLOR UR: ABNORMAL
CREAT SERPL-MCNC: 0.75 MG/DL (ref 0.6–1.3)
DIFFERENTIAL METHOD BLD: ABNORMAL
EOSINOPHIL # BLD: 0.2 K/UL (ref 0–0.4)
EOSINOPHIL NFR BLD: 2 % (ref 0–5)
EPITH CASTS URNS QL MICRO: ABNORMAL /LPF (ref 0–5)
ERYTHROCYTE [DISTWIDTH] IN BLOOD BY AUTOMATED COUNT: 15.4 % (ref 11.6–14.5)
GLOBULIN SER CALC-MCNC: 4 G/DL (ref 2–4)
GLUCOSE SERPL-MCNC: 116 MG/DL (ref 74–99)
GLUCOSE UR STRIP.AUTO-MCNC: NEGATIVE MG/DL
HCT VFR BLD AUTO: 39.1 % (ref 36–48)
HGB BLD-MCNC: 12.6 G/DL (ref 13–16)
HGB UR QL STRIP: ABNORMAL
IMM GRANULOCYTES # BLD AUTO: 0 K/UL (ref 0–0.04)
IMM GRANULOCYTES NFR BLD AUTO: 0 % (ref 0–0.5)
KETONES UR QL STRIP.AUTO: ABNORMAL MG/DL
LEUKOCYTE ESTERASE UR QL STRIP.AUTO: ABNORMAL
LYMPHOCYTES # BLD: 1.2 K/UL (ref 0.9–3.6)
LYMPHOCYTES NFR BLD: 13 % (ref 21–52)
MCH RBC QN AUTO: 30 PG (ref 24–34)
MCHC RBC AUTO-ENTMCNC: 32.2 G/DL (ref 31–37)
MCV RBC AUTO: 93.1 FL (ref 78–100)
MONOCYTES # BLD: 0.6 K/UL (ref 0.05–1.2)
MONOCYTES NFR BLD: 6 % (ref 3–10)
NEUTS SEG # BLD: 7.4 K/UL (ref 1.8–8)
NEUTS SEG NFR BLD: 78 % (ref 40–73)
NITRITE UR QL STRIP.AUTO: POSITIVE
NRBC # BLD: 0 K/UL (ref 0–0.01)
NRBC BLD-RTO: 0 PER 100 WBC
PH UR STRIP: 6.5 [PH] (ref 5–8)
PLATELET # BLD AUTO: 292 K/UL (ref 135–420)
PMV BLD AUTO: 10.6 FL (ref 9.2–11.8)
POTASSIUM SERPL-SCNC: 3.9 MMOL/L (ref 3.5–5.5)
PROT SERPL-MCNC: 7.1 G/DL (ref 6.4–8.2)
PROT UR STRIP-MCNC: 300 MG/DL
RBC # BLD AUTO: 4.2 M/UL (ref 4.35–5.65)
RBC #/AREA URNS HPF: ABNORMAL /HPF (ref 0–5)
SODIUM SERPL-SCNC: 141 MMOL/L (ref 136–145)
SP GR UR REFRACTOMETRY: 1.03 (ref 1–1.03)
UROBILINOGEN UR QL STRIP.AUTO: 1 EU/DL (ref 0.2–1)
WBC # BLD AUTO: 9.5 K/UL (ref 4.6–13.2)
WBC URNS QL MICRO: ABNORMAL /HPF (ref 0–5)

## 2022-02-24 PROCEDURE — 85025 COMPLETE CBC W/AUTO DIFF WBC: CPT

## 2022-02-24 PROCEDURE — 51702 INSERT TEMP BLADDER CATH: CPT

## 2022-02-24 PROCEDURE — 80053 COMPREHEN METABOLIC PANEL: CPT

## 2022-02-24 PROCEDURE — 81001 URINALYSIS AUTO W/SCOPE: CPT

## 2022-02-24 PROCEDURE — 87086 URINE CULTURE/COLONY COUNT: CPT

## 2022-02-24 PROCEDURE — 99283 EMERGENCY DEPT VISIT LOW MDM: CPT

## 2022-02-24 NOTE — ED TRIAGE NOTES
From [facility] and rehab, reports having clogged perales cath and leakage from penis for unknown time per staff. No truama reported or noted. Concerned for urinary retention.

## 2022-02-25 VITALS
HEART RATE: 64 BPM | HEIGHT: 68 IN | RESPIRATION RATE: 14 BRPM | DIASTOLIC BLOOD PRESSURE: 69 MMHG | OXYGEN SATURATION: 95 % | SYSTOLIC BLOOD PRESSURE: 108 MMHG | BODY MASS INDEX: 22.28 KG/M2 | TEMPERATURE: 98.5 F | WEIGHT: 147 LBS

## 2022-02-25 NOTE — ED PROVIDER NOTES
EMERGENCY DEPARTMENT HISTORY AND PHYSICAL EXAM  This was created with voice recognition software and transcription errors may be present. 7:57 PM  Date: 2/24/2022  Patient Name: Enrique Garrett    History of Presenting Illness     Chief Complaint:    History Provided By:     HPI: Enrique Garrett is a 78 y.o. male past medical history of anemia arthritis bladder calculus bladder cancer bladder tumor brain aneurysm CVA with any plegia dysphagia Webb catheter gross hematuria hypertension who presents with some urinary pain. Patient reportedly does not have good urine output from his Webb no fevers or chills.     PCP: Hilary Spaulding MD      Past History     Past Medical History:  Past Medical History:   Diagnosis Date    Abnormal computed tomography of bladder     Anemia     Arthritis     Basal ganglia disease     Benign prostatic hyperplasia with lower urinary tract symptoms     Bladder calculus     Bladder cancer ([address] 3/13/14    Bladder mass     Bladder tumor     Brain aneurysm     CVA (cerebral vascular accident) ([address] DEC 2012    hemiplegia    Dysphagia     Erythematous bladder mucosa     Webb catheter in place     Gross hematuria     Hemiplegia ([address]     HTN (hypertension)     Hx MRSA infection 09/2017    urine    Neurogenic bladder     Psychiatric disorder     Retention, urine     Stroke ([address]     Urethral stricture        Past Surgical History:  Past Surgical History:   Procedure Laterality Date    COLONOSCOPY N/A 1/15/2018    COLONOSCOPY w/ biopsies w/ polypectomy w/ ink injection performed by Santos Dobson MD at [address] COLONOSCOPY N/A 4/29/2019    COLONOSCOPY performed by Lanre Craft MD at [address] HX GI      PEG TUBE--REMOVED    [facility]    HX UROLOGICAL      super pubic tube    HX UROLOGICAL  3/13/14    TURBT, Dr. Conrad Rodriguez, [place]    HX UROLOGICAL  10/8/15    Cysto, [facility]. Faviola Simon, [facility]    [facility] LAP,SURG,COLECTOMY, PARTIAL, [facility] N/A 03/15/2018    Dr. Froilan Navarro       Family History:  Family History   Problem Relation Age of Onset    Hypertension Mother     Hypertension Father        Social History:  Social History     Tobacco Use    Smoking status: Former Smoker     Quit date: 2010     Years since quittin.0    Smokeless tobacco: Never Used   Substance Use Topics    Alcohol use: No    Drug use: No       Allergies: Allergies   Allergen Reactions    Penicillins Other (comments), Itching and Hives     intolerance       Review of Systems     Review of Systems   All other systems reviewed and are negative. 10 point review of systems otherwise negative unless noted in HPI. Physical Exam       Physical Exam  Constitutional:       Appearance: He is well-developed. HENT:      Head: Normocephalic and atraumatic. Eyes:      Pupils: Pupils are equal, round, and reactive to light. Cardiovascular:      Rate and Rhythm: Normal rate and regular rhythm. Heart sounds: Normal heart sounds. No murmur heard. No friction rub. Pulmonary:      Effort: Pulmonary effort is normal. No respiratory distress. Breath sounds: Normal breath sounds. No wheezing. Abdominal:      General: There is no distension. Palpations: Abdomen is soft. Tenderness: There is no abdominal tenderness. There is no guarding or rebound. Musculoskeletal:         General: Normal range of motion. Cervical back: Normal range of motion and neck supple. Skin:     General: Skin is warm and dry. Neurological:      Mental Status: He is alert. Comments: Left hemiplegia   Psychiatric:         Behavior: Behavior normal.         Thought Content:  Thought content normal.         Diagnostic Study Results     Vital Signs   Visit Vitals  /65   Pulse 69   Temp 98.5 °F (36.9 °C)   Resp 17   Ht 5' 8\" (1.727 m)   Wt 66.7 kg (147 lb)   SpO2 97%   BMI 22.35 kg/m²      EKG:  Labs: CBC unremarkable UA noted patient has a permanent Webb history unremarkable imaging: No imaging    Medical Decision Making     ED Course: Progress Notes, Reevaluation, and Consults:    I will be the provider of record for this patient. Provider Notes (Medical Decision Making): Patient presents with poor urine output from Webb he is afebrile we will swap out catheter check labs and reassess    Patient UA noted. He has a permanent Webb and is afebrile his urine is draining and is clear after the Webb was replaced we will not place on antibiotics but discharge for outpatient follow-up       Diagnosis     Clinical Impression: No diagnosis found. Disposition:        Patient's Medications   Start Taking    No medications on file   Continue Taking    ASCORBIC ACID, VITAMIN C, (VITAMIN C) 500 MG TABLET    Take  by mouth. ATORVASTATIN (LIPITOR) 10 MG TABLET    Take 10 mg by mouth daily. CALCIUM CITRATE-VITAMIN D3 (CITRACAL+D) 315-200 MG-UNIT TAB    Take 1 Tab by mouth daily (with breakfast). CALCIUM-VITAMIN D (OYSTER SHELL CALCIUM-VIT D3) 250-125 MG-UNIT TABLET    Take 1 Tab by mouth daily. CHOLECALCIFEROL, VITAMIN D3, (VITAMIN D3) 1,000 UNIT CAP    Take  by mouth. CITALOPRAM (CELEXA) 10 MG TABLET        DICLOFENAC (VOLTAREN) 1 % GEL    Apply  to affected area four (4) times daily. DIPHENHYDRAMINE HCL (BENADRYL ALLERGY PO)    Take 25 mg by mouth. DOCUSATE SODIUM (COLACE) 100 MG CAPSULE    Take 100 mg by mouth two (2) times a day. EPINEPHRINE HCL, PF, (ADRENALIN) 1 MG/ML (1 ML) INJECTION    once. FERROUS SULFATE (IRON, FERROUS SULFATE,) 325 MG (65 MG IRON) TABLET    Take 1 Tab by mouth Daily (before breakfast). FINASTERIDE (PROSCAR) 5 MG TABLET    Take 1 Tab by mouth daily. FOOD SUPPLEMT, LACTOSE-REDUCED (ENSURE ACTIVE HIGH PROTEIN) LIQD    Take 237 mL by mouth four (4) times daily. LACTULOSE (CHRONULAC) 10 GRAM/15 ML SOLUTION    Take  by mouth three (3) times daily. LISINOPRIL (PRINIVIL, ZESTRIL) 5 MG TABLET    Take 1 Tab by mouth daily. OXYBUTYNIN CHLORIDE XL (DITROPAN XL) 10 MG CR TABLET    Take 1 Tab by mouth two (2) times a day. OXYCODONE IR (ROXICODONE) 10 MG TAB IMMEDIATE RELEASE TABLET    Take 1 Tab by mouth every six (6) hours as needed. Max Daily Amount: 40 mg. POLYETHYLENE GLYCOL (MIRALAX) 17 GRAM PACKET    Take 17 g by mouth daily. PROMETHAZINE (PHENERGAN) 25 MG TABLET    Take 25 mg by mouth every six (6) hours as needed for Nausea. VITAMIN C-VITAMIN E (CRANBERRY CONCENTRATE) CAP    Take  by mouth.    These Medications have changed    No medications on file   Stop Taking    No medications on file

## 2022-02-25 NOTE — ED NOTES
Discussed with the patient and all questioned fully answered. He will call me if any problems arise. Pt. Discharged to nursing home via EMS. VSS. Comfortable. Breathing even and unlabored.

## 2022-02-26 LAB
BACTERIA SPEC CULT: NORMAL
BACTERIA SPEC CULT: NORMAL
CC UR VC: NORMAL
SERVICE CMNT-IMP: NORMAL

## 2022-03-19 PROBLEM — Z85.51 HX OF BLADDER CANCER: Status: ACTIVE | Noted: 2017-08-03

## 2022-03-19 PROBLEM — N20.9: Status: ACTIVE | Noted: 2019-03-16

## 2022-03-19 PROBLEM — D64.9 ANEMIA: Status: ACTIVE | Noted: 2018-03-14

## 2023-12-05 PROBLEM — N32.89 ERYTHEMATOUS BLADDER MUCOSA: Status: ACTIVE | Noted: 2023-12-05

## 2023-12-05 PROBLEM — R31.0 GROSS HEMATURIA: Status: ACTIVE | Noted: 2023-12-05

## 2023-12-05 PROBLEM — N35.919 URETHRAL STRICTURE: Status: ACTIVE | Noted: 2023-12-05

## 2024-06-18 ENCOUNTER — APPOINTMENT (OUTPATIENT)
Facility: HOSPITAL | Age: 79
End: 2024-06-18
Payer: MEDICARE

## 2024-06-18 ENCOUNTER — HOSPITAL ENCOUNTER (INPATIENT)
Facility: HOSPITAL | Age: 79
LOS: 4 days | Discharge: NURSING, REHAB OR LONG-TERM CARE | End: 2024-06-22
Attending: FAMILY MEDICINE | Admitting: FAMILY MEDICINE
Payer: MEDICARE

## 2024-06-18 DIAGNOSIS — N30.01 ACUTE CYSTITIS WITH HEMATURIA: ICD-10-CM

## 2024-06-18 DIAGNOSIS — A41.9 SEPTICEMIA (HCC): Primary | ICD-10-CM

## 2024-06-18 DIAGNOSIS — E87.0 HYPERNATREMIA: ICD-10-CM

## 2024-06-18 DIAGNOSIS — N17.9 AKI (ACUTE KIDNEY INJURY) (HCC): ICD-10-CM

## 2024-06-18 PROBLEM — I69.30 HISTORY OF CVA WITH RESIDUAL DEFICIT: Status: ACTIVE | Noted: 2024-06-18

## 2024-06-18 PROBLEM — G93.41 ACUTE METABOLIC ENCEPHALOPATHY: Status: ACTIVE | Noted: 2024-06-18

## 2024-06-18 PROBLEM — R65.20 SEVERE SEPSIS (HCC): Status: ACTIVE | Noted: 2024-06-18

## 2024-06-18 PROBLEM — Z86.19 HISTORY OF INFECTION WITH MICROORGANISM RESISTANT TO MULTIPLE DRUGS: Status: ACTIVE | Noted: 2024-06-18

## 2024-06-18 PROBLEM — R53.81 DEBILITY: Status: ACTIVE | Noted: 2024-06-18

## 2024-06-18 LAB
ALBUMIN SERPL-MCNC: 3.1 G/DL (ref 3.4–5)
ALBUMIN/GLOB SERPL: 0.8 (ref 0.8–1.7)
ALP SERPL-CCNC: 121 U/L (ref 45–117)
ALT SERPL-CCNC: 73 U/L (ref 16–61)
ANION GAP SERPL CALC-SCNC: 9 MMOL/L (ref 3–18)
APPEARANCE UR: ABNORMAL
AST SERPL-CCNC: 84 U/L (ref 10–38)
B PERT DNA SPEC QL NAA+PROBE: NOT DETECTED
BACTERIA URNS QL MICRO: ABNORMAL /HPF
BASOPHILS # BLD: 0 K/UL (ref 0–0.1)
BASOPHILS NFR BLD: 0 % (ref 0–2)
BILIRUB SERPL-MCNC: 0.6 MG/DL (ref 0.2–1)
BILIRUB UR QL: NEGATIVE
BORDETELLA PARAPERTUSSIS BY PCR: NOT DETECTED
BUN SERPL-MCNC: 77 MG/DL (ref 7–18)
BUN/CREAT SERPL: 28 (ref 12–20)
C PNEUM DNA SPEC QL NAA+PROBE: NOT DETECTED
CALCIUM SERPL-MCNC: 9.7 MG/DL (ref 8.5–10.1)
CHLORIDE SERPL-SCNC: 117 MMOL/L (ref 100–111)
CHLORIDE UR-SCNC: 39 MMOL/L (ref 55–125)
CO2 SERPL-SCNC: 24 MMOL/L (ref 21–32)
COLOR UR: YELLOW
CREAT SERPL-MCNC: 2.77 MG/DL (ref 0.6–1.3)
CREAT UR-MCNC: 103 MG/DL (ref 30–125)
DIFFERENTIAL METHOD BLD: ABNORMAL
EKG ATRIAL RATE: 92 BPM
EKG DIAGNOSIS: NORMAL
EKG P AXIS: 44 DEGREES
EKG P-R INTERVAL: 150 MS
EKG Q-T INTERVAL: 336 MS
EKG QRS DURATION: 76 MS
EKG QTC CALCULATION (BAZETT): 415 MS
EKG R AXIS: -24 DEGREES
EKG T AXIS: 86 DEGREES
EKG VENTRICULAR RATE: 92 BPM
EOSINOPHIL # BLD: 0 K/UL (ref 0–0.4)
EOSINOPHIL NFR BLD: 0 % (ref 0–5)
EPITH CASTS URNS QL MICRO: ABNORMAL /LPF (ref 0–5)
ERYTHROCYTE [DISTWIDTH] IN BLOOD BY AUTOMATED COUNT: 17 % (ref 11.6–14.5)
FLUAV SUBTYP SPEC NAA+PROBE: NOT DETECTED
FLUBV RNA SPEC QL NAA+PROBE: NOT DETECTED
GLOBULIN SER CALC-MCNC: 4.1 G/DL (ref 2–4)
GLUCOSE BLD STRIP.AUTO-MCNC: 106 MG/DL (ref 70–110)
GLUCOSE BLD STRIP.AUTO-MCNC: 96 MG/DL (ref 70–110)
GLUCOSE SERPL-MCNC: 122 MG/DL (ref 74–99)
GLUCOSE UR STRIP.AUTO-MCNC: NEGATIVE MG/DL
HADV DNA SPEC QL NAA+PROBE: NOT DETECTED
HCOV 229E RNA SPEC QL NAA+PROBE: NOT DETECTED
HCOV HKU1 RNA SPEC QL NAA+PROBE: NOT DETECTED
HCOV NL63 RNA SPEC QL NAA+PROBE: NOT DETECTED
HCOV OC43 RNA SPEC QL NAA+PROBE: NOT DETECTED
HCT VFR BLD AUTO: 44.1 % (ref 36–48)
HGB BLD-MCNC: 14.7 G/DL (ref 13–16)
HGB UR QL STRIP: ABNORMAL
HMPV RNA SPEC QL NAA+PROBE: NOT DETECTED
HPIV1 RNA SPEC QL NAA+PROBE: NOT DETECTED
HPIV2 RNA SPEC QL NAA+PROBE: NOT DETECTED
HPIV3 RNA SPEC QL NAA+PROBE: NOT DETECTED
HPIV4 RNA SPEC QL NAA+PROBE: NOT DETECTED
IMM GRANULOCYTES # BLD AUTO: 0 K/UL
IMM GRANULOCYTES NFR BLD AUTO: 0 %
INR PPP: 1.1 (ref 0.9–1.1)
KETONES UR QL STRIP.AUTO: ABNORMAL MG/DL
LACTATE BLD-SCNC: 1.41 MMOL/L (ref 0.4–2)
LACTATE BLD-SCNC: 7.46 MMOL/L (ref 0.4–2)
LACTATE SERPL-SCNC: 1.6 MMOL/L (ref 0.4–2)
LEUKOCYTE ESTERASE UR QL STRIP.AUTO: ABNORMAL
LYMPHOCYTES # BLD: 0.7 K/UL (ref 0.9–3.6)
LYMPHOCYTES NFR BLD: 3 % (ref 21–52)
M PNEUMO DNA SPEC QL NAA+PROBE: NOT DETECTED
MAGNESIUM SERPL-MCNC: 2.6 MG/DL (ref 1.6–2.6)
MCH RBC QN AUTO: 31 PG (ref 24–34)
MCHC RBC AUTO-ENTMCNC: 33.3 G/DL (ref 31–37)
MCV RBC AUTO: 93 FL (ref 78–100)
MONOCYTES # BLD: 1.8 K/UL (ref 0.05–1.2)
MONOCYTES NFR BLD: 8 % (ref 3–10)
NEUTS BAND NFR BLD MANUAL: 9 % (ref 0–5)
NEUTS SEG # BLD: 20.1 K/UL (ref 1.8–8)
NEUTS SEG NFR BLD: 80 % (ref 40–73)
NITRITE UR QL STRIP.AUTO: NEGATIVE
NRBC # BLD: 0 K/UL (ref 0–0.01)
NRBC BLD-RTO: 0 PER 100 WBC
OSMOLALITY UR: 525 MOSM/KG H2O
PH UR STRIP: 7 (ref 5–8)
PLATELET # BLD AUTO: 203 K/UL (ref 135–420)
PLATELET COMMENT: ABNORMAL
PMV BLD AUTO: 12 FL (ref 9.2–11.8)
POTASSIUM SERPL-SCNC: 4.2 MMOL/L (ref 3.5–5.5)
POTASSIUM UR-SCNC: 31 MMOL/L (ref 12–62)
PROCALCITONIN SERPL-MCNC: 127.15 NG/ML
PROT SERPL-MCNC: 7.2 G/DL (ref 6.4–8.2)
PROT UR STRIP-MCNC: 100 MG/DL
PROTHROMBIN TIME: 14 SEC (ref 11.9–14.9)
RBC # BLD AUTO: 4.74 M/UL (ref 4.35–5.65)
RBC #/AREA URNS HPF: ABNORMAL /HPF (ref 0–5)
RBC MORPH BLD: ABNORMAL
RSV RNA SPEC QL NAA+PROBE: NOT DETECTED
RV+EV RNA SPEC QL NAA+PROBE: NOT DETECTED
SARS-COV-2 RNA RESP QL NAA+PROBE: NOT DETECTED
SODIUM SERPL-SCNC: 150 MMOL/L (ref 136–145)
SODIUM UR-SCNC: 47 MMOL/L (ref 20–110)
SP GR UR REFRACTOMETRY: >1.03 (ref 1–1.03)
TROPONIN I SERPL HS-MCNC: 18 NG/L (ref 0–78)
UROBILINOGEN UR QL STRIP.AUTO: 1 EU/DL (ref 0.2–1)
WBC # BLD AUTO: 22.6 K/UL (ref 4.6–13.2)
WBC MORPH BLD: ABNORMAL
WBC URNS QL MICRO: ABNORMAL /HPF (ref 0–4)

## 2024-06-18 PROCEDURE — 93005 ELECTROCARDIOGRAM TRACING: CPT

## 2024-06-18 PROCEDURE — 85610 PROTHROMBIN TIME: CPT

## 2024-06-18 PROCEDURE — 83605 ASSAY OF LACTIC ACID: CPT

## 2024-06-18 PROCEDURE — 93010 ELECTROCARDIOGRAM REPORT: CPT | Performed by: INTERNAL MEDICINE

## 2024-06-18 PROCEDURE — 70450 CT HEAD/BRAIN W/O DYE: CPT

## 2024-06-18 PROCEDURE — 87086 URINE CULTURE/COLONY COUNT: CPT

## 2024-06-18 PROCEDURE — 99285 EMERGENCY DEPT VISIT HI MDM: CPT

## 2024-06-18 PROCEDURE — 99223 1ST HOSP IP/OBS HIGH 75: CPT | Performed by: PHYSICIAN ASSISTANT

## 2024-06-18 PROCEDURE — 0202U NFCT DS 22 TRGT SARS-COV-2: CPT

## 2024-06-18 PROCEDURE — 84300 ASSAY OF URINE SODIUM: CPT

## 2024-06-18 PROCEDURE — 87154 CUL TYP ID BLD PTHGN 6+ TRGT: CPT

## 2024-06-18 PROCEDURE — 84145 PROCALCITONIN (PCT): CPT

## 2024-06-18 PROCEDURE — 70498 CT ANGIOGRAPHY NECK: CPT

## 2024-06-18 PROCEDURE — 2580000003 HC RX 258

## 2024-06-18 PROCEDURE — 81001 URINALYSIS AUTO W/SCOPE: CPT

## 2024-06-18 PROCEDURE — 80053 COMPREHEN METABOLIC PANEL: CPT

## 2024-06-18 PROCEDURE — 87186 SC STD MICRODIL/AGAR DIL: CPT

## 2024-06-18 PROCEDURE — 84484 ASSAY OF TROPONIN QUANT: CPT

## 2024-06-18 PROCEDURE — 96374 THER/PROPH/DIAG INJ IV PUSH: CPT

## 2024-06-18 PROCEDURE — 85025 COMPLETE CBC W/AUTO DIFF WBC: CPT

## 2024-06-18 PROCEDURE — 6360000002 HC RX W HCPCS: Performed by: PHYSICIAN ASSISTANT

## 2024-06-18 PROCEDURE — 74176 CT ABD & PELVIS W/O CONTRAST: CPT

## 2024-06-18 PROCEDURE — 83735 ASSAY OF MAGNESIUM: CPT

## 2024-06-18 PROCEDURE — 6360000004 HC RX CONTRAST MEDICATION

## 2024-06-18 PROCEDURE — 82436 ASSAY OF URINE CHLORIDE: CPT

## 2024-06-18 PROCEDURE — 87077 CULTURE AEROBIC IDENTIFY: CPT

## 2024-06-18 PROCEDURE — 82962 GLUCOSE BLOOD TEST: CPT

## 2024-06-18 PROCEDURE — 87040 BLOOD CULTURE FOR BACTERIA: CPT

## 2024-06-18 PROCEDURE — 71045 X-RAY EXAM CHEST 1 VIEW: CPT

## 2024-06-18 PROCEDURE — 2580000003 HC RX 258: Performed by: PHYSICIAN ASSISTANT

## 2024-06-18 PROCEDURE — 82570 ASSAY OF URINE CREATININE: CPT

## 2024-06-18 PROCEDURE — 83935 ASSAY OF URINE OSMOLALITY: CPT

## 2024-06-18 PROCEDURE — 6360000002 HC RX W HCPCS

## 2024-06-18 PROCEDURE — 84133 ASSAY OF URINE POTASSIUM: CPT

## 2024-06-18 PROCEDURE — 2140000001 HC CVICU INTERMEDIATE R&B

## 2024-06-18 RX ORDER — ONDANSETRON 4 MG/1
4 TABLET, ORALLY DISINTEGRATING ORAL EVERY 8 HOURS PRN
Status: DISCONTINUED | OUTPATIENT
Start: 2024-06-18 | End: 2024-06-22 | Stop reason: HOSPADM

## 2024-06-18 RX ORDER — ONDANSETRON 2 MG/ML
4 INJECTION INTRAMUSCULAR; INTRAVENOUS EVERY 6 HOURS PRN
Status: DISCONTINUED | OUTPATIENT
Start: 2024-06-18 | End: 2024-06-22 | Stop reason: HOSPADM

## 2024-06-18 RX ORDER — SODIUM CHLORIDE, SODIUM LACTATE, POTASSIUM CHLORIDE, AND CALCIUM CHLORIDE .6; .31; .03; .02 G/100ML; G/100ML; G/100ML; G/100ML
30 INJECTION, SOLUTION INTRAVENOUS ONCE
Status: COMPLETED | OUTPATIENT
Start: 2024-06-18 | End: 2024-06-18

## 2024-06-18 RX ORDER — SODIUM CHLORIDE 0.9 % (FLUSH) 0.9 %
5-40 SYRINGE (ML) INJECTION PRN
Status: DISCONTINUED | OUTPATIENT
Start: 2024-06-18 | End: 2024-06-22 | Stop reason: HOSPADM

## 2024-06-18 RX ORDER — ACETAMINOPHEN 325 MG/1
650 TABLET ORAL EVERY 6 HOURS PRN
Status: DISCONTINUED | OUTPATIENT
Start: 2024-06-18 | End: 2024-06-22 | Stop reason: HOSPADM

## 2024-06-18 RX ORDER — SODIUM CHLORIDE 9 MG/ML
INJECTION, SOLUTION INTRAVENOUS PRN
Status: DISCONTINUED | OUTPATIENT
Start: 2024-06-18 | End: 2024-06-22 | Stop reason: HOSPADM

## 2024-06-18 RX ORDER — ACETAMINOPHEN 650 MG/1
650 SUPPOSITORY RECTAL EVERY 6 HOURS PRN
Status: DISCONTINUED | OUTPATIENT
Start: 2024-06-18 | End: 2024-06-22 | Stop reason: HOSPADM

## 2024-06-18 RX ORDER — SODIUM CHLORIDE 0.9 % (FLUSH) 0.9 %
5-40 SYRINGE (ML) INJECTION EVERY 12 HOURS SCHEDULED
Status: DISCONTINUED | OUTPATIENT
Start: 2024-06-18 | End: 2024-06-22 | Stop reason: HOSPADM

## 2024-06-18 RX ORDER — SODIUM CHLORIDE 450 MG/100ML
INJECTION, SOLUTION INTRAVENOUS CONTINUOUS
Status: DISCONTINUED | OUTPATIENT
Start: 2024-06-18 | End: 2024-06-20

## 2024-06-18 RX ORDER — ENOXAPARIN SODIUM 100 MG/ML
30 INJECTION SUBCUTANEOUS DAILY
Status: DISCONTINUED | OUTPATIENT
Start: 2024-06-19 | End: 2024-06-22 | Stop reason: HOSPADM

## 2024-06-18 RX ORDER — BISACODYL 10 MG
10 SUPPOSITORY, RECTAL RECTAL DAILY PRN
Status: DISCONTINUED | OUTPATIENT
Start: 2024-06-18 | End: 2024-06-22 | Stop reason: HOSPADM

## 2024-06-18 RX ORDER — POLYETHYLENE GLYCOL 3350 17 G/17G
17 POWDER, FOR SOLUTION ORAL DAILY PRN
Status: DISCONTINUED | OUTPATIENT
Start: 2024-06-18 | End: 2024-06-22 | Stop reason: HOSPADM

## 2024-06-18 RX ADMIN — SODIUM CHLORIDE, PRESERVATIVE FREE 10 ML: 5 INJECTION INTRAVENOUS at 21:33

## 2024-06-18 RX ADMIN — SODIUM CHLORIDE: 450 INJECTION, SOLUTION INTRAVENOUS at 15:02

## 2024-06-18 RX ADMIN — WATER 1000 MG: 1 INJECTION INTRAMUSCULAR; INTRAVENOUS; SUBCUTANEOUS at 10:32

## 2024-06-18 RX ADMIN — VANCOMYCIN HYDROCHLORIDE 1000 MG: 1 INJECTION, POWDER, LYOPHILIZED, FOR SOLUTION INTRAVENOUS at 22:13

## 2024-06-18 RX ADMIN — SODIUM CHLORIDE, POTASSIUM CHLORIDE, SODIUM LACTATE AND CALCIUM CHLORIDE 1701 ML: 600; 310; 30; 20 INJECTION, SOLUTION INTRAVENOUS at 10:30

## 2024-06-18 RX ADMIN — AMIKACIN SULFATE 275 MG: 250 INJECTION, SOLUTION INTRAMUSCULAR; INTRAVENOUS at 23:35

## 2024-06-18 RX ADMIN — IOPAMIDOL 80 ML: 755 INJECTION, SOLUTION INTRAVENOUS at 10:17

## 2024-06-18 ASSESSMENT — ENCOUNTER SYMPTOMS
CONSTIPATION: 0
VOMITING: 0
DIARRHEA: 0
NAUSEA: 0
SHORTNESS OF BREATH: 0
CHEST TIGHTNESS: 0
COUGH: 0
ABDOMINAL PAIN: 0

## 2024-06-18 ASSESSMENT — PAIN SCALES - GENERAL
PAINLEVEL_OUTOF10: 0

## 2024-06-18 ASSESSMENT — PAIN - FUNCTIONAL ASSESSMENT: PAIN_FUNCTIONAL_ASSESSMENT: NONE - DENIES PAIN

## 2024-06-18 NOTE — ED NOTES
..TRANSFER - OUT REPORT:    Verbal report given to MARCUS Phelps on Antwan Boucher  being transferred to CVT stepdown for routine progression of patient care       Report consisted of patient's Situation, Background, Assessment and   Recommendations(SBAR).     Information from the following report(s) ED SBAR was reviewed with the receiving nurse.    [place] Fall Assessment:                           Lines:   Peripheral IV 06/18/24 Right;Anterior Forearm (Active)   Site Assessment Clean, dry & intact 06/18/24 0940        Opportunity for questions and clarification was provided.      Patient transported with:  Paramedic Tech

## 2024-06-18 NOTE — ED PROVIDER NOTES
of Onset    Hypertension Mother     Hypertension Father        Social History:  Social History     Tobacco Use    Smoking status: Former     Current packs/day: 0.00     Types: Cigarettes     Quit date: 2010     Years since quittin.3    Smokeless tobacco: Never   Substance Use Topics    Alcohol use: No    Drug use: No       Allergies:  Allergies   Allergen Reactions    Penicillins Hives, Itching and Other (See Comments)     intolerance         Review of Systems       Review of Systems   Constitutional:  Negative for chills and fever.   Respiratory:  Negative for cough, chest tightness and shortness of breath.    Cardiovascular:  Negative for chest pain.   Gastrointestinal:  Negative for abdominal pain, constipation, diarrhea, nausea and vomiting.   Genitourinary:  Negative for dysuria and frequency.   Skin:  Negative for rash and wound.   Neurological:  Positive for speech difficulty. Negative for dizziness, light-headedness and headaches.         Physical Exam   /69   Pulse 77   Temp 97.3 °F (36.3 °C) (Temporal)   Resp 18   Ht 1.702 m (5' 7\")   Wt 56.7 kg (125 lb)   SpO2 97%   BMI 19.58 kg/m²       Physical Exam  Vitals and nursing note reviewed.   Constitutional:       General: He is awake. He is not in acute distress.     Appearance: Normal appearance. He is well-developed, well-groomed and overweight. He is not ill-appearing.   HENT:      Head: Normocephalic and atraumatic.      Right Ear: Tympanic membrane, ear canal and external ear normal.      Left Ear: Tympanic membrane, ear canal and external ear normal.      Mouth/Throat:      Pharynx: Oropharynx is clear. Uvula midline. No pharyngeal swelling, oropharyngeal exudate or posterior oropharyngeal erythema.      Tonsils: No tonsillar exudate.   Eyes:      Extraocular Movements: Extraocular movements intact.   Cardiovascular:      Rate and Rhythm: Normal rate and regular rhythm.      Pulses: Normal pulses.      Heart sounds: Normal heart  administration    []Vasopressors initiated (if hypotension persists after fluid resuscitation)   Patient Vitals for the past 6 hrs:   BP Temp Pulse Resp SpO2   06/18/24 1145 91/68 -- 76 25 94 %   06/18/24 1215 100/68 -- -- -- --   06/18/24 1245 100/68 -- 75 15 100 %   06/18/24 1255 96/65 -- 72 18 98 %   06/18/24 1402 102/61 -- 73 22 98 %   06/18/24 1432 104/64 -- 75 17 98 %   06/18/24 1442 105/61 -- 76 17 97 %   06/18/24 1502 99/63 -- 74 14 97 %   06/18/24 1634 113/69 97.3 °F (36.3 °C) 77 18 97 %      Recent Labs     06/18/24  0942   WBC 22.6*   CREATININE 2.77*   BILITOT 0.6   INR 1.1           Infection (sepsis)  related to UTI (fill in source of infection)   identified date: 6/18/24 time: 1045    Fluid Resuscitation Rationale: at least 30mL/kg based on entered actual weight at time of triage    Repeat lactate level: not indicated due to initial lactate < 2    Reassessment Exam: I have reassessed tissue perfusion and hemodynamic status after fluid bolus at this date/time: 6/18/24    CRITICAL CARE TIME:   Total critical care time: 45 Minutes was dedicated the patient through a combination of bedside treatment, therapeutic interventions, reassessment, and review and interpretation of studies and review of the medical record.  This excludes separately billable procedures.  Critical care was necessary to treat or prevent imminent or life-threatening deterioration of the following conditions: Sepsis related to urinary tract infection with TERRENCE and hypernatremia      For Hospitalized Patients:    1. Hospitalization Decision Time:  The decision to hospitalize the patient was made by Dr. Plata at 1200 on 6/18/2024    Diagnosis     Clinical Impression:   1. Septicemia (HCC)    2. Acute cystitis with hematuria    3. TERRENCE (acute kidney injury) (HCC)    4. Hypernatremia        Disposition: Admit    No follow-up provider specified.        Medication List        ASK your doctor about these medications      acetaminophen

## 2024-06-18 NOTE — CONSULTS
[facility]  (A Division of [facility])      Consultation Note      Date of Admission: 6/18/2024    Date of Note: 6/18/2024      Reason for Referral: Multidrug-resistant UTI  Referring Physician: Dr. Boni Dickinson from this admission:   6/18 blood cultures: No growth to date  6/18 urine culture: Pending  6/18 respiratory viral panel: Negative for all tested pathogen    Prior cultures reviewed:  12/5/2023 urine culture: Klebsiella sensitive to amikacin, Zosyn, intermediate to Macrodantin and gentamicin    Current Antimicrobials:    Prior Antimicrobials:  Cefepime 6/18 to present        Assessment:         Suspected urosepsis: In setting of chronic suprapubic catheter.   -6/18 UA too numerous to count WBCs, large leukocyte esterase and unable to calculate bacteria or RBCs.   -CT abdomen and pelvis from 6/18 shows intact suprapubic catheter, no hydronephrosis, thickened bladder wall.  No evidence of pyelonephritis or perinephric stranding.  Sepsis and elevated lactic acid: With leukocytosis, altered mental status and hypotension  Acute renal failure  Hypernatremia  Suspected pneumonia: CT abdomen and pelvis with bilateral lung base opacifications.  Unclear if atelectasis or pneumonia.  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  History of CRE and ESBL in the  [[id number]] Collected: 06/18/24 1045    Order Status: Sent Specimen: Urine, clean catch Updated: 06/18/24 1054    Blood Culture 1 [[id number]] Collected: 06/18/24 1036    Order Status: Sent Specimen: Blood Updated: 06/18/24 1122    Blood Culture 2 [[id number]] Collected: 06/18/24 1036    Order Status: Sent Specimen: Blood Updated: 06/18/24 1122                  Imaging:     All available imaging since presentation reviewed as per EPIC

## 2024-06-18 NOTE — H&P
old infarct. There are old lacunar infarcts in the right putamen, right external capsule, and nathan. There is marked dilatation of the ventricular system and moderate cerebral cortical atrophy. No subfalcine or transtentorial herniation is noted. There is hypodensity in the periventricular and deep white matter suggestive of moderate microvascular ischemic changes. The visualized portions of the sinuses and mastoid air cells are clear.     No intracranial mass lesion or hemorrhage. Multiple old infarcts. Please review above for additional information. Electronically signed by Brandt Gomez          Assessment/Plan       Severe sepsis: HR > 90, leukocytosis, hypotension improving with IV fluids. Of note, elevated lactic acid was more than likely an error. Procal 127. Suspect 2/2 UTI +/- PNA.  Hx of MDR urinary infections: ESBL, CRE. Last urine culture with Klebsiella sensitive to amikacin, Zosyn, intermediate to Macrodantin and tobramycin   Penicillin allergy: unable to confirm reaction  Catheter associated UTI  - monitor CBC, temp  - ID consulted, appreciate assistance  - IV abx per ID  - f/up cultures  - contact precautions    Acute metabolic encephalopathy  - aspiration and fall precautions  - NPO for now - unsafe for PO intake at this time  - speech consult    Hypernatremia  - monitor Na with IV fluids    TERRENCE  - IV fluids   - monitor BMP  - nephrology consulted, appreciate assistance  - renal US ordered    Neurogenic bladder  Suprapubic catheter in place  - routine catheter care  - urology consult needed tomorrow for exchange    CVA/ICH with residual deficits  Debility   - palliative care consult      Anticipated Discharge: >2 days    DVT Prophylaxis:  [x]Lovenox  []Hep SQ  []SCDs  []Coumadin []DOAC  []On Heparin gtt     I have personally reviewed all pertinent labs, films and EKGs that have officially resulted. I reviewed available electronic documentation outlining the initial presentation as well as the  emergency room physician's encounter. Time spent reviewing records, independently interpreting results, obtaining history from patient or caregiver, performing physical exam, ordering tests and medications, communicating with specialists, documenting in the chart, and coordinating overall care is 75 minutes    CARA Kevin

## 2024-06-18 NOTE — PROGRESS NOTES
Bedside and Verbal shift change report given to Uche Dos Santos RN (oncoming nurse) by Lenin Sanchez RN (offgoing nurse). Report included the following information Nurse Handoff Report, ED SBAR, Intake/Output, MAR, Recent Results, Med Rec Status, and Cardiac Rhythm NSR .      2007: Pt alert and awake to self, disoriented to place, time and situation, denies pain, on room air, unlabored breathing, VSS, IVF infusing well, suprapubic cath in place, shift assessment completed, bed locked and low position, bed alarm on    2213: Antibiotic administered, infusing well    2339: Medication administered as scheduled, Pt awake, no c/o voiced, no change from previous assessment    0200: CHG bath provided, linen, pad and gown changed, wright bag emptied    0406: VSS, Pt sleeping, no distress noted, reassessment completed, bed alarm on    0519: BG 85    Bedside and Verbal shift change report given to Stephany Louis RN (oncoming nurse) by Uche Dos Santos RN (offgoing nurse). Report included the following information Nurse Handoff Report, ED SBAR, Intake/Output, MAR, Recent Results, Med Rec Status, and Cardiac Rhythm NSR .

## 2024-06-18 NOTE — ED NOTES
Pt placed in gown and in position of most comfort with warm blanket for comfort. Bed in lowest position with rails up for safety

## 2024-06-18 NOTE — ED TRIAGE NOTES
Pt arrived via EMS from [facility] c/o AMS, SANDRA 6am today, pt normally A7O x4 but was found confused this morning. , pt bed written, extremities contractured.

## 2024-06-18 NOTE — PROGRESS NOTES
completed the initial Spiritual Assessment of the patient, and offered Pastoral Care support to the is unresponsive patient in bed 14 of the emergency room.  There is no advance directive on file for him. There is no family present and patient is not responsive to verbal communication and appears to be in deep sleep.Patient does not have any Sabianism/cultural needs that will affect patient’s preferences in health care. Chaplains will continue to follow and will provide pastoral care on an as needed/requested basis.    Chaplain Jenaro Benito  Board Certified   Spiritual Care Department  [phone]

## 2024-06-18 NOTE — PROGRESS NOTES
Consult Note    Patient: Antwan Boucher               Sex: male          DOA: 6/18/2024         YOB: 1945      Age:  78 y.o.        LOS:  LOS: 0 days              HPI:     Antwan Boucher is a 78 y.o. male who has been seen on consultation at the request of the emergency room physician for acute renal failure along with hypernatremia.  Patient is a resident of [facility] rehab center and was sent to the emergency room for evaluation with change in mental status and possibility of sepsis.  Patient cannot give any history has history of previous stroke and neurogenic bladder.  He has a history of CVA from intracerebral hemorrhage with left-sided hemiplegia.  Also has hypertension neurogenic bladder with chronic Espinal catheter and ended up with suprapubic catheter placement before bladder cancer.  He follows regularly with urology for routine suprapubic catheter exchange.  He has not cultures from his suprapubic catheter in December and July 2023 showing multidrug-resistant Klebsiella.  ID is also following.  Today when he came he was found to not febrile.  But WBC count was still very high at 22.6 thousand.  Patient was also hypotensive fluid was given.  A lactic acid was done which was social on the hide lactic acid of 7 point 4 repeat 1 came down to only 1.6.  It was also found that serum sodium was 150 and serum creatinine up to 2.77..  Subsequently he had a CT of the abdomen and pelvis which shows no right-sided hydronephrosis but has have diffuse bladder wall thickening with his suprapubic catheter placement.  Also there is a patchy opacity opacification at the lung base.  Not sure whether the patient is alert to take anything by mouth or not in the nursing home as she is a stroke and does not communicate well.  He has suprapubic catheter which needs to be changed before coming to the floor admitted on 20 cc.  Now in the floor with another 100 cc urine urine looks clear now.    Past Medical  contrast in the bilateral renal  collecting systems and ureters.     2. Suprapubic catheter appears appropriately positioned.     3. Diffuse bladder wall thickening, may be related to under distention or known  bladder malignancy. Recommend clinical correlation for cystitis.     4. Enlarged prostate.     5. Induration in the soft tissues overlying the distal sacrum may reflect  pressure injury. No abscess.     6. Patchy consolidations at the lung bases are concerning for pneumonia.              Electronically signed by Nessa Fernández      Latest Reference Range & Units 06/18/24 10:45   Color, UA -   YELLOW   Glucose, Ur NEG mg/dL Negative   Bilirubin, Urine NEG   Negative   Ketones, Urine NEG mg/dL TRACE !   Specific Gravity, UA 1.003 - 1.030  >1.030 (H)   Blood, Urine NEG   MODERATE !   Protein, UA NEG mg/dL 100 !   Urobilinogen 0.2 - 1.0 EU/dL 1.0   Nitrite, Urine NEG   Negative   Leukocyte Esterase, Urine NEG   LARGE !   Appearance -   TURBID   pH, Urine 5.0 - 8.0   7.0   WBC, UA 0 - 4 /hpf TOO NUMEROUS TO COUNT   RBC, UA 0 - 5 /hpf UNABLE TO QUANTITATE MICROSCOPIC PARAMETERS DUE TO EXCESSIVE WBCS   Epithelial Cells, UA 0 - 5 /lpf UNABLE TO QUANTITATE MICROSCOPIC PARAMETERS DUE TO EXCESSIVE WBCS   Bacteria, UA NEG /hpf UNABLE TO QUANTITATE MICROSCOPIC PARAMETERS DUE TO EXCESSIVE WBCS !   Chloride 55 - 125 MMOL/L 39 (L)   Creatinine, Ur 30 - 125 mg/dL 103.00   Osmolality, Ur MOSM/kg H2O 525   SODIUM, RANDOM URINE 20 - 110 MMOL/L 47   !: Data is abnormal  (H): Data is abnormally high  (L): Data is abnormally low                  Assessment/Plan     Principal Problem:    Severe sepsis ([facility])  Active Problems:    Urinary tract infection    Neurogenic bladder    Debility    ARF (acute renal failure) ([facility])    Hypernatremia  Resolved Problems:    * No resolved hospital problems. *      Patient has acute renal failure.  His blood urea creatinine ratio is in the prerenal side.  He looks clinically dry he has

## 2024-06-19 ENCOUNTER — HOSPITAL ENCOUNTER (INPATIENT)
Facility: HOSPITAL | Age: 79
Discharge: HOME | End: 2024-06-22
Payer: MEDICARE

## 2024-06-19 ENCOUNTER — APPOINTMENT (OUTPATIENT)
Facility: HOSPITAL | Age: 79
End: 2024-06-19
Payer: MEDICARE

## 2024-06-19 PROBLEM — A41.9 SEPTICEMIA (HCC): Status: ACTIVE | Noted: 2024-06-19

## 2024-06-19 PROBLEM — Z51.5 ENCOUNTER FOR PALLIATIVE CARE: Status: ACTIVE | Noted: 2024-06-19

## 2024-06-19 PROBLEM — Z71.89 GOALS OF CARE, COUNSELING/DISCUSSION: Status: ACTIVE | Noted: 2024-06-19

## 2024-06-19 PROBLEM — G81.94 LEFT HEMIPLEGIA (HCC): Status: ACTIVE | Noted: 2024-06-19

## 2024-06-19 PROBLEM — Z86.73 HISTORY OF STROKE: Status: ACTIVE | Noted: 2024-06-19

## 2024-06-19 LAB
ACCESSION NUMBER, LLC1M: ABNORMAL
ACINETOBACTER CALCOAC BAUMANNII COMPLEX BY PCR: NOT DETECTED
ALBUMIN SERPL-MCNC: 2.7 G/DL (ref 3.4–5)
ALBUMIN/GLOB SERPL: 0.6 (ref 0.8–1.7)
ALP SERPL-CCNC: 100 U/L (ref 45–117)
ALT SERPL-CCNC: 59 U/L (ref 16–61)
ANION GAP SERPL CALC-SCNC: 7 MMOL/L (ref 3–18)
AST SERPL-CCNC: 53 U/L (ref 10–38)
B FRAGILIS DNA BLD POS QL NAA+NON-PROBE: NOT DETECTED
BASOPHILS # BLD: 0 K/UL (ref 0–0.1)
BASOPHILS NFR BLD: 0 % (ref 0–2)
BILIRUB SERPL-MCNC: 0.7 MG/DL (ref 0.2–1)
BIOFIRE TEST COMMENT: ABNORMAL
BLACTX-M ISLT/SPM QL: DETECTED
BLAIMP ISLT/SPM QL: NOT DETECTED
BLAKPC ISLT/SPM QL: NOT DETECTED
BLAOXA-48-LIKE ISLT/SPM QL: NOT DETECTED
BLAVIM ISLT/SPM QL: NOT DETECTED
BUN SERPL-MCNC: 58 MG/DL (ref 7–18)
BUN/CREAT SERPL: 34 (ref 12–20)
C ALBICANS DNA BLD POS QL NAA+NON-PROBE: NOT DETECTED
C AURIS DNA BLD POS QL NAA+NON-PROBE: NOT DETECTED
C GATTII+NEOFOR DNA BLD POS QL NAA+N-PRB: NOT DETECTED
C GLABRATA DNA BLD POS QL NAA+NON-PROBE: NOT DETECTED
C KRUSEI DNA BLD POS QL NAA+NON-PROBE: NOT DETECTED
C PARAP DNA BLD POS QL NAA+NON-PROBE: NOT DETECTED
C TROPICLS DNA BLD POS QL NAA+NON-PROBE: NOT DETECTED
CALCIUM SERPL-MCNC: 9.4 MG/DL (ref 8.5–10.1)
CHLORIDE SERPL-SCNC: 117 MMOL/L (ref 100–111)
CO2 SERPL-SCNC: 23 MMOL/L (ref 21–32)
COLISTIN RES MCR-1 ISLT/SPM QL: NOT DETECTED
CREAT SERPL-MCNC: 1.71 MG/DL (ref 0.6–1.3)
DIFFERENTIAL METHOD BLD: ABNORMAL
E CLOAC COMP DNA BLD POS NAA+NON-PROBE: NOT DETECTED
E COLI DNA BLD POS QL NAA+NON-PROBE: NOT DETECTED
E FAECALIS DNA BLD POS QL NAA+NON-PROBE: NOT DETECTED
E FAECIUM DNA BLD POS QL NAA+NON-PROBE: NOT DETECTED
ENTEROBACTERALES DNA BLD POS NAA+N-PRB: DETECTED
EOSINOPHIL # BLD: 0 K/UL (ref 0–0.4)
EOSINOPHIL NFR BLD: 0 % (ref 0–5)
ERYTHROCYTE [DISTWIDTH] IN BLOOD BY AUTOMATED COUNT: 17.2 % (ref 11.6–14.5)
GLOBULIN SER CALC-MCNC: 4.2 G/DL (ref 2–4)
GLUCOSE BLD STRIP.AUTO-MCNC: 85 MG/DL (ref 70–110)
GLUCOSE BLD STRIP.AUTO-MCNC: 87 MG/DL (ref 70–110)
GLUCOSE BLD STRIP.AUTO-MCNC: 93 MG/DL (ref 70–110)
GLUCOSE BLD STRIP.AUTO-MCNC: 96 MG/DL (ref 70–110)
GLUCOSE SERPL-MCNC: 88 MG/DL (ref 74–99)
GP B STREP DNA BLD POS QL NAA+NON-PROBE: NOT DETECTED
HAEM INFLU DNA BLD POS QL NAA+NON-PROBE: NOT DETECTED
HCT VFR BLD AUTO: 40.5 % (ref 36–48)
HGB BLD-MCNC: 12.7 G/DL (ref 13–16)
IMM GRANULOCYTES # BLD AUTO: 0 K/UL (ref 0–0.04)
IMM GRANULOCYTES NFR BLD AUTO: 0 % (ref 0–0.5)
K OXYTOCA DNA BLD POS QL NAA+NON-PROBE: NOT DETECTED
KLEBSIELLA SP DNA BLD POS QL NAA+NON-PRB: DETECTED
KLEBSIELLA SP DNA BLD POS QL NAA+NON-PRB: NOT DETECTED
L MONOCYTOG DNA BLD POS QL NAA+NON-PROBE: NOT DETECTED
LYMPHOCYTES # BLD: 0 K/UL (ref 0.9–3.6)
LYMPHOCYTES NFR BLD: 0 % (ref 21–52)
MCH RBC QN AUTO: 30 PG (ref 24–34)
MCHC RBC AUTO-ENTMCNC: 31.4 G/DL (ref 31–37)
MCV RBC AUTO: 95.7 FL (ref 78–100)
MONOCYTES # BLD: 0 K/UL (ref 0.05–1.2)
MONOCYTES NFR BLD: 0 % (ref 3–10)
N MEN DNA BLD POS QL NAA+NON-PROBE: NOT DETECTED
NEUTS BAND NFR BLD MANUAL: 3 %
NEUTS SEG # BLD: 18.3 K/UL (ref 1.8–8)
NEUTS SEG NFR BLD: 97 % (ref 40–73)
NRBC # BLD: 0 K/UL (ref 0–0.01)
NRBC BLD-RTO: 0 PER 100 WBC
P AERUGINOSA DNA BLD POS NAA+NON-PROBE: NOT DETECTED
PLATELET # BLD AUTO: 161 K/UL (ref 135–420)
PLATELET COMMENT: ABNORMAL
PMV BLD AUTO: 11.8 FL (ref 9.2–11.8)
POTASSIUM SERPL-SCNC: 4.4 MMOL/L (ref 3.5–5.5)
PROT SERPL-MCNC: 6.9 G/DL (ref 6.4–8.2)
PROTEUS SP DNA BLD POS QL NAA+NON-PROBE: NOT DETECTED
RBC # BLD AUTO: 4.23 M/UL (ref 4.35–5.65)
RBC MORPH BLD: ABNORMAL
RESISTANT GENE NDM BY PCR: NOT DETECTED
RESISTANT GENE TARGETS: ABNORMAL
S AUREUS DNA BLD POS QL NAA+NON-PROBE: NOT DETECTED
S AUREUS+CONS DNA BLD POS NAA+NON-PROBE: NOT DETECTED
S EPIDERMIDIS DNA BLD POS QL NAA+NON-PRB: NOT DETECTED
S LUGDUNENSIS DNA BLD POS QL NAA+NON-PRB: NOT DETECTED
S MALTOPHILIA DNA BLD POS QL NAA+NON-PRB: NOT DETECTED
S MARCESCENS DNA BLD POS NAA+NON-PROBE: NOT DETECTED
S PNEUM DNA BLD POS QL NAA+NON-PROBE: NOT DETECTED
S PYO DNA BLD POS QL NAA+NON-PROBE: NOT DETECTED
SALMONELLA DNA BLD POS QL NAA+NON-PROBE: NOT DETECTED
SODIUM SERPL-SCNC: 147 MMOL/L (ref 136–145)
STREPTOCOCCUS DNA BLD POS NAA+NON-PROBE: NOT DETECTED
WBC # BLD AUTO: 18.3 K/UL (ref 4.6–13.2)

## 2024-06-19 PROCEDURE — 2580000003 HC RX 258: Performed by: INTERNAL MEDICINE

## 2024-06-19 PROCEDURE — 76770 US EXAM ABDO BACK WALL COMP: CPT

## 2024-06-19 PROCEDURE — 36415 COLL VENOUS BLD VENIPUNCTURE: CPT

## 2024-06-19 PROCEDURE — 92610 EVALUATE SWALLOWING FUNCTION: CPT

## 2024-06-19 PROCEDURE — 85025 COMPLETE CBC W/AUTO DIFF WBC: CPT

## 2024-06-19 PROCEDURE — 99232 SBSQ HOSP IP/OBS MODERATE 35: CPT | Performed by: STUDENT IN AN ORGANIZED HEALTH CARE EDUCATION/TRAINING PROGRAM

## 2024-06-19 PROCEDURE — 80053 COMPREHEN METABOLIC PANEL: CPT

## 2024-06-19 PROCEDURE — 2140000001 HC CVICU INTERMEDIATE R&B

## 2024-06-19 PROCEDURE — 99222 1ST HOSP IP/OBS MODERATE 55: CPT | Performed by: NURSE PRACTITIONER

## 2024-06-19 PROCEDURE — 2580000003 HC RX 258: Performed by: PHYSICIAN ASSISTANT

## 2024-06-19 PROCEDURE — 92526 ORAL FUNCTION THERAPY: CPT

## 2024-06-19 PROCEDURE — 97535 SELF CARE MNGMENT TRAINING: CPT

## 2024-06-19 PROCEDURE — 94761 N-INVAS EAR/PLS OXIMETRY MLT: CPT

## 2024-06-19 PROCEDURE — 97110 THERAPEUTIC EXERCISES: CPT

## 2024-06-19 PROCEDURE — 97162 PT EVAL MOD COMPLEX 30 MIN: CPT

## 2024-06-19 PROCEDURE — 6360000002 HC RX W HCPCS: Performed by: PHYSICIAN ASSISTANT

## 2024-06-19 PROCEDURE — 97166 OT EVAL MOD COMPLEX 45 MIN: CPT

## 2024-06-19 RX ADMIN — SODIUM CHLORIDE: 450 INJECTION, SOLUTION INTRAVENOUS at 00:17

## 2024-06-19 RX ADMIN — SODIUM CHLORIDE, PRESERVATIVE FREE 10 ML: 5 INJECTION INTRAVENOUS at 20:46

## 2024-06-19 RX ADMIN — SODIUM CHLORIDE, PRESERVATIVE FREE 10 ML: 5 INJECTION INTRAVENOUS at 09:20

## 2024-06-19 RX ADMIN — SODIUM CHLORIDE: 450 INJECTION, SOLUTION INTRAVENOUS at 15:53

## 2024-06-19 RX ADMIN — ENOXAPARIN SODIUM 30 MG: 100 INJECTION SUBCUTANEOUS at 09:20

## 2024-06-19 ASSESSMENT — PAIN SCALES - GENERAL
PAINLEVEL_OUTOF10: 0

## 2024-06-19 ASSESSMENT — ENCOUNTER SYMPTOMS
HEMATOCHEZIA: 0
EYE PAIN: 0
COUGH: 0
COLOR CHANGE: 0
DIARRHEA: 0
SHORTNESS OF BREATH: 0
SORE THROAT: 0
NAUSEA: 0
PHOTOPHOBIA: 0
VOMITING: 0
WHEEZING: 0
CONSTIPATION: 0
ABDOMINAL PAIN: 0

## 2024-06-19 NOTE — PROGRESS NOTES
Received call for suprapubic wright catheter exchanged at 1659. CT scan from 6/18/24 reviewed. SPT in appropriate position. Bladder decompressed. Will exchange SPT tomorrow. Please contact Urology overnight should SPT tube becomes nonfunctional.     Sandra Barnes DNP, NP-C  [address], [address]  [place] [address]    Pager: [phone]  Phone: [phone]

## 2024-06-19 NOTE — PROGRESS NOTES
[facility]  (A Division of [facility])      Consultation Note      Date of Admission: 6/18/2024    Date of Note: 6/19/2024      Reason for Referral: Multidrug-resistant UTI  Referring Physician: Dr. Boni Dickinson from this admission:   6/18 blood cultures: 2 out of 4 gram-negative rods-suspect Klebsiella based on PCR  6/18 urine culture: Pending  6/18 respiratory viral panel: Negative for all tested pathogen    Prior cultures reviewed:  12/5/2023 urine culture: Klebsiella sensitive to amikacin, Zosyn, intermediate to Macrodantin and gentamicin    Current Antimicrobials:    Prior Antimicrobials:  Cefepime 6/18 to present  Amikacin 6/18   Vanc 6/18       Assessment:         Gram-negative emilia urosepsis: In setting of chronic suprapubic catheter.   -6/18 UA too numerous to count WBCs, large leukocyte esterase and unable to calculate bacteria or RBCs.   -CT abdomen and pelvis from 6/18 shows intact suprapubic catheter, no hydronephrosis, thickened bladder wall.  No evidence of pyelonephritis or perinephric stranding.  Sepsis and elevated lactic acid: With leukocytosis, altered mental status and hypotension  Acute renal failure  Hypernatremia  Suspected pneumonia: CT abdomen and pelvis with bilateral lung base opacifications.  Unclear if atelectasis or pneumonia.  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 and neurogenic bladder: Bedbound, contracted  History of recurrent UTIs with multidrug-resistant bacteria:  last avaliable abx documented was macrobid on 1/24/24  PCN allergy:  unable to describe reaction due to AMS    Plan:   Please change suprapubic catheter-due for change in the next couple of days  Follow-up 6/18 blood cultures-2 out of 4 suspected Klebsiella with CTX resistance gene  Follow-up 6/18 urine cultures  Trend CBC, BMP  Gentle fluid hydration as directed by hospitalist and  awake alert and oriented, appears stated age thin, chronically ill-appearing   Skin:   no rashes or skin lesions noted on limited exam, no jaundice   HEENT:  Normocephalic, atraumatic, PERRL, EOMI, no scleral icterus; no conjunctival hemmohage;    Lymph Nodes:   no cervical or inguinal adenopathy   Lungs:   non-labored, bilaterally clear to aspiration   Heart:  RRR, s1 and s2; no murmurs, no edema, + pedal pulses   Abdomen:  soft, non-distended, active bowel sounds. Non-tender   Genitourinary:  deferred   Extremities:   no clubbing, cyanosis; contracted, muscle wasting   Neurologic:  No gross focal sensory abnormalities; left-sided hemiplegia confused, difficult historian; speech hard to understand. Cranial nerves intact   Psychiatric:   appropriate and interactive.       Labs: Results:   Chemistry Recent Labs     06/18/24  0942 06/19/24 0429   * 147*   K 4.2 4.4   * 117*   CO2 24 23   BUN 77* 58*   GLOB 4.1* 4.2*      CBC w/Diff Recent Labs     06/18/24  0942 06/19/24 0429   WBC 22.6* 18.3*   RBC 4.74 4.23*   HGB 14.7 12.7*   HCT 44.1 40.5    161            No results found for: \"SDES\" No components found for: \"CULT\"     Results       Procedure Component Value Units Date/Time    Respiratory Panel, Molecular, with COVID-19 (Restricted: peds pts or suitable admitted adults) [[id number]] Collected: 06/18/24 1050    Order Status: Completed Specimen: Nasopharyngeal Updated: 06/18/24 1253     Adenovirus by PCR Not detected        Coronavirus 229E by PCR Not detected        Coronavirus HKU1 by PCR Not detected        Coronavirus NL63 by PCR Not detected        Coronavirus OC43 by PCR Not detected        SARS-CoV-2, PCR Not detected        Human Metapneumovirus by PCR Not detected        Rhinovirus Enterovirus PCR Not detected        Influenza A by PCR Not detected        Influenza B PCR Not detected        Parainfluenza 1 PCR Not detected        Parainfluenza 2 PCR Not detected        Parainfluenza 3

## 2024-06-19 NOTE — PROGRESS NOTES
Problem       1) Severe sepsis (HCC)   2)  Urinary tract infection    3) Neurogenic bladder   4)  Debility    ARF (acute renal failure) (HCC)  5)   Hypernatremia  6) TERRENCE      Plan    TERRENCE  Creatinine improving    Hypernatremia  Sodium improving    UTI and Sepsis  Leucytosis  Improving          Subjective :     No new complaints   No chest pain or SOB   Weak  Slow to answer     Objective :     /70   Pulse 89   Temp 98.1 °F (36.7 °C) (Oral)   Resp 18   Ht 1.702 m (5' 7\")   Wt 57 kg (125 lb 10.6 oz)   SpO2 98%   BMI 19.68 kg/m²         General : No apparent distress.   HEENT : Normocephalic and atraumatic  Chest expansion equal   Abdomen : Soft , nontender, nondistended with positive bowel sounds.  Extremities : No lower extremity edema    Laboratory and imaging data:     Pertinent laboratory testing and imaging data reviewed    Checklist    Code status :  Full Code        Diet :    ADULT DIET; Easy to Chew    Treatment Team :  Treatment Team: Attending Provider: Shaka Limon MD; Consulting Physician: Garcia Isabel MD; Consulting Physician: Karson Kenney DO; Consulting Physician: Cadence Sanchez, APRN - NP; Speech Language Pathologist: Karla Leggett, SLP; Occupational Therapist: Tomer Daniel OTR/L; Physical Therapist: Sil Crowder, PT; Registered Nurse: Stephany Louis, MARCUS; : Shawna Alonso; Utilization Reviewer: Loli Yin RN    Medications :   Current Medications  :     Current Facility-Administered Medications   Medication Dose Route Frequency Provider Last Rate Last Admin    0.45 % sodium chloride infusion   IntraVENous Continuous Garcia Isabel MD 75 mL/hr at 06/19/24 0017 New Bag at 06/19/24 0017    sodium chloride flush 0.9 % injection 5-40 mL  5-40 mL IntraVENous 2 times per day Janette Miller PA   10 mL at 06/19/24 0920    sodium chloride flush 0.9 % injection 5-40 mL  5-40 mL IntraVENous PRN Janette Miller PA        0.9 % sodium chloride infusion    IntraVENous PRN Janette Miller PA        enoxaparin Sodium (LOVENOX) injection 30 mg  30 mg SubCUTAneous Daily Janette Miller PA   30 mg at 06/19/24 0920    ondansetron (ZOFRAN-ODT) disintegrating tablet 4 mg  4 mg Oral Q8H PRN Janette Miller PA        Or    ondansetron (ZOFRAN) injection 4 mg  4 mg IntraVENous Q6H PRN Janette Miller PA        polyethylene glycol (GLYCOLAX) packet 17 g  17 g Oral Daily PRN Janette Miller PA        bisacodyl (DULCOLAX) suppository 10 mg  10 mg Rectal Daily PRN Janette Miller PA        acetaminophen (TYLENOL) tablet 650 mg  650 mg Oral Q6H PRN Janette Miller PA        Or    acetaminophen (TYLENOL) suppository 650 mg  650 mg Rectal Q6H PRN Janette Miller PA

## 2024-06-19 NOTE — PROGRESS NOTES
[facility]ist Group  Progress Note    Patient: Antwan Boucher Age: 78 y.o. : 1945 MR#: [id number] SSN: [id number]  Date/Time: 2024    Subjective:   Subjective:  Symptoms:  Stable.  No shortness of breath, cough, chest pain, weakness, headache, chest pressure, diarrhea or anxiety.    Diet:  Adequate intake.  No nausea or vomiting.    Activity level: Normal.    Pain:  He reports no pain.       No acute events overnight, no new concerns or complaints, vitals stable.    Review of Systems   Constitutional: Negative for chills, fever and malaise/fatigue.   HENT:  Negative for sore throat.    Eyes:  Negative for pain, photophobia and visual disturbance.   Cardiovascular:  Negative for chest pain, irregular heartbeat, palpitations and syncope.   Respiratory:  Negative for cough, shortness of breath and wheezing.    Skin:  Negative for color change, dry skin and rash.   Gastrointestinal:  Negative for abdominal pain, constipation, diarrhea, hematochezia, melena, nausea and vomiting.   Neurological:  Negative for disturbances in coordination, dizziness, focal weakness, headaches and weakness.   Psychiatric/Behavioral:  Negative for altered mental status, depression, hallucinations and suicidal ideas.       Assessment/Plan:   Gram-negative emilia bacteremia, likely Klebsiella  Severe sepsis due to UTI  Acute renal failure  Hypernatremia  Suspected pneumonia   Acute encephalopathy  Functional quadriplegia  Suprapubic catheter  Hypoalbuminemia  Hyperglobulinemia  Hyperchloremia      Plan  Infectious disease nephrology consulted, palliative consulted, urology consulted.  Continue antibiotics per their recommendation  Nephrology consulted, appreciate their assistance as well, gentle hydration with improving hyponatremia at this point  Palliative medicine consulted for goals of care discussion-    Disposition: Expected location: To be determined    Expected timeframe: To be determined

## 2024-06-19 NOTE — ACP (ADVANCE CARE PLANNING)
Advance Care Planning     Palliative Team Advance Care Planning (ACP) Conversation    Date of Conversation: 06/19/24     ACP documents on file prior to discussion:  -None    Healthcare Decision Maker:  Patient does not have a formal health care decision maker on file.     Conversation Summary:    Visited patient for new consult along with Palliative team member SILVANO Sanchez NP. Patient resting quietly in bed, awake and alert. States he is \"better\" today. Denies any pain or discomfort at this time. He is able to tell our team that he is in the hospital, who the president is, his wife's name, and that he has two children. Although patient can answer simple questions, he is not able to participate in his health care conversations or make complex medical decisions.     Patient does not have an Advanced Medical Directive on file. He is not able to complete one at this time. He is , has one son and one daughter. Gave our team permission to reach out to his wife or daughter.     Call placed to patient's daughter Judy at [phone]. Introduced our team and our role in the hospitalized patient. Addressed goals of care with daughter. Asked if she and patient's wife would be able to meet with our team in person to address goals of care moving forward. Daughter stated, \"we are not interested\" and politely thanked me for the call.    Palliative team remains available to provide support to Mr Boucher during this hospital stay. Team is willing to have further conversations if family changes their mind.      Resuscitation Status:   Code Status: Full Code     Documentation Completed:  -No new documents completed.    Ct Ackerman RN  Palliative Medicine Inpatient RN  Palliative COPE Line: [phone]

## 2024-06-19 NOTE — PROGRESS NOTES
[place] [facility]   Pharmacy Pharmacokinetic Monitoring Service - Aminoglycoside    Antwan Boucher is a 78 y.o. male starting on amikacin therapy for Urinary Tract Infection.   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  Dr Kenney is following the patient as well.  Pharmacy consulted by TORRI Miller for monitoring and adjustment.    Target Concentration: Peak: 15-20 mcg/mL and Trough: </= 4-8 mcg/mL    Additional Antimicrobials: Vancomycin    Pertinent Laboratory Values:   Temp: 98.3 °F (36.8 °C), Weight - Scale: 56.7 kg (125 lb)  Recent Labs     06/18/24  0942   BUN 77*   WBC 22.6*     Estimated Creatinine Clearance: 18 mL/min (A) (based on SCr of 2.77 mg/dL (H)).    Pertinent Cultures:  Culture Date Source Results   6/18 Blood pending   6/18 Urine pending     Plan:  Concentration-guided dosing due to renal impairment/insufficiency  Start amikacin 275 mg IV x 1 dose tonight  Will dose according to levels at this time due to renal insufficiency  Renal labs as indicated   amikacin concentration NOT ordered at this time, as Dr Kenney's note suggests that only one dose of both Amikacin and Vancomycin be ordered.  A level can be ordered if continued therapy is desired.  Please follow up on Wednesday.  Pharmacy will continue to monitor patient and adjust therapy as indicated    Thank you for the consult,  CARY ESPITIA RPH  6/18/2024

## 2024-06-19 NOTE — PROGRESS NOTES
Bedside and Verbal shift change report given to Uche Dos Santos RN (oncoming nurse) by Stephany Louis RN (offgoing nurse). Report included the following information Nurse Handoff Report, Intake/Output, MAR, Recent Results, Med Rec Status, and Cardiac Rhythm NSR .      1942: Pt awake, alert and oriented to self and place, denies pain, on room air, non labored breathing, VSS, shift assessment done, bed alarm on    2328: pt awake, no c/o voiced, VSS, reassessment completed    0133: Pt sleeping, HR 73, IVF infusing well    0341: Pt sleeping comfortably, VSS, reassessment completed    0500: CHG bath provided, repositioned pt comfortably in bed    Bedside and Verbal shift change report given to Catherine Tavera RN (oncoming nurse) by Uche Dos Santos RN (offgoing nurse). Report included the following information Nurse Handoff Report, ED SBAR, Intake/Output, MAR, Recent Results, Med Rec Status, and Cardiac Rhythm NSR .

## 2024-06-19 NOTE — PROGRESS NOTES
0715- Bedside and Verbal shift change report given to MARCUS Hammond (oncoming nurse) by MARCUS Ivey (offgoing nurse). Report included the following information SBAR, Intake/Output, MAR, Recent Results, and Cardiac Rhythm NSR .     1038- Telephone call from Patient's daughter Judy Duenas, update given per flowsheets.    1719- Telephone call to Patient's daughter Judy Duenas providing update per flowsheets. Ms. Duenas to call back tomorrow to inquire about US results and plan.    1903- Bedside and Verbal shift change report given to MARCUS Ivey (oncoming nurse) by MARCUS Hammond (offgoing nurse). Report included the following information SBAR, Intake/Output, MAR, Recent Results, and Cardiac Rhythm NSR.

## 2024-06-19 NOTE — PROGRESS NOTES
PHYSICAL THERAPY EVALUATION/DISCHARGE    Patient: Antwan Boucher (78 y.o. male)  Date: 6/19/2024  Primary Diagnosis: Severe sepsis (HCC) [A41.9, R65.20]       Precautions: Fall Risk, General Precautions, Contact Precautions,  ,  ,  ,  ,  ,  ,    PLOF: lives in LTC facility with access to 24/7 staff assist, bed bound     ASSESSMENT AND RECOMMENDATIONS:  Patient seen for PT evaluation and one-time treatment. Received supine; agreeable. Presenting with deficits as listed below (all chronic), impacting his ability to mobilize independently. Profound bilateral LE contractures with L windswept posture observed. Education provided on positioning to prevent skin breakdown as well as bilateral LE and lower back stretches completed to address chronic low back pain. RN provided hand off on turn schedule.   No further acute PT needs identified as patient at baseline function. PT to sign off.    Further Equipment Recommendations for Discharge:  none-owns DME    [facility]: AM-PAC Inpatient Mobility Raw Score : 7      At this time and based on an AM-PAC score, no further PT is recommended upon discharge due to patient is at baseline functional status. Dependent at baseline with access to support of LTC staff.  Recommend patient returns to prior setting with prior services.    This [facility] score should be considered in conjunction with interdisciplinary team recommendations to determine the most appropriate discharge setting. Patient's social support, diagnosis, medical stability, and prior level of function should also be taken into consideration.     SUBJECTIVE:   Patient stated “My tail hurts.”    OBJECTIVE DATA SUMMARY:     Past Medical History:   Diagnosis Date    Abnormal computed tomography of bladder     Anemia     Arthritis     Basal ganglia disease     Benign prostatic hyperplasia with lower urinary tract symptoms     Bladder calculus     Bladder cancer (HCC) 3/13/14    Bladder mass     Bladder tumor     Brain aneurysm      CVA (cerebral vascular accident) (HCC) DEC 2012    hemiplegia    Dysphagia     Erythematous bladder mucosa     Espinal catheter in place     Gross hematuria     Hemiplegia (HCC)     History of CVA with residual deficit 06/18/2024    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    HTN (hypertension)     Hx MRSA infection 09/2017    urine    Neurogenic bladder     Psychiatric disorder     Retention, urine     Stroke (HCC)     Urethral stricture      Past Surgical History:   Procedure Laterality Date    COLONOSCOPY N/A 4/29/2019    COLONOSCOPY performed by Eleazar Kenney MD at [facility] ENDOSCOPY    COLONOSCOPY N/A 1/15/2018    COLONOSCOPY w/ biopsies w/ polypectomy w/ ink injection performed by Eleazar Kenney MD at [facility] ENDOSCOPY    GI      PEG TUBE--REMOVED    HEENT      H/O TRACH    LAP,SURG,COLECTOMY, PARTIAL, W/ANAST N/A 03/15/2018    Dr. Atkins    UROLOGICAL SURGERY  10/8/15    Cysto, Dr. Pennington, [facility]    UROLOGICAL SURGERY  3/13/14    TURBT, Dr. Navarro, [facility]    UROLOGICAL SURGERY      super pubic tube       Home Situation:  Social/Functional History  Type of Home: Facility  Additional Comments: Bedbound. Receives 24/7 supervision/assistance of LTC staff.  Critical Behavior:  Orientation  Orientation Level: Oriented to person;Oriented to place       Strength:    Strength: Grossly decreased, non-functional    Tone & Sensation:   Tone: Abnormal  Sensation: Intact    Range Of Motion:  AROM: Grossly decreased, non-functional (severe bilateral hip flexion, knee flexion, ankle plantarflexion contractures; windswept posture to L)       Functional Mobility:  Bed Mobility:     Bed Mobility Training  Bed Mobility Training: Yes  Rolling: Maximum assistance;Total assistance (able to reach with RUE to assist with rolling R/L)    Pain:  Intensity Pre-treatment: 4/10   Intensity Post-treatment: 4/10  Scale: Numeric Rating Scale  Location: Buttock Hip  Quality: Aching and Sore  Intervention(s):

## 2024-06-19 NOTE — CARE COORDINATION
06/19/24 1411   IMM Letter   IMM Letter given to Patient/Family/Significant other/Guardian/POA/by: Shawna Alonso   IMM Letter date given: 06/19/24   IMM Letter time given: 1969

## 2024-06-19 NOTE — PROGRESS NOTES
[facility]   Pharmacy Pharmacokinetic Monitoring Service - Vancomycin     Antwan Boucher is a 78 y.o. male starting on vancomycin therapy for Urinary Tract Infection.   History of Multiresistant UTI from Klebsiella  Dr Kenney following patient as well  Pharmacy consulted for monitoring and adjustment.    Target Concentration: Dosing based on anticipated concentration <15 mg/L due to renal impairment/insufficiency    Additional Antimicrobials: Amikacin    Pertinent Laboratory Values:   Temp: 98.3 °F (36.8 °C), Weight - Scale: 56.7 kg (125 lb)  Recent Labs     06/18/24  0942   CREATININE 2.77*   BUN 77*   WBC 22.6*   PROCAL 127.15     Estimated Creatinine Clearance: 18 mL/min (A) (based on SCr of 2.77 mg/dL (H)).    Pertinent Cultures:  Culture Date Source Results   6/18 Blood pending   6/18 Urine pending   MRSA Nasal Swab: N/A. Non-respiratory infection    Plan:  Concentration-guided dosing due to renal impairment/insufficiency  Start vancomycin 1000 mg IV x 1 dose  Will dose according to vancomycin concentration levels at this time due to renal insufficiency  Renal labs as indicated   Vancomycin concentration ordered for AM labs tomorrow  Pharmacy will continue to monitor patient and adjust therapy as indicated    Thank you for the consult,  CARY ESPITIA RPH  6/18/2024

## 2024-06-19 NOTE — CARE COORDINATION
CM spoke with patient's wife Elisa Boucher [phone], and patient's daughter Judy Duenas [phone].   Patient has been a LTC resident with [facility] for 8 to 9 years, and patient is bedbound.   Discharge plan if for patient to return to [facility] when medically stable for discharge.       CM uploaded patient with clinicals to [facility] and LT referral sent to [facility].       [facility] accepted patient in Epic/[facility].           Shawna Alonso RN  Case Management [phone]

## 2024-06-19 NOTE — CONSULTS
Palliative Medicine  Patient Name: Antwan Boucher  YOB: 1945  MRN: [id number]  Age: 78 y.o.  Gender: male    Date of Initial Consult: 6/19/2024   Date of Service: 6/19/2024  Time: 3:10 PM  Provider: MANJINDER Bernabe NP  Hospital Day: 2  Admit Date: 6/18/2024  Referring Provider: CARA Pickard        Reasons for Consultation:  Goals of Care    HISTORY OF PRESENT ILLNESS (HPI):   Antwan Boucher is a 78 y.o. male with a past medical history of BPH, CVA/ICH, neurogenic bladder with retention status post suprapubic catheter, bladder mass, hypertension who was admitted on 6/18/2024 from [facility] with a diagnosis of sepsis.  Patient who is a resident at [facility] and [facility] he presented with altered mental status.  In the ER he was found to have a heart rate of 90 leukocytosis he was noted to be hypotensive however did improve with IV fluids his Pro-Rudolph was 127.  Palliative medicine is consulted for goals of care discussions.    June 19, 2024 patient is alert tells us he feels much better he is oriented x 3 however thought process seems to be a little slow.  He denies pain or shortness of breath.          PALLIATIVE DIAGNOSES:    Goals of care/advance care plan discussions  Encounter for palliative care  Sepsis  TERRENCE  Left hemiplegia  History of brain hemorrhage  Neurogenic bladder  Debility    ASSESSMENT AND PLAN:   Goals of care/advance care plan discussions    June 19, 2024 Patient seen along with Ms Tyron RN Mr. Boucher is alert sitting up in bed very pleasant gentleman.  He is oriented x 3.  However his thought process seems low I do not believe he can participate in a complex goals of care discussion.  He told us he is feeling much better.  Told me he lived in [place].  When I ask what he did for living he said he played baseball when he was younger.  However, when I ask with team he could not tell me.  Social; he resides at [facility] long-term  ventilator  NVPS Score : 0    RDOS:  RDOS  Heart rate per minute: less than 90  Respiratory rate per minute: less than 19  Restlessness:non-purposeful: None  Paradoxical breathing pattern:abdomen moves in on inspiration: None  Accessory muscle use: rise in clavicle during inspiration: None  Grunting at end-expiration: guttural sound: None  Nasal flaring: involuntary movement of nares: None  Total : 0      Vital Signs: Blood pressure 109/70, pulse 89, temperature 98.1 °F (36.7 °C), temperature source Oral, resp. rate 18, height 1.702 m (5' 7\"), weight 57 kg (125 lb 10.6 oz), SpO2 98 %.    PHYSICAL ASSESSMENT:   General: [x] Oriented x3  [] Well appearing  [] Intubated  []Ill appearing  [x]Other: Thought process seems to be slow  Mental Status: [] Normal mental status exam  [] Drowsy  [] Confused  [x]Other: Oriented x 3 however doubt he can make a complex medical decision  Cardiovascular: [x] Regular rate/rhythm  [] Arrhythmia  [] Other:  Chest: [x] Effort normal  []Lungs clear  [] Respiratory distress  []Tachypnea  [] Other:  Abdomen: [x] Soft/non-tender  [] Normal appearance  [] Distended  [] Ascites  [] Other:  Neurological: [] Normal speech  [] Normal sensation  [x]Deficits present: Left-sided hemiplegia  Extremity: [] Normal skin color/temp  [] Clubbing/cyanosis  [] No edema  [x] Other: Left-sided contractures    Wt Readings from Last 15 Encounters:   06/19/24 57 kg (125 lb 10.6 oz)   04/23/24 56.7 kg (125 lb)   03/19/24 56.7 kg (125 lb)   01/31/24 56.7 kg (125 lb)   01/24/24 56.7 kg (125 lb)   12/28/23 56.7 kg (125 lb)   12/05/23 58.5 kg (129 lb)   10/19/23 58.1 kg (128 lb)   09/29/23 58.1 kg (128 lb)   06/28/23 58.1 kg (128 lb)   06/08/23 58.1 kg (128 lb)   05/26/23 66.7 kg (147 lb)   03/01/23 66.7 kg (147 lb)   03/17/22 66.7 kg (147 lb)   03/08/22 66.7 kg (147 lb)        Current Diet: ADULT DIET; Easy to Chew       PSYCHOSOCIAL/SPIRITUAL SCREENING:   Palliative IDT has assessed this patient for cultural

## 2024-06-19 NOTE — PROGRESS NOTES
OCCUPATIONAL THERAPY EVALUATION/DISCHARGE    Patient: Antwan Boucher (78 y.o. male)  Date: 6/19/2024  Primary Diagnosis: Severe sepsis (HCC) [A41.9, R65.20]       Precautions: Fall Risk, General Precautions, Contact Precautions  PLOF: Pt resides in LTC facility, assist for all ADLs and bed mobility.     ASSESSMENT AND RECOMMENDATIONS:    Pt is agreeable to OT this am, demos decreased but functional AROM and strength in RUE, contracture in LUE. Pt performed simple grooming ADLs with CGA- min A using RUE, required Mod A for bilateral task due to no functional use of LUE, pain with PROM. Max-Total A x2 for bed mobility for repositioning as pt c/o pain in buttocks. Based on objective data below pt presents at functional baseline for ADLs and functional mobility, no further OT indicated, recommend frequent skin checks for LUE to prevent skin injury due to hypertonicity.  Maximum therapeutic gains met at current level of care and patient will be discharged from occupational therapy at this time.    Further Equipment Recommendations for Discharge: hospital bed with pressure relief function    AMPA: AM-[facility] Inpatient Daily Activity Raw Score: 10    At this time and based on an AM-PAC score, no further OT is recommended upon discharge.  Recommend patient returns to prior setting with prior services.    This [facility] score should be considered in conjunction with interdisciplinary team recommendations to determine the most appropriate discharge setting. Patient's social support, diagnosis, medical stability, and prior level of function should also be taken into consideration.     SUBJECTIVE:   Patient stated “I am okay.”    OBJECTIVE DATA SUMMARY:     Past Medical History:   Diagnosis Date    Abnormal computed tomography of bladder     Anemia     Arthritis     Basal ganglia disease     Benign prostatic hyperplasia with lower urinary tract symptoms     Bladder calculus     Bladder cancer (HCC) 3/13/14    Bladder mass

## 2024-06-19 NOTE — CARE COORDINATION
06/19/24 1417   Service Assessment   Patient Orientation Alert and Oriented;Person;Place;Situation;Self   Cognition Alert   History Provided By Significant Other   Primary Caregiver Other (Comment)  (Patient is a Long Term Care resident at [facility].)   Accompanied By/Relationship No one is currently at the bedside.   Support Systems Spouse/Significant Other;Children   Patient's Healthcare Decision Maker is:   (Not applicable.)   PCP Verified by CM Yes   Last Visit to PCP Within last 3 months  (Patient's PCP is at the Long Term Care [facility].)   Prior Functional Level Assistance with the following:;Bathing;Dressing;Toileting;Feeding;Mobility   Current Functional Level Mobility;Feeding;Toileting;Dressing;Bathing;Assistance with the following:   Can patient return to prior living arrangement Yes   Ability to make needs known: Fair   Family able to assist with home care needs: No  (Patient is a Long Term Care resident at [facility].)   Would you like for me to discuss the discharge plan with any other family members/significant others, and if so, who? Yes  (Patient's wife, patient's daughter, and patient's son, all listed in patient's contact list.)   Financial Resources Medicare;Medicaid   Community Resources None   CM/SW Referral Other (see comment)  (Discharge planning.)   Social/Functional History   Lives With Other (comment)  (Patient is a Long Term Care resident at [facility].)   Type of Home Facility  (Patient is a Long Term Care resident at [facility].)   Home Layout One level   Home Access Level entry   Bathroom Shower/Tub   (Not applicable, patient is bedbound.)   Bathroom Toilet   (Not applicable, patient is bedbound.)   Bathroom Equipment   (Not applicable, patient is bedbound.)   Bathroom Accessibility   (Not applicable, patient is bedbound.)   Home Equipment Adjustable bed  (Patient has all needed DME at [facility]

## 2024-06-20 LAB
ANION GAP SERPL CALC-SCNC: 5 MMOL/L (ref 3–18)
BACTERIA SPEC CULT: NORMAL
BASOPHILS # BLD: 0 K/UL (ref 0–0.1)
BASOPHILS # BLD: 0.1 K/UL (ref 0–0.1)
BASOPHILS NFR BLD: 0 % (ref 0–2)
BASOPHILS NFR BLD: 0 % (ref 0–2)
BUN SERPL-MCNC: 34 MG/DL (ref 7–18)
BUN/CREAT SERPL: 28 (ref 12–20)
CALCIUM SERPL-MCNC: 9.4 MG/DL (ref 8.5–10.1)
CC UR VC: NORMAL
CHLORIDE SERPL-SCNC: 112 MMOL/L (ref 100–111)
CO2 SERPL-SCNC: 26 MMOL/L (ref 21–32)
CREAT SERPL-MCNC: 1.2 MG/DL (ref 0.6–1.3)
DIFFERENTIAL METHOD BLD: ABNORMAL
DIFFERENTIAL METHOD BLD: ABNORMAL
EOSINOPHIL # BLD: 0.2 K/UL (ref 0–0.4)
EOSINOPHIL # BLD: 0.2 K/UL (ref 0–0.4)
EOSINOPHIL NFR BLD: 2 % (ref 0–5)
EOSINOPHIL NFR BLD: 2 % (ref 0–5)
ERYTHROCYTE [DISTWIDTH] IN BLOOD BY AUTOMATED COUNT: 17.2 % (ref 11.6–14.5)
ERYTHROCYTE [DISTWIDTH] IN BLOOD BY AUTOMATED COUNT: 17.3 % (ref 11.6–14.5)
GLUCOSE BLD STRIP.AUTO-MCNC: 82 MG/DL (ref 70–110)
GLUCOSE SERPL-MCNC: 105 MG/DL (ref 74–99)
HCT VFR BLD AUTO: 34.1 % (ref 36–48)
HCT VFR BLD AUTO: 36.1 % (ref 36–48)
HGB BLD-MCNC: 10.7 G/DL (ref 13–16)
HGB BLD-MCNC: 11.5 G/DL (ref 13–16)
IMM GRANULOCYTES # BLD AUTO: 0.2 K/UL (ref 0–0.04)
IMM GRANULOCYTES # BLD AUTO: 0.3 K/UL (ref 0–0.04)
IMM GRANULOCYTES NFR BLD AUTO: 1 % (ref 0–0.5)
IMM GRANULOCYTES NFR BLD AUTO: 2 % (ref 0–0.5)
LYMPHOCYTES # BLD: 0.6 K/UL (ref 0.9–3.6)
LYMPHOCYTES # BLD: 0.7 K/UL (ref 0.9–3.6)
LYMPHOCYTES NFR BLD: 5 % (ref 21–52)
LYMPHOCYTES NFR BLD: 5 % (ref 21–52)
MCH RBC QN AUTO: 30.1 PG (ref 24–34)
MCH RBC QN AUTO: 30.3 PG (ref 24–34)
MCHC RBC AUTO-ENTMCNC: 31.4 G/DL (ref 31–37)
MCHC RBC AUTO-ENTMCNC: 31.9 G/DL (ref 31–37)
MCV RBC AUTO: 95.3 FL (ref 78–100)
MCV RBC AUTO: 95.8 FL (ref 78–100)
MONOCYTES # BLD: 0.6 K/UL (ref 0.05–1.2)
MONOCYTES # BLD: 0.9 K/UL (ref 0.05–1.2)
MONOCYTES NFR BLD: 5 % (ref 3–10)
MONOCYTES NFR BLD: 6 % (ref 3–10)
NEUTS SEG # BLD: 11.3 K/UL (ref 1.8–8)
NEUTS SEG # BLD: 11.8 K/UL (ref 1.8–8)
NEUTS SEG NFR BLD: 84 % (ref 40–73)
NEUTS SEG NFR BLD: 87 % (ref 40–73)
NRBC # BLD: 0 K/UL (ref 0–0.01)
NRBC # BLD: 0 K/UL (ref 0–0.01)
NRBC BLD-RTO: 0 PER 100 WBC
NRBC BLD-RTO: 0 PER 100 WBC
PLATELET # BLD AUTO: 137 K/UL (ref 135–420)
PLATELET # BLD AUTO: 152 K/UL (ref 135–420)
PMV BLD AUTO: 11.3 FL (ref 9.2–11.8)
PMV BLD AUTO: 12.1 FL (ref 9.2–11.8)
POTASSIUM SERPL-SCNC: 3.9 MMOL/L (ref 3.5–5.5)
RBC # BLD AUTO: 3.56 M/UL (ref 4.35–5.65)
RBC # BLD AUTO: 3.79 M/UL (ref 4.35–5.65)
SERVICE CMNT-IMP: NORMAL
SODIUM SERPL-SCNC: 143 MMOL/L (ref 136–145)
WBC # BLD AUTO: 13.4 K/UL (ref 4.6–13.2)
WBC # BLD AUTO: 13.6 K/UL (ref 4.6–13.2)

## 2024-06-20 PROCEDURE — 6360000002 HC RX W HCPCS: Performed by: PHYSICIAN ASSISTANT

## 2024-06-20 PROCEDURE — 6360000002 HC RX W HCPCS: Performed by: INTERNAL MEDICINE

## 2024-06-20 PROCEDURE — 80048 BASIC METABOLIC PNL TOTAL CA: CPT

## 2024-06-20 PROCEDURE — 2580000003 HC RX 258: Performed by: INTERNAL MEDICINE

## 2024-06-20 PROCEDURE — 2140000001 HC CVICU INTERMEDIATE R&B

## 2024-06-20 PROCEDURE — 0T2BX0Z CHANGE DRAINAGE DEVICE IN BLADDER, EXTERNAL APPROACH: ICD-10-PCS | Performed by: INTERNAL MEDICINE

## 2024-06-20 PROCEDURE — 36415 COLL VENOUS BLD VENIPUNCTURE: CPT

## 2024-06-20 PROCEDURE — 82962 GLUCOSE BLOOD TEST: CPT

## 2024-06-20 PROCEDURE — 2580000003 HC RX 258: Performed by: PHYSICIAN ASSISTANT

## 2024-06-20 PROCEDURE — 85025 COMPLETE CBC W/AUTO DIFF WBC: CPT

## 2024-06-20 PROCEDURE — 94761 N-INVAS EAR/PLS OXIMETRY MLT: CPT

## 2024-06-20 PROCEDURE — 99232 SBSQ HOSP IP/OBS MODERATE 35: CPT | Performed by: STUDENT IN AN ORGANIZED HEALTH CARE EDUCATION/TRAINING PROGRAM

## 2024-06-20 RX ORDER — DEXTROSE MONOHYDRATE 50 MG/ML
INJECTION, SOLUTION INTRAVENOUS CONTINUOUS
Status: DISCONTINUED | OUTPATIENT
Start: 2024-06-20 | End: 2024-06-21

## 2024-06-20 RX ADMIN — SODIUM CHLORIDE: 450 INJECTION, SOLUTION INTRAVENOUS at 05:16

## 2024-06-20 RX ADMIN — CEFEPIME 2000 MG: 2 INJECTION, POWDER, FOR SOLUTION INTRAVENOUS at 21:04

## 2024-06-20 RX ADMIN — ENOXAPARIN SODIUM 30 MG: 100 INJECTION SUBCUTANEOUS at 08:58

## 2024-06-20 RX ADMIN — DEXTROSE MONOHYDRATE: 50 INJECTION, SOLUTION INTRAVENOUS at 15:01

## 2024-06-20 RX ADMIN — WATER 2000 MG: 1 INJECTION INTRAMUSCULAR; INTRAVENOUS; SUBCUTANEOUS at 09:35

## 2024-06-20 RX ADMIN — SODIUM CHLORIDE, PRESERVATIVE FREE 10 ML: 5 INJECTION INTRAVENOUS at 21:06

## 2024-06-20 ASSESSMENT — ENCOUNTER SYMPTOMS
SORE THROAT: 0
DIARRHEA: 0
PHOTOPHOBIA: 0
COUGH: 0
HEMATOCHEZIA: 0
NAUSEA: 0
COLOR CHANGE: 0
SHORTNESS OF BREATH: 0
CONSTIPATION: 0
WHEEZING: 0
ABDOMINAL PAIN: 0
EYE PAIN: 0
VOMITING: 0

## 2024-06-20 ASSESSMENT — PAIN SCALES - GENERAL
PAINLEVEL_OUTOF10: 0

## 2024-06-20 NOTE — CONSULTS
6/18/24: >100K 2 mixed organisms  4. Creatine 1.7 stable, gentle fluid hydration per nephrology   5. Continue antibiotic treatment per ID recommendations  6. SPT catheter exchanged today with 16 Fr silicone catheter. Patient tolerated well.  7. Urology Signed Off    FU: Patient to maintain SPT catheter exchanges in office every 30 days, FU appt message sent to ANGLE Burleson.     Sandra MARIO Barnes, APRN - CNP  [phone] Office  [phone]  Pager    Chief Complaint   Patient presents with    Altered Mental Status       HISTORY OF PRESENT ILLNESS:  Antwan Boucher is a 78 y.o. male who is seen in consultation as referred by Dr. Manolo Limon  for SPT catheter exchange.PMHx of BPH, CVA/ICH, HTN, neurogenic bladder with retention, suprapubic catheter who presented to the ED from [facility] and rehab due to AMS. Patient is unable to provide any history. Per [facility] staff, he is normally A&O x4. Last known well was 0600 this morning, but he was found altered when they brought him breakfast at 0830 - eyes open, but not following commands or responding appropriately.  Patient admitted for urosepsis, ID and Nephrology consulted as well for this admission.     Urological history includes hx of neurogenic bladder (chronic SPT catheter), urinary retention, recurrent UTI, BPH w Luts, bladder spasms hx of bladder cancer s/p TURBT 2/2014, pathology PUNLMP. Most recent cystoscopy 6/8/23 revealed severe bullous edema with moderate debris within the bladder. Last seen in the office by CARA Morales on 1/31/24. Currently on Trospium 60 mg ER for bladder spasms. Last seen in office on nurse visit on 5/20/24 for SPT exchange.     Patient laying on right side in bed. Alert, follows simple commands. Patient denies pain, suprapubic pain, or flank pain. No gross hematuria. Afebrile. SPT catheter draining to gravity with pale, yellow urine.          6/20/2024    12:00 PM   [facility] CLINCIAL VISIT   Pain Assessment None - Denies Pain   Pain  Level 0   Patient's Stated Pain Goal 0 - No pain   POSS Score 1       Past Medical History:   Diagnosis Date    Abnormal computed tomography of bladder     Anemia     Arthritis     Basal ganglia disease     Benign prostatic hyperplasia with lower urinary tract symptoms     Bladder calculus     Bladder cancer (HCC) 3/13/14    Bladder mass     Bladder tumor     Brain aneurysm     CVA (cerebral vascular accident) (HCC) DEC 2012    hemiplegia    Dysphagia     Erythematous bladder mucosa     Espinal catheter in place     Gross hematuria     Hemiplegia (HCC)     History of CVA with residual deficit 2024    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 2024    HTN (hypertension)     Hx MRSA infection 2017    urine    Neurogenic bladder     Psychiatric disorder     Retention, urine     Stroke (HCC)     Urethral stricture        Past Surgical History:   Procedure Laterality Date    COLONOSCOPY N/A 2019    COLONOSCOPY performed by Eleazar Kenney MD at [facility] ENDOSCOPY    COLONOSCOPY N/A 1/15/2018    COLONOSCOPY w/ biopsies w/ polypectomy w/ ink injection performed by Eleazar Kenney MD at [facility] ENDOSCOPY    GI      PEG TUBE--REMOVED    HEENT      H/O TRACH    LAP,SURG,COLECTOMY, PARTIAL, W/ANAST N/A 03/15/2018    Dr. Atkins    UROLOGICAL SURGERY  10/8/15    Cysto, Dr. Pennington, [facility]    UROLOGICAL SURGERY  3/13/14    TURBT, Dr. Navarro, [facility]    UROLOGICAL SURGERY      super pubic tube       Social History     Tobacco Use    Smoking status: Former     Current packs/day: 0.00     Types: Cigarettes     Quit date: 2010     Years since quittin.4    Smokeless tobacco: Never   Substance Use Topics    Alcohol use: No    Drug use: No       Allergies   Allergen Reactions    Penicillins Hives, Itching and Other (See Comments)     intolerance       Family History   Problem Relation Age of Onset    Hypertension Mother     Hypertension Father        Current Facility-Administered Medications

## 2024-06-20 NOTE — PROGRESS NOTES
Progress Note    Antwan Boucher  78 y.o.      Admit Date: 6/18/2024  Patient Active Problem List   Diagnosis    Contractures of both knees    Urinary tract infection associated with cystostomy catheter (HCC)    Neurogenic bladder    Stroke (HCC)    Hemiplegia (HCC)    Hematuria    Malignant neoplasm of urinary bladder (HCC)    MRSA infection    Advanced care planning/counseling discussion    Dysphagia    Basal ganglia disease    Bladder tumor    Retention of urine    Urinary tract calculus    Hx MRSA infection    Brain aneurysm    Cerebral edema (HCC)    Hx of bladder cancer    Anemia    Hyperosmolality and/or hypernatremia    HTN (hypertension)    Intracerebral hemorrhage (HCC)    Chronic indwelling Espinal catheter    Erythematous bladder mucosa    Gross hematuria    Urethral stricture    Severe sepsis (HCC)    Debility    TERRENCE (acute kidney injury) (HCC)    Hypernatremia    History of CVA with residual deficit    Acute metabolic encephalopathy    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  Maintains nearly urine through the suprapubic catheter.  Serum sodium still high but slowly improving.       A comprehensive review of systems was negative except for that written in the History of Present Illness.    Objective:     /76   Pulse 85   Temp 99 °F (37.2 °C) (Oral)   Resp 16   Ht 1.702 m (5' 7\")   Wt 57 kg (125 lb 10.6 oz)   SpO2 98%   BMI 19.68 kg/m²       Intake/Output Summary (Last 24 hours) at 6/20/2024 1607  Last data filed at 6/20/2024 1347  Gross per 24 hour   Intake 2455 ml   Output 1090 ml   Net 1365 ml       Current Facility-Administered Medications   Medication Dose Route

## 2024-06-20 NOTE — PROCEDURES
PROCEDURE NOTE  Date: 6/20/2024   Name: Antwan Boucher  YOB: 1945    Procedures           SPT Catheter Exchange      Procedure: The patient was prepped in the usual sterile manner. A lubricated 16Fr Wright catheter was then passed into the urethra/urethral meatus and advanced into the bladder without difficulty with an immediate return of pink tinged urine initially s/p insertion, quickly changed to pale, yellow, urine. The wright catheter balloon was then inflated with 10cc's of water and the wright drainage bag was put to gravity. Wright catheter was anchored to thigh. The patient tolerated the procedure well.    Thank you for allowing us to participate in the care of this patient. If further assistance with this patient is required, please feel free to contact us as needed.    Sandra Barnes DNP, NP-C       [address] [place] [phone]

## 2024-06-20 NOTE — PROGRESS NOTES
[facility]   Pharmacy Pharmacokinetic Monitoring Service - Aminoglycoside    Antwan Boucher is a 78 y.o. male resuming amikacin therapy for Bloodstream Infection. Pharmacy consulted by Dr. Kenney for monitoring and adjustment.    Target Concentration: Peak: 20-25 mcg/mL, Trough: 1-4 mcg/mL    Additional Antimicrobials: Cefepime    Pertinent Laboratory Values:   Temp: 98.1 °F (36.7 °C), Weight - Scale: 57 kg (125 lb 10.6 oz)  Recent Labs     06/18/24  0942 06/19/24  0429 06/20/24  0450   BUN 77* 58*  --    WBC 22.6* 18.3* 13.6*     Estimated Creatinine Clearance: 30 mL/min (A) (based on SCr of 1.71 mg/dL (H)).    Pertinent Cultures:  Culture Date Source Results   6/18 Blood Klebsiella    6/19 Urine      Plan:  Concentration-guided dosing due to renal impairment/insufficiency  Amikacin 275 mg IV x 1 today  Renal labs as indicated   Amikacin concentration ordered with AM labs tomorrow  Pharmacy will continue to monitor patient and adjust therapy as indicated    Thank you for the consult,  HAIDER OLIVA [facility]  6/20/2024

## 2024-06-20 NOTE — PROGRESS NOTES
[facility] Hospitalist Group  Progress Note    Patient: Antwan Boucher Age: 78 y.o. : 1945 MR#: [id number] SSN: [id number]  Date/Time: 2024    Subjective:   Subjective:  Symptoms:  Stable.  No shortness of breath, cough, chest pain, weakness, headache, chest pressure, diarrhea or anxiety.    Diet:  Adequate intake.  No nausea or vomiting.    Activity level: Normal.    Pain:  He reports no pain.       No acute events overnight, no new concerns or complaints, vitals stable.    Review of Systems   Constitutional: Negative for chills, fever and malaise/fatigue.   HENT:  Negative for sore throat.    Eyes:  Negative for pain, photophobia and visual disturbance.   Cardiovascular:  Negative for chest pain, irregular heartbeat, palpitations and syncope.   Respiratory:  Negative for cough, shortness of breath and wheezing.    Skin:  Negative for color change, dry skin and rash.   Gastrointestinal:  Negative for abdominal pain, constipation, diarrhea, hematochezia, melena, nausea and vomiting.   Neurological:  Negative for disturbances in coordination, dizziness, focal weakness, headaches and weakness.   Psychiatric/Behavioral:  Negative for altered mental status, depression, hallucinations and suicidal ideas.       Assessment/Plan:   Gram-negative emilia bacteremia, likely Klebsiella  Severe sepsis due to UTI  Acute renal failure  Hypernatremia  Suspected pneumonia   Acute encephalopathy  Functional quadriplegia  Suprapubic catheter  Hypoalbuminemia  Hyperglobulinemia  Hyperchloremia      Plan  Infectious disease nephrology consulted, palliative consulted, urology consulted.  Continue antibiotics per their recommendation  Nephrology consulted, appreciate their assistance as well, gentle hydration with improving hypernatremia at this point  Palliative medicine consulted for goals of care discussion-  Urology to exchange suprapubic catheter today  Continue plan otherwise as

## 2024-06-20 NOTE — PROGRESS NOTES
marcescens by PCR Not detected        Haemophilus Influenzae by PCR Not detected        Neisseria meningitidis by PCR Not detected        Pseudomonas aeruginosa Not detected        Stenotrophomonas maltophilia by PCR Not detected        Candida albicans by PCR Not detected        Candida auris by PCR Not detected        Candida glabrata Not detected        Candida krusei by PCR Not detected        Candida parapsilosis by PCR Not detected        Candida tropicalis by PCR Not detected        Cryptococcus neoformans/gattii by PCR Not detected        Resistant gene targets          Resistant gene ctx-m by PCR Detected        Resistant gene imp by PCR Not detected        KPC (Carbapenem resistance gene) Not detected        Colistin Resistance mcr-1 gene by PCR Not detected        Resistant gene ndm by PCR Not detected        Resistant gene oxa-48-like by pcr Not detected        Resistant gene vim by PCR Not detected        [employer] test comment       False positive results may rarely occur. Correlate with clinical,epidemiologic, and other laboratory findings           Comment: Please see BCID Interpretation Guide in EPIC Links       Blood Culture 1 [[id number]]  (Abnormal) Collected: 06/18/24 1036    Order Status: Completed Specimen: Blood Updated: 06/19/24 1423     Special Requests NO SPECIAL REQUESTS        Gram Stain       ANAEROBIC BOTTLE Gram negative rods                  SMEAR CALLED TO AND CORRECTLY REPEATED BY: MARCUS DE LEON CVTSD ON 19JUN24 AT 0813 HRS TO 1396.           Culture       Gram negative rods GROWING IN 1 OF 2 BOTTLES DRAWN SITE = LAC          Blood Culture 2 [[id number]]  (Abnormal) Collected: 06/18/24 1036    Order Status: Completed Specimen: Blood Updated: 06/19/24 1950     Special Requests NO SPECIAL REQUESTS        Gram Stain       AEROBIC AND ANAEROBIC BOTTLES Gram negative rods                  SMEAR CALLED TO AND CORRECTLY REPEATED BY: MARCUS DE LEON CVTSD ON 19JUN24 AT 0813 HRS TO 1396.            Culture       PROBABLE Klebsiella pneumoniae GROWING IN 1 OF 2 BOTTLES DRAWN SITE = LFA                        Imaging:     All available imaging since presentation reviewed as per EPIC

## 2024-06-20 NOTE — PROGRESS NOTES
Pharmacy Note     cefepime 1000 gm Every 12 hours ordered for treatment of Blood stream infection. Per [facility] Policy, Cefepime will be changed to Cefepime 2000 gm Once followed by Cefepime 2000 mg Every 12 hours.     Estimated Creatinine Clearance: Estimated Creatinine Clearance: 30 mL/min (A) (based on SCr of 1.71 mg/dL (H)).  Dialysis Status, TERRENCE, CKD: N/A    BMI:  Body mass index is 19.68 kg/m².    Rationale for Adjustment:  [facility] B-Lactam extended infusion policy    Pharmacy will continue to monitor and adjust dose as necessary.      Please call with any questions.    Thank you,  Wilmer Garza RPH

## 2024-06-20 NOTE — PROGRESS NOTES
Bedside and Verbal shift change report given by MOHAMUD Brownlee RN (offgoing nurse). Report included the following information Nurse Handoff Report, Index, Intake/Output, Recent Results, and Cardiac Rhythm NSR .

## 2024-06-21 LAB
ANION GAP SERPL CALC-SCNC: 7 MMOL/L (ref 3–18)
ANION GAP SERPL CALC-SCNC: 7 MMOL/L (ref 3–18)
BASOPHILS # BLD: 0 K/UL (ref 0–0.1)
BASOPHILS NFR BLD: 0 % (ref 0–2)
BUN SERPL-MCNC: 18 MG/DL (ref 7–18)
BUN SERPL-MCNC: 22 MG/DL (ref 7–18)
BUN/CREAT SERPL: 18 (ref 12–20)
BUN/CREAT SERPL: 20 (ref 12–20)
CALCIUM SERPL-MCNC: 8.8 MG/DL (ref 8.5–10.1)
CALCIUM SERPL-MCNC: 8.9 MG/DL (ref 8.5–10.1)
CHLORIDE SERPL-SCNC: 110 MMOL/L (ref 100–111)
CHLORIDE SERPL-SCNC: 111 MMOL/L (ref 100–111)
CO2 SERPL-SCNC: 24 MMOL/L (ref 21–32)
CO2 SERPL-SCNC: 24 MMOL/L (ref 21–32)
CREAT SERPL-MCNC: 1 MG/DL (ref 0.6–1.3)
CREAT SERPL-MCNC: 1.1 MG/DL (ref 0.6–1.3)
DIFFERENTIAL METHOD BLD: ABNORMAL
EOSINOPHIL # BLD: 0.2 K/UL (ref 0–0.4)
EOSINOPHIL NFR BLD: 2 % (ref 0–5)
ERYTHROCYTE [DISTWIDTH] IN BLOOD BY AUTOMATED COUNT: 16.7 % (ref 11.6–14.5)
GLUCOSE SERPL-MCNC: 133 MG/DL (ref 74–99)
GLUCOSE SERPL-MCNC: 143 MG/DL (ref 74–99)
HCT VFR BLD AUTO: 34.5 % (ref 36–48)
HGB BLD-MCNC: 11.2 G/DL (ref 13–16)
IMM GRANULOCYTES # BLD AUTO: 0.4 K/UL (ref 0–0.04)
IMM GRANULOCYTES NFR BLD AUTO: 4 % (ref 0–0.5)
LYMPHOCYTES # BLD: 0.7 K/UL (ref 0.9–3.6)
LYMPHOCYTES NFR BLD: 6 % (ref 21–52)
MAGNESIUM SERPL-MCNC: 2 MG/DL (ref 1.6–2.6)
MCH RBC QN AUTO: 29.9 PG (ref 24–34)
MCHC RBC AUTO-ENTMCNC: 32.5 G/DL (ref 31–37)
MCV RBC AUTO: 92.2 FL (ref 78–100)
MONOCYTES # BLD: 0.7 K/UL (ref 0.05–1.2)
MONOCYTES NFR BLD: 6 % (ref 3–10)
NEUTS SEG # BLD: 8.7 K/UL (ref 1.8–8)
NEUTS SEG NFR BLD: 81 % (ref 40–73)
NRBC # BLD: 0 K/UL (ref 0–0.01)
NRBC BLD-RTO: 0 PER 100 WBC
PLATELET # BLD AUTO: 161 K/UL (ref 135–420)
PMV BLD AUTO: 11.2 FL (ref 9.2–11.8)
POTASSIUM SERPL-SCNC: 3.3 MMOL/L (ref 3.5–5.5)
POTASSIUM SERPL-SCNC: 3.6 MMOL/L (ref 3.5–5.5)
RBC # BLD AUTO: 3.74 M/UL (ref 4.35–5.65)
SODIUM SERPL-SCNC: 141 MMOL/L (ref 136–145)
SODIUM SERPL-SCNC: 142 MMOL/L (ref 136–145)
WBC # BLD AUTO: 10.7 K/UL (ref 4.6–13.2)

## 2024-06-21 PROCEDURE — 80150 ASSAY OF AMIKACIN: CPT

## 2024-06-21 PROCEDURE — 36415 COLL VENOUS BLD VENIPUNCTURE: CPT

## 2024-06-21 PROCEDURE — 94761 N-INVAS EAR/PLS OXIMETRY MLT: CPT

## 2024-06-21 PROCEDURE — 36569 INSJ PICC 5 YR+ W/O IMAGING: CPT

## 2024-06-21 PROCEDURE — 2580000003 HC RX 258: Performed by: PHYSICIAN ASSISTANT

## 2024-06-21 PROCEDURE — 2580000003 HC RX 258: Performed by: INTERNAL MEDICINE

## 2024-06-21 PROCEDURE — 6360000002 HC RX W HCPCS: Performed by: INTERNAL MEDICINE

## 2024-06-21 PROCEDURE — 83735 ASSAY OF MAGNESIUM: CPT

## 2024-06-21 PROCEDURE — 2140000001 HC CVICU INTERMEDIATE R&B

## 2024-06-21 PROCEDURE — 85025 COMPLETE CBC W/AUTO DIFF WBC: CPT

## 2024-06-21 PROCEDURE — 6360000002 HC RX W HCPCS: Performed by: PHYSICIAN ASSISTANT

## 2024-06-21 PROCEDURE — 99239 HOSP IP/OBS DSCHRG MGMT >30: CPT | Performed by: STUDENT IN AN ORGANIZED HEALTH CARE EDUCATION/TRAINING PROGRAM

## 2024-06-21 PROCEDURE — 05H933Z INSERTION OF INFUSION DEVICE INTO RIGHT BRACHIAL VEIN, PERCUTANEOUS APPROACH: ICD-10-PCS | Performed by: INTERNAL MEDICINE

## 2024-06-21 PROCEDURE — 80048 BASIC METABOLIC PNL TOTAL CA: CPT

## 2024-06-21 RX ORDER — POTASSIUM CHLORIDE, DEXTROSE MONOHYDRATE 150; 5 MG/100ML; G/100ML
INJECTION, SOLUTION INTRAVENOUS CONTINUOUS
Status: DISCONTINUED | OUTPATIENT
Start: 2024-06-21 | End: 2024-06-22

## 2024-06-21 RX ADMIN — CEFEPIME 2000 MG: 2 INJECTION, POWDER, FOR SOLUTION INTRAVENOUS at 08:24

## 2024-06-21 RX ADMIN — SODIUM CHLORIDE, PRESERVATIVE FREE 10 ML: 5 INJECTION INTRAVENOUS at 20:03

## 2024-06-21 RX ADMIN — DEXTROSE MONOHYDRATE: 50 INJECTION, SOLUTION INTRAVENOUS at 03:46

## 2024-06-21 RX ADMIN — SODIUM CHLORIDE, PRESERVATIVE FREE 10 ML: 5 INJECTION INTRAVENOUS at 10:14

## 2024-06-21 RX ADMIN — MEROPENEM 1000 MG: 1 INJECTION, POWDER, FOR SOLUTION INTRAVENOUS at 18:15

## 2024-06-21 RX ADMIN — MEROPENEM 1000 MG: 1 INJECTION, POWDER, FOR SOLUTION INTRAVENOUS at 10:34

## 2024-06-21 RX ADMIN — ENOXAPARIN SODIUM 30 MG: 100 INJECTION SUBCUTANEOUS at 08:24

## 2024-06-21 RX ADMIN — POTASSIUM CHLORIDE AND DEXTROSE MONOHYDRATE: 150; 5 INJECTION, SOLUTION INTRAVENOUS at 13:54

## 2024-06-21 ASSESSMENT — PAIN SCALES - GENERAL
PAINLEVEL_OUTOF10: 0

## 2024-06-21 NOTE — PROGRESS NOTES
Visited patient for ordered PICC line. Patient does not communicate well, unable to reliably obtain informed consent. Spoke with nursing staff who will contact family/decision maker for consent and replace PICC order.

## 2024-06-21 NOTE — CARE COORDINATION
Call made to [employer] transportation [phone], spoke with Antwan, dispatch will call the Nurses station with [facility]. Requested 4 pm .  Trip # [id number].

## 2024-06-21 NOTE — PROGRESS NOTES
Revisited patient after order replaced to dynamic. No consent found in chart and patient still noncommunitive, unable to obtain consent at this time. Recommend nursing to reorder PICC through dynamic when family/decision maker can be reached for consent

## 2024-06-21 NOTE — CARE COORDINATION
NISA spoke with [facility] with [facility], updated that patient discharging today, will need PICC line, and IV Meropenem 1000 mg TID stop date 06/28/2024.   NISA let [facility] know that will request 4 pm transport due to PICC line needed.   [facility] said they can take patient back, and that patient will need to be at the LTC facility before 8 pm, or they will not be able to take patient today.               Shawna Alonso RN  Case Management [phone]

## 2024-06-21 NOTE — PROGRESS NOTES
[facility]  (A Division of [facility])      Consultation Note      Date of Admission: 6/18/2024    Date of Note: 6/21/2024      Reason for Referral: Multidrug-resistant UTI  Referring Physician: Dr. Boni Dickinson from this admission:   6/18 blood cultures: 2 out of 4 Klebsiella pneumonia (sensitive to amikacin, meropenem, Zosyn)  6/18 urine culture: Greater than 100,000 x 2 organisms, considered contaminant  6/18 respiratory viral panel: Negative for all tested pathogen    Prior cultures reviewed:  12/5/2023 urine culture: Klebsiella sensitive to amikacin, Zosyn, intermediate to Macrodantin and gentamicin    Current Antimicrobials:    Prior Antimicrobials:  Cefepime 6/18 to present  Amikacin 6/18   Vanc 6/18       Assessment:         MDRO Klebsiella urosepsis: In setting of chronic suprapubic catheter.   -6/18 UA too numerous to count WBCs, large leukocyte esterase and unable to calculate bacteria or RBCs.   -CT abdomen and pelvis from 6/18 shows intact suprapubic catheter, no hydronephrosis, thickened bladder wall.  No evidence of pyelonephritis or perinephric stranding.  Sepsis and elevated lactic acid: With leukocytosis, altered mental status and hypotension  Acute renal failure: resolved  Hypernatremia  Suspected pneumonia: CT abdomen and pelvis with bilateral lung base opacifications.  Unclear if atelectasis or pneumonia.  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 and neurogenic bladder: Bedbound, contracted  History of recurrent UTIs with multidrug-resistant bacteria:  last avaliable abx documented was macrobid on 1/24/24   -Suprapubic catheter changed by urology on 6/20  PCN allergy:  unable to describe reaction due to AMS    Plan:   Follow-up 6/18 blood cultures-2 out of 4 multidrug-resistant Klebsiella  Follow-up 6/18 urine cultures-greater than 100,000 organisms,  98.5 °F (36.9 °C) (Oral)   Resp 18   Ht 1.702 m (5' 7\")   Wt 57 kg (125 lb 10.6 oz)   SpO2 96%   BMI 19.68 kg/m²   Temp (24hrs), Av.9 °F (37.2 °C), Min:98.5 °F (36.9 °C), Max:99.6 °F (37.6 °C)        General:   awake alert and oriented, appears stated age thin, chronically ill-appearing   Skin:   no rashes or skin lesions noted on limited exam, no jaundice   HEENT:  Normocephalic, atraumatic, PERRL, EOMI, no scleral icterus; no conjunctival hemmohage;    Lymph Nodes:   no cervical or inguinal adenopathy   Lungs:   non-labored, bilaterally clear to aspiration   Heart:  RRR, s1 and s2; no murmurs, no edema, + pedal pulses   Abdomen:  soft, non-distended, active bowel sounds. Non-tender   Genitourinary:  deferred   Extremities:   no clubbing, cyanosis; contracted, muscle wasting   Neurologic:  No gross focal sensory abnormalities; left-sided hemiplegia confused, difficult historian; speech hard to understand. Cranial nerves intact   Psychiatric:   appropriate and interactive.       Labs: Results:   Chemistry Recent Labs     24  0942 24  0429 24  1527 24  0358   * 147* 143 141   K 4.2 4.4 3.9 3.3*   * 117* 112* 110   CO2 24  24   BUN 77* 58* 34* 22*   GLOB 4.1* 4.2*  --   --       CBC w/Diff Recent Labs     24  0450 24  1527 24  0358   WBC 13.6* 13.4* 10.7   RBC 3.56* 3.79* 3.74*   HGB 10.7* 11.5* 11.2*   HCT 34.1* 36.1 34.5*    152 161            No results found for: \"SDES\" No components found for: \"CULT\"     Results       Procedure Component Value Units Date/Time    Respiratory Panel, Molecular, with COVID-19 (Restricted: peds pts or suitable admitted adults) [[id number]] Collected: 24 1050    Order Status: Completed Specimen: Nasopharyngeal Updated: 24 1253     Adenovirus by PCR Not detected        Coronavirus 229E by PCR Not detected        Coronavirus HKU1 by PCR Not detected        Coronavirus NL63 by PCR Not detected

## 2024-06-21 NOTE — CARE COORDINATION
Requested Case Management specialist to assist with transportation to:        [facility]  LTC        Address is:    [address] [place] [address]         and phone number is:    [phone]        Patient will require BLS transport.   Pt requires Stretcher If stretcher, reason: Severe Sepsis, UTI, Quadraplegic, Acute Encephalopathy, ARF, Impaired Mobility  Patient is currently requiring oxygen No   Height:  5\"7   Weight: 125 lbs  Pt is on isolation: Yes Isolation is for: MRSA, ESBL, CRE,   Is the pt ready now? No  Requested time: Today at 4 pm.  PCS Faxed: No  Insurance verified on face sheet: Yes  Auth needed for transport: Yes  CM completed PCS/ Envelope and placed on chart.

## 2024-06-21 NOTE — PROGRESS NOTES
acetaminophen (TYLENOL) suppository 650 mg  650 mg Rectal Q6H PRN Janette Miller PA            Physical Exam:     Physical Exam:   General:  Alert, cooperative, no distress, appears stated age.   Mouth/Throat: Lips, mucosa, and tongue normal. Teeth and gums normal.   Neck: Supple, symmetrical, trachea midline, no adenopathy, thyroid: no enlargement/tenderness/nodules, no carotid bruit and no JVD.   Lungs:   Clear to auscultation bilaterally.   Heart:  Regular rate and rhythm, S1, S2 normal, no murmur, click, rub or gallop.   Abdomen:   Soft, non-tender. Bowel sounds normal. No masses,  No organomegaly.   Extremities: Extremities normal, atraumatic, no cyanosis or edema,                         Data Review:    Labs: Results:   Chemistry Recent Labs     06/19/24  0429 06/20/24  1527 06/21/24  0358   GLUCOSE 88 105* 133*   * 143 141   K 4.4 3.9 3.3*   * 112* 110   CO2 23 26 24   BUN 58* 34* 22*   CREATININE 1.71* 1.20 1.10   GLOB 4.2*  --   --    ALT 59  --   --    AST 53*  --   --       CBC w/Diff Recent Labs     06/20/24  0450 06/20/24  1527 06/21/24  0358   WBC 13.6* 13.4* 10.7   RBC 3.56* 3.79* 3.74*   HGB 10.7* 11.5* 11.2*   HCT 34.1* 36.1 34.5*    152 161                      Impression:       Active Hospital Problems    Diagnosis Date Noted    History of stroke 06/19/2024    Septicemia ([facility]) 06/19/2024    Encounter for palliative care 06/19/2024    Goals of care, counseling/discussion 06/19/2024    Severe sepsis ([facility]) 06/18/2024    Debility 06/18/2024    TERRENCE (acute kidney injury) ([facility]) 06/18/2024    History of CVA with residual deficit 06/18/2024    Acute metabolic encephalopathy 06/18/2024    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  But now has  hyperkalemia.,  Acute renal failure also resolved      Plan:     Patient still needs hydration saltines IV fluid to D5W with potassium chloride 20 mill equivalent per liter and run at 75 cc/h.. Keep  Suprapubic catheter in.      ALEJANDRA BUSTILLO MD

## 2024-06-21 NOTE — PROGRESS NOTES
Physician Progress Note      PATIENT:               MARCOS FERNANDEZ  [facility] #:                  [id number]  :                       1945  ADMIT DATE:       2024 9:42 AM  DISCH DATE:  RESPONDING  PROVIDER #:        Shaka Kelley MD          QUERY TEXT:    Dr Limon  Patient admitted with sepsis. Documentation reflects 'Catheter associated UTI'   in HP.  If possible, please document in the progress notes and discharge   summary if 'Catheter associated UTI' was:    The medical record reflects the following:  Risk Factors: severe sepsis; TERRENCE; neurogenic bladder; suprapubic CATHETER in   place, CVA/ICH  Clinical Indicators:  notes' catheter associated UTI', UA on admission:   large leukocyte, moderate blood, trace ketones  Treatment: ID consulted, appreciate assistance, urology exchanged suprapubic   wright catheter on .  Options provided:  -- Catheter associated UTI confirmed after study  -- Catheter associated UTI treated and resolved  -- Catheter associated UTI ruled out after study  -- Other - I will add my own diagnosis  -- Disagree - Not applicable / Not valid  -- Disagree - Clinically unable to determine / Unknown  -- Refer to Clinical Documentation Reviewer    PROVIDER RESPONSE TEXT:    Catheter associated UTI confirmed after study.    Query created by: Allyssa Chamberlain on 2024 12:58 PM      Electronically signed by:  Shaka Kelley MD 2024 1:44 PM

## 2024-06-21 NOTE — CARE COORDINATION
Discharge order noted for today. Patient has been accepted to [facility] skilled nursing facility for LTC. Confirmed with [facility] that bed is available today.  Spoke with patient's daughter  and is agreeable to the transition plan today. Transport to facility has been arranged through [employer] Transport at 4 pm time. Patient's discharge summary has been forwarded to skilled nursing facility via Epic/CCLink, and was placed in Transport Envelope to go with patient back to LTC facility. Bedside RN, Amparo, has been updated to the transition plan. Discharge information has been updated on the AVS.  Please call report to [phone].            Shawna Alonso RN  Case Management [phone]

## 2024-06-21 NOTE — PROGRESS NOTES
Called x 2  unable to get in touch with daughter deborah Duenas @ [phone].   Dr. Limon made aware .

## 2024-06-21 NOTE — PROGRESS NOTES
Pharmacy Note     Meropenem  1000 mg Every 8 hours ordered for treatment of Bloodstream infection. Per [facility] Policy, Meropenem will be changed to Meropenem 1000 mg ONCE followed by 1000 mg  Every 12 hours.     Estimated Creatinine Clearance: Estimated Creatinine Clearance: 46 mL/min (based on SCr of 1.1 mg/dL).  Dialysis Status, TERRENCE, CKD: TERRENCE    BMI:  Body mass index is 19.68 kg/m².    Rationale for Adjustment:  [facility] B-Lactam extended infusion policy    Pharmacy will continue to monitor and adjust dose as necessary.      Please call with any questions.    Thank you,  Wilmer Garza [facility]

## 2024-06-21 NOTE — CARE COORDINATION
Transition of Care Plan to SNF/Rehab      Patient Name: Antwan Boucher                   YOB: 1945    SNF/Rehab Transition:  [facility] and meets criteria for admission.   Patient will transported by [employer] Transport and expected to leave at 4 pm.    Medicaid Long-term Services and Supports(LTSS) screening completion: No.   UAI/LTSS should already be on file at [facility] and [facility].    Three Inpatient Midnights for Medicare: Yes    Current Code Status:   Code Status: Full Code     Last Weight:   Wt Readings from Last 1 Encounters:   06/19/24 57 kg (125 lb 10.6 oz)       Weightbearing Status: NA    IV Medication, IV Site, Device Type: meropenem  infusion  Commonly known as: MERREM  Infuse 1,000 mg intravenously in the morning and 1,000 mg at noon and 1,000 mg in the evening. Do all this for 7 days. Compound per protocol.  PICC Line ordered.    Dialysis: No      O2 Needs (including O2, Bipap, Cpap, ect.): No    Covid Vaccine Dates/ if known:    Internal Administration   First Dose      Second Dose           Last COVID Lab SARS-CoV-2, PCR ( )   Date Value   06/18/2024 Not detected     POC Glucose (mg/dL)   Date Value   06/20/2024 82            Current Diet: ADULT DIET; Easy to Chew; Prefers chicken    Wound Vac or Other Equipment Needs: NA    Patient requires Isolation: Yes: Comment: MRSA, ESBL, CRE, .    Follow-up Appointment needed or scheduled: Follow-up: with PCP, Tia Quezada MD in 7-10days     Communication to SNF/Rehab:  Bedside RNAmparo, has been notified to update the transition plan to the facility and call report (phone number [phone]).  Discharge Summary has been updated on the AVS and communicated to facility via [email or web address], and was placed in Transport Envelope to go with patient back to LTC facility.    Nursing Please include all hard scripts for controlled substances, med rec and dc summary,  and AVS in packet.     Shawna Alonso  Case Management Department  Ph: [phone]

## 2024-06-21 NOTE — DISCHARGE SUMMARY
Discharge Summary    Patient: Antwan Boucher MRN: [id number]  CSN: [id number]    YOB: 1945  Age: 78 y.o.  Sex: male    DOA: 6/18/2024 LOS:  LOS: 4 days   Discharge Date:  6/22/2024      Admission Diagnosis: Severe sepsis (HCC) [A41.9, R65.20]    Discharge Diagnosis:  Gram-negative eimlia bacteremia, likely Klebsiella  Severe sepsis due to UTI  Acute renal failure  Hypernatremia  Suspected pneumonia   Acute encephalopathy  Functional quadriplegia  Suprapubic catheter  Hypoalbuminemia  Hyperglobulinemia  Hyperchloremia    Discharge Condition: Stable    Discharge Disposition: [facility]    PHYSICAL EXAM    Visit Vitals  BP (!) 143/75   Pulse 90   Temp 98.3 °F (36.8 °C) (Oral)   Resp 18   Ht 1.702 m (5' 7\")   Wt 59.1 kg (130 lb 4.7 oz)   SpO2 96%   BMI 20.41 kg/m²     General: Easily arousable, cooperative, no acute distress    HEENT: NC, Atraumatic.  PERRLA, EOMI.   Lungs:  CTA Bilaterally. No Wheezing/Rhonchi/Rales.  Heart:  Regular  rhythm,  No murmur, No Rubs, No Gallops  Abdomen: Soft, Non distended, Non tender.  +Bowel sounds, no HSM  Extremities: Bilateral contractures  Psych:   Good insight. Not anxious or agitated.  Neurologic:  CN 2-12 grossly intact, oriented X 3 however slowed.  No acute neurological deficits, residual left-sided hemiplegia    Hospital Course By Problem:   Patient initially presented to the emergency room from long-term care facility secondary to altered mentation.  Patient does have history of neurogenic bladder for which she has a suprapubic catheter placed.  Patient was worked up in the emergency room and found to have what appeared to be a gram-negative emilia bacteremia and severe sepsis likely secondary to urologic source.  Given such, patient was admitted and started treatment on appropriate antibiotics.  Cath exchange was done at bedside by urology.  Nephrology was consulted due to patient's hypernatremia and they assisted in gently correcting patient's

## 2024-06-21 NOTE — CARE COORDINATION
CM called patient's wife Elisa Boucher [phone], CM received voicemail, CM left message that patient is medically ready to discharge today, and transport is being scheduled for 4 pm today to transport patient back to [facility].   CM left phone number for a return call.             CM placed Transport Envelope with PCS form, 2 facesheets, and Discharge Summary in patient's chart for Medicaid Transport to transport patient back to [facility] today for discharge.                 Shawna Alonso, RN  Case Management [phone]

## 2024-06-21 NOTE — CARE COORDINATION
CM called patient's wife Elisa Boucher [phone] again, CM received voicemail, CM left message that patient is medically ready to discharge today, and transport is being scheduled for 4 pm today to transport patient back to [facility].   CM left phone number for a return call.       CM called and spoke with patient's daughter Judy Duenas [phone], and updated that CM called patient's wife twice, left message, and no return call, and CM let patient's daughter know that CM wanted to speak with a family member to let them know that patient will be discharging today at 4 pm back to [facility].   Patient's daughter asked about US results, CM let patient's daughter know that CM will ask the Doctor to call her and update.   Patient's daughter is agreeable to the discharge plan for today.       CM Perfect Served Dr Limon and asked to please call patient's daughter, name and phone number given, for US results.             Shawna Alonso, RN  Case Management [phone]

## 2024-06-22 VITALS
WEIGHT: 130.29 LBS | HEIGHT: 67 IN | HEART RATE: 90 BPM | DIASTOLIC BLOOD PRESSURE: 75 MMHG | BODY MASS INDEX: 20.45 KG/M2 | OXYGEN SATURATION: 96 % | RESPIRATION RATE: 18 BRPM | SYSTOLIC BLOOD PRESSURE: 143 MMHG | TEMPERATURE: 98.3 F

## 2024-06-22 LAB
BACTERIA SPEC CULT: ABNORMAL
BASOPHILS # BLD: 0.1 K/UL (ref 0–0.1)
BASOPHILS NFR BLD: 0 % (ref 0–2)
DIFFERENTIAL METHOD BLD: ABNORMAL
EOSINOPHIL # BLD: 0.3 K/UL (ref 0–0.4)
EOSINOPHIL NFR BLD: 3 % (ref 0–5)
ERYTHROCYTE [DISTWIDTH] IN BLOOD BY AUTOMATED COUNT: 16.4 % (ref 11.6–14.5)
GLUCOSE BLD STRIP.AUTO-MCNC: 113 MG/DL (ref 70–110)
GRAM STN SPEC: ABNORMAL
HCT VFR BLD AUTO: 34.9 % (ref 36–48)
HGB BLD-MCNC: 11.5 G/DL (ref 13–16)
IMM GRANULOCYTES # BLD AUTO: 0.7 K/UL (ref 0–0.04)
IMM GRANULOCYTES NFR BLD AUTO: 6 % (ref 0–0.5)
LYMPHOCYTES # BLD: 0.7 K/UL (ref 0.9–3.6)
LYMPHOCYTES NFR BLD: 6 % (ref 21–52)
MCH RBC QN AUTO: 30.3 PG (ref 24–34)
MCHC RBC AUTO-ENTMCNC: 33 G/DL (ref 31–37)
MCV RBC AUTO: 92.1 FL (ref 78–100)
MONOCYTES # BLD: 0.9 K/UL (ref 0.05–1.2)
MONOCYTES NFR BLD: 8 % (ref 3–10)
NEUTS SEG # BLD: 8.6 K/UL (ref 1.8–8)
NEUTS SEG NFR BLD: 77 % (ref 40–73)
NRBC # BLD: 0 K/UL (ref 0–0.01)
NRBC BLD-RTO: 0 PER 100 WBC
PLATELET # BLD AUTO: 175 K/UL (ref 135–420)
PMV BLD AUTO: 11.1 FL (ref 9.2–11.8)
RBC # BLD AUTO: 3.79 M/UL (ref 4.35–5.65)
SERVICE CMNT-IMP: ABNORMAL
SERVICE CMNT-IMP: ABNORMAL
WBC # BLD AUTO: 11.3 K/UL (ref 4.6–13.2)

## 2024-06-22 PROCEDURE — 6360000002 HC RX W HCPCS: Performed by: PHYSICIAN ASSISTANT

## 2024-06-22 PROCEDURE — 94761 N-INVAS EAR/PLS OXIMETRY MLT: CPT

## 2024-06-22 PROCEDURE — 2580000003 HC RX 258: Performed by: INTERNAL MEDICINE

## 2024-06-22 PROCEDURE — 6360000002 HC RX W HCPCS: Performed by: INTERNAL MEDICINE

## 2024-06-22 PROCEDURE — 2580000003 HC RX 258: Performed by: PHYSICIAN ASSISTANT

## 2024-06-22 PROCEDURE — 82962 GLUCOSE BLOOD TEST: CPT

## 2024-06-22 PROCEDURE — 85025 COMPLETE CBC W/AUTO DIFF WBC: CPT

## 2024-06-22 PROCEDURE — 36415 COLL VENOUS BLD VENIPUNCTURE: CPT

## 2024-06-22 RX ADMIN — MEROPENEM 1000 MG: 1 INJECTION, POWDER, FOR SOLUTION INTRAVENOUS at 14:01

## 2024-06-22 RX ADMIN — ENOXAPARIN SODIUM 30 MG: 100 INJECTION SUBCUTANEOUS at 08:17

## 2024-06-22 RX ADMIN — POTASSIUM CHLORIDE AND DEXTROSE MONOHYDRATE: 150; 5 INJECTION, SOLUTION INTRAVENOUS at 04:28

## 2024-06-22 RX ADMIN — SODIUM CHLORIDE, PRESERVATIVE FREE 10 ML: 5 INJECTION INTRAVENOUS at 08:15

## 2024-06-22 RX ADMIN — MEROPENEM 1000 MG: 1 INJECTION, POWDER, FOR SOLUTION INTRAVENOUS at 06:41

## 2024-06-22 ASSESSMENT — PAIN SCALES - GENERAL
PAINLEVEL_OUTOF10: 0
PAINLEVEL_OUTOF10: 0

## 2024-06-22 NOTE — CARE COORDINATION
Discharge orders noted.  Chart reviewed.  Pt to [facility].  Called [facility] [place] [phone].  They can receive pt today.    1220:  Called [employer] [phone] and spoke with Mel.  She stated they can arrange for 3:30pm transport and provider will be calling the Nurses station for ETA.  Trip # [id number]    1231:  Updated Nurse Amparo and she stated she notified pt's spouse Elisa.    1243:  Called [facility] and notified her of transport time.            Melina Ocampo, JEROMEN RN  Care Management

## 2024-06-22 NOTE — PROGRESS NOTES
Progress Note    Antwan Boucher  78 y.o.      Admit Date: 6/18/2024  [unfilled]        Subjective:     Patient is comfortable and stated taking p.o. food.  Still on the IV fluid.  Waiting to be transferred back to the nursing home sometime today labs being done this morning. .  Received amikacin      A comprehensive review of systems was negative except for that written in the History of Present Illness.    Objective:     BP (!) 143/75   Pulse 90   Temp 98.3 °F (36.8 °C) (Oral)   Resp 18   Ht 1.702 m (5' 7\")   Wt 59.1 kg (130 lb 4.7 oz)   SpO2 96%   BMI 20.41 kg/m²       Intake/Output Summary (Last 24 hours) at 6/22/2024 1158  Last data filed at 6/22/2024 1119  Gross per 24 hour   Intake 2545 ml   Output 2800 ml   Net -255 ml       Current Facility-Administered Medications   Medication Dose Route Frequency Provider Last Rate Last Admin    meropenem (MERREM) 1,000 mg in sodium chloride 0.9 % 100 mL IVPB (mini-bag)  1,000 mg IntraVENous Q12H Karson Kenney, DO   Stopped at 06/22/24 0941    sodium chloride flush 0.9 % injection 5-40 mL  5-40 mL IntraVENous 2 times per day Janette Miller PA   10 mL at 06/22/24 0815    sodium chloride flush 0.9 % injection 5-40 mL  5-40 mL IntraVENous PRN Janette Miller PA        0.9 % sodium chloride infusion   IntraVENous PRN Janette Miller PA        enoxaparin Sodium (LOVENOX) injection 30 mg  30 mg SubCUTAneous Daily Janette Miller PA   30 mg at 06/22/24 0817    ondansetron (ZOFRAN-ODT) disintegrating tablet 4 mg  4 mg Oral Q8H PRN Janette Miller PA        Or    ondansetron (ZOFRAN) injection 4 mg  4 mg IntraVENous Q6H PRN Janette Miller PA        polyethylene glycol (GLYCOLAX) packet 17 g  17 g Oral Daily PRN Janette Miller PA        bisacodyl (DULCOLAX) suppository 10 mg  10 mg Rectal Daily PRN Reprogle, Janette K, PA        acetaminophen (TYLENOL) tablet 650 mg  650 mg Oral Q6H PRN Janette Miller PA        Or

## 2024-06-22 NOTE — PROGRESS NOTES
Bedside shift change report given to MARCUS Guerrier (oncoming nurse) by MARCUS Christensen (offgoing nurse). Report included the following information Nurse Handoff Report, Cardiac Rhythm NSR, and Alarm Parameters.      1953: A&O x 2, on room air, denies any pain or discomfort, shift assessment done, VSS, all needs, no further concerns as of present.    2050: vascular access(PICC line inserted) by Vascular team 1 purple lumen with dressing intact, no oozing noted.    2200: dinner served with poor appetite, apple juice given and consumed 8oz.    2330: reassessment done, VSS, call light within reach, expresses no other needs this time.    0330: reassessment done, VSS, denies any pain or discomfort, bed in lowest position.    0500: CHG wipes rendered, gown and linens changed.    Bedside shift change report given to MARCUS Christensen (oncoming nurse) by MARCUS Guerrier (offgoing nurse). Report included the following information Nurse Handoff Report, Cardiac Rhythm NSR, and Alarm Parameters.

## 2024-06-22 NOTE — DISCHARGE INSTR - DIET

## 2024-06-24 LAB — AMIKACIN RANDOM: <0.8 UG/ML (ref 0.8–25)

## (undated) DEVICE — MEDI-VAC NON-CONDUCTIVE SUCTION TUBING: Brand: CARDINAL HEALTH

## (undated) DEVICE — FORCEPS BX L240CM JAW DIA2.8MM L CAP W/ NDL MIC MESH TOOTH

## (undated) DEVICE — SUTURE VCRL SZ 0 L18IN ABSRB UD POLYGLACTIN 910 BRAID TIE J912G

## (undated) DEVICE — CATHETER SUCT TR FL TIP 14FR W/ O CTRL

## (undated) DEVICE — SYRINGE MED 20ML STD CLR PLAS LUERLOCK TIP N CTRL DISP

## (undated) DEVICE — SOLUTION IRRIG 1000ML H2O STRL BLT

## (undated) DEVICE — MEDI-VAC SUCTION HIGH CAPACITY: Brand: CARDINAL HEALTH

## (undated) DEVICE — AIRLIFE™ NASAL OXYGEN CANNULA CURVED, FLARED TIP WITH 14 FOOT (4.3 M) CRUSH-RESISTANT TUBING, OVER-THE-EAR STYLE: Brand: AIRLIFE™

## (undated) DEVICE — (D)GLOVE EXAM LG NITRL NS -- DISC BY MFR NO SUB

## (undated) DEVICE — GOWN ISOL IMPERV UNIV, DISP, OPEN BACK, BLUE --

## (undated) DEVICE — (D)SYR 10ML 1/5ML GRAD NSAF -- PKGING CHANGE USE ITEM 338027

## (undated) DEVICE — Device: Brand: SPOT EX ENDOSCOPIC TATTOO

## (undated) DEVICE — FLUFF AND POLYMER UNDERPAD,EXTRA HEAVY: Brand: WINGS

## (undated) DEVICE — FLEX ADVANTAGE 3000CC: Brand: FLEX ADVANTAGE

## (undated) DEVICE — SNARE POLYP M W27MMXL240CM OVL STIFF DISP CAPTIVATOR

## (undated) DEVICE — NDL INJ SCLERO 25G 240CM -- INTERJECT M00518360 BX/5

## (undated) DEVICE — 3M™ DURAPORE™ SURGICAL TAPE 1538-3, 3 INCH X 10 YARD (7,5CM X 9,1M), 4 ROLLS/BOX: Brand: 3M™ DURAPORE™

## (undated) DEVICE — PACK PROCEDURE SURG LAPAROSCOPY COLORECTAL LF

## (undated) DEVICE — SUTURE VCRL SZ 0 L36IN ABSRB UD L36MM CT-1 1/2 CIR J946H

## (undated) DEVICE — STER SINGLE BASIN SET W/BOWLS: Brand: CARDINAL HEALTH

## (undated) DEVICE — SUTURE PDS II SZ 1 L36IN ABSRB VLT CTXB L48MM 1/2 CIR BLNT ZB371

## (undated) DEVICE — ENDOCUT SCISSOR TIP, DISPOSABLE: Brand: RENEW

## (undated) DEVICE — MARYLAND JAW LAPAROSCOPIC SEALER/DIVIDER: Brand: LIGASURE

## (undated) DEVICE — Device

## (undated) DEVICE — GAUZE SPONGES,16 PLY: Brand: CURITY

## (undated) DEVICE — SOFT SILICONE HYDROCELLULAR SACRUM DRESSING WITH LOCK AWAY LAYER: Brand: ALLEVYN LIFE SACRUM (LARGE) PACK OF 10

## (undated) DEVICE — BITE BLOCK ENDOSCP UNIV AD 6 TO 9.4 MM

## (undated) DEVICE — SYRINGE MED 25GA 3ML L5/8IN SUBQ PLAS W/ DETACH NDL SFTY

## (undated) DEVICE — SOLUTION IV 1000ML 0.9% SOD CHL

## (undated) DEVICE — CANNULA ORIG TL CLR W FOAM CUSHIONS AND 14FT SUPL TB 3 CHN

## (undated) DEVICE — TRAP SPEC COLL POLYP POLYSTYR --

## (undated) DEVICE — TRAY PREP DRY W/ PREM GLV 2 APPL 6 SPNG 2 UNDPD 1 OVERWRAP

## (undated) DEVICE — 3M™ BAIR PAWS FLEX™ WARMING GOWN, STANDARD, 20 PER CASE 81003: Brand: BAIR PAWS™

## (undated) DEVICE — STERILE POLYISOPRENE POWDER-FREE SURGICAL GLOVES: Brand: PROTEXIS

## (undated) DEVICE — INTENDED FOR TISSUE SEPARATION, AND OTHER PROCEDURES THAT REQUIRE A SHARP SURGICAL BLADE TO PUNCTURE OR CUT.: Brand: BARD-PARKER SAFETY BLADES SIZE 15, STERILE

## (undated) DEVICE — STAPLER INT RELD REG 60 MM VERY THCK TISS 2 ROW 4FIRING PROX

## (undated) DEVICE — CORD ES L10FT MPLR LAP

## (undated) DEVICE — SYR 10ML LUER LOK 1/5ML GRAD --

## (undated) DEVICE — TUBING IRRIG L77IN DIA0.241IN L BOR FOR CYSTO W/ NVENT

## (undated) DEVICE — CATH URETH FOL 2W MED 18FRX5 --

## (undated) DEVICE — VISUALIZATION SYSTEM: Brand: CLEARIFY

## (undated) DEVICE — CLIP INT XL YEL POLYMER HEM-O-LOK WECK

## (undated) DEVICE — BAG DRAINAGE CUST DISP

## (undated) DEVICE — BLADELESS OPTICAL TROCAR WITH FIXATION CANNULA: Brand: VERSAPORT

## (undated) DEVICE — BASIN EMESIS 500CC ROSE 250/CS 60/PLT: Brand: MEDEGEN MEDICAL PRODUCTS, LLC

## (undated) DEVICE — SOLUTION IRRIG 3000ML 0.9% SOD CHL FLX CONT 0797208] ICU MEDICAL INC]

## (undated) DEVICE — SUTURE MCRYL SZ 4-0 L27IN ABSRB UD L24MM PS-1 3/8 CIR PRIM Y935H

## (undated) DEVICE — SYR 50ML SLIP TIP NSAF LF STRL --

## (undated) DEVICE — BLADELESS OPTICAL TROCAR WITH FIXATION CANNULA: Brand: VERSAONE

## (undated) DEVICE — RELOAD STPL L75MM OPN H3.8MM CLS 1.5MM WIRE DIA0.2MM REG

## (undated) DEVICE — SUTURE VCRL SZ 2-0 L27IN ABSRB UD L26MM SH 1/2 CIR J417H

## (undated) DEVICE — ENDOSCOPY PUMP TUBING/ CAP SET: Brand: ERBE

## (undated) DEVICE — GOWN,PREVENTION PLUS,XLN/XL,ST,24/CS: Brand: MEDLINE

## (undated) DEVICE — STAPLER INT L75MM CUT LN L73MM STPL LN L77MM BLU B FRM 8

## (undated) DEVICE — Z DUP USE 2565107 PACK SURG PROC LEG CYSTO T-DRAPE REINF TBL CVR HND TWL

## (undated) DEVICE — AIRLIFE™ NASAL OXYGEN CANNULA CURVED, NONFLARED TIP WITH 14 FOOT (4.3 M) CRUSH-RESISTANT TUBING, OVER-THE-EAR STYLE: Brand: AIRLIFE™

## (undated) DEVICE — FCPS RAD JAW 4LC 240CM W/NDL -- BX/20 RADIAL JAW 4

## (undated) DEVICE — INSUFFLATION NEEDLE: Brand: SURGINEEDLE

## (undated) DEVICE — REM POLYHESIVE ADULT PATIENT RETURN ELECTRODE: Brand: VALLEYLAB

## (undated) DEVICE — LAPAROSCOPIC ACCESS SYSTEM: Brand: ALEXIS LAPAROSCOPIC SYSTEM WITH KII FIOS FIRST ENTRY

## (undated) DEVICE — KENDALL SCD EXPRESS SLEEVES, KNEE LENGTH, MEDIUM: Brand: KENDALL SCD

## (undated) DEVICE — PACK PROCEDURE SURG MAJ W/ BASIN LF

## (undated) DEVICE — (D)STRIP SKN CLSR 0.5X4IN WHT --

## (undated) DEVICE — KIT CLN UP BON SECOURS MARYV

## (undated) DEVICE — GDWIRE 3CM FLX-TIP 0.038X150CM -- BX/5 SENSOR

## (undated) DEVICE — COVER LT HNDL BLU STRL -- MEDICHOICE

## (undated) DEVICE — SUT PROL 2-0 30IN SH BLU --